# Patient Record
Sex: FEMALE | Race: WHITE | Employment: OTHER | URBAN - METROPOLITAN AREA
[De-identification: names, ages, dates, MRNs, and addresses within clinical notes are randomized per-mention and may not be internally consistent; named-entity substitution may affect disease eponyms.]

---

## 2017-02-07 RX ORDER — GABAPENTIN 300 MG/1
CAPSULE ORAL
Qty: 360 CAPSULE | Refills: 1 | Status: SHIPPED | OUTPATIENT
Start: 2017-02-07 | End: 2017-09-29 | Stop reason: SDUPTHER

## 2017-02-16 ENCOUNTER — TELEPHONE (OUTPATIENT)
Dept: INTERNAL MEDICINE CLINIC | Age: 72
End: 2017-02-16

## 2017-02-16 ENCOUNTER — OFFICE VISIT (OUTPATIENT)
Dept: RHEUMATOLOGY | Age: 72
End: 2017-02-16

## 2017-02-16 VITALS
BODY MASS INDEX: 29.41 KG/M2 | WEIGHT: 193.4 LBS | SYSTOLIC BLOOD PRESSURE: 122 MMHG | DIASTOLIC BLOOD PRESSURE: 74 MMHG | HEART RATE: 80 BPM

## 2017-02-16 DIAGNOSIS — F11.20 CHRONIC NARCOTIC DEPENDENCE (HCC): ICD-10-CM

## 2017-02-16 DIAGNOSIS — M34.9 SCLERODERMA (HCC): Primary | ICD-10-CM

## 2017-02-16 DIAGNOSIS — S33.9XXA SPRAIN AND STRAIN OF LUMBOSACRAL JOINT/LIGAMENT, INITIAL ENCOUNTER: ICD-10-CM

## 2017-02-16 DIAGNOSIS — M15.9 PRIMARY OSTEOARTHRITIS INVOLVING MULTIPLE JOINTS: ICD-10-CM

## 2017-02-16 DIAGNOSIS — M79.7 FIBROMYALGIA: ICD-10-CM

## 2017-02-16 PROCEDURE — G8484 FLU IMMUNIZE NO ADMIN: HCPCS | Performed by: INTERNAL MEDICINE

## 2017-02-16 PROCEDURE — G8427 DOCREV CUR MEDS BY ELIG CLIN: HCPCS | Performed by: INTERNAL MEDICINE

## 2017-02-16 PROCEDURE — 1090F PRES/ABSN URINE INCON ASSESS: CPT | Performed by: INTERNAL MEDICINE

## 2017-02-16 PROCEDURE — 1036F TOBACCO NON-USER: CPT | Performed by: INTERNAL MEDICINE

## 2017-02-16 PROCEDURE — G8420 CALC BMI NORM PARAMETERS: HCPCS | Performed by: INTERNAL MEDICINE

## 2017-02-16 PROCEDURE — 3014F SCREEN MAMMO DOC REV: CPT | Performed by: INTERNAL MEDICINE

## 2017-02-16 PROCEDURE — 4040F PNEUMOC VAC/ADMIN/RCVD: CPT | Performed by: INTERNAL MEDICINE

## 2017-02-16 PROCEDURE — 99213 OFFICE O/P EST LOW 20 MIN: CPT | Performed by: INTERNAL MEDICINE

## 2017-02-16 PROCEDURE — 3017F COLORECTAL CA SCREEN DOC REV: CPT | Performed by: INTERNAL MEDICINE

## 2017-02-16 PROCEDURE — G8399 PT W/DXA RESULTS DOCUMENT: HCPCS | Performed by: INTERNAL MEDICINE

## 2017-02-16 PROCEDURE — 1123F ACP DISCUSS/DSCN MKR DOCD: CPT | Performed by: INTERNAL MEDICINE

## 2017-02-16 RX ORDER — HYDROCODONE BITARTRATE AND ACETAMINOPHEN 7.5; 325 MG/1; MG/1
TABLET ORAL
Qty: 60 TABLET | Refills: 0 | Status: SHIPPED | OUTPATIENT
Start: 2017-03-27 | End: 2017-09-29 | Stop reason: SDUPTHER

## 2017-02-16 RX ORDER — HYDROCODONE BITARTRATE AND ACETAMINOPHEN 7.5; 325 MG/1; MG/1
TABLET ORAL
Qty: 60 TABLET | Refills: 0 | Status: SHIPPED | OUTPATIENT
Start: 2017-02-27 | End: 2017-09-29 | Stop reason: SDUPTHER

## 2017-02-16 RX ORDER — HYDROCODONE BITARTRATE AND ACETAMINOPHEN 7.5; 325 MG/1; MG/1
TABLET ORAL
Qty: 60 TABLET | Refills: 0 | Status: SHIPPED | OUTPATIENT
Start: 2017-04-27 | End: 2017-09-29 | Stop reason: SDUPTHER

## 2017-02-16 RX ORDER — CYCLOBENZAPRINE HCL 10 MG
10 TABLET ORAL 3 TIMES DAILY PRN
Qty: 60 TABLET | Refills: 1 | Status: SHIPPED | OUTPATIENT
Start: 2017-02-16 | End: 2017-02-16 | Stop reason: SDUPTHER

## 2017-02-16 RX ORDER — CYCLOBENZAPRINE HCL 10 MG
10 TABLET ORAL 3 TIMES DAILY PRN
Qty: 60 TABLET | Refills: 1 | Status: SHIPPED | OUTPATIENT
Start: 2017-02-16 | End: 2017-03-14 | Stop reason: SDUPTHER

## 2017-02-22 LAB
6-ACETYLMORPHINE: NOT DETECTED
7-AMINOCLONAZEPAM: NOT DETECTED
ALPHA-OH-ALPRAZOLAM: NOT DETECTED
ALPRAZOLAM: NOT DETECTED
AMPHETAMINE: NOT DETECTED
BARBITURATES: NOT DETECTED
BENZOYLECGONINE: NOT DETECTED
BUPRENORPHINE: NOT DETECTED
CARISOPRODOL: NOT DETECTED
CLONAZEPAM: NOT DETECTED
CODEINE: NOT DETECTED
CREATININE URINE: 124.9 MG/DL (ref 20–400)
DIAZEPAM: NOT DETECTED
DRUGS EXPECTED: NORMAL
EER PAIN MGT DRUG PANEL, HIGH RES/EMIT U: NORMAL
ETHYL GLUCURONIDE: NOT DETECTED
FENTANYL: NOT DETECTED
HYDROCODONE: PRESENT
HYDROMORPHONE: NOT DETECTED
LORAZEPAM: NOT DETECTED
MARIJUANA METABOLITE: NOT DETECTED
MDA: NOT DETECTED
MDEA: NOT DETECTED
MDMA URINE: NOT DETECTED
MEPERIDINE: NOT DETECTED
METHADONE: NOT DETECTED
METHAMPHETAMINE: NOT DETECTED
METHYLPHENIDATE: NOT DETECTED
MIDAZOLAM: NOT DETECTED
MORPHINE: NOT DETECTED
NORBUPRENORPHINE, FREE: NOT DETECTED
NORDIAZEPAM: NOT DETECTED
NORFENTANYL: NOT DETECTED
NORHYDROCODONE, URINE: PRESENT
NOROXYCODONE: NOT DETECTED
NOROXYMORPHONE, URINE: NOT DETECTED
OXAZEPAM: NOT DETECTED
OXYCODONE: NOT DETECTED
OXYMORPHONE: NOT DETECTED
PAIN MANAGEMENT DRUG PANEL: NORMAL
PAIN MANAGEMENT DRUG PANEL: NORMAL
PCP: NOT DETECTED
PHENTERMINE: NOT DETECTED
PROPOXYPHENE: NOT DETECTED
TAPENTADOL, URINE: NOT DETECTED
TAPENTADOL-O-SULFATE, URINE: NOT DETECTED
TEMAZEPAM: NOT DETECTED
TRAMADOL: NOT DETECTED
ZOLPIDEM: NOT DETECTED

## 2017-03-14 DIAGNOSIS — S33.9XXA SPRAIN AND STRAIN OF LUMBOSACRAL JOINT/LIGAMENT, INITIAL ENCOUNTER: ICD-10-CM

## 2017-03-14 RX ORDER — CYCLOBENZAPRINE HCL 10 MG
10 TABLET ORAL 3 TIMES DAILY PRN
Qty: 60 TABLET | Refills: 1 | Status: SHIPPED | OUTPATIENT
Start: 2017-03-14 | End: 2017-03-21 | Stop reason: SDUPTHER

## 2017-03-21 DIAGNOSIS — S33.9XXA SPRAIN AND STRAIN OF LUMBOSACRAL JOINT/LIGAMENT, INITIAL ENCOUNTER: ICD-10-CM

## 2017-03-22 RX ORDER — CYCLOBENZAPRINE HCL 10 MG
10 TABLET ORAL 3 TIMES DAILY PRN
Qty: 270 TABLET | Refills: 0 | Status: SHIPPED | OUTPATIENT
Start: 2017-03-22 | End: 2017-09-29 | Stop reason: SDUPTHER

## 2017-09-29 ENCOUNTER — OFFICE VISIT (OUTPATIENT)
Dept: RHEUMATOLOGY | Age: 72
End: 2017-09-29

## 2017-09-29 VITALS
WEIGHT: 183 LBS | HEART RATE: 93 BPM | SYSTOLIC BLOOD PRESSURE: 127 MMHG | BODY MASS INDEX: 27.83 KG/M2 | DIASTOLIC BLOOD PRESSURE: 54 MMHG

## 2017-09-29 DIAGNOSIS — E55.9 VITAMIN D DEFICIENCY: ICD-10-CM

## 2017-09-29 DIAGNOSIS — Z78.0 POSTMENOPAUSAL STATE: ICD-10-CM

## 2017-09-29 DIAGNOSIS — M34.9 SCLERODERMA (HCC): Primary | ICD-10-CM

## 2017-09-29 DIAGNOSIS — G89.29 ENCOUNTER FOR CHRONIC PAIN MANAGEMENT: ICD-10-CM

## 2017-09-29 DIAGNOSIS — M79.7 FIBROMYALGIA: ICD-10-CM

## 2017-09-29 DIAGNOSIS — S33.9XXA SPRAIN AND STRAIN OF LUMBOSACRAL JOINT/LIGAMENT, INITIAL ENCOUNTER: ICD-10-CM

## 2017-09-29 PROCEDURE — 4040F PNEUMOC VAC/ADMIN/RCVD: CPT | Performed by: INTERNAL MEDICINE

## 2017-09-29 PROCEDURE — G8427 DOCREV CUR MEDS BY ELIG CLIN: HCPCS | Performed by: INTERNAL MEDICINE

## 2017-09-29 PROCEDURE — 3017F COLORECTAL CA SCREEN DOC REV: CPT | Performed by: INTERNAL MEDICINE

## 2017-09-29 PROCEDURE — 99214 OFFICE O/P EST MOD 30 MIN: CPT | Performed by: INTERNAL MEDICINE

## 2017-09-29 PROCEDURE — 1123F ACP DISCUSS/DSCN MKR DOCD: CPT | Performed by: INTERNAL MEDICINE

## 2017-09-29 PROCEDURE — 1090F PRES/ABSN URINE INCON ASSESS: CPT | Performed by: INTERNAL MEDICINE

## 2017-09-29 PROCEDURE — G8399 PT W/DXA RESULTS DOCUMENT: HCPCS | Performed by: INTERNAL MEDICINE

## 2017-09-29 PROCEDURE — 3014F SCREEN MAMMO DOC REV: CPT | Performed by: INTERNAL MEDICINE

## 2017-09-29 PROCEDURE — G8419 CALC BMI OUT NRM PARAM NOF/U: HCPCS | Performed by: INTERNAL MEDICINE

## 2017-09-29 PROCEDURE — 1036F TOBACCO NON-USER: CPT | Performed by: INTERNAL MEDICINE

## 2017-09-29 RX ORDER — CYCLOBENZAPRINE HCL 10 MG
10 TABLET ORAL 3 TIMES DAILY PRN
Qty: 270 TABLET | Refills: 1 | Status: SHIPPED | OUTPATIENT
Start: 2017-09-29 | End: 2018-07-03 | Stop reason: SDUPTHER

## 2017-09-29 RX ORDER — HYDROCODONE BITARTRATE AND ACETAMINOPHEN 7.5; 325 MG/1; MG/1
TABLET ORAL
Qty: 60 TABLET | Refills: 0 | Status: SHIPPED | OUTPATIENT
Start: 2017-09-29 | End: 2017-12-19 | Stop reason: SDUPTHER

## 2017-09-29 RX ORDER — HYDROCODONE BITARTRATE AND ACETAMINOPHEN 7.5; 325 MG/1; MG/1
TABLET ORAL
Qty: 60 TABLET | Refills: 0 | Status: SHIPPED | OUTPATIENT
Start: 2017-10-29 | End: 2017-12-19 | Stop reason: SDUPTHER

## 2017-09-29 RX ORDER — GABAPENTIN 300 MG/1
CAPSULE ORAL
Qty: 360 CAPSULE | Refills: 1 | Status: SHIPPED | OUTPATIENT
Start: 2017-09-29 | End: 2018-07-03 | Stop reason: SDUPTHER

## 2017-09-29 RX ORDER — HYDROCODONE BITARTRATE AND ACETAMINOPHEN 7.5; 325 MG/1; MG/1
TABLET ORAL
Qty: 60 TABLET | Refills: 0 | Status: SHIPPED | OUTPATIENT
Start: 2017-11-29 | End: 2018-07-03 | Stop reason: SDUPTHER

## 2017-10-11 ENCOUNTER — TELEPHONE (OUTPATIENT)
Dept: INTERNAL MEDICINE CLINIC | Age: 72
End: 2017-10-11

## 2017-10-11 NOTE — TELEPHONE ENCOUNTER
Pt called stating she had a message to call Lucho Route from Dr. Uday Alba office. She states it was to go over lab results but I don't see anything in the chart. Please call the pt.

## 2017-10-26 ENCOUNTER — TELEPHONE (OUTPATIENT)
Dept: RHEUMATOLOGY | Age: 72
End: 2017-10-26

## 2017-11-07 ENCOUNTER — TELEPHONE (OUTPATIENT)
Dept: INTERNAL MEDICINE CLINIC | Age: 72
End: 2017-11-07

## 2017-11-07 ENCOUNTER — OFFICE VISIT (OUTPATIENT)
Dept: RHEUMATOLOGY | Age: 72
End: 2017-11-07

## 2017-11-07 VITALS
DIASTOLIC BLOOD PRESSURE: 80 MMHG | BODY MASS INDEX: 28.13 KG/M2 | WEIGHT: 185 LBS | HEART RATE: 82 BPM | SYSTOLIC BLOOD PRESSURE: 120 MMHG

## 2017-11-07 DIAGNOSIS — M79.10 MYALGIA: Primary | ICD-10-CM

## 2017-11-07 PROCEDURE — 1123F ACP DISCUSS/DSCN MKR DOCD: CPT | Performed by: INTERNAL MEDICINE

## 2017-11-07 PROCEDURE — G8427 DOCREV CUR MEDS BY ELIG CLIN: HCPCS | Performed by: INTERNAL MEDICINE

## 2017-11-07 PROCEDURE — 1036F TOBACCO NON-USER: CPT | Performed by: INTERNAL MEDICINE

## 2017-11-07 PROCEDURE — 3014F SCREEN MAMMO DOC REV: CPT | Performed by: INTERNAL MEDICINE

## 2017-11-07 PROCEDURE — 1090F PRES/ABSN URINE INCON ASSESS: CPT | Performed by: INTERNAL MEDICINE

## 2017-11-07 PROCEDURE — 4040F PNEUMOC VAC/ADMIN/RCVD: CPT | Performed by: INTERNAL MEDICINE

## 2017-11-07 PROCEDURE — 99213 OFFICE O/P EST LOW 20 MIN: CPT | Performed by: INTERNAL MEDICINE

## 2017-11-07 PROCEDURE — G8419 CALC BMI OUT NRM PARAM NOF/U: HCPCS | Performed by: INTERNAL MEDICINE

## 2017-11-07 PROCEDURE — 3017F COLORECTAL CA SCREEN DOC REV: CPT | Performed by: INTERNAL MEDICINE

## 2017-11-07 PROCEDURE — G8484 FLU IMMUNIZE NO ADMIN: HCPCS | Performed by: INTERNAL MEDICINE

## 2017-11-07 PROCEDURE — G8399 PT W/DXA RESULTS DOCUMENT: HCPCS | Performed by: INTERNAL MEDICINE

## 2017-11-07 RX ORDER — OMEPRAZOLE 20 MG/1
20 TABLET, DELAYED RELEASE ORAL DAILY
Qty: 30 TABLET | Refills: 3 | Status: SHIPPED | OUTPATIENT
Start: 2017-11-07 | End: 2017-12-19 | Stop reason: ALTCHOICE

## 2017-11-07 RX ORDER — DICLOFENAC SODIUM 75 MG/1
75 TABLET, DELAYED RELEASE ORAL 2 TIMES DAILY
Qty: 60 TABLET | Refills: 4 | Status: SHIPPED | OUTPATIENT
Start: 2017-11-07 | End: 2017-12-19 | Stop reason: ALTCHOICE

## 2017-11-07 RX ORDER — DICLOFENAC SODIUM 75 MG/1
75 TABLET, DELAYED RELEASE ORAL 2 TIMES DAILY
Qty: 60 TABLET | Refills: 1 | Status: SHIPPED | OUTPATIENT
Start: 2017-11-07 | End: 2017-11-07

## 2017-11-07 NOTE — PROGRESS NOTES
Provider, MD     Allergies   Allergen Reactions    Cortisone Other (See Comments)    Pcn [Penicillins]     Scopolamine Sulfate        Past medical/surgical history, medications and allergies are reviewed and updated as appropriate. Subjective:        Review of Systems  CONSTITUTIONAL:    One-time episode of rigors  GI:Denies having any gastrointestinal upset or side effects associated with medications  RESPIRATORY:Stable breathing  CARDIOVASCULAR:  negative for Costochondritis  GI: Stable swallowing. Denies frequent choking episodes. MUSCULOSKELETAL: Diffuse myalgias    severe and much worse  BEHAVIOR/PSYCH:  negative for depressed mood and psychosis. Poor sleep secondary to her joint symptoms  SKIN: Stable Raynaud's      Objective: Wt Readings from Last 3 Encounters:   11/07/17 185 lb (83.9 kg)   09/29/17 183 lb (83 kg)   02/16/17 193 lb 6.4 oz (87.7 kg)       BP Readings from Last 3 Encounters:   11/07/17 120/80   09/29/17 (!) 127/54   02/16/17 122/74     Pulse Readings from Last 3 Encounters:   11/07/17 82   09/29/17 93   02/16/17 80         General:  Awake, alert, NAD. Able to ambulate with assistance of a single prong cane  HEENT: no evidence of any oral ulcers lesions or sores  LUNGS: clear to auscultation bilaterally  CARDIOVASCULAR: regular rate  MUSCULOSKELETAL: No evidence of any synovitis, swelling, warmth, or limitation of motion noted over patient's upper or lower peripheral joints. Able to make a complete fist bilaterally. Intraosseous muscle wasting bilaterally. Full range of motion of bilateral elbows and shoulders with mild tenderness with right greater than left internal rotation. SKIN: no evidence of any morphea, skin hardening over bilateral hands neck or back. Noted scars involving the left lower leg.      Labs:         Chemistry        Component Value Date/Time     02/25/2015 1201    K 4.5 02/25/2015 1201    CL 99 02/25/2015 1201    CO2 30 02/25/2015 1201    BUN 22 02/25/2015 1201    CREATININE 0.90 02/25/2015 1201        Component Value Date/Time    CALCIUM 9.9 02/25/2015 1201    ALKPHOS 66 01/06/2015 1520    AST 40 (H) 01/06/2015 1520    ALT 52 (H) 01/06/2015 1520    BILITOT 0.6 01/06/2015 1520        Lab Results   Component Value Date    WBC 12.9 (H) 01/06/2015    HGB 14.0 01/06/2015    HCT 43.1 01/06/2015    MCV 91.4 01/06/2015     01/06/2015     Results for Edwardo Barger (MRN <[id number]>) as of 5/27/2014 16:08   Ref. Range 2/27/2014 16:03   Angio Convert Enzyme Latest Range: 9-67 U/L 70 (H)   Total CK Latest Range:  U/L 180     Results for Edwardo Barger (MRN <[id number]>) as of 5/27/2014 16:08   Ref. Range 2/27/2014 16:03   CHRISTOFER Latest Range: Negative  POSITIVE (A)   CHRISTOFER TITER No range found 1:160   ANCA IFA Latest Range: <1:20  <1:20   CHRISTOFER Interpretation No range found see below   CHRISTOFER Pattern 1 No range found Centromere         Assessment/Plan:      Assessment/Plan:  Lenny Worrell was seen today for follow-up. Diagnoses and all orders for this visit:    Myalgia- question if this may be a viral illness versus medication-induced side effect from the Bactrim. Patient is allergic to prednisone. Plan will be to temporarily start her on diclofenac for her to use the Prilosec given her significant reflux symptoms. Risks, side effects and warning signs were fully discussed with patient. Reading information was given to patient to review. She was advised to only use the diclofenac for the next 14 days. In addition, she was advised to call her PCP if her symptoms worsened or if she developed signs of an infection.     -     Discontinue: diclofenac (VOLTAREN) 75 MG EC tablet; Take 1 tablet by mouth 2 times daily  -     diclofenac (VOLTAREN) 75 MG EC tablet; Take 1 tablet by mouth 2 times daily  -     omeprazole (PRILOSEC OTC) 20 MG tablet; Take 1 tablet by mouth daily      Return as Scheduled.       The risks and benefits of my recommendations, as well as other

## 2017-12-19 ENCOUNTER — OFFICE VISIT (OUTPATIENT)
Dept: RHEUMATOLOGY | Age: 72
End: 2017-12-19

## 2017-12-19 VITALS
SYSTOLIC BLOOD PRESSURE: 118 MMHG | RESPIRATION RATE: 14 BRPM | BODY MASS INDEX: 28.55 KG/M2 | DIASTOLIC BLOOD PRESSURE: 68 MMHG | HEART RATE: 84 BPM | WEIGHT: 187.8 LBS

## 2017-12-19 DIAGNOSIS — G89.29 ENCOUNTER FOR CHRONIC PAIN MANAGEMENT: ICD-10-CM

## 2017-12-19 DIAGNOSIS — M79.7 FIBROMYALGIA: ICD-10-CM

## 2017-12-19 DIAGNOSIS — M34.9 SCLERODERMA (HCC): Primary | ICD-10-CM

## 2017-12-19 PROCEDURE — G8484 FLU IMMUNIZE NO ADMIN: HCPCS | Performed by: INTERNAL MEDICINE

## 2017-12-19 PROCEDURE — 1036F TOBACCO NON-USER: CPT | Performed by: INTERNAL MEDICINE

## 2017-12-19 PROCEDURE — 4040F PNEUMOC VAC/ADMIN/RCVD: CPT | Performed by: INTERNAL MEDICINE

## 2017-12-19 PROCEDURE — G8419 CALC BMI OUT NRM PARAM NOF/U: HCPCS | Performed by: INTERNAL MEDICINE

## 2017-12-19 PROCEDURE — 99213 OFFICE O/P EST LOW 20 MIN: CPT | Performed by: INTERNAL MEDICINE

## 2017-12-19 PROCEDURE — 3017F COLORECTAL CA SCREEN DOC REV: CPT | Performed by: INTERNAL MEDICINE

## 2017-12-19 PROCEDURE — 1123F ACP DISCUSS/DSCN MKR DOCD: CPT | Performed by: INTERNAL MEDICINE

## 2017-12-19 PROCEDURE — G8399 PT W/DXA RESULTS DOCUMENT: HCPCS | Performed by: INTERNAL MEDICINE

## 2017-12-19 PROCEDURE — G8427 DOCREV CUR MEDS BY ELIG CLIN: HCPCS | Performed by: INTERNAL MEDICINE

## 2017-12-19 PROCEDURE — 3014F SCREEN MAMMO DOC REV: CPT | Performed by: INTERNAL MEDICINE

## 2017-12-19 PROCEDURE — 1090F PRES/ABSN URINE INCON ASSESS: CPT | Performed by: INTERNAL MEDICINE

## 2017-12-19 RX ORDER — HYDROCODONE BITARTRATE AND ACETAMINOPHEN 7.5; 325 MG/1; MG/1
TABLET ORAL
Qty: 60 TABLET | Refills: 0 | Status: SHIPPED | OUTPATIENT
Start: 2018-01-29 | End: 2018-04-03 | Stop reason: SDUPTHER

## 2017-12-19 RX ORDER — HYDROCODONE BITARTRATE AND ACETAMINOPHEN 7.5; 325 MG/1; MG/1
TABLET ORAL
Qty: 60 TABLET | Refills: 0 | Status: SHIPPED | OUTPATIENT
Start: 2017-12-29 | End: 2018-04-03 | Stop reason: SDUPTHER

## 2017-12-19 RX ORDER — HYDROCODONE BITARTRATE AND ACETAMINOPHEN 7.5; 325 MG/1; MG/1
TABLET ORAL
Qty: 60 TABLET | Refills: 0 | Status: SHIPPED | OUTPATIENT
Start: 2018-02-28 | End: 2018-04-03 | Stop reason: SDUPTHER

## 2017-12-19 NOTE — PROGRESS NOTES
[facility]) Physicians  [facility]  Rheumatology Progress Note  Jadiel Miles MD           [ X ] [facility] Rheumatology        [ ] [facility] Rheumatology                                      [address], [address]      [phone]                [phone]      Fax: [phone]                 Fax: [phone]           _______________________________________________    Patient Name:  Alana Cheng is a 74 y.o. patient     Chief Complaint:     Returns today for follow-up of her scleroderma, fibromyalgia and for her chronic pain management. Since last seen in general she has been stable on current medication. She remains off of all medications for her scleroderma. Her breathing symptoms gastrointestinal symptoms have both been stable. She remarks that she has not been able to keep up her appointments with Dr. Arjun Galindo secondary to her sister's illness and her increased responsibilities. But she's not getting short of breath as often as she had. Denies any skin changes or hardening. With the cooler weather she's noticing increase in her Raynauds symptoms. Denies having any ulcerations. She is able to take care of her ADLs without any assistance. Chronically has diffuse myalgias but denies any new swelling of her joints. From where she's had surgery involving her left lower extremity she chronically has some mild swelling involving her left knee leg and ankle. No recent laboratory studies have been obtained. Chronically she remains on Norco taken dailytwice a day to help her with her chronic pain management. Past medical history, surgical history, and medications were reviewed and updated accordingly.        Past Medical History:   Diagnosis Date    Diabetes mellitus ([address]     Fatty liver     Severe    Fibromyalgia     GERD (gastroesophageal reflux disease)     Hypertension     IBS (irritable bowel syndrome)     Osteoarthritis     Peripheral neuropathy (HCC)     Scleroderma ([facility] [address]     crest     Past Surgical History:   Procedure Laterality Date    ARTHROPLASTY  08/10/2012    FIFTH TOE LEFT FOOT    CHOLECYSTECTOMY  1989    FIBULA FRACTURE SURGERY  2017    3 separate surgeries for a fractured left fibula tibial a closed done by Dr. Jayson Khan  2007    THROAT SURGERY  2012    vocal cord polyp           possibly  Prior to Admission medications    Medication Sig Start Date End Date Taking? Authorizing Provider   Biotin (BIOTIN 5000) 5 MG CAPS Take  by mouth daily. Yes Historical Provider, MD   hydroxychloroquine (PLAQUENIL) 200 MG tablet Take 1 tablet by mouth 2 times daily. 7/24/12 7/24/13 Yes Carleen Gomez MD   Naproxen-Esomeprazole (VIMOVO) 500-20 MG TBEC Take 500 mg by mouth 2 times daily (with meals). 5/24/12  Yes Carleen Gomez MD   GLIPIZIDE Take 5 mg by mouth. Yes Historical Provider, MD   losartan-hydrochlorothiazide (HYZAAR) 50-12.5 MG per tablet Take 1 tablet by mouth daily. Yes Historical Provider, MD   gabapentin (NEURONTIN) 100 MG capsule Take 200 mg by mouth 2 times daily. 200mg in am and 300 in pm    Yes Historical Provider, MD   potassium chloride (KLOR-CON 10) 10 MEQ CR tablet Take 10 mEq by mouth daily. Yes Historical Provider, MD   trazodone (DESYREL) 50 MG tablet Take 50 mg by mouth nightly. Yes Historical Provider, MD   paroxetine (PAXIL) 20 MG tablet Take 20 mg by mouth every morning. Yes Historical Provider, MD   esomeprazole (NEXIUM) 40 MG capsule Take 40 mg by mouth every morning (before breakfast). Yes Historical Provider, MD   hydrocodone-acetaminophen (VICODIN) 5-500 MG per tablet Take 1 tablet by mouth every 8 hours as needed. Yes Historical Provider, MD   aspirin 81 MG chewable tablet Take 81 mg by mouth daily.      Yes Historical MG per tablet; Take 1 tab every 12 hrs prn for pain. Earliest Fill Date: 1/29/18  -     HYDROcodone-acetaminophen (NORCO) 7.5-325 MG per tablet; Take 1 tab every 12 hrs prn for pain. Beatriz Diaz Date: 12/29/17  -     HYDROcodone-acetaminophen (Karole Dakins) 7.5-325 MG per tablet; Take 1 tab every 12 hrs prn for pain. Earliest Fill Date: 2/28/18  -     Controlled Substances Monitoring:     Attestation: The Prescription Monitoring Report for this patient was reviewed today. Justin Dickinson MD)  Documentation: No signs of potential drug abuse or diversion identified. Justin Dickinson MD)  Chronic Pain: Treatment objectives documented - patient is progressing appropriately. Justin Dickinson MD)         Return in about 90 days (around 3/19/2018). The risks and benefits of my recommendations, as well as other treatment options, benefits and side effects were discussed with the patient today. Questions were answered. [address] [facility]  [facility] and Rheumatology  [address] [place] [address] Dr Rodriguez S [address]  [facility]:[phone]  [facility]:[phone]    NOTE: This report is transcribed by using voice recognition software dragon. Every effort is made to ensure accuracy; however, inadvertent computerized transcription errors may be present.

## 2018-04-03 ENCOUNTER — OFFICE VISIT (OUTPATIENT)
Dept: RHEUMATOLOGY | Age: 73
End: 2018-04-03

## 2018-04-03 VITALS
SYSTOLIC BLOOD PRESSURE: 138 MMHG | BODY MASS INDEX: 29.22 KG/M2 | DIASTOLIC BLOOD PRESSURE: 82 MMHG | WEIGHT: 192.2 LBS | HEART RATE: 102 BPM

## 2018-04-03 DIAGNOSIS — M79.7 FIBROMYALGIA: ICD-10-CM

## 2018-04-03 DIAGNOSIS — G89.29 ENCOUNTER FOR CHRONIC PAIN MANAGEMENT: ICD-10-CM

## 2018-04-03 DIAGNOSIS — M34.9 SCLERODERMA (HCC): ICD-10-CM

## 2018-04-03 DIAGNOSIS — M34.9 SCLERODERMA (HCC): Primary | ICD-10-CM

## 2018-04-03 LAB
ALBUMIN SERPL-MCNC: 4.6 G/DL (ref 3.4–5)
ALP BLD-CCNC: 78 U/L (ref 40–129)
ALT SERPL-CCNC: 49 U/L (ref 10–40)
AST SERPL-CCNC: 49 U/L (ref 15–37)
BACTERIA: ABNORMAL /HPF
BILIRUB SERPL-MCNC: 0.5 MG/DL (ref 0–1)
BILIRUBIN DIRECT: <0.2 MG/DL (ref 0–0.3)
BILIRUBIN URINE: NEGATIVE
BILIRUBIN, INDIRECT: ABNORMAL MG/DL (ref 0–1)
BLOOD, URINE: NEGATIVE
C-REACTIVE PROTEIN: 4.5 MG/L (ref 0–5.1)
CLARITY: CLEAR
COLOR: YELLOW
CREAT SERPL-MCNC: 0.9 MG/DL (ref 0.6–1.2)
EPITHELIAL CELLS, UA: 2 /HPF (ref 0–5)
GFR AFRICAN AMERICAN: >60
GFR NON-AFRICAN AMERICAN: >60
GLUCOSE URINE: 100 MG/DL
HCT VFR BLD CALC: 41.4 % (ref 36–48)
HEMOGLOBIN: 13.8 G/DL (ref 12–16)
HYALINE CASTS: 0 /LPF (ref 0–8)
KETONES, URINE: NEGATIVE MG/DL
LEUKOCYTE ESTERASE, URINE: ABNORMAL
MCH RBC QN AUTO: 29.8 PG (ref 26–34)
MCHC RBC AUTO-ENTMCNC: 33.5 G/DL (ref 31–36)
MCV RBC AUTO: 89.1 FL (ref 80–100)
MICROSCOPIC EXAMINATION: YES
NITRITE, URINE: NEGATIVE
PDW BLD-RTO: 14.2 % (ref 12.4–15.4)
PH UA: 7
PLATELET # BLD: 193 K/UL (ref 135–450)
PMV BLD AUTO: 9.5 FL (ref 5–10.5)
PROTEIN UA: NEGATIVE MG/DL
RBC # BLD: 4.64 M/UL (ref 4–5.2)
RBC UA: ABNORMAL /HPF (ref 0–2)
RHEUMATOID FACTOR: <10 IU/ML
SPECIFIC GRAVITY UA: 1.02
TOTAL PROTEIN: 7.2 G/DL (ref 6.4–8.2)
URINE TYPE: ABNORMAL
UROBILINOGEN, URINE: 0.2 E.U./DL
WBC # BLD: 10.6 K/UL (ref 4–11)
WBC UA: 9 /HPF (ref 0–5)

## 2018-04-03 PROCEDURE — G8427 DOCREV CUR MEDS BY ELIG CLIN: HCPCS | Performed by: INTERNAL MEDICINE

## 2018-04-03 PROCEDURE — 1036F TOBACCO NON-USER: CPT | Performed by: INTERNAL MEDICINE

## 2018-04-03 PROCEDURE — G8399 PT W/DXA RESULTS DOCUMENT: HCPCS | Performed by: INTERNAL MEDICINE

## 2018-04-03 PROCEDURE — 4040F PNEUMOC VAC/ADMIN/RCVD: CPT | Performed by: INTERNAL MEDICINE

## 2018-04-03 PROCEDURE — 1090F PRES/ABSN URINE INCON ASSESS: CPT | Performed by: INTERNAL MEDICINE

## 2018-04-03 PROCEDURE — 3017F COLORECTAL CA SCREEN DOC REV: CPT | Performed by: INTERNAL MEDICINE

## 2018-04-03 PROCEDURE — 1123F ACP DISCUSS/DSCN MKR DOCD: CPT | Performed by: INTERNAL MEDICINE

## 2018-04-03 PROCEDURE — 99214 OFFICE O/P EST MOD 30 MIN: CPT | Performed by: INTERNAL MEDICINE

## 2018-04-03 PROCEDURE — G8419 CALC BMI OUT NRM PARAM NOF/U: HCPCS | Performed by: INTERNAL MEDICINE

## 2018-04-03 RX ORDER — HYDROCODONE BITARTRATE AND ACETAMINOPHEN 7.5; 325 MG/1; MG/1
TABLET ORAL
Qty: 60 TABLET | Refills: 0 | Status: SHIPPED | OUTPATIENT
Start: 2018-06-02 | End: 2018-07-03 | Stop reason: SDUPTHER

## 2018-04-03 RX ORDER — HYDROCODONE BITARTRATE AND ACETAMINOPHEN 7.5; 325 MG/1; MG/1
TABLET ORAL
Qty: 60 TABLET | Refills: 0 | Status: SHIPPED | OUTPATIENT
Start: 2018-05-03 | End: 2018-07-03 | Stop reason: SDUPTHER

## 2018-04-03 RX ORDER — HYDROCODONE BITARTRATE AND ACETAMINOPHEN 7.5; 325 MG/1; MG/1
TABLET ORAL
Qty: 60 TABLET | Refills: 0 | Status: SHIPPED | OUTPATIENT
Start: 2018-04-03 | End: 2018-05-03

## 2018-04-04 LAB
ENA TO RNP ANTIBODY: NEGATIVE EU
ENA TO SMITH (SM) ANTIBODY: NEGATIVE EU
ENA TO SSA (RO) ANTIBODY: NEGATIVE EU
ENA TO SSB (LA) ANTIBODY: NEGATIVE EU

## 2018-04-05 ENCOUNTER — TELEPHONE (OUTPATIENT)
Dept: CARDIOLOGY CLINIC | Age: 73
End: 2018-04-05

## 2018-04-05 DIAGNOSIS — M34.9 SCLERODERMA (HCC): Chronic | ICD-10-CM

## 2018-04-05 DIAGNOSIS — R06.02 SOB (SHORTNESS OF BREATH): Chronic | ICD-10-CM

## 2018-04-05 DIAGNOSIS — I27.21 PAH (PULMONARY ARTERY HYPERTENSION) (HCC): Primary | Chronic | ICD-10-CM

## 2018-04-05 LAB
CCP IGG ANTIBODIES: 3 UNITS (ref 0–19)
CENTROMERE AB IGG: 122 AU/ML (ref 0–40)
SCLERODERMA (SCL-70) AB: 0 AU/ML (ref 0–40)

## 2018-04-06 LAB
ANA INTERPRETATION: ABNORMAL
ANA PATTERN: ABNORMAL
ANA TITER: ABNORMAL
ANTI-NUCLEAR ANTIBODY (ANA): POSITIVE
DOUBLE STRANDED DNA AB, IGG: NORMAL
PATHOLOGIST: ABNORMAL

## 2018-04-13 ENCOUNTER — HOSPITAL ENCOUNTER (OUTPATIENT)
Dept: PULMONOLOGY | Age: 73
Discharge: HOME | End: 2018-04-13
Attending: INTERNAL MEDICINE | Admitting: INTERNAL MEDICINE

## 2018-04-13 VITALS — HEART RATE: 89 BPM | RESPIRATION RATE: 18 BRPM | OXYGEN SATURATION: 96 %

## 2018-04-13 DIAGNOSIS — M34.9 SYSTEMIC SCLEROSIS (HCC): ICD-10-CM

## 2018-04-13 DIAGNOSIS — M34.9 SCLERODERMA (HCC): ICD-10-CM

## 2018-04-13 RX ORDER — ALBUTEROL SULFATE 90 UG/1
4 AEROSOL, METERED RESPIRATORY (INHALATION) ONCE
Status: COMPLETED | OUTPATIENT
Start: 2018-04-13 | End: 2018-04-13

## 2018-04-13 RX ADMIN — ALBUTEROL SULFATE 4 PUFF: 90 AEROSOL, METERED RESPIRATORY (INHALATION) at 15:38

## 2018-05-31 ENCOUNTER — HOSPITAL ENCOUNTER (OUTPATIENT)
Dept: NON INVASIVE DIAGNOSTICS | Age: 73
Discharge: HOME | End: 2018-05-31
Attending: INTERNAL MEDICINE | Admitting: INTERNAL MEDICINE

## 2018-05-31 ENCOUNTER — OFFICE VISIT (OUTPATIENT)
Dept: CARDIOLOGY CLINIC | Age: 73
End: 2018-05-31

## 2018-05-31 VITALS
SYSTOLIC BLOOD PRESSURE: 118 MMHG | DIASTOLIC BLOOD PRESSURE: 70 MMHG | HEIGHT: 67 IN | HEART RATE: 88 BPM | WEIGHT: 189 LBS | BODY MASS INDEX: 29.66 KG/M2

## 2018-05-31 DIAGNOSIS — R06.02 SOB (SHORTNESS OF BREATH): Primary | Chronic | ICD-10-CM

## 2018-05-31 DIAGNOSIS — M34.9 SYSTEMIC SCLEROSIS (HCC): ICD-10-CM

## 2018-05-31 DIAGNOSIS — M34.9 SCLERODERMA (HCC): Chronic | ICD-10-CM

## 2018-05-31 LAB
LV EF: 55 %
LVEF MODALITY: NORMAL

## 2018-05-31 PROCEDURE — 1036F TOBACCO NON-USER: CPT | Performed by: INTERNAL MEDICINE

## 2018-05-31 PROCEDURE — 3017F COLORECTAL CA SCREEN DOC REV: CPT | Performed by: INTERNAL MEDICINE

## 2018-05-31 PROCEDURE — 1090F PRES/ABSN URINE INCON ASSESS: CPT | Performed by: INTERNAL MEDICINE

## 2018-05-31 PROCEDURE — G8427 DOCREV CUR MEDS BY ELIG CLIN: HCPCS | Performed by: INTERNAL MEDICINE

## 2018-05-31 PROCEDURE — G8419 CALC BMI OUT NRM PARAM NOF/U: HCPCS | Performed by: INTERNAL MEDICINE

## 2018-05-31 PROCEDURE — 99214 OFFICE O/P EST MOD 30 MIN: CPT | Performed by: INTERNAL MEDICINE

## 2018-05-31 PROCEDURE — 1123F ACP DISCUSS/DSCN MKR DOCD: CPT | Performed by: INTERNAL MEDICINE

## 2018-05-31 PROCEDURE — G8399 PT W/DXA RESULTS DOCUMENT: HCPCS | Performed by: INTERNAL MEDICINE

## 2018-05-31 PROCEDURE — 4040F PNEUMOC VAC/ADMIN/RCVD: CPT | Performed by: INTERNAL MEDICINE

## 2018-05-31 RX ORDER — OXYBUTYNIN CHLORIDE 15 MG/1
15 TABLET, EXTENDED RELEASE ORAL DAILY
COMMUNITY
End: 2018-07-03

## 2018-07-03 ENCOUNTER — TELEPHONE (OUTPATIENT)
Dept: INTERNAL MEDICINE CLINIC | Age: 73
End: 2018-07-03

## 2018-07-03 ENCOUNTER — OFFICE VISIT (OUTPATIENT)
Dept: RHEUMATOLOGY | Age: 73
End: 2018-07-03

## 2018-07-03 VITALS
DIASTOLIC BLOOD PRESSURE: 78 MMHG | BODY MASS INDEX: 29.63 KG/M2 | WEIGHT: 189.2 LBS | SYSTOLIC BLOOD PRESSURE: 140 MMHG | HEART RATE: 74 BPM

## 2018-07-03 DIAGNOSIS — S33.9XXA SPRAIN AND STRAIN OF LUMBOSACRAL JOINT/LIGAMENT, INITIAL ENCOUNTER: ICD-10-CM

## 2018-07-03 DIAGNOSIS — M34.9 SCLERODERMA (HCC): Primary | ICD-10-CM

## 2018-07-03 DIAGNOSIS — G89.29 ENCOUNTER FOR CHRONIC PAIN MANAGEMENT: ICD-10-CM

## 2018-07-03 DIAGNOSIS — M34.9 SCLERODERMA (HCC): ICD-10-CM

## 2018-07-03 DIAGNOSIS — M79.7 FIBROMYALGIA: ICD-10-CM

## 2018-07-03 DIAGNOSIS — T81.82XD SUBCUTANEOUS EMPHYSEMA RESULTING FROM A PROCEDURE, SUBSEQUENT ENCOUNTER: ICD-10-CM

## 2018-07-03 PROCEDURE — G8427 DOCREV CUR MEDS BY ELIG CLIN: HCPCS | Performed by: INTERNAL MEDICINE

## 2018-07-03 PROCEDURE — 1036F TOBACCO NON-USER: CPT | Performed by: INTERNAL MEDICINE

## 2018-07-03 PROCEDURE — 1090F PRES/ABSN URINE INCON ASSESS: CPT | Performed by: INTERNAL MEDICINE

## 2018-07-03 PROCEDURE — G8419 CALC BMI OUT NRM PARAM NOF/U: HCPCS | Performed by: INTERNAL MEDICINE

## 2018-07-03 PROCEDURE — 1123F ACP DISCUSS/DSCN MKR DOCD: CPT | Performed by: INTERNAL MEDICINE

## 2018-07-03 PROCEDURE — G8399 PT W/DXA RESULTS DOCUMENT: HCPCS | Performed by: INTERNAL MEDICINE

## 2018-07-03 PROCEDURE — 4040F PNEUMOC VAC/ADMIN/RCVD: CPT | Performed by: INTERNAL MEDICINE

## 2018-07-03 PROCEDURE — 3017F COLORECTAL CA SCREEN DOC REV: CPT | Performed by: INTERNAL MEDICINE

## 2018-07-03 PROCEDURE — 99214 OFFICE O/P EST MOD 30 MIN: CPT | Performed by: INTERNAL MEDICINE

## 2018-07-03 RX ORDER — HYDROXYCHLOROQUINE SULFATE 200 MG/1
200 TABLET, FILM COATED ORAL 2 TIMES DAILY
Qty: 60 TABLET | Refills: 11 | Status: SHIPPED | OUTPATIENT
Start: 2018-07-03 | End: 2018-07-03 | Stop reason: SDUPTHER

## 2018-07-03 RX ORDER — HYDROCODONE BITARTRATE AND ACETAMINOPHEN 7.5; 325 MG/1; MG/1
TABLET ORAL
Qty: 60 TABLET | Refills: 0 | Status: SHIPPED | OUTPATIENT
Start: 2018-09-02 | End: 2018-09-27 | Stop reason: SDUPTHER

## 2018-07-03 RX ORDER — GABAPENTIN 300 MG/1
CAPSULE ORAL
Qty: 360 CAPSULE | Refills: 1 | Status: SHIPPED | OUTPATIENT
Start: 2018-07-03 | End: 2018-07-03 | Stop reason: SDUPTHER

## 2018-07-03 RX ORDER — HYDROXYCHLOROQUINE SULFATE 200 MG/1
200 TABLET, FILM COATED ORAL 2 TIMES DAILY
Qty: 60 TABLET | Refills: 11 | Status: SHIPPED | OUTPATIENT
Start: 2018-07-03 | End: 2018-07-03

## 2018-07-03 RX ORDER — HYDROCODONE BITARTRATE AND ACETAMINOPHEN 7.5; 325 MG/1; MG/1
TABLET ORAL
Qty: 60 TABLET | Refills: 0 | Status: SHIPPED | OUTPATIENT
Start: 2018-08-02 | End: 2018-08-02

## 2018-07-03 RX ORDER — HYDROCODONE BITARTRATE AND ACETAMINOPHEN 7.5; 325 MG/1; MG/1
TABLET ORAL
Qty: 60 TABLET | Refills: 0 | Status: SHIPPED | OUTPATIENT
Start: 2018-07-03 | End: 2018-09-27 | Stop reason: SDUPTHER

## 2018-07-03 RX ORDER — HYDROXYCHLOROQUINE SULFATE 200 MG/1
200 TABLET, FILM COATED ORAL 2 TIMES DAILY
Qty: 60 TABLET | Refills: 11 | Status: SHIPPED | OUTPATIENT
Start: 2018-07-03 | End: 2018-07-05 | Stop reason: SDUPTHER

## 2018-07-03 RX ORDER — CYCLOBENZAPRINE HCL 10 MG
10 TABLET ORAL 3 TIMES DAILY PRN
Qty: 270 TABLET | Refills: 1 | Status: SHIPPED | OUTPATIENT
Start: 2018-07-03 | End: 2019-04-14 | Stop reason: SDUPTHER

## 2018-07-03 RX ORDER — CYCLOBENZAPRINE HCL 10 MG
10 TABLET ORAL 3 TIMES DAILY PRN
Qty: 270 TABLET | Refills: 1 | Status: SHIPPED | OUTPATIENT
Start: 2018-07-03 | End: 2018-07-03 | Stop reason: SDUPTHER

## 2018-07-03 RX ORDER — GABAPENTIN 300 MG/1
CAPSULE ORAL
Qty: 360 CAPSULE | Refills: 1 | Status: SHIPPED | OUTPATIENT
Start: 2018-07-03 | End: 2018-12-07 | Stop reason: SDUPTHER

## 2018-07-03 NOTE — PROGRESS NOTES
Fibromyalgia     GERD (gastroesophageal reflux disease)     Hypertension     IBS (irritable bowel syndrome)     Osteoarthritis     Peripheral neuropathy (HCC)     Scleroderma ([facility] [address]     crest     Past Surgical History:   Procedure Laterality Date    ARTHROPLASTY  08/10/2012    FIFTH TOE LEFT FOOT    CHOLECYSTECTOMY  1989    FIBULA FRACTURE SURGERY  2017    3 separate surgeries for a fractured left fibula tibial a closed done by Dr. Souleymane Cannon  2007    THROAT SURGERY  2012    vocal cord polyp           possibly  Prior to Admission medications    Medication Sig Start Date End Date Taking? Authorizing Provider   Biotin (BIOTIN 5000) 5 MG CAPS Take  by mouth daily. Yes Historical Provider, MD   hydroxychloroquine (PLAQUENIL) 200 MG tablet Take 1 tablet by mouth 2 times daily. 7/24/12 7/24/13 Yes Monte Barthel, MD   Naproxen-Esomeprazole (VIMOVO) 500-20 MG TBEC Take 500 mg by mouth 2 times daily (with meals). 5/24/12  Yes Monte Barthel, MD   GLIPIZIDE Take 5 mg by mouth. Yes Historical Provider, MD   losartan-hydrochlorothiazide (HYZAAR) 50-12.5 MG per tablet Take 1 tablet by mouth daily. Yes Historical Provider, MD   gabapentin (NEURONTIN) 100 MG capsule Take 200 mg by mouth 2 times daily. 200mg in am and 300 in pm    Yes Historical Provider, MD   potassium chloride (KLOR-CON 10) 10 MEQ CR tablet Take 10 mEq by mouth daily. Yes Historical Provider, MD   trazodone (DESYREL) 50 MG tablet Take 50 mg by mouth nightly. Yes Historical Provider, MD   paroxetine (PAXIL) 20 MG tablet Take 20 mg by mouth every morning. Yes Historical Provider, MD   esomeprazole (NEXIUM) 40 MG capsule Take 40 mg by mouth every morning (before breakfast). Yes Historical Provider, MD   hydrocodone-acetaminophen (VICODIN) 5-500 MG per tablet Take 1 tablet by mouth every 8 hours as needed. Yes Historical Provider, MD   aspirin 81 MG chewable tablet Take 81 mg by mouth daily. tablet; Take 1 tab every 12 hrs prn for pain. Earliest Fill Date: 8/2/18  -     HYDROcodone-acetaminophen (NORCO) 7.5-325 MG per tablet; Take 1 tab every 12 hrs prn for pain. Gisselle Mew Date: 9/2/18  -     hydroxychloroquine (PLAQUENIL) 200 MG tablet; Take 1 tablet by mouth 2 times daily  -     hydroxychloroquine (PLAQUENIL) 200 MG tablet; Take 1 tablet by mouth 2 times daily    Fibromyalgia- continue with gabapentin. Encounter for chronic pain management   Controlled Substances Monitoring:     RX Monitoring 7/3/2018   Attestation The Prescription Monitoring Report was requested today but not available. Documentation No signs of potential drug abuse or diversion identified. Chronic Pain Reviewed the patient's functional status and documentation. Medication Contracts Existing medication contract.       -     HYDROcodone-acetaminophen (NORCO) 7.5-325 MG per tablet; Take 1 tab every 12 hrs prn for pain. Take 1 tab every 12 hrs prn for pain. .. Earliest Fill Date: 7/3/18  -     HYDROcodone-acetaminophen (NORCO) 7.5-325 MG per tablet; Take 1 tab every 12 hrs prn for pain. Earliest Fill Date: 8/2/18  -     HYDROcodone-acetaminophen (NORCO) 7.5-325 MG per tablet; Take 1 tab every 12 hrs prn for pain. Gisselle [facility] Date: 9/2/18    Sprain and strain of lumbosacral joint/ligament, initial encounter- plan on continuing Flexeril. If her insurance denies it then we'll consider time tizanidine. Has tried and failed baclofen per patient. -     cyclobenzaprine (FLEXERIL) 10 MG tablet; Take 1 tablet by mouth 3 times daily as needed for Muscle spasms  -     gabapentin (NEURONTIN) 300 MG capsule; TAKE 1 CAPSULE IN THE MORNING & AFTERNOON AND 2 CAPSULES AT BEDTIME. .    Subcutaneous emphysema resulting from a procedure, subsequent encounter- referral given for her to see Dr. Vasquez Patel regarding her emphysematous changes and possible aspiration pneumonia. Guarded prognosis.     -     External Referral To Pulmonology       Return in about 90 days (around 10/1/2018). The risks and benefits of my recommendations, as well as other treatment options, benefits and side effects were discussed with the patient today. Questions were answered. [address] [facility]  [facility] and Rheumatology  [address] [place] [address] [address] , [address] [place] [address]  YYDCD:[phone]  PUL:[phone]    NOTE: This report is transcribed by using voice recognition software dragon. Every effort is made to ensure accuracy; however, inadvertent computerized transcription errors may be present.            Referring physicians:  Dr. Radha Wang

## 2018-07-03 NOTE — PATIENT INSTRUCTIONS
Plaquenil instructions: Take Plaquenil one tablet daily for the first week. If well tolerated, then take one tablet twice a day. Hydroxychloroquine       Pronunciation: hernesto staley   Brand: Plaquenil Sulfate   What is hydroxychloroquine? Hydroxychloroquine is used to treat or prevent malaria, a disease caused by parasites that enter the body through the bite of a mosquito. Malaria is common in areas such as [place], [place], and [place]. Hydroxychloroquine is also used to treat symptoms of rheumatoid arthritis and discoid or systemic lupus erythematosus. Hydroxychloroquine may also be used for purposes not listed in this medication guide. What should I discuss with my health care provider before taking hydroxychloroquine? You should not use this medication if you are allergic to hydroxychloroquine, or if you have a history of vision changes or damage to your retina caused by an anti-malaria medication. Hydroxychloroquine should not be used for long-term treatment in children. To make sure hydroxychloroquine is safe for you, tell your doctor if you have any of these conditions:   psoriasis;   porphyria;   liver disease;   alcoholism; or   glucose-6-phosphate dehydrogenase (G-6-PD) deficiency. It is not known whether hydroxychloroquine will harm an unborn baby. Tell your doctor if you are pregnant or plan to become pregnant while using this medication. Malaria is more likely to cause death in a pregnant woman. If you are pregnant, talk with your doctor about the risks of traveling to areas where malaria is common. It is not known whether hydroxychloroquine passes into breast milk or if it could harm a nursing baby. Do not use this medication without telling your doctor if you are breast-feeding a baby. How should I take hydroxychloroquine? Take exactly as prescribed by your doctor. Do not take in larger or smaller amounts or for longer than recommended.  Follow the directions on your prescription label. Take hydroxychloroquine with a meal or a glass of milk, unless your doctor tells you otherwise. Hydroxychloroquine is sometimes given only once per week. Choose the same day each week to take this medication if you are on a weekly dosing schedule. To prevent malaria: Start taking the medicine 2 weeks before entering an area where malaria is common. Continue taking the medicine regularly during your stay and for at least 8 weeks after you leave the area. To treat malaria: Your doctor may recommend a single dose, or a high starting dose followed by a smaller dose during the last 2 days of treatment. Follow your doctor's instructions. Take this medicine for the full prescribed length of time for malaria. Your symptoms may improve before the infection is completely cleared. In addition to taking hydroxychloroquine, use protective clothing, insect repellents, and mosquito netting around your bed to further prevent mosquito bites that could cause malaria. Contact your doctor as soon as possible if you have been exposed to malaria, or if you have fever or other symptoms of illness during or after a stay in an area where malaria is common. When treating lupus or arthritis, hydroxychloroquine is usually given daily for several weeks or months. For best results, keep using the medication as directed. Talk with your doctor if your symptoms do not improve after 6 months of treatment. While using hydroxychloroquine, you may need frequent blood tests at your doctor's office. No medication is 100% effective in treating or preventing all types of malaria. For best results, keep using the medication as directed. Talk with your doctor if you have fever, vomiting, or diarrhea during your treatment. Store at room temperature away from moisture, heat, and light. What happens if I miss a dose? Take the missed dose as soon as you remember.  Skip the missed dose if it is almost time for your next scheduled dose. Do not take extra medicine to make up the missed dose. What happens if I overdose? Seek emergency medical attention or call the Poison Help line at 1-939.719.1308. An overdose of hydroxychloroquine can be fatal, especially in children. Treatment of a hydroxychloroquine overdose must be started quickly. You may be told to induce vomiting right away (at home, before transport to an emergency room). Ask the poison control center how to induce vomiting in the case of a hydroxychloroquine overdose. Overdose symptoms may include headache, drowsiness, vision changes, slow heart rate, chest pain or heavy feeling, pain spreading to the arm or shoulder, nausea, sweating, seizure (convulsions), shallow breathing, or breathing that stops. What should I avoid while taking hydroxychloroquine? Avoid taking an antacid or Kaopectate (kaolin-pectin) within 4 hours before or after you take hydroxychloroquine. Some antacids can make it harder for your body to absorb hydroxychloroquine. What are the possible side effects of hydroxychloroquine? Some people taking this medication over long periods of time or at high doses have developed irreversible damage to the retina of the eye. Stop taking hydroxychloroquine and call your doctor at once if you have trouble focusing, if you see light streaks or flashes in your vision, or if you notice any swelling or color changes in your eyes. Get emergency medical help if you have any of these signs of an allergic reaction: hives; difficulty breathing; swelling of your face, lips, tongue, or throat. Call your doctor at once if you have a serious side effect such as:   muscle weakness, twitching, or uncontrolled movement;   loss of balance or coordination;   blurred vision, light sensitivity, seeing halos around lights;   pale skin, easy bruising or bleeding;   confusion, unusual thoughts or behavior; or   seizure (convulsions).   Less serious side effects may include:   headache, ringing in your ears, spinning sensation;   nausea, vomiting, stomach pain;   loss of appetite, weight loss;   mood changes, feeling nervous or irritable;   skin rash or itching; or   hair loss. This is not a complete list of side effects and others may occur. Call your doctor for medical advice about side effects. You may report side effects to FDA at 2-610-IHQ-8818. What other drugs will affect hydroxychloroquine? Hydroxychloroquine can harm your liver. This effect is increased when you also use other medicines harmful to the liver. You may need dose adjustments or special tests if you have recently used:   acetaminophen (Tylenol);   an antibiotic, antifungal medicine, sulfa drug, or tuberculosis medicine;   birth control pills or hormone replacement therapy;   blood pressure medication;   cancer medications;   cholesterol-lowering medications such as Crestor, Lipitor, Pravachol, Simcor, Vytorin, Zocor;   gout or arthritis medications (including gold injections); HIV/AIDS medications;   medicines to treat psychiatric disorders;   an NSAID such as Advil, Aleve, Arthrotec, Cataflam, Celebrex, Indocin, Motrin, Naprosyn, Treximet, Voltaren; or   seizure medications. This list is not complete and other drugs may interact with hydroxychloroquine. Tell your doctor about all medications you use. This includes prescription, over-the-counter, vitamin, and herbal products. Do not start a new medication without telling your doctor. Where can I get more information? Your pharmacist can provide more information about hydroxychloroquine. Remember, keep this and all other medicines out of the reach of children, never share your medicines with others, and use this medication only for the indication prescribed.    Every effort has been made to ensure that the information provided by [address]anie [address] is accurate, up-to-date, and complete, but no guarantee is

## 2018-07-03 NOTE — TELEPHONE ENCOUNTER
Phone call from patient. Her refills from today's visit were sent to [facility] in error. She says they need to be sent to [facility].

## 2018-07-05 DIAGNOSIS — M34.9 SCLERODERMA (HCC): ICD-10-CM

## 2018-07-05 RX ORDER — HYDROXYCHLOROQUINE SULFATE 200 MG/1
200 TABLET, FILM COATED ORAL 2 TIMES DAILY
Qty: 180 TABLET | Refills: 3 | Status: SHIPPED | OUTPATIENT
Start: 2018-07-05 | End: 2019-05-06 | Stop reason: SDUPTHER

## 2018-08-01 ENCOUNTER — HOSPITAL ENCOUNTER (OUTPATIENT)
Dept: SURGERY | Age: 73
Discharge: HOME | End: 2018-08-01
Attending: UROLOGY | Admitting: UROLOGY

## 2018-08-01 VITALS
BODY MASS INDEX: 29.32 KG/M2 | SYSTOLIC BLOOD PRESSURE: 124 MMHG | HEART RATE: 60 BPM | OXYGEN SATURATION: 98 % | WEIGHT: 187.2 LBS | RESPIRATION RATE: 14 BRPM | TEMPERATURE: 97.6 F | DIASTOLIC BLOOD PRESSURE: 75 MMHG

## 2018-08-01 LAB
ANION GAP SERPL CALCULATED.3IONS-SCNC: 13 MMOL/L (ref 3–16)
BUN BLDV-MCNC: 21 MG/DL (ref 7–20)
CALCIUM SERPL-MCNC: 9.7 MG/DL (ref 8.3–10.6)
CHLORIDE BLD-SCNC: 100 MMOL/L (ref 99–110)
CO2: 29 MMOL/L (ref 21–32)
CREAT SERPL-MCNC: 1.1 MG/DL (ref 0.6–1.2)
GFR AFRICAN AMERICAN: 59
GFR NON-AFRICAN AMERICAN: 48
GLUCOSE BLD-MCNC: 141 MG/DL (ref 70–99)
GLUCOSE BLD-MCNC: 149 MG/DL (ref 70–99)
GLUCOSE BLD-MCNC: 154 MG/DL (ref 70–99)
HCT VFR BLD CALC: 40 % (ref 36–48)
HEMOGLOBIN: 12.9 G/DL (ref 12–16)
MCH RBC QN AUTO: 29.3 PG (ref 26–34)
MCHC RBC AUTO-ENTMCNC: 32.2 G/DL (ref 31–36)
MCV RBC AUTO: 90.9 FL (ref 80–100)
PDW BLD-RTO: 13.4 % (ref 12.4–15.4)
PERFORMED ON: ABNORMAL
PERFORMED ON: ABNORMAL
PLATELET # BLD: 168 K/UL (ref 135–450)
PMV BLD AUTO: 8.7 FL (ref 5–10.5)
POTASSIUM SERPL-SCNC: 4.6 MMOL/L (ref 3.5–5.1)
RBC # BLD: 4.41 M/UL (ref 4–5.2)
SODIUM BLD-SCNC: 142 MMOL/L (ref 136–145)
WBC # BLD: 8.2 K/UL (ref 4–11)

## 2018-08-01 PROCEDURE — 93010 ELECTROCARDIOGRAM REPORT: CPT | Performed by: INTERNAL MEDICINE

## 2018-08-01 RX ORDER — LABETALOL HYDROCHLORIDE 5 MG/ML
5 INJECTION, SOLUTION INTRAVENOUS EVERY 10 MIN PRN
Status: DISCONTINUED | OUTPATIENT
Start: 2018-08-01 | End: 2018-08-02 | Stop reason: HOSPADM

## 2018-08-01 RX ORDER — CIPROFLOXACIN 2 MG/ML
400 INJECTION, SOLUTION INTRAVENOUS
Status: COMPLETED | OUTPATIENT
Start: 2018-08-01 | End: 2018-08-01

## 2018-08-01 RX ORDER — MEPERIDINE HYDROCHLORIDE 25 MG/ML
12.5 INJECTION INTRAMUSCULAR; INTRAVENOUS; SUBCUTANEOUS EVERY 5 MIN PRN
Status: DISCONTINUED | OUTPATIENT
Start: 2018-08-01 | End: 2018-08-02 | Stop reason: HOSPADM

## 2018-08-01 RX ORDER — OXYCODONE HYDROCHLORIDE AND ACETAMINOPHEN 5; 325 MG/1; MG/1
2 TABLET ORAL PRN
Status: ACTIVE | OUTPATIENT
Start: 2018-08-01 | End: 2018-08-01

## 2018-08-01 RX ORDER — SODIUM CHLORIDE 9 MG/ML
INJECTION, SOLUTION INTRAVENOUS CONTINUOUS
Status: DISCONTINUED | OUTPATIENT
Start: 2018-08-01 | End: 2018-08-02 | Stop reason: HOSPADM

## 2018-08-01 RX ORDER — HYDROMORPHONE HCL 110MG/55ML
0.5 PATIENT CONTROLLED ANALGESIA SYRINGE INTRAVENOUS EVERY 5 MIN PRN
Status: DISCONTINUED | OUTPATIENT
Start: 2018-08-01 | End: 2018-08-02 | Stop reason: HOSPADM

## 2018-08-01 RX ORDER — OXYCODONE HYDROCHLORIDE AND ACETAMINOPHEN 5; 325 MG/1; MG/1
1 TABLET ORAL PRN
Status: ACTIVE | OUTPATIENT
Start: 2018-08-01 | End: 2018-08-01

## 2018-08-01 RX ORDER — DIPHENHYDRAMINE HYDROCHLORIDE 50 MG/ML
12.5 INJECTION INTRAMUSCULAR; INTRAVENOUS
Status: ACTIVE | OUTPATIENT
Start: 2018-08-01 | End: 2018-08-01

## 2018-08-01 RX ORDER — ONDANSETRON 2 MG/ML
4 INJECTION INTRAMUSCULAR; INTRAVENOUS
Status: ACTIVE | OUTPATIENT
Start: 2018-08-01 | End: 2018-08-01

## 2018-08-01 RX ORDER — FENTANYL CITRATE 50 UG/ML
50 INJECTION, SOLUTION INTRAMUSCULAR; INTRAVENOUS EVERY 5 MIN PRN
Status: DISCONTINUED | OUTPATIENT
Start: 2018-08-01 | End: 2018-08-02 | Stop reason: HOSPADM

## 2018-08-01 RX ORDER — LIDOCAINE HYDROCHLORIDE 10 MG/ML
0.5 INJECTION, SOLUTION EPIDURAL; INFILTRATION; INTRACAUDAL; PERINEURAL ONCE
Status: DISCONTINUED | OUTPATIENT
Start: 2018-08-01 | End: 2018-08-02 | Stop reason: HOSPADM

## 2018-08-01 RX ADMIN — CIPROFLOXACIN 400 MG: 2 INJECTION, SOLUTION INTRAVENOUS at 08:58

## 2018-08-01 RX ADMIN — SODIUM CHLORIDE: 9 INJECTION, SOLUTION INTRAVENOUS at 08:57

## 2018-08-01 ASSESSMENT — ENCOUNTER SYMPTOMS: SHORTNESS OF BREATH: 1

## 2018-08-01 ASSESSMENT — PAIN - FUNCTIONAL ASSESSMENT: PAIN_FUNCTIONAL_ASSESSMENT: 0-10

## 2018-08-01 ASSESSMENT — PAIN SCALES - GENERAL: PAINLEVEL_OUTOF10: 0

## 2018-08-01 NOTE — H&P
The history and physical exam was reviewed. The patient was seen and examined in pre-op - heart and lungs normal. She had a chance to ask questions which were answered. There has been no interval changes. Plan to continue to the OR for cystoscopy, botox injection 100 units. Clinic note 7/2018:  73F pt of Dr. Miladys Nguyen. PMH-hysterectomy, DM, scleroderma. Recent normal pelvic exam per Dr. Aaron Mullins. OAB: trial of Myrbetriq, Vesicare, Sx - urgency, UUI (4-5 ppd), frequency, noct 1-2 x. No RIGOBERTO. No other LUTS. Failed 1st/2nd line therapies.    - Botox 100 UN RBA DWP particularly urinary retention   UTIs with atrophic vaginitis: on vaginal estrogen, improvement in UTIs since starting       Remigio Primrose  8/1/2018

## 2018-08-01 NOTE — PROGRESS NOTES
Arrived to phase two care. Report received from [facility], [facility]. Patient denies pain, vss on room air, patient eating ice chips. Will continue to monitor.

## 2018-08-01 NOTE — OP NOTE
Urology Operative Report  [facility]    Provider: Naomy Serrano MD Patient ID:  Admission Date: 2018 Name: Harjeet Quezada Date: 2018  MRN: [id number]   Patient Location: Room/bed info not found : 1945  Attending: Naomy Serrano MD Date of Service: 2018  PCP: Antonio Gonsalves MD     Date of Operation: 2018    Preoperative Diagnosis: Overactive bladder with urgency urinary incontinence     Postoperative Diagnosis: same     Procedure:    1. Cystoscopy  2. Intradestrusor injection of botulinum toxin (100 units)     Surgeon:   Naomy Serrano MD     Anesthesia: General LMA anesthesia     Indications: Aurea Hanna is a 74 y.o. female who presents for the above named surgery. Informed consent was obtained and the risks, benefits, and details of the procedure were explained to the patient who elected to proceed.      Details of Procedure: The patient was brought to the operating room and placed in the supine position on the operating room table. SCDs were placed on the lower extremities. Following induction of anesthesia the patient was positioned in lithotomy and the genitals were prepped and draped in the usual sterile fashion. A routine timeout was performed, confirming the patient, procedure, site, risk of fire, patient allergies and confirming that preoperative antibiotics had been administered prior to beginning.       A 21 Fr rigid cystoscope was advanced through a normal appearing urethra into the bladder. The bladder was inspected systematically using a 30 degree lense. There were no suspicious lesions, stones or diverticula seen. 100 units of botox were then injected in 1 mL instillations into the posterior bladder wall. Hemostasis was confirmed. The bladder was emptied and all cystoscopic equipment was removed.      At the end of the procedure all counts were correct.  The patient tolerated the procedure well and was transported to the PACU in stable condition.     Findings: normal cystoscopy     Estimated Blood Loss: minimal                  Drains: none          Specimens: none     Complications: none apparent           Disposition:  PACU - hemodynamically stable.     Plan: followup 6 weeks            Jeremie Gates MD  8/1/2018

## 2018-08-01 NOTE — ANESTHESIA PRE-OP
Department of Anesthesiology  Preprocedure Note       Name:  Sheila Bella   Age:  74 y.o.  :  1945                                          MRN:  [id number]         Date:  2018      Surgeon:    Procedure:    Medications prior to admission:   Prior to Admission medications    Medication Sig Start Date End Date Taking? Authorizing Provider   hydroxychloroquine (PLAQUENIL) 200 MG tablet Take 1 tablet by mouth 2 times daily 18   Deborah Olmedo MD   HYDROcodone-acetaminophen (NORCO) 7.5-325 MG per tablet Take 1 tab every 12 hrs prn for pain. Take 1 tab every 12 hrs prn for pain. .. Earliest Fill Date: 7/3/18 7/3/18 8/2/18  Deborah Olmedo MD   HYDROcodone-acetaminophen (NORCO) 7.5-325 MG per tablet Take 1 tab every 12 hrs prn for pain. Earliest Fill Date: 18  Deborah Olmedo MD   HYDROcodone-acetaminophen (NORCO) 7.5-325 MG per tablet Take 1 tab every 12 hrs prn for pain. Peymanus Sood Date: 9/2/18 9/2/18 10/2/18  Deborah Olmedo MD   gabapentin (NEURONTIN) 300 MG capsule TAKE 1 CAPSULE IN THE MORNING & AFTERNOON AND 2 CAPSULES AT BEDTIME. Gopal Rotunda 7/3/18 7/3/19  Deborah Olmedo MD   cyclobenzaprine (FLEXERIL) 10 MG tablet Take 1 tablet by mouth 3 times daily as needed for Muscle spasms 7/3/18   Deborah Olmedo MD   Probiotic Product (PROBIOTIC DAILY PO) Take by mouth daily    Historical Provider, MD   Albiglutide (TANZEUM) 30 MG PEN Inject into the skin weekly    Historical Provider, MD   sitaGLIPtin (JANUVIA) 100 MG tablet Take 100 mg by mouth daily    Historical Provider, MD   magnesium oxide (MAG-OX) 400 MG tablet Take 800 mg by mouth daily. Historical Provider, MD   metFORMIN (GLUCOPHAGE) 500 MG tablet Take 1,000 mg by mouth 2 times daily (with meals). Historical Provider, MD   Biotin (BIOTIN 5000) 5 MG CAPS Take  by mouth daily. Historical Provider, MD   losartan-hydrochlorothiazide (HYZAAR) 50-12.5 MG per tablet Take 1 tablet by mouth 2 times daily.     Historical Provider, MD   potassium chloride (KLOR-CON 10) 10 MEQ CR tablet Take 10 mEq by mouth daily. Historical Provider, MD   aspirin 81 MG chewable tablet Take 81 mg by mouth daily. Historical Provider, MD Schroeder Ruby  by Does not apply route. Historical Provider, MD   Vitamin D (CHOLECALCIFEROL) 1000 UNITS CAPS capsule Take 2,000 Units by mouth daily. Historical Provider, MD   Multiple Vitamin (THERA) TABS Take  by mouth. Historical Provider, MD       Current medications:    Current Outpatient Prescriptions   Medication Sig Dispense Refill    hydroxychloroquine (PLAQUENIL) 200 MG tablet Take 1 tablet by mouth 2 times daily 180 tablet 3    HYDROcodone-acetaminophen (NORCO) 7.5-325 MG per tablet Take 1 tab every 12 hrs prn for pain. Take 1 tab every 12 hrs prn for pain. .. Earliest Fill Date: 7/3/18 60 tablet 0    [START ON 8/2/2018] HYDROcodone-acetaminophen (NORCO) 7.5-325 MG per tablet Take 1 tab every 12 hrs prn for pain. Earliest Fill Date: 8/2/18 60 tablet 0    [START ON 9/2/2018] HYDROcodone-acetaminophen (NORCO) 7.5-325 MG per tablet Take 1 tab every 12 hrs prn for pain. Antony Reading Date: 9/2/18 60 tablet 0    gabapentin (NEURONTIN) 300 MG capsule TAKE 1 CAPSULE IN THE MORNING & AFTERNOON AND 2 CAPSULES AT BEDTIME. . 360 capsule 1    cyclobenzaprine (FLEXERIL) 10 MG tablet Take 1 tablet by mouth 3 times daily as needed for Muscle spasms 270 tablet 1    Probiotic Product (PROBIOTIC DAILY PO) Take by mouth daily      Albiglutide (TANZEUM) 30 MG PEN Inject into the skin weekly      sitaGLIPtin (JANUVIA) 100 MG tablet Take 100 mg by mouth daily      magnesium oxide (MAG-OX) 400 MG tablet Take 800 mg by mouth daily.  metFORMIN (GLUCOPHAGE) 500 MG tablet Take 1,000 mg by mouth 2 times daily (with meals).  Biotin (BIOTIN 5000) 5 MG CAPS Take  by mouth daily.  losartan-hydrochlorothiazide (HYZAAR) 50-12.5 MG per tablet Take 1 tablet by mouth 2 times daily.       potassium chloride (KLOR-CON 10) 10 MEQ CR tablet Take 10 mEq by mouth daily.  aspirin 81 MG chewable tablet Take 81 mg by mouth daily.  FISH OIL by Does not apply route.  Vitamin D (CHOLECALCIFEROL) 1000 UNITS CAPS capsule Take 2,000 Units by mouth daily.  Multiple Vitamin (THERA) TABS Take  by mouth. Current Facility-Administered Medications   Medication Dose Route Frequency Provider Last Rate Last Dose    onabotulinumtoxin A (BOTOX) injection 100 Units  100 Units Intramuscular Once Cassius Toussaint MD        0.9 % sodium chloride infusion   Intravenous Continuous Cassius Toussaint MD        ciprofloxacin (CIPRO) IVPB 400 mg  400 mg Intravenous On Call to [address]        lidocaine PF 1 % injection 0.5 mL  0.5 mL Intradermal Once Cassius Toussaint MD           Allergies: Allergies   Allergen Reactions    Bactrim [Sulfamethoxazole-Trimethoprim] Other (See Comments)     Body shaking and chills.  Body pain    Cortisone Other (See Comments)     Rash, confusion, hyperactivity    Pcn [Penicillins]     Scopolamine Sulfate Anxiety       Problem List:    Patient Active Problem List   Diagnosis Code    Toe deformity, acquired M20.60    Diabetes type 2, controlled ([facility] [address] E11.9    Hypertension I10    Peripheral neuropathy ([facility] G62.9    GERD (gastroesophageal reflux disease) K21.9    Fibromyalgia M79.7    Scleroderma (HCC) M34.9    SOB (shortness of breath) R06.02    PAH (pulmonary artery hypertension) I27.21    Chronic narcotic dependence (HCC) F11.20    Encounter for chronic pain management G89.29       Past Medical History:        Diagnosis Date    Diabetes mellitus ([facility] [address]     Fatty liver     Severe    Fibromyalgia     GERD (gastroesophageal reflux disease)     Hypertension     IBS (irritable bowel syndrome)     Osteoarthritis     Peripheral neuropathy (HCC)     Scleroderma (HCC)     crest       Past Surgical History:        Procedure Laterality Date    ARTHROPLASTY  08/10/2012    FIFTH TOE LEFT FOOT    BACK SURGERY      CHOLECYSTECTOMY  1989    FIBULA FRACTURE SURGERY  2017    3 separate surgeries for a fractured left fibula tibial a closed done by Dr. Lyla Monzon  2007    THROAT SURGERY  2012    vocal cord polyp       Social History:    Social History   Substance Use Topics    Smoking status: Former Smoker     Packs/day: 3.00     Years: 49.00     Start date: 1/1/1960     Quit date: 8/17/2011    Smokeless tobacco: Never Used    Alcohol use No                                Counseling given: Not Answered      Vital Signs (Current): There were no vitals filed for this visit. BP Readings from Last 3 Encounters:   07/03/18 (!) 140/78   05/31/18 118/70   04/03/18 138/82       NPO Status:                                                                                 BMI:   Wt Readings from Last 3 Encounters:   07/25/18 190 lb (86.2 kg)   07/03/18 189 lb 3.2 oz (85.8 kg)   05/31/18 189 lb (85.7 kg)     There is no height or weight on file to calculate BMI. Anesthesia Evaluation  Patient summary reviewed and Nursing notes reviewed  Airway: Mallampati: II  TM distance: >3 FB   Neck ROM: limited  Mouth opening: > = 3 FB Dental:      Comment: Missing left upper flipper     Pulmonary:   (+) shortness of breath: chronic,                             Cardiovascular:  Exercise tolerance: good (>4 METS),   (+) hypertension: mild,             Echocardiogram reviewed               ROS comment: PULMONARY HPT   Echo EF 55% see report      Neuro/Psych:      (-) neuromuscular disease           GI/Hepatic/Renal:   (+) GERD: poorly controlled, liver disease:,           Endo/Other:    (+) DiabetesType II DM, , : arthritis:., .                  ROS comment: SCLERODERMA  CHRONIC NARCOTIC DEPENDENCE Abdominal:           Vascular: negative vascular ROS.                                      Anesthesia Plan      MAC     ASA 3     (    ECHO

## 2018-08-01 NOTE — ANESTHESIA POST-OP
Anesthesia Post-op Note    Patient: Terrie Parson  MRN: [id number]  YOB: 1945  Date of evaluation: 8/1/2018  Time:  2:57 PM     Procedure(s) Performed:     Last Vitals: /75   Pulse 60   Temp 97.6 °F (36.4 °C) (Temporal)   Resp 14   Wt 187 lb 3.2 oz (84.9 kg)   SpO2 98%   BMI 29.32 kg/m²     Constantin Phase I: Constantin Score: 10    Constantin Phase II: Constantin Score: 10    Anesthesia Post Evaluation    Final anesthesia type: MAC and TIVA  Patient location during evaluation: PACU  Level of consciousness: awake and alert  Complications: no  Respiratory status: acceptable  Hydration status: stable        Kurt Samson MD  2:57 PM

## 2018-08-01 NOTE — PROGRESS NOTES
Discharge instructions reviewed with patient/responsible adult. All home medications have been reviewed, questions answered and patient verbalized understanding. Discharge instructions signed and copies given. Patient discharged home via wheelchair with belongings. No new prescriptions.

## 2018-08-02 LAB
EKG ATRIAL RATE: 77 BPM
EKG DIAGNOSIS: NORMAL
EKG P AXIS: 96 DEGREES
EKG P-R INTERVAL: 222 MS
EKG Q-T INTERVAL: 436 MS
EKG QRS DURATION: 98 MS
EKG QTC CALCULATION (BAZETT): 493 MS
EKG R AXIS: -40 DEGREES
EKG T AXIS: 56 DEGREES
EKG VENTRICULAR RATE: 77 BPM

## 2018-08-24 ENCOUNTER — TELEPHONE (OUTPATIENT)
Dept: INTERNAL MEDICINE CLINIC | Age: 73
End: 2018-08-24

## 2018-08-28 NOTE — TELEPHONE ENCOUNTER
Called pt she believes they said they needed a new rx from our office According to our records new rx was given in July for 90 day I will call [facility] tomorrow

## 2018-08-29 NOTE — TELEPHONE ENCOUNTER
[facility] to [facility] Spoke with Rey Valdez Pt had a paid claim from [place] on the exact day that she gort her refill they will not be able to refill again until 10/2018 Pt was advised

## 2018-09-27 ENCOUNTER — OFFICE VISIT (OUTPATIENT)
Dept: RHEUMATOLOGY | Age: 73
End: 2018-09-27
Payer: MEDICARE

## 2018-09-27 VITALS
BODY MASS INDEX: 30.42 KG/M2 | WEIGHT: 193.8 LBS | SYSTOLIC BLOOD PRESSURE: 102 MMHG | HEIGHT: 67 IN | HEART RATE: 88 BPM | DIASTOLIC BLOOD PRESSURE: 80 MMHG

## 2018-09-27 DIAGNOSIS — G89.29 ENCOUNTER FOR CHRONIC PAIN MANAGEMENT: ICD-10-CM

## 2018-09-27 DIAGNOSIS — Z79.899 HIGH RISK MEDICATION USE: ICD-10-CM

## 2018-09-27 DIAGNOSIS — I27.21 PAH (PULMONARY ARTERY HYPERTENSION) (HCC): ICD-10-CM

## 2018-09-27 DIAGNOSIS — M48.061 SPINAL STENOSIS OF LUMBAR REGION WITHOUT NEUROGENIC CLAUDICATION: ICD-10-CM

## 2018-09-27 DIAGNOSIS — M34.9 SCLERODERMA (HCC): Primary | ICD-10-CM

## 2018-09-27 DIAGNOSIS — M34.9 SCLERODERMA (HCC): ICD-10-CM

## 2018-09-27 DIAGNOSIS — M79.7 FIBROMYALGIA: ICD-10-CM

## 2018-09-27 LAB
ALBUMIN SERPL-MCNC: 4.6 G/DL (ref 3.4–5)
ALP BLD-CCNC: 80 U/L (ref 40–129)
ALT SERPL-CCNC: 44 U/L (ref 10–40)
AST SERPL-CCNC: 43 U/L (ref 15–37)
BILIRUB SERPL-MCNC: 0.6 MG/DL (ref 0–1)
BILIRUBIN DIRECT: <0.2 MG/DL (ref 0–0.3)
BILIRUBIN, INDIRECT: ABNORMAL MG/DL (ref 0–1)
C-REACTIVE PROTEIN: 4.3 MG/L (ref 0–5.1)
TOTAL PROTEIN: 7.4 G/DL (ref 6.4–8.2)

## 2018-09-27 PROCEDURE — 1123F ACP DISCUSS/DSCN MKR DOCD: CPT | Performed by: INTERNAL MEDICINE

## 2018-09-27 PROCEDURE — 3017F COLORECTAL CA SCREEN DOC REV: CPT | Performed by: INTERNAL MEDICINE

## 2018-09-27 PROCEDURE — G8427 DOCREV CUR MEDS BY ELIG CLIN: HCPCS | Performed by: INTERNAL MEDICINE

## 2018-09-27 PROCEDURE — G8417 CALC BMI ABV UP PARAM F/U: HCPCS | Performed by: INTERNAL MEDICINE

## 2018-09-27 PROCEDURE — G8399 PT W/DXA RESULTS DOCUMENT: HCPCS | Performed by: INTERNAL MEDICINE

## 2018-09-27 PROCEDURE — 1090F PRES/ABSN URINE INCON ASSESS: CPT | Performed by: INTERNAL MEDICINE

## 2018-09-27 PROCEDURE — 99214 OFFICE O/P EST MOD 30 MIN: CPT | Performed by: INTERNAL MEDICINE

## 2018-09-27 PROCEDURE — 1036F TOBACCO NON-USER: CPT | Performed by: INTERNAL MEDICINE

## 2018-09-27 PROCEDURE — 1101F PT FALLS ASSESS-DOCD LE1/YR: CPT | Performed by: INTERNAL MEDICINE

## 2018-09-27 PROCEDURE — 4040F PNEUMOC VAC/ADMIN/RCVD: CPT | Performed by: INTERNAL MEDICINE

## 2018-09-27 RX ORDER — HYDROCODONE BITARTRATE AND ACETAMINOPHEN 7.5; 325 MG/1; MG/1
1 TABLET ORAL EVERY 12 HOURS PRN
Qty: 60 TABLET | Refills: 0 | Status: SHIPPED | OUTPATIENT
Start: 2018-12-04 | End: 2018-12-12 | Stop reason: SDUPTHER

## 2018-09-27 RX ORDER — HYDROCODONE BITARTRATE AND ACETAMINOPHEN 7.5; 325 MG/1; MG/1
1 TABLET ORAL EVERY 12 HOURS PRN
Qty: 60 TABLET | Refills: 0 | Status: SHIPPED | OUTPATIENT
Start: 2018-10-04 | End: 2019-03-07 | Stop reason: SDUPTHER

## 2018-09-27 RX ORDER — HYDROCODONE BITARTRATE AND ACETAMINOPHEN 7.5; 325 MG/1; MG/1
1 TABLET ORAL EVERY 12 HOURS PRN
Qty: 60 TABLET | Refills: 0 | Status: SHIPPED | OUTPATIENT
Start: 2018-11-04 | End: 2018-12-12 | Stop reason: SDUPTHER

## 2018-09-27 RX ORDER — PANTOPRAZOLE SODIUM 40 MG/1
40 TABLET, DELAYED RELEASE ORAL DAILY
COMMUNITY
End: 2022-03-03 | Stop reason: ALTCHOICE

## 2018-09-27 NOTE — PATIENT INSTRUCTIONS
by either parent may cause birth defects. If you are a man, use a condom to keep from causing a pregnancy while you are using methotrexate. Continue using condoms for at least 90 days after your treatment ends. If you are a woman, use an effective form of birth control while you are taking methotrexate, and for at least one cycle of ovulation after your treatment ends. Do not give this medicine to a child without the advice of a doctor. How should I take methotrexate? Follow all directions on your prescription label. Do not take this medicine in larger or smaller amounts or for longer than recommended. You must use the correct dose of methotrexate for your condition. Methotrexate is sometimes taken once or twice per week and not every day. Follow the directions on your prescription label. Some people have  after taking methotrexate every day by accident. Ask your doctor or pharmacist if you have questions about your dose of methotrexate or how often to take it. Use methotrexate regularly to get the most benefit. Get your prescription refilled before you run out of medicine completely. Methotrexate can lower blood cells that help your body fight infections and help your blood to clot. Your blood will need to be tested often, and you may need an occasional liver biopsy. Your cancer treatments may be delayed based on the results of these tests. Store at room temperature away from moisture and heat. What happens if I miss a dose? Call your doctor for instructions if you miss a dose of methotrexate. What happens if I overdose? Seek emergency medical attention or call the Poison Help line at 1-330.454.9718. An overdose of methotrexate can be fatal.   What should I avoid while taking methotrexate? This medicine can pass into body fluids (including urine, feces, vomit, semen, vaginal fluid).  Patients and caregivers should wear rubber gloves while cleaning up body fluids, handling contaminated

## 2018-09-27 NOTE — PROGRESS NOTES
[facility]) Physicians  Internists of [address]  Rheumatology Progress Note  Marva Kang MD           [ X ] [address] Rheumatology        [ ] [facility] Rheumatology                                      [address]                  [address] [id number]                [address], [address] [facility]      [phone]                [phone]      Fax: [phone]                 Fax: [phone]           _______________________________________________    Patient Name:  Elise Riley is a 74 y.o. patient     Chief Complaint:     Returns today for follow-up of her scleroderma, fibromyalgia and for her chronic pain management. Since last seen in general she  continues to do worse. She tells me that she has established care with Dr. Satya Oliveira. She continues to have ongoing shortness of breath and difficulty breathing. She tells me that her arthritis continues to be ongoing. Worse symptoms involve her lumbar spine. She has received injections in the past and tells me that she does not tolerate steroids. She has undergone surgery done by Dr. Al Ozuna. Review of her last MRI that was done in 2016 indicated evidence of moderate canal stenosis at L2-3 and L4-5. Symptoms radiate down into her lower extremities. She is having difficulty standing and walking. She seen today with her sister who is concerned and worried about her current health status. She has had episodes of swelling involving her hands over the last couple of weeks. She's recently undergone surgical removal of a skin cancer involving her left hand. Chronically she is requiring the Norco.  She tries to only take it dailytwice a day on her severe days. She tells me that she could most likely use it more often but is concerned about the possibility of being addicted to it. Chart was reviewed. Laboratory studies that were obtained were reviewed.     Past medical history, surgical history, and medications were reviewed and updated accordingly. Past Medical History:   Diagnosis Date    Diabetes mellitus ([facility] [address]     Fatty liver     Severe    Fibromyalgia     GERD (gastroesophageal reflux disease)     Hypertension     IBS (irritable bowel syndrome)     Osteoarthritis     Peripheral neuropathy     Scleroderma (HCC)     crest     Past Surgical History:   Procedure Laterality Date    ARTHROPLASTY  08/10/2012    FIFTH TOE LEFT FOOT    BACK SURGERY      CHOLECYSTECTOMY  1989    CYSTOSCOPY  08/01/2018    with botox injection     FIBULA FRACTURE SURGERY  2017    3 separate surgeries for a fractured left fibula tibial a closed done by Dr. Marie Head  2007    THROAT SURGERY  2012    vocal cord polyp     Prior to Admission medications    Medication Sig Start Date End Date Taking? Authorizing Provider   pantoprazole (PROTONIX) 40 MG tablet Take 40 mg by mouth daily   Yes Historical Provider, MD   HYDROcodone-acetaminophen (NORCO) 7.5-325 MG per tablet Take 1 tablet by mouth every 12 hours as needed for Pain for up to 30 days. Take 1 tab every 12 hrs prn for pain. Larnell Mons Date: 10/4/18 10/4/18 11/3/18 Yes Dulce Maria Benites MD   HYDROcodone-acetaminophen (NORCO) 7.5-325 MG per tablet Take 1 tablet by mouth every 12 hours as needed for Pain for up to 30 days. Take 1 tab every 12 hrs prn for pain. Take 1 tab every 12 hrs prn for pain. .. Earliest Fill Date: 11/4/18 11/4/18 12/4/18 Yes Dulce Maria Benites MD   HYDROcodone-acetaminophen (NORCO) 7.5-325 MG per tablet Take 1 tablet by mouth every 12 hours as needed for Pain for up to 30 days. Take 1 tab every 12 hrs prn for pain.  Earliest Fill Date: 12/4/18 12/4/18 1/3/19 Yes Dulce Maria Benites MD   hydroxychloroquine (PLAQUENIL) 200 MG tablet Take 1 tablet by mouth 2 times daily 7/5/18  Yes Dulce Maria Benites MD   gabapentin (NEURONTIN) 300 MG capsule TAKE 1 CAPSULE IN THE MORNING & AFTERNOON AND 2 CAPSULES AT BEDTIME. Marla Valdez 7/3/18 7/3/19 Yes Cheri Swan MD   cyclobenzaprine (FLEXERIL) 10 MG tablet Take 1 tablet by mouth 3 times daily as needed for Muscle spasms 7/3/18  Yes Cheri Swan MD   Probiotic Product (PROBIOTIC DAILY PO) Take by mouth daily   Yes Historical Provider, MD   Albiglutide (TANZEUM) 30 MG PEN Inject into the skin weekly   Yes Historical Provider, MD   sitaGLIPtin (JANUVIA) 100 MG tablet Take 100 mg by mouth daily   Yes Historical Provider, MD   magnesium oxide (MAG-OX) 400 MG tablet Take 800 mg by mouth daily. Yes Historical Provider, MD   metFORMIN (GLUCOPHAGE) 500 MG tablet Take 1,000 mg by mouth 2 times daily (with meals). Yes Historical Provider, MD   Biotin (BIOTIN 5000) 5 MG CAPS Take  by mouth daily. Yes Historical Provider, MD   losartan-hydrochlorothiazide (HYZAAR) 50-12.5 MG per tablet Take 1 tablet by mouth 2 times daily. Yes Historical Provider, MD   potassium chloride (KLOR-CON 10) 10 MEQ CR tablet Take 10 mEq by mouth daily. Yes Historical Provider, MD   aspirin 81 MG chewable tablet Take 81 mg by mouth daily. Yes Historical Provider, MD   [address] by Does not apply route. Yes Historical Provider, MD   Vitamin D (CHOLECALCIFEROL) 1000 UNITS CAPS capsule Take 2,000 Units by mouth daily. Yes Historical Provider, MD   Multiple Vitamin (THERA) TABS Take  by mouth. Yes Historical Provider, MD         Allergies   Allergen Reactions    Bactrim [Sulfamethoxazole-Trimethoprim] Other (See Comments)     Body shaking and chills. Body pain    Cortisone Other (See Comments)     Rash, confusion, hyperactivity    Pcn [Penicillins]     Scopolamine Sulfate Anxiety       Past medical/surgical history, medications and allergies are reviewed and updated as appropriate. Subjective:        Review of Systems  CONSTITUTIONAL:      denies severe fatigue or appetite.    Anish Lizet having any gastrointestinal upset or side effects associated with medications  RESPIRATORY: 12.9 08/01/2018    HCT 40.0 08/01/2018    MCV 90.9 08/01/2018     08/01/2018     Results for Melinda Haywood (MRN <[id number]>) as of 5/27/2014 16:08   Ref. Range 2/27/2014 16:03   Angio Convert Enzyme Latest Range: 9-67 U/L 70 (H)   Total CK Latest Range:  U/L 180     Results for Melinda Haywood (MRN <[id number]>) as of 5/27/2014 16:08   Ref. Range 2/27/2014 16:03   CHRISTOFER Latest Range: Negative  POSITIVE (A)   CHRISTOFER TITER No range found 1:160   ANCA IFA Latest Range: <1:20  <1:20   CHRISTOFER Interpretation No range found see below   CHRISTOFER Pattern 1 No range found Centromere           Assessment/Plan:      Assessment/Plan:  Brandon Sherwood was seen today for follow-up. Diagnoses and all orders for this visit:    Scleroderma ([address]- chronically remains on Plaquenil 200 mg 3 times a day now with intermittent inflammatory arthritis. Currently stable. Discussed with her the probability that we may need to consider methotrexate or possibly Imuran or CellCept. Guarded prognosis. -     Hepatic Function Panel; Future  -     HYDROcodone-acetaminophen (NORCO) 7.5-325 MG per tablet; Take 1 tablet by mouth every 12 hours as needed for Pain for up to 30 days. Take 1 tab every 12 hrs prn for pain. Peyman Sood Date: 10/4/18  -     C-Reactive Protein    High risk medication use - Will check labs to evaluate for any evidence of drug toxicity or side effects.    -     Hepatic Function Panel; Future    Fibromyalgia continue gabapentin 300 mg in the morning and midafternoon and 600 mg at bedtime. When necessary Flexeril 10 mg. Encounter for chronic pain management   Controlled Substances Monitoring:     RX Monitoring 9/27/2018   Attestation The Prescription Monitoring Report for this patient was reviewed today. Documentation No signs of potential drug abuse or diversion identified. Acute Pain Prescriptions Severe pain not adequately treated with lower dose. Chronic Pain Dose reduction has been attempted. ;Treatment objectives Kaylee Gee

## 2018-12-07 DIAGNOSIS — S33.9XXA SPRAIN AND STRAIN OF LUMBOSACRAL JOINT/LIGAMENT, INITIAL ENCOUNTER: ICD-10-CM

## 2018-12-07 RX ORDER — GABAPENTIN 300 MG/1
CAPSULE ORAL
Qty: 120 CAPSULE | Refills: 2 | Status: SHIPPED | OUTPATIENT
Start: 2018-12-07 | End: 2019-08-05

## 2018-12-12 ENCOUNTER — OFFICE VISIT (OUTPATIENT)
Dept: RHEUMATOLOGY | Age: 73
End: 2018-12-12
Payer: MEDICARE

## 2018-12-12 VITALS
BODY MASS INDEX: 30.38 KG/M2 | HEART RATE: 100 BPM | WEIGHT: 194 LBS | SYSTOLIC BLOOD PRESSURE: 120 MMHG | DIASTOLIC BLOOD PRESSURE: 80 MMHG

## 2018-12-12 DIAGNOSIS — G89.29 ENCOUNTER FOR CHRONIC PAIN MANAGEMENT: ICD-10-CM

## 2018-12-12 DIAGNOSIS — M34.9 SCLERODERMA (HCC): ICD-10-CM

## 2018-12-12 DIAGNOSIS — M79.7 FIBROMYALGIA: ICD-10-CM

## 2018-12-12 DIAGNOSIS — M34.9 SCLERODERMA (HCC): Primary | ICD-10-CM

## 2018-12-12 DIAGNOSIS — Z79.899 HIGH RISK MEDICATION USE: ICD-10-CM

## 2018-12-12 LAB
ALBUMIN SERPL-MCNC: 4.8 G/DL (ref 3.4–5)
ALP BLD-CCNC: 79 U/L (ref 40–129)
ALT SERPL-CCNC: 49 U/L (ref 10–40)
AST SERPL-CCNC: 53 U/L (ref 15–37)
BILIRUB SERPL-MCNC: 0.5 MG/DL (ref 0–1)
BILIRUBIN DIRECT: <0.2 MG/DL (ref 0–0.3)
BILIRUBIN, INDIRECT: ABNORMAL MG/DL (ref 0–1)
C-REACTIVE PROTEIN: 4.2 MG/L (ref 0–5.1)
CREAT SERPL-MCNC: 1.2 MG/DL (ref 0.6–1.2)
GFR AFRICAN AMERICAN: 53
GFR NON-AFRICAN AMERICAN: 44
HCT VFR BLD CALC: 42.1 % (ref 36–48)
HEMOGLOBIN: 13.6 G/DL (ref 12–16)
MCH RBC QN AUTO: 29.5 PG (ref 26–34)
MCHC RBC AUTO-ENTMCNC: 32.3 G/DL (ref 31–36)
MCV RBC AUTO: 91.4 FL (ref 80–100)
PDW BLD-RTO: 13.9 % (ref 12.4–15.4)
PLATELET # BLD: 202 K/UL (ref 135–450)
PMV BLD AUTO: 9.9 FL (ref 5–10.5)
RBC # BLD: 4.6 M/UL (ref 4–5.2)
TOTAL PROTEIN: 7.4 G/DL (ref 6.4–8.2)
WBC # BLD: 12 K/UL (ref 4–11)

## 2018-12-12 PROCEDURE — 99214 OFFICE O/P EST MOD 30 MIN: CPT | Performed by: INTERNAL MEDICINE

## 2018-12-12 PROCEDURE — G8399 PT W/DXA RESULTS DOCUMENT: HCPCS | Performed by: INTERNAL MEDICINE

## 2018-12-12 PROCEDURE — 1123F ACP DISCUSS/DSCN MKR DOCD: CPT | Performed by: INTERNAL MEDICINE

## 2018-12-12 PROCEDURE — G8417 CALC BMI ABV UP PARAM F/U: HCPCS | Performed by: INTERNAL MEDICINE

## 2018-12-12 PROCEDURE — 4040F PNEUMOC VAC/ADMIN/RCVD: CPT | Performed by: INTERNAL MEDICINE

## 2018-12-12 PROCEDURE — 1036F TOBACCO NON-USER: CPT | Performed by: INTERNAL MEDICINE

## 2018-12-12 PROCEDURE — G8428 CUR MEDS NOT DOCUMENT: HCPCS | Performed by: INTERNAL MEDICINE

## 2018-12-12 PROCEDURE — 1101F PT FALLS ASSESS-DOCD LE1/YR: CPT | Performed by: INTERNAL MEDICINE

## 2018-12-12 PROCEDURE — 1090F PRES/ABSN URINE INCON ASSESS: CPT | Performed by: INTERNAL MEDICINE

## 2018-12-12 PROCEDURE — 3017F COLORECTAL CA SCREEN DOC REV: CPT | Performed by: INTERNAL MEDICINE

## 2018-12-12 PROCEDURE — G8484 FLU IMMUNIZE NO ADMIN: HCPCS | Performed by: INTERNAL MEDICINE

## 2018-12-12 RX ORDER — HYDROCODONE BITARTRATE AND ACETAMINOPHEN 7.5; 325 MG/1; MG/1
1 TABLET ORAL EVERY 12 HOURS PRN
Qty: 60 TABLET | Refills: 0 | Status: SHIPPED | OUTPATIENT
Start: 2019-02-04 | End: 2019-03-07 | Stop reason: SDUPTHER

## 2018-12-12 RX ORDER — HYDROCODONE BITARTRATE AND ACETAMINOPHEN 7.5; 325 MG/1; MG/1
1 TABLET ORAL EVERY 12 HOURS PRN
Qty: 60 TABLET | Refills: 0 | Status: SHIPPED | OUTPATIENT
Start: 2019-03-04 | End: 2019-03-07 | Stop reason: SDUPTHER

## 2018-12-12 RX ORDER — HYDROCODONE BITARTRATE AND ACETAMINOPHEN 7.5; 325 MG/1; MG/1
1 TABLET ORAL EVERY 12 HOURS PRN
Qty: 60 TABLET | Refills: 0 | Status: SHIPPED | OUTPATIENT
Start: 2019-01-04 | End: 2019-02-03

## 2018-12-12 NOTE — PROGRESS NOTES
MD   sitaGLIPtin (JANUVIA) 100 MG tablet Take 100 mg by mouth daily   Yes Historical Provider, MD   magnesium oxide (MAG-OX) 400 MG tablet Take 800 mg by mouth daily. Yes Historical Provider, MD   metFORMIN (GLUCOPHAGE) 500 MG tablet Take 1,000 mg by mouth 2 times daily (with meals). Yes Historical Provider, MD   Biotin (BIOTIN 5000) 5 MG CAPS Take  by mouth daily. Yes Historical Provider, MD   losartan-hydrochlorothiazide (HYZAAR) 50-12.5 MG per tablet Take 1 tablet by mouth 2 times daily. Yes Historical Provider, MD   potassium chloride (KLOR-CON 10) 10 MEQ CR tablet Take 10 mEq by mouth daily. Yes Historical Provider, MD   aspirin 81 MG chewable tablet Take 81 mg by mouth daily. Yes Historical Provider, MD [address] by Does not apply route. Yes Historical Provider, MD   Vitamin D (CHOLECALCIFEROL) 1000 UNITS CAPS capsule Take 2,000 Units by mouth daily. Yes Historical Provider, MD   Multiple Vitamin (THERA) TABS Take  by mouth. Yes Historical Provider, MD         Allergies   Allergen Reactions    Bactrim [Sulfamethoxazole-Trimethoprim] Other (See Comments)     Body shaking and chills. Body pain    Cortisone Other (See Comments)     Rash, confusion, hyperactivity    Pcn [Penicillins]     Scopolamine Sulfate Anxiety       Past medical/surgical history, medications and allergies are reviewed and updated as appropriate. Subjective:        Review of Systems  CONSTITUTIONAL:      denies severe fatigue or appetite. Pam Watson having any gastrointestinal upset or side effects associated with medications  RESPIRATORY:  stable  shortness of breath    CARDIOVASCULAR:  negative for Costochondritis  GI: Difficulty swallowing. Denies frequent choking episodes. MUSCULOSKELETAL: Diffuse myalgias  ; lumbar spine pain    BEHAVIOR/PSYCH:  negative for depressed mood and psychosis. SKIN:    Recent skin biopsy      Objective:      Wt Readings from Last 3 Encounters:   12/12/18 194 lb (88 kg) 09/27/18 193 lb 12.8 oz (87.9 kg)   08/01/18 187 lb 3.2 oz (84.9 kg)       BP Readings from Last 3 Encounters:   12/12/18 120/80   09/27/18 102/80   08/01/18 124/75     Pulse Readings from Last 3 Encounters:   12/12/18 100   09/27/18 88   08/01/18 60         General:  Awake, alert, NAD. Able to ambulate with assistance of a single prong cane  HEENT: no evidence of any oral ulcers lesions or sores  LUNGS: clear to auscultation bilaterally  CARDIOVASCULAR: regular rate  MUSCULOSKELETAL: No evidence of any synovitis, swelling, warmth, or limitation of motion noted over patient's upper or lower peripheral joints except for the right second and third PIP joints with noted mild swelling but no synovitis. Able to make a complete fist bilaterally. Intraosseous muscle wasting bilaterally. Full range of motion of bilateral elbows and shoulders with mild tenderness with right greater than left internal rotation. SKIN: no evidence of any morphea, skin hardening over bilateral hands neck or back. Noted bandaged over left medial aspect of her hand. No evidence of any drainage. Labs:         Chemistry        Component Value Date/Time     08/01/2018 0851    K 4.6 08/01/2018 0851     08/01/2018 0851    CO2 29 08/01/2018 0851    BUN 21 (H) 08/01/2018 0851    CREATININE 1.1 08/01/2018 0851        Component Value Date/Time    CALCIUM 9.7 08/01/2018 0851    ALKPHOS 80 09/27/2018 1438    AST 43 (H) 09/27/2018 1438    ALT 44 (H) 09/27/2018 1438    BILITOT 0.6 09/27/2018 1438        Lab Results   Component Value Date    WBC 8.2 08/01/2018    HGB 12.9 08/01/2018    HCT 40.0 08/01/2018    MCV 90.9 08/01/2018     08/01/2018     Results for Rosalinda Cardona (MRN <[id number]>) as of 5/27/2014 16:08   Ref. Range 2/27/2014 16:03   Angio Convert Enzyme Latest Range: 9-67 U/L 70 (H)   Total CK Latest Range:  U/L 180     Results for Rosalinda Cardona (MRN <[id number]>) as of 5/27/2014 16:08   Ref.  Range 2/27/2014 16:03 CHRISTOFER Latest Range: Negative  POSITIVE (A)   CHRISTOFER TITER No range found 1:160   ANCA IFA Latest Range: <1:20  <1:20   CHRISTOFER Interpretation No range found see below   CHRISTOFER Pattern 1 No range found Centromere           Assessment/Plan:      Assessment/Plan:  Stephen Rust was seen today for follow-up. Diagnoses and all orders for this visit:    Scleroderma ([facility] [address]- chronically remains on Plaquenil 200 mg 3 times a day now with intermittent inflammatory arthritis. Currently stable. May need to consider starting her on methotrexate if she continues to develop skin changes. -     CBC; Future  -     C-Reactive Protein; Future  -     Creatinine, Serum; Future  -     Hepatic Function Panel; Future    High risk medication use - Will check labs to evaluate for any evidence of drug toxicity or side effects.    -     CBC; Future  -     C-Reactive Protein; Future  -     Creatinine, Serum; Future  -     Hepatic Function Panel; Future    Fibromyalgia- continue with Flexeril when necessary    Encounter for chronic pain management   Controlled Substances Monitoring:     RX Monitoring 2018   Attestation The Prescription Monitoring Report for this patient was reviewed today. Documentation No signs of potential drug abuse or diversion identified.;Obtaining appropriate analgesic effect of treatment. Acute Pain Prescriptions -   Chronic Pain Dose reduction has been attempted. Medication Contracts -   \               Return in about 90 days (around 3/12/2019). The risks and benefits of my recommendations, as well as other treatment options, benefits and side effects were discussed with the patient today. Questions were answered. [facility]  [facility]  [address] [place] [address] Dr [address]  [phone]  CBI:[phone]    NOTE: This report is transcribed by using voice recognition software dragon.  Every effort is made to ensure accuracy; however, inadvertent

## 2018-12-13 ENCOUNTER — TELEPHONE (OUTPATIENT)
Dept: INTERNAL MEDICINE CLINIC | Age: 73
End: 2018-12-13

## 2019-03-01 ENCOUNTER — TELEPHONE (OUTPATIENT)
Dept: INTERNAL MEDICINE CLINIC | Age: 74
End: 2019-03-01

## 2019-03-07 ENCOUNTER — TELEPHONE (OUTPATIENT)
Dept: RHEUMATOLOGY | Age: 74
End: 2019-03-07

## 2019-03-07 ENCOUNTER — OFFICE VISIT (OUTPATIENT)
Dept: RHEUMATOLOGY | Age: 74
End: 2019-03-07
Payer: MEDICARE

## 2019-03-07 VITALS
WEIGHT: 191.8 LBS | HEART RATE: 88 BPM | DIASTOLIC BLOOD PRESSURE: 68 MMHG | BODY MASS INDEX: 30.04 KG/M2 | SYSTOLIC BLOOD PRESSURE: 130 MMHG

## 2019-03-07 DIAGNOSIS — M34.9 SCLERODERMA (HCC): Primary | ICD-10-CM

## 2019-03-07 DIAGNOSIS — M48.061 SPINAL STENOSIS OF LUMBAR REGION WITHOUT NEUROGENIC CLAUDICATION: ICD-10-CM

## 2019-03-07 DIAGNOSIS — Z79.899 HIGH RISK MEDICATION USE: ICD-10-CM

## 2019-03-07 DIAGNOSIS — G89.29 ENCOUNTER FOR CHRONIC PAIN MANAGEMENT: ICD-10-CM

## 2019-03-07 DIAGNOSIS — M34.9 SCLERODERMA (HCC): ICD-10-CM

## 2019-03-07 DIAGNOSIS — M79.7 FIBROMYALGIA: ICD-10-CM

## 2019-03-07 LAB
ALBUMIN SERPL-MCNC: 4.7 G/DL (ref 3.4–5)
ALP BLD-CCNC: 85 U/L (ref 40–129)
ALT SERPL-CCNC: 36 U/L (ref 10–40)
AST SERPL-CCNC: 34 U/L (ref 15–37)
BILIRUB SERPL-MCNC: 0.5 MG/DL (ref 0–1)
BILIRUBIN DIRECT: <0.2 MG/DL (ref 0–0.3)
BILIRUBIN, INDIRECT: NORMAL MG/DL (ref 0–1)
C-REACTIVE PROTEIN: 14.1 MG/L (ref 0–5.1)
CREAT SERPL-MCNC: 1.2 MG/DL (ref 0.6–1.2)
GFR AFRICAN AMERICAN: 53
GFR NON-AFRICAN AMERICAN: 44
HCT VFR BLD CALC: 42.4 % (ref 36–48)
HEMOGLOBIN: 13.6 G/DL (ref 12–16)
MCH RBC QN AUTO: 29.7 PG (ref 26–34)
MCHC RBC AUTO-ENTMCNC: 32.1 G/DL (ref 31–36)
MCV RBC AUTO: 92.3 FL (ref 80–100)
PDW BLD-RTO: 13.6 % (ref 12.4–15.4)
PLATELET # BLD: 212 K/UL (ref 135–450)
PMV BLD AUTO: 10.4 FL (ref 5–10.5)
RBC # BLD: 4.59 M/UL (ref 4–5.2)
TOTAL PROTEIN: 7.4 G/DL (ref 6.4–8.2)
WBC # BLD: 14.3 K/UL (ref 4–11)

## 2019-03-07 PROCEDURE — 1090F PRES/ABSN URINE INCON ASSESS: CPT | Performed by: INTERNAL MEDICINE

## 2019-03-07 PROCEDURE — 1101F PT FALLS ASSESS-DOCD LE1/YR: CPT | Performed by: INTERNAL MEDICINE

## 2019-03-07 PROCEDURE — G8484 FLU IMMUNIZE NO ADMIN: HCPCS | Performed by: INTERNAL MEDICINE

## 2019-03-07 PROCEDURE — 1036F TOBACCO NON-USER: CPT | Performed by: INTERNAL MEDICINE

## 2019-03-07 PROCEDURE — 4040F PNEUMOC VAC/ADMIN/RCVD: CPT | Performed by: INTERNAL MEDICINE

## 2019-03-07 PROCEDURE — G8417 CALC BMI ABV UP PARAM F/U: HCPCS | Performed by: INTERNAL MEDICINE

## 2019-03-07 PROCEDURE — G8427 DOCREV CUR MEDS BY ELIG CLIN: HCPCS | Performed by: INTERNAL MEDICINE

## 2019-03-07 PROCEDURE — G8399 PT W/DXA RESULTS DOCUMENT: HCPCS | Performed by: INTERNAL MEDICINE

## 2019-03-07 PROCEDURE — 1123F ACP DISCUSS/DSCN MKR DOCD: CPT | Performed by: INTERNAL MEDICINE

## 2019-03-07 PROCEDURE — 3017F COLORECTAL CA SCREEN DOC REV: CPT | Performed by: INTERNAL MEDICINE

## 2019-03-07 PROCEDURE — 99214 OFFICE O/P EST MOD 30 MIN: CPT | Performed by: INTERNAL MEDICINE

## 2019-03-07 RX ORDER — DULOXETIN HYDROCHLORIDE 30 MG/1
30 CAPSULE, DELAYED RELEASE ORAL DAILY
Qty: 7 CAPSULE | Refills: 0 | Status: SHIPPED | OUTPATIENT
Start: 2019-03-07 | End: 2019-06-11

## 2019-03-07 RX ORDER — HYDROCODONE BITARTRATE AND ACETAMINOPHEN 7.5; 325 MG/1; MG/1
1 TABLET ORAL EVERY 12 HOURS PRN
Qty: 60 TABLET | Refills: 0 | Status: CANCELLED | OUTPATIENT
Start: 2019-06-06 | End: 2019-07-06

## 2019-03-07 RX ORDER — HYDROCODONE BITARTRATE AND ACETAMINOPHEN 7.5; 325 MG/1; MG/1
1 TABLET ORAL EVERY 12 HOURS PRN
Qty: 60 TABLET | Refills: 0 | Status: SHIPPED | OUTPATIENT
Start: 2019-05-07 | End: 2019-03-25 | Stop reason: SDUPTHER

## 2019-03-07 RX ORDER — HYDROCODONE BITARTRATE AND ACETAMINOPHEN 7.5; 325 MG/1; MG/1
1 TABLET ORAL EVERY 12 HOURS PRN
Qty: 60 TABLET | Refills: 0 | Status: SHIPPED | OUTPATIENT
Start: 2019-03-07 | End: 2019-04-06

## 2019-03-07 RX ORDER — HYDROCODONE BITARTRATE AND ACETAMINOPHEN 7.5; 325 MG/1; MG/1
1 TABLET ORAL EVERY 12 HOURS PRN
Qty: 60 TABLET | Refills: 0 | Status: SHIPPED | OUTPATIENT
Start: 2019-04-07 | End: 2019-05-07

## 2019-03-07 RX ORDER — DULOXETIN HYDROCHLORIDE 60 MG/1
60 CAPSULE, DELAYED RELEASE ORAL DAILY
Qty: 30 CAPSULE | Refills: 4 | Status: SHIPPED | OUTPATIENT
Start: 2019-03-07 | End: 2019-04-08 | Stop reason: SDUPTHER

## 2019-03-08 ENCOUNTER — TELEPHONE (OUTPATIENT)
Dept: INTERNAL MEDICINE CLINIC | Age: 74
End: 2019-03-08

## 2019-03-11 LAB
6-ACETYLMORPHINE: NOT DETECTED
7-AMINOCLONAZEPAM: NOT DETECTED
ALPHA-OH-ALPRAZOLAM: NOT DETECTED
ALPRAZOLAM: NOT DETECTED
AMPHETAMINE: NOT DETECTED
BARBITURATES: NOT DETECTED
BENZOYLECGONINE: NOT DETECTED
BUPRENORPHINE: NOT DETECTED
CARISOPRODOL: NOT DETECTED
CLONAZEPAM: NOT DETECTED
CODEINE: NOT DETECTED
CREATININE URINE: 163.3 MG/DL (ref 20–400)
DIAZEPAM: NOT DETECTED
DRUGS EXPECTED: NORMAL
EER PAIN MGT DRUG PANEL, HIGH RES/EMIT U: NORMAL
ETHYL GLUCURONIDE: NORMAL
FENTANYL: NOT DETECTED
HYDROCODONE: PRESENT
HYDROMORPHONE: NOT DETECTED
LORAZEPAM: NOT DETECTED
MARIJUANA METABOLITE: NOT DETECTED
MDA: NOT DETECTED
MDEA: NOT DETECTED
MDMA URINE: NOT DETECTED
MEPERIDINE: NOT DETECTED
METHADONE: NOT DETECTED
METHAMPHETAMINE: NOT DETECTED
METHYLPHENIDATE: NOT DETECTED
MIDAZOLAM: NOT DETECTED
MORPHINE: NOT DETECTED
NORBUPRENORPHINE, FREE: NOT DETECTED
NORDIAZEPAM: NOT DETECTED
NORFENTANYL: NOT DETECTED
NORHYDROCODONE, URINE: PRESENT
NOROXYCODONE: NOT DETECTED
NOROXYMORPHONE, URINE: NOT DETECTED
OXAZEPAM: NOT DETECTED
OXYCODONE: NOT DETECTED
OXYMORPHONE: NOT DETECTED
PAIN MANAGEMENT DRUG PANEL: NORMAL
PAIN MANAGEMENT DRUG PANEL: NORMAL
PCP: NOT DETECTED
PHENTERMINE: NOT DETECTED
PROPOXYPHENE: NOT DETECTED
TAPENTADOL, URINE: NOT DETECTED
TAPENTADOL-O-SULFATE, URINE: NOT DETECTED
TEMAZEPAM: NOT DETECTED
TRAMADOL: NOT DETECTED
ZOLPIDEM: NOT DETECTED

## 2019-03-21 DIAGNOSIS — G89.29 ENCOUNTER FOR CHRONIC PAIN MANAGEMENT: ICD-10-CM

## 2019-03-26 RX ORDER — HYDROCODONE BITARTRATE AND ACETAMINOPHEN 7.5; 325 MG/1; MG/1
1 TABLET ORAL EVERY 12 HOURS PRN
Qty: 60 TABLET | Refills: 0 | Status: SHIPPED | OUTPATIENT
Start: 2019-06-06 | End: 2019-06-11 | Stop reason: SDUPTHER

## 2019-04-08 ENCOUNTER — TELEPHONE (OUTPATIENT)
Dept: INTERNAL MEDICINE CLINIC | Age: 74
End: 2019-04-08

## 2019-04-08 DIAGNOSIS — M48.061 SPINAL STENOSIS OF LUMBAR REGION WITHOUT NEUROGENIC CLAUDICATION: ICD-10-CM

## 2019-04-08 DIAGNOSIS — M79.7 FIBROMYALGIA: ICD-10-CM

## 2019-04-09 RX ORDER — DULOXETIN HYDROCHLORIDE 60 MG/1
60 CAPSULE, DELAYED RELEASE ORAL DAILY
Qty: 90 CAPSULE | Refills: 1 | Status: SHIPPED | OUTPATIENT
Start: 2019-04-09 | End: 2020-10-30

## 2019-04-14 DIAGNOSIS — S33.9XXA SPRAIN AND STRAIN OF LUMBOSACRAL JOINT/LIGAMENT, INITIAL ENCOUNTER: ICD-10-CM

## 2019-04-15 RX ORDER — CYCLOBENZAPRINE HCL 10 MG
TABLET ORAL
Qty: 270 TABLET | Refills: 1 | Status: SHIPPED | OUTPATIENT
Start: 2019-04-15 | End: 2021-02-12

## 2019-05-06 DIAGNOSIS — M34.9 SCLERODERMA (HCC): ICD-10-CM

## 2019-05-06 RX ORDER — HYDROXYCHLOROQUINE SULFATE 200 MG/1
200 TABLET, FILM COATED ORAL 2 TIMES DAILY
Qty: 180 TABLET | Refills: 3 | Status: SHIPPED | OUTPATIENT
Start: 2019-05-06 | End: 2019-08-04

## 2019-06-11 ENCOUNTER — OFFICE VISIT (OUTPATIENT)
Dept: RHEUMATOLOGY | Age: 74
End: 2019-06-11
Payer: MEDICARE

## 2019-06-11 VITALS
DIASTOLIC BLOOD PRESSURE: 74 MMHG | SYSTOLIC BLOOD PRESSURE: 106 MMHG | WEIGHT: 191.8 LBS | OXYGEN SATURATION: 99 % | HEART RATE: 106 BPM | BODY MASS INDEX: 30.04 KG/M2

## 2019-06-11 DIAGNOSIS — M79.7 FIBROMYALGIA: ICD-10-CM

## 2019-06-11 DIAGNOSIS — N28.9 RENAL INSUFFICIENCY: ICD-10-CM

## 2019-06-11 DIAGNOSIS — Z79.899 HIGH RISK MEDICATION USE: ICD-10-CM

## 2019-06-11 DIAGNOSIS — G89.29 ENCOUNTER FOR CHRONIC PAIN MANAGEMENT: ICD-10-CM

## 2019-06-11 DIAGNOSIS — M34.9 SCLERODERMA (HCC): Primary | ICD-10-CM

## 2019-06-11 PROCEDURE — G8399 PT W/DXA RESULTS DOCUMENT: HCPCS | Performed by: INTERNAL MEDICINE

## 2019-06-11 PROCEDURE — 1123F ACP DISCUSS/DSCN MKR DOCD: CPT | Performed by: INTERNAL MEDICINE

## 2019-06-11 PROCEDURE — 1090F PRES/ABSN URINE INCON ASSESS: CPT | Performed by: INTERNAL MEDICINE

## 2019-06-11 PROCEDURE — 99214 OFFICE O/P EST MOD 30 MIN: CPT | Performed by: INTERNAL MEDICINE

## 2019-06-11 PROCEDURE — 4040F PNEUMOC VAC/ADMIN/RCVD: CPT | Performed by: INTERNAL MEDICINE

## 2019-06-11 PROCEDURE — G8427 DOCREV CUR MEDS BY ELIG CLIN: HCPCS | Performed by: INTERNAL MEDICINE

## 2019-06-11 PROCEDURE — 1036F TOBACCO NON-USER: CPT | Performed by: INTERNAL MEDICINE

## 2019-06-11 PROCEDURE — G8417 CALC BMI ABV UP PARAM F/U: HCPCS | Performed by: INTERNAL MEDICINE

## 2019-06-11 RX ORDER — HYDROCODONE BITARTRATE AND ACETAMINOPHEN 7.5; 325 MG/1; MG/1
1 TABLET ORAL EVERY 12 HOURS PRN
Qty: 60 TABLET | Refills: 0 | Status: SHIPPED | OUTPATIENT
Start: 2019-07-07 | End: 2019-06-21 | Stop reason: SDUPTHER

## 2019-06-11 RX ORDER — HYDROCODONE BITARTRATE AND ACETAMINOPHEN 7.5; 325 MG/1; MG/1
1 TABLET ORAL EVERY 12 HOURS PRN
Qty: 60 TABLET | Refills: 0 | Status: SHIPPED | OUTPATIENT
Start: 2019-09-05 | End: 2019-10-05

## 2019-06-11 RX ORDER — HYDROCODONE BITARTRATE AND ACETAMINOPHEN 7.5; 325 MG/1; MG/1
1 TABLET ORAL EVERY 12 HOURS PRN
Qty: 60 TABLET | Refills: 0 | Status: SHIPPED | OUTPATIENT
Start: 2019-08-06 | End: 2019-06-21 | Stop reason: SDUPTHER

## 2019-06-11 NOTE — Clinical Note
Pavithra Sinclair. Please follow-up with Ms. Telles Overall regarding her tachycardia. We have now captured her tachycardia on multiple occasions. I am concerned given that she has passed out and fallen and is symptomatic. I am wondering if she may have an underlying arrhythmia to explain this. I have requested that she call your office for follow-up but hope that you can have your schedule call her for sooner appointment. Other than that, her scleroderma has been stable. Please do not hesitate to contact me should you have any questions or concerns.

## 2019-06-11 NOTE — PROGRESS NOTES
[facility]) Physicians  [facility]  Rheumatology Progress Note  Anna Kumar MD           [ X ] [facility] Rheumatology        [ ] [facility] Rheumatology                                      [address], [address]      [phone]                [phone]      Fax: [phone]                 Fax: [phone]           _______________________________________________    Patient Name:  Bonnie Cotton is a 66 y.o. patient     Chief Complaint:     Returns today for follow-up of her scleroderma, fibromyalgia and for her chronic pain management. Since last seen in general she  has been stable. She   remains on Plaquenil 200 mg twice a day. Continues to require the use of the Norco.  Remarks that she continues to have good efficacy from it. She usually takes it on a when necessary basis but recently has been using it at least daily sometimes twice daily depending on her activities and pain level. She tells me that her primary care physician has referred her to the ER and now to seeing a nephrologist because of her creatinine and her renal insufficiency. She has fallen once since her last office visit here and tells me that at times she is aware of a rapid heart rate. She denies having any breathing difficulties, or swallowing difficulties, or morphea. Chart was reviewed. Laboratory studies that were obtained were reviewed. Past medical history, surgical history, and medications were reviewed and updated accordingly.        Past Medical History:   Diagnosis Date    Diabetes mellitus ([address]     Fatty liver     Severe    Fibromyalgia     GERD (gastroesophageal reflux disease)     Hypertension     IBS (irritable bowel syndrome)     Osteoarthritis     Peripheral neuropathy     Scleroderma (H[facility])     crest     Past Surgical History: Procedure Laterality Date    ARTHROPLASTY  08/10/2012    FIFTH TOE LEFT FOOT    BACK SURGERY      CHOLECYSTECTOMY  1989    CYSTOSCOPY  08/01/2018    with botox injection     FIBULA FRACTURE SURGERY  2017    3 separate surgeries for a fractured left fibula tibial a closed done by Dr. Ernesto Pratt  2007    THROAT SURGERY  2012    vocal cord polyp     Prior to Admission medications    Medication Sig Start Date End Date Taking? Authorizing Provider   aspirin 81 MG tablet Take 81 mg by mouth daily   Yes Historical Provider, MD   hydroxychloroquine (PLAQUENIL) 200 MG tablet Take 1 tablet by mouth 2 times daily 5/6/19 8/4/19 Yes Anastacia Benavides MD   cyclobenzaprine (FLEXERIL) 10 MG tablet TAKE 1 TABLET THREE TIMES A DAY AS NEEDED FOR MUSCLE SPASMS 4/15/19  Yes Anastacia Benavides MD   DULoxetine (CYMBALTA) 60 MG extended release capsule Take 1 capsule by mouth daily 4/9/19  Yes Anastacia Benavides MD   HYDROcodone-acetaminophen (NORCO) 7.5-325 MG per tablet Take 1 tablet by mouth every 12 hours as needed for Pain for up to 30 days. 6/6/19 7/6/19 Yes Anastacia Benavides MD   Dulaglutide (TRULICITY SC) Inject into the skin   Yes Historical Provider, MD   Calcium Polycarbophil (FIBER-CAPS PO) Take by mouth   Yes Historical Provider, MD   gabapentin (NEURONTIN) 300 MG capsule TAKE 1 CAPSULE IN THE MORNING AND AFTERNOON AND 2 CAPSULES AT BEDTIME 12/7/18 6/11/19 Yes Anastacia Benavides MD   pantoprazole (PROTONIX) 40 MG tablet Take 40 mg by mouth daily   Yes Historical Provider, MD   Probiotic Product (PROBIOTIC DAILY PO) Take by mouth daily   Yes Historical Provider, MD   sitaGLIPtin (JANUVIA) 100 MG tablet Take 100 mg by mouth daily   Yes Historical Provider, MD   magnesium oxide (MAG-OX) 400 MG tablet Take 800 mg by mouth daily. Yes Historical Provider, MD   metFORMIN (GLUCOPHAGE) 500 MG tablet Take 1,000 mg by mouth 2 times daily (with meals).    Yes Historical Provider, MD   Biotin (BIOTIN 5000) 5 MG CAPS Take bilaterally  CARDIOVASCULAR: regular rate  MUSCULOSKELETAL: No evidence of any synovitis, swelling, warmth, or limitation of motion noted over patient's upper or lower peripheral joints except for the right second and third PIP joints with noted mild swelling but no synovitis. Able to make a complete fist bilaterally. Intraosseous muscle wasting bilaterally. Full range of motion of bilateral elbows and shoulders with mild tenderness with right greater than left internal rotation. SKIN: no evidence of any morphea, skin hardening over bilateral hands neck or back. Labs:         Chemistry        Component Value Date/Time     08/01/2018 0851    K 4.6 08/01/2018 0851     08/01/2018 0851    CO2 29 08/01/2018 0851    BUN 21 (H) 08/01/2018 0851    CREATININE 1.2 03/07/2019 1503        Component Value Date/Time    CALCIUM 9.7 08/01/2018 0851    ALKPHOS 85 03/07/2019 1503    AST 34 03/07/2019 1503    ALT 36 03/07/2019 1503    BILITOT 0.5 03/07/2019 1503        Lab Results   Component Value Date    WBC 14.3 (H) 03/07/2019    HGB 13.6 03/07/2019    HCT 42.4 03/07/2019    MCV 92.3 03/07/2019     03/07/2019     Results for Karen Vivar (MRN <[id number]>) as of 5/27/2014 16:08   Ref. Range 2/27/2014 16:03   Angio Convert Enzyme Latest Range: 9-67 U/L 70 (H)   Total CK Latest Range:  U/L 180     Results for Karen Vivar (MRN <[id number]>) as of 5/27/2014 16:08   Ref. Range 2/27/2014 16:03   CHRISTOFER Latest Range: Negative  POSITIVE (A)   CHRISTOFER TITER No range found 1:160   ANCA IFA Latest Range: <1:20  <1:20   CHRISTOFER Interpretation No range found see below   CHRISTOFER Pattern 1 No range found Centromere         Assessment/Plan:      Assessment/Plan:  Kristie Rodriguez was seen today for follow-up. Diagnoses and all orders for this visit:    Scleroderma ([address] continue-with Plaquenil 200 mg twice daily.     High risk medication use - Will check labs to evaluate for any evidence of drug toxicity or side effects. Fibromyalgia  - Continue Cymbalta 60 mg daily and   Gabapentin 300 mg 1 capsule in the morning and afternoon and 2 nightly. Encounter for chronic pain management   Controlled Substance Monitoring:    Acute and Chronic Pain Monitoring:   RX Monitoring 3/7/2019   Attestation The Prescription Monitoring Report for this patient was reviewed today. Acute Pain Prescriptions -   Periodic Controlled Substance Monitoring No signs of potential drug abuse or diversion identified: otherwise, see note documentation;Obtaining appropriate analgesic effect of treatment. Chronic Pain > 80 MEDD -         -     HYDROcodone-acetaminophen (NORCO) 7.5-325 MG per tablet; Take 1 tablet by mouth every 12 hours as needed for Pain for up to 30 days.  -     HYDROcodone-acetaminophen (NORCO) 7.5-325 MG per tablet; Take 1 tablet by mouth every 12 hours as needed for Pain for up to 30 days.  -     HYDROcodone-acetaminophen (NORCO) 7.5-325 MG per tablet; Take 1 tablet by mouth every 12 hours as needed for Pain for up to 30 days. Renal insufficiency-referral given. -     Amb External Referral To Nephrology    Tachycardia-I expressed the patient that I was more concerned about her tachycardia and her renal insufficiency and I wanted her to follow-up with Dr. Fidencio Black regarding this. Return in about 3 months (around 9/9/2019). The risks and benefits of my recommendations, as well as other treatment options, benefits and side effects were discussed with the patient today. Questions were answered. [address] [facility]  [facility]  [address] [place] [address] [address]  LGLES:[phone]  QEV:[phone]    NOTE: This report is transcribed by using voice recognition software dragon. Every effort is made to ensure accuracy; however, inadvertent computerized transcription errors may be present.                    Referring physicians:  Dr. Maribell Valadez Dr. Henderson Dr. Lynann Jolly Dr. Claudette Spinner Dr. Corlis Mandes

## 2019-06-13 ENCOUNTER — TELEPHONE (OUTPATIENT)
Dept: CARDIOLOGY CLINIC | Age: 74
End: 2019-06-13

## 2019-06-13 DIAGNOSIS — R06.02 SHORTNESS OF BREATH: Primary | ICD-10-CM

## 2019-06-13 NOTE — TELEPHONE ENCOUNTER
Pt scheduled for 6/21/19 for echo and needs updated order placed in epic. Central scheduled states dx M34.9 is not valid.  Please only use R06.02.

## 2019-06-17 ENCOUNTER — TELEPHONE (OUTPATIENT)
Dept: CARDIOLOGY CLINIC | Age: 74
End: 2019-06-17

## 2019-06-17 DIAGNOSIS — R06.02 SOB (SHORTNESS OF BREATH): Chronic | ICD-10-CM

## 2019-06-17 DIAGNOSIS — R42 DIZZINESS: Primary | ICD-10-CM

## 2019-06-17 NOTE — TELEPHONE ENCOUNTER
Mark Roland, you will be seeing this patient 6/21. KAREN had sent me a message that she needs an MCOT in addition to OV and echo. Do you want this ordered and started before your OV?

## 2019-06-20 ENCOUNTER — TELEPHONE (OUTPATIENT)
Dept: CARDIOLOGY CLINIC | Age: 74
End: 2019-06-20

## 2019-06-20 DIAGNOSIS — R00.0 TACHYCARDIA: Primary | ICD-10-CM

## 2019-06-20 NOTE — TELEPHONE ENCOUNTER
Spoke with pt.   She is to get MCOT after OV with [facility]

## 2019-06-21 ENCOUNTER — NURSE ONLY (OUTPATIENT)
Dept: CARDIOLOGY CLINIC | Age: 74
End: 2019-06-21
Payer: MEDICARE

## 2019-06-21 ENCOUNTER — OFFICE VISIT (OUTPATIENT)
Dept: CARDIOLOGY CLINIC | Age: 74
End: 2019-06-21
Payer: MEDICARE

## 2019-06-21 ENCOUNTER — HOSPITAL ENCOUNTER (OUTPATIENT)
Dept: NON INVASIVE DIAGNOSTICS | Age: 74
Discharge: HOME | End: 2019-06-21
Payer: MEDICARE

## 2019-06-21 ENCOUNTER — HOSPITAL ENCOUNTER (OUTPATIENT)
Age: 74
Discharge: HOME | End: 2019-06-21
Payer: MEDICARE

## 2019-06-21 VITALS
OXYGEN SATURATION: 96 % | HEART RATE: 72 BPM | DIASTOLIC BLOOD PRESSURE: 82 MMHG | BODY MASS INDEX: 28.64 KG/M2 | SYSTOLIC BLOOD PRESSURE: 134 MMHG | WEIGHT: 189 LBS | HEIGHT: 68 IN

## 2019-06-21 DIAGNOSIS — I10 ESSENTIAL HYPERTENSION: ICD-10-CM

## 2019-06-21 DIAGNOSIS — R00.0 TACHYCARDIA: ICD-10-CM

## 2019-06-21 DIAGNOSIS — R00.0 TACHYCARDIA: Primary | ICD-10-CM

## 2019-06-21 DIAGNOSIS — M34.9 SCLERODERMA (HCC): ICD-10-CM

## 2019-06-21 DIAGNOSIS — R06.02 SHORTNESS OF BREATH: ICD-10-CM

## 2019-06-21 LAB
ANION GAP SERPL CALCULATED.3IONS-SCNC: 13 MMOL/L (ref 3–16)
BUN BLDV-MCNC: 19 MG/DL (ref 7–20)
CALCIUM SERPL-MCNC: 10 MG/DL (ref 8.3–10.6)
CHLORIDE BLD-SCNC: 100 MMOL/L (ref 99–110)
CO2: 29 MMOL/L (ref 21–32)
CREAT SERPL-MCNC: 1.2 MG/DL (ref 0.6–1.2)
GFR AFRICAN AMERICAN: 53
GFR NON-AFRICAN AMERICAN: 44
GLUCOSE BLD-MCNC: 114 MG/DL (ref 70–99)
HCT VFR BLD CALC: 40 % (ref 36–48)
HEMOGLOBIN: 13.4 G/DL (ref 12–16)
LV EF: 58 %
LVEF MODALITY: NORMAL
MCH RBC QN AUTO: 30.6 PG (ref 26–34)
MCHC RBC AUTO-ENTMCNC: 33.6 G/DL (ref 31–36)
MCV RBC AUTO: 91.1 FL (ref 80–100)
PDW BLD-RTO: 13.5 % (ref 12.4–15.4)
PLATELET # BLD: 207 K/UL (ref 135–450)
PMV BLD AUTO: 9.7 FL (ref 5–10.5)
POTASSIUM SERPL-SCNC: 4.8 MMOL/L (ref 3.5–5.1)
RBC # BLD: 4.39 M/UL (ref 4–5.2)
SODIUM BLD-SCNC: 142 MMOL/L (ref 136–145)
TSH REFLEX: 1.44 UIU/ML (ref 0.27–4.2)
WBC # BLD: 10.2 K/UL (ref 4–11)

## 2019-06-21 PROCEDURE — 93228 REMOTE 30 DAY ECG REV/REPORT: CPT | Performed by: INTERNAL MEDICINE

## 2019-06-21 PROCEDURE — 36415 COLL VENOUS BLD VENIPUNCTURE: CPT

## 2019-06-21 PROCEDURE — G8417 CALC BMI ABV UP PARAM F/U: HCPCS | Performed by: NURSE PRACTITIONER

## 2019-06-21 PROCEDURE — 99214 OFFICE O/P EST MOD 30 MIN: CPT | Performed by: NURSE PRACTITIONER

## 2019-06-21 PROCEDURE — 85027 COMPLETE CBC AUTOMATED: CPT

## 2019-06-21 PROCEDURE — 93000 ELECTROCARDIOGRAM COMPLETE: CPT | Performed by: NURSE PRACTITIONER

## 2019-06-21 PROCEDURE — 4040F PNEUMOC VAC/ADMIN/RCVD: CPT | Performed by: NURSE PRACTITIONER

## 2019-06-21 PROCEDURE — 1123F ACP DISCUSS/DSCN MKR DOCD: CPT | Performed by: NURSE PRACTITIONER

## 2019-06-21 PROCEDURE — 80048 BASIC METABOLIC PNL TOTAL CA: CPT

## 2019-06-21 PROCEDURE — 1036F TOBACCO NON-USER: CPT | Performed by: NURSE PRACTITIONER

## 2019-06-21 PROCEDURE — 93306 TTE W/DOPPLER COMPLETE: CPT

## 2019-06-21 PROCEDURE — 1090F PRES/ABSN URINE INCON ASSESS: CPT | Performed by: NURSE PRACTITIONER

## 2019-06-21 PROCEDURE — G8427 DOCREV CUR MEDS BY ELIG CLIN: HCPCS | Performed by: NURSE PRACTITIONER

## 2019-06-21 PROCEDURE — G8399 PT W/DXA RESULTS DOCUMENT: HCPCS | Performed by: NURSE PRACTITIONER

## 2019-06-21 PROCEDURE — 84443 ASSAY THYROID STIM HORMONE: CPT

## 2019-06-21 NOTE — PATIENT INSTRUCTIONS
1. Continue current medications. 2. Check labs (BMP/CBC/TSH). 3. Cardiac event monitor to further assess heart rate. 4. We will call with echo results. 5. Follow up in 6 weeks, earlier for worsening.

## 2019-06-21 NOTE — PROGRESS NOTES
[address] - Cardiology      Chief Complaint: fast HR    Chief Complaint   Patient presents with    1 Year Follow Up     Had Echo done today       History of present illness: Eulalia Villafana is a 66 y.o. female with past medical history significant for HTN, DM, scleroderma followed by Dr Alexis Garcia. Patient is monitored yearly with echo to monitor for [address]. Last echo 2018 demonstrated RVSP 27 mmHg. Patient is here today for yearly monitoring for PAH. She presents to office ambulatory with cane. Patient reports she was recently in to see Dr Alexis Garcia () and was noted to have , she was advised to follow up up cardiology. Denies sensation of rapid HR, palpitations, lightheadedness, dizziness. Notes stable exertional shortness of breath, denies chest pain, has diffuse myalgias. Echo performed prior to office visit, results not yet available.          Past Medical History:   Diagnosis Date    Diabetes mellitus ([address]     Fatty liver     Severe    Fibromyalgia     GERD (gastroesophageal reflux disease)     Hypertension     IBS (irritable bowel syndrome)     Osteoarthritis     Peripheral neuropathy     Scleroderma (HCC)     [facility]st     Past Surgical History:   Procedure Laterality Date    ARTHROPLASTY  08/10/2012    FIFTH TOE LEFT FOOT    BACK SURGERY      CHOLECYSTECTOMY  1989    CYSTOSCOPY  2018    with botox injection     FIBULA FRACTURE SURGERY  2017    3 separate surgeries for a fractured left fibula tibial a closed done by Dr. Jonathan Woo      THROAT SURGERY  2012    vocal cord polyp     Social History     Tobacco Use    Smoking status: Former Smoker     Packs/day: 3.00     Years: 49.00     Pack years: 147.00     Start date: 1960     Last attempt to quit: 2011     Years since quittin.8    Smokeless tobacco: Never Used   Substance Use Topics    Alcohol use: No       Review of Systems: Constitutional: No significant change in weight, + fatigue, no weakness. HEENT: No change in vision or ringing in the ears. Respiratory: + GANDHI, no PND, orthopnea or cough. Cardiovascular: No chest pain, palpitations, edema. GI: No n/v, abdominal pain or changes in bowel habits. No melena, no hematochezia  : No dysuria or hematuria. Skin: No rash or new skin lesions. Musculoskeletal: + diffuse myalgia. Neurological: No lightheadedness, dizziness, syncope or TIA-like symptoms. Psychiatric: No anxiety, insomnia or depression    Physical Exam:  /82 (Site: Left Upper Arm, Position: Sitting, Cuff Size: Medium Adult)   Pulse 72   Ht 5' 7.5\" (1.715 m)   Wt 189 lb (85.7 kg)   SpO2 96%   BMI 29.16 kg/m²     General:  Awake, alert, oriented in NAD  Skin:  Warm and dry. No unusual bruising or rash  HEENT: Normocephalic and atraumatic. Oral mucosa moist, no cyanosis. Neck:  Supple. No JVP appreciated  Chest:  Normal effort. Clear to auscultation  Cardiovascular:  RRR, mildly tachycardic, S1/S2, no murmur/gallop/rub  Abdomen:  Soft, nontender, +bowel sounds  Extremities:  No edema  Neurological: No focal deficits  Psychological: Normal mood and affect     Home Medications:  Current Outpatient Medications   Medication Sig Dispense Refill    aspirin 81 MG tablet Take 81 mg by mouth daily      [START ON 9/5/2019] HYDROcodone-acetaminophen (NORCO) 7.5-325 MG per tablet Take 1 tablet by mouth every 12 hours as needed for Pain for up to 30 days.  60 tablet 0    hydroxychloroquine (PLAQUENIL) 200 MG tablet Take 1 tablet by mouth 2 times daily 180 tablet 3    cyclobenzaprine (FLEXERIL) 10 MG tablet TAKE 1 TABLET THREE TIMES A DAY AS NEEDED FOR MUSCLE SPASMS 270 tablet 1    DULoxetine (CYMBALTA) 60 MG extended release capsule Take 1 capsule by mouth daily 90 capsule 1    Dulaglutide (TRULICITY SC) Inject into the skin      Calcium Polycarbophil (FIBER-CAPS PO) Take by mouth      pantoprazole (PROTONIX) 40 MG tablet Take 40 mg by mouth daily      Probiotic Product (PROBIOTIC DAILY PO) Take by mouth daily      sitaGLIPtin (JANUVIA) 100 MG tablet Take 100 mg by mouth daily      magnesium oxide (MAG-OX) 400 MG tablet Take 800 mg by mouth daily.  metFORMIN (GLUCOPHAGE) 500 MG tablet Take 1,000 mg by mouth 2 times daily (with meals).  Biotin (BIOTIN 5000) 5 MG CAPS Take  by mouth daily.  losartan-hydrochlorothiazide (HYZAAR) 50-12.5 MG per tablet Take 1 tablet by mouth 2 times daily.  potassium chloride (KLOR-CON 10) 10 MEQ CR tablet Take 10 mEq by mouth daily.  FISH OIL by Does not apply route.  Vitamin D (CHOLECALCIFEROL) 1000 UNITS CAPS capsule Take 2,000 Units by mouth daily.  Multiple Vitamin (THERA) TABS Take  by mouth.  gabapentin (NEURONTIN) 300 MG capsule TAKE 1 CAPSULE IN THE MORNING AND AFTERNOON AND 2 CAPSULES AT BEDTIME 120 capsule 2     No current facility-administered medications for this visit. Labs:   Lab Results   Component Value Date    WBC 14.3 (H) 03/07/2019    HGB 13.6 03/07/2019    HCT 42.4 03/07/2019    MCV 92.3 03/07/2019     03/07/2019     Lab Results   Component Value Date     08/01/2018    K 4.6 08/01/2018     08/01/2018    CO2 29 08/01/2018    BUN 21 08/01/2018    CREATININE 1.2 03/07/2019    GLUCOSE 149 08/01/2018    CALCIUM 9.7 08/01/2018      No results found for: TRIG, HDL, LDLCALC, LDLDIRECT, LABVLDL      Diagnostics:    EKG 6/21/2019: Sinus tachycardia vs atrial tach    Echo 5/31/2018:  Summary  Normal left ventricle size, wall thickness, and systolic function with an EF of 55%. Mitral annular calcification is present. Mild mitral regurgitation. Mild tricuspid regurgitation with RVSP of 27 mmHg. Trivial pulmonic regurgitation present. Echo 9/6/2018:  Summary   Normal left ventricle size, wall thickness and systolic function with an estimated ejection fraction of 55%.    Calcification of the mitral

## 2019-06-25 ENCOUNTER — TELEPHONE (OUTPATIENT)
Dept: CARDIOLOGY CLINIC | Age: 74
End: 2019-06-25

## 2019-06-26 NOTE — TELEPHONE ENCOUNTER
Patient informed of results and comments. In agreement with starting beta blocker - send script to [facility] in [address] Samples Lot [id number], exp Jun 2021, 4 boxes of 14 tabs placed at  for the patient    She stated she would come in on Friday morning to speak with [facility]. Call transferred to scheduling to set up EP appointment.

## 2019-06-26 NOTE — TELEPHONE ENCOUNTER
Notify patient echo shows normal pumping function of the heart and normal wall motion. There is evidence that relaxation of the heart is impaired (diastolic dysfunction), this is treated by keeping BP under control and heart rate controlled so heart has more time to fill. They did not give pressure measurement from right side to assess for pulmonary hypertension, perhaps because just trivial regurgitation, but right side of heart is normal sized and function. In regards to the fast heart rate, EP did confirm that you have episodes of atrial fibrillation. She needs to continue taking the Eliquis to protect her from stroke. I would also like to start a beta blocker,likely lopressor to control heart rate. I would like for her to stop by office this week to  additional Eliquis samples. When her, I will meet with her to answer questions and discuss treatment plan. She will not be charged for office visit. She can stop by pretty much anytime except when I have patients scheduled. She also needs appointment with EP. Thanks.

## 2019-06-28 ENCOUNTER — TELEPHONE (OUTPATIENT)
Dept: CARDIOLOGY CLINIC | Age: 74
End: 2019-06-28

## 2019-06-28 DIAGNOSIS — I48.91 NEW ONSET ATRIAL FIBRILLATION (HCC): Primary | ICD-10-CM

## 2019-06-28 DIAGNOSIS — R06.09 DYSPNEA ON EXERTION: ICD-10-CM

## 2019-07-15 ENCOUNTER — TELEPHONE (OUTPATIENT)
Dept: CARDIOLOGY CLINIC | Age: 74
End: 2019-07-15

## 2019-07-17 NOTE — TELEPHONE ENCOUNTER
Reviewed weekly MCOT (7/7-7/13) which shows sinus rhythm and HR 60s-90s. Have sent refills of Eliquis and metoprolol as requested.

## 2019-07-17 NOTE — TELEPHONE ENCOUNTER
Signed prescription for Eliquis and Free 30-day trial card given to [facility]. I still need to review monitor results to determine metoprolol dose.

## 2019-07-23 ENCOUNTER — TELEPHONE (OUTPATIENT)
Dept: CARDIOLOGY CLINIC | Age: 74
End: 2019-07-23

## 2019-08-01 ENCOUNTER — OFFICE VISIT (OUTPATIENT)
Dept: CARDIOLOGY CLINIC | Age: 74
End: 2019-08-01
Payer: MEDICARE

## 2019-08-01 VITALS
SYSTOLIC BLOOD PRESSURE: 118 MMHG | HEIGHT: 68 IN | BODY MASS INDEX: 29.22 KG/M2 | WEIGHT: 192.8 LBS | DIASTOLIC BLOOD PRESSURE: 78 MMHG | HEART RATE: 76 BPM

## 2019-08-01 DIAGNOSIS — I10 BENIGN ESSENTIAL HTN: ICD-10-CM

## 2019-08-01 DIAGNOSIS — M34.9 SCLERODERMA (HCC): ICD-10-CM

## 2019-08-01 DIAGNOSIS — R00.0 TACHYCARDIA: ICD-10-CM

## 2019-08-01 DIAGNOSIS — I47.1 SVT (SUPRAVENTRICULAR TACHYCARDIA) (HCC): Primary | ICD-10-CM

## 2019-08-01 PROCEDURE — G8427 DOCREV CUR MEDS BY ELIG CLIN: HCPCS | Performed by: INTERNAL MEDICINE

## 2019-08-01 PROCEDURE — 1123F ACP DISCUSS/DSCN MKR DOCD: CPT | Performed by: INTERNAL MEDICINE

## 2019-08-01 PROCEDURE — 93000 ELECTROCARDIOGRAM COMPLETE: CPT | Performed by: INTERNAL MEDICINE

## 2019-08-01 PROCEDURE — 1090F PRES/ABSN URINE INCON ASSESS: CPT | Performed by: INTERNAL MEDICINE

## 2019-08-01 PROCEDURE — 99204 OFFICE O/P NEW MOD 45 MIN: CPT | Performed by: INTERNAL MEDICINE

## 2019-08-01 PROCEDURE — 1036F TOBACCO NON-USER: CPT | Performed by: INTERNAL MEDICINE

## 2019-08-01 PROCEDURE — G8399 PT W/DXA RESULTS DOCUMENT: HCPCS | Performed by: INTERNAL MEDICINE

## 2019-08-01 PROCEDURE — 4040F PNEUMOC VAC/ADMIN/RCVD: CPT | Performed by: INTERNAL MEDICINE

## 2019-08-01 PROCEDURE — G8417 CALC BMI ABV UP PARAM F/U: HCPCS | Performed by: INTERNAL MEDICINE

## 2019-08-01 NOTE — LETTER
[facility]  [facility]    Alma Rosa Early  1945    EP Procedures     Pacemaker implant (single/dual)  EP Study    ICD implant (single/dual)  Atrial flutter ablation (ANNETTE Y/N)    Biv implant ICD  Cryoablation    Biv implant PPM  Atrial fibrillation ablation (ANNETTE Yes)    Generator Change (PPM/ICD/BiV)  SVT ablation    Lead revision (RV/LA/RA) (<1 month)  VT ablation      Lead extraction +/- upgrade (BiV/PPM/ICD)  VT Ischemic/ non-ischemic   xxx Loop implant  VT RVOT    Cardioversion  VT Left sided    ANNETTE  AVN ablation     Equipment     Medtronic   JURGEN Mapping System    [facility]  [address] [address]  CryoAblation    Biotronik  Laser Lead Extraction    Special Equipment       EP Procedures Scheduling Request    # hours Requested     Specific Day    Anesthesia    Overnight stay    CT surgery backup    Location mff  MXA     Pre-Procedure Labs / Imaging     PT/INR  Type & cross    CBC  Units PRBC    BMP/Mg  Units FFP    Venogram  CXR    Echo  Cardiac CTA for Pulmonary vein mapping     RN INITIALS: LS      Patient Instructions    Dx: AF

## 2019-08-02 NOTE — PROGRESS NOTES
[facility]   Advanced Heart Failure/Pulmonary Hypertension  Cardiac Evaluation      Ariana Conner  YOB: 1945    Date of Visit:  8/8/19    Chief Complaint   Patient presents with    Hypertension      History of Present Illness:  Saleem Bronw has a history of pulmonary hypertension, scleroderma, and Crest syndrome with Raynaud's / telangiosis including arthralgia and myalgia. She also has Fibromyalgia which is stable, and severe fatty liver infiltration-borderline elevated LFTs. She had a PFT 6/5/2014--and it shows mild obstructive defect with no response to bronchodilators. Air trapping was present on lung volumes and moderate decrease in diffusion capacity. Clinical correlation is recommended since the severity of her decrease in diffusion capacity might indicate pulmonary hypertension in a patient with scleroderma. She had some hair loss that she thinks was from taking Plaquenil so it was stopped, and the hair loss has lessened. She had an echo, and it did not show PHTN. She is getting echos for surveying scleroderma. Today, she is here for regular follow up. Overall, she is feeling fairly well. Mild SOB is unchanged. She denies exertional chest pain, PND, palpitations, light-headedness, edema. MCOT showed one episode of AF, f/u with Dr. Hernandez Rojas who scheduled an ILR placement. She will remain on Eliquis for now. She is wearing a hard soled flat foot shoe right foot after falling and breaking one of the bones across the top. Allergies   Allergen Reactions    Bactrim [Sulfamethoxazole-Trimethoprim] Other (See Comments)     Body shaking and chills.  Body pain    Cortisone Other (See Comments)     Rash, confusion, hyperactivity    Pcn [Penicillins]     Scopolamine Sulfate Anxiety       Current Outpatient Medications:     potassium chloride (MICRO-K) 10 MEQ extended release capsule, Take 10 mEq by mouth daily, Disp: , Rfl:     gabapentin (NEURONTIN) 300 MG capsule, Take 300 mg  Hypertension     IBS (irritable bowel syndrome)     Osteoarthritis     Peripheral neuropathy     Scleroderma (HCC)     crest     Past Surgical History:   Procedure Laterality Date    ARTHROPLASTY  08/10/2012    FIFTH TOE LEFT FOOT    BACK SURGERY      CHOLECYSTECTOMY  1989    CYSTOSCOPY  2018    with botox injection     FIBULA FRACTURE SURGERY  2017    3 separate surgeries for a fractured left fibula tibial a closed done by Dr. Dominique Muhammad [address]  2007    THROAT SURGERY  2012    vocal cord polyp     Family History   Problem Relation Age of Onset    Heart Disease Mother     Cancer Mother     Hypertension Mother     Diabetes Mother     Stroke Father     Hypertension Father     Cancer Other         leukemia    Diabetes Sister      Social History     Socioeconomic History    Marital status:       Spouse name: Not on file    Number of children: Not on file    Years of education: Not on file    Highest education level: Not on file   Occupational History    Not on file   Social Needs    Financial resource strain: Not on file    Food insecurity:     Worry: Not on file     Inability: Not on file    Transportation needs:     Medical: Not on file     Non-medical: Not on file   Tobacco Use    Smoking status: Former Smoker     Packs/day: 3.00     Years: 49.00     Pack years: 147.00     Start date: 1960     Last attempt to quit: 2011     Years since quittin.9    Smokeless tobacco: Never Used   Substance and Sexual Activity    Alcohol use: No    Drug use: No    Sexual activity: Not on file   Lifestyle    Physical activity:     Days per week: Not on file     Minutes per session: Not on file    Stress: Not on file   Relationships    Social connections:     Talks on phone: Not on file     Gets together: Not on file     Attends Sabianism service: Not on file     Active member of club or organization: Not on file     Attends meetings of clubs or organizations: Not on file     Relationship status: Not on file    Intimate partner violence:     Fear of current or ex partner: Not on file     Emotionally abused: Not on file     Physically abused: Not on file     Forced sexual activity: Not on file   Other Topics Concern    Not on file   Social History Narrative    Not on file       Review of Systems:   · Constitutional: there has been no unanticipated weight loss. There's been no change in energy level, sleep pattern, or activity level. Wearing hard sol flat shoe brace right foot   · Eyes: No visual changes or diplopia. No scleral icterus. · ENT: No Headaches, hearing loss or vertigo. No mouth sores or sore throat. · Cardiovascular: Reviewed in HPI  · Respiratory: No cough or wheezing, no sputum production. No hematemesis. · Gastrointestinal: No abdominal pain, appetite loss, blood in stools. No change in bowel or bladder habits. · Genitourinary: No dysuria, trouble voiding, or hematuria. · Musculoskeletal:  No gait disturbance, weakness or joint complaints. · Integumentary: No rash or pruritis. · Neurological: No headache, diplopia, change in muscle strength, numbness or tingling. No change in gait, balance, coordination, mood, affect, memory, mentation, behavior. · Psychiatric: No anxiety, no depression. · Endocrine: No malaise, fatigue or temperature intolerance. No excessive thirst, fluid intake, or urination. No tremor. · Hematologic/Lymphatic: No abnormal bruising or bleeding, blood clots or swollen lymph nodes. · Allergic/Immunologic: No nasal congestion or hives. Physical Examination:    Body mass index is 29.63 kg/m².      Wt Readings from Last 3 Encounters:   08/08/19 192 lb (87.1 kg)   08/05/19 191 lb (86.6 kg)   08/01/19 192 lb 12.8 oz (87.5 kg)     BP Readings from Last 3 Encounters:   08/08/19 134/72   08/05/19 122/78   08/01/19 118/78     Constitutional and General Appearance:   WD/WN in NAD  HEENT:

## 2019-08-08 ENCOUNTER — OFFICE VISIT (OUTPATIENT)
Dept: CARDIOLOGY CLINIC | Age: 74
End: 2019-08-08
Payer: MEDICARE

## 2019-08-08 VITALS
OXYGEN SATURATION: 97 % | BODY MASS INDEX: 29.1 KG/M2 | DIASTOLIC BLOOD PRESSURE: 72 MMHG | SYSTOLIC BLOOD PRESSURE: 134 MMHG | WEIGHT: 192 LBS | HEIGHT: 68 IN | HEART RATE: 74 BPM

## 2019-08-08 DIAGNOSIS — M34.9 SCLERODERMA (HCC): ICD-10-CM

## 2019-08-08 DIAGNOSIS — I47.1 SVT (SUPRAVENTRICULAR TACHYCARDIA) (HCC): Primary | ICD-10-CM

## 2019-08-08 DIAGNOSIS — I10 BENIGN ESSENTIAL HTN: ICD-10-CM

## 2019-08-08 DIAGNOSIS — R06.09 DYSPNEA ON EXERTION: ICD-10-CM

## 2019-08-08 PROCEDURE — G8427 DOCREV CUR MEDS BY ELIG CLIN: HCPCS | Performed by: INTERNAL MEDICINE

## 2019-08-08 PROCEDURE — G8399 PT W/DXA RESULTS DOCUMENT: HCPCS | Performed by: INTERNAL MEDICINE

## 2019-08-08 PROCEDURE — 1123F ACP DISCUSS/DSCN MKR DOCD: CPT | Performed by: INTERNAL MEDICINE

## 2019-08-08 PROCEDURE — 1036F TOBACCO NON-USER: CPT | Performed by: INTERNAL MEDICINE

## 2019-08-08 PROCEDURE — 1090F PRES/ABSN URINE INCON ASSESS: CPT | Performed by: INTERNAL MEDICINE

## 2019-08-08 PROCEDURE — G8417 CALC BMI ABV UP PARAM F/U: HCPCS | Performed by: INTERNAL MEDICINE

## 2019-08-08 PROCEDURE — 4040F PNEUMOC VAC/ADMIN/RCVD: CPT | Performed by: INTERNAL MEDICINE

## 2019-08-08 PROCEDURE — 99214 OFFICE O/P EST MOD 30 MIN: CPT | Performed by: INTERNAL MEDICINE

## 2019-08-08 RX ORDER — POTASSIUM CHLORIDE 750 MG/1
10 CAPSULE, EXTENDED RELEASE ORAL DAILY
COMMUNITY
End: 2020-09-10

## 2019-08-20 ENCOUNTER — HOSPITAL ENCOUNTER (OUTPATIENT)
Dept: CARDIAC CATH/INVASIVE PROCEDURES | Age: 74
Discharge: HOME | End: 2019-08-20
Attending: INTERNAL MEDICINE | Admitting: INTERNAL MEDICINE
Payer: MEDICARE

## 2019-08-20 VITALS
RESPIRATION RATE: 16 BRPM | DIASTOLIC BLOOD PRESSURE: 77 MMHG | SYSTOLIC BLOOD PRESSURE: 163 MMHG | TEMPERATURE: 98 F | BODY MASS INDEX: 29.82 KG/M2 | HEART RATE: 75 BPM | OXYGEN SATURATION: 97 % | HEIGHT: 67 IN | WEIGHT: 190 LBS

## 2019-08-20 PROCEDURE — 33285 INSJ SUBQ CAR RHYTHM MNTR: CPT | Performed by: INTERNAL MEDICINE

## 2019-08-20 PROCEDURE — 33285 INSJ SUBQ CAR RHYTHM MNTR: CPT

## 2019-08-20 PROCEDURE — C1764 EVENT RECORDER, CARDIAC: HCPCS

## 2019-08-21 ENCOUNTER — TELEPHONE (OUTPATIENT)
Dept: CARDIOLOGY CLINIC | Age: 74
End: 2019-08-21

## 2019-08-26 PROBLEM — Z45.09 ENCOUNTER FOR ELECTRONIC ANALYSIS OF REVEAL EVENT RECORDER: Status: ACTIVE | Noted: 2017-09-29

## 2019-08-27 ENCOUNTER — NURSE ONLY (OUTPATIENT)
Dept: CARDIOLOGY CLINIC | Age: 74
End: 2019-08-27
Payer: MEDICARE

## 2019-08-27 DIAGNOSIS — I48.0 PAROXYSMAL ATRIAL FIBRILLATION (HCC): ICD-10-CM

## 2019-08-27 DIAGNOSIS — Z45.09 ENCOUNTER FOR ELECTRONIC ANALYSIS OF REVEAL EVENT RECORDER: Primary | ICD-10-CM

## 2019-08-27 PROCEDURE — 93291 INTERROG DEV EVAL SCRMS IP: CPT | Performed by: INTERNAL MEDICINE

## 2019-09-19 ENCOUNTER — TELEPHONE (OUTPATIENT)
Dept: CARDIOLOGY CLINIC | Age: 74
End: 2019-09-19

## 2019-09-20 NOTE — TELEPHONE ENCOUNTER
Covering for Ramon Wisdom, message sent to [facility] in error. Will defer to EP as previously routed.

## 2019-10-01 ENCOUNTER — OFFICE VISIT (OUTPATIENT)
Dept: CARDIOLOGY CLINIC | Age: 74
End: 2019-10-01
Payer: MEDICARE

## 2019-10-01 VITALS
DIASTOLIC BLOOD PRESSURE: 56 MMHG | HEIGHT: 68 IN | OXYGEN SATURATION: 90 % | HEART RATE: 70 BPM | WEIGHT: 188 LBS | SYSTOLIC BLOOD PRESSURE: 110 MMHG | BODY MASS INDEX: 28.49 KG/M2

## 2019-10-01 DIAGNOSIS — R06.02 SOB (SHORTNESS OF BREATH): Chronic | ICD-10-CM

## 2019-10-01 DIAGNOSIS — I27.21 PAH (PULMONARY ARTERY HYPERTENSION) (HCC): Primary | Chronic | ICD-10-CM

## 2019-10-01 DIAGNOSIS — I10 ESSENTIAL HYPERTENSION: Chronic | ICD-10-CM

## 2019-10-01 PROCEDURE — 1090F PRES/ABSN URINE INCON ASSESS: CPT | Performed by: NURSE PRACTITIONER

## 2019-10-01 PROCEDURE — 1123F ACP DISCUSS/DSCN MKR DOCD: CPT | Performed by: NURSE PRACTITIONER

## 2019-10-01 PROCEDURE — G8427 DOCREV CUR MEDS BY ELIG CLIN: HCPCS | Performed by: NURSE PRACTITIONER

## 2019-10-01 PROCEDURE — G8399 PT W/DXA RESULTS DOCUMENT: HCPCS | Performed by: NURSE PRACTITIONER

## 2019-10-01 PROCEDURE — 1036F TOBACCO NON-USER: CPT | Performed by: NURSE PRACTITIONER

## 2019-10-01 PROCEDURE — G8484 FLU IMMUNIZE NO ADMIN: HCPCS | Performed by: NURSE PRACTITIONER

## 2019-10-01 PROCEDURE — G8417 CALC BMI ABV UP PARAM F/U: HCPCS | Performed by: NURSE PRACTITIONER

## 2019-10-01 PROCEDURE — 4040F PNEUMOC VAC/ADMIN/RCVD: CPT | Performed by: NURSE PRACTITIONER

## 2019-10-01 PROCEDURE — 99214 OFFICE O/P EST MOD 30 MIN: CPT | Performed by: NURSE PRACTITIONER

## 2019-10-01 RX ORDER — GENTAMICIN SULFATE 40 MG/ML
80 INJECTION, SOLUTION INTRAMUSCULAR; INTRAVENOUS EVERY 12 HOURS
COMMUNITY
End: 2019-10-07

## 2019-10-01 RX ORDER — LANOLIN ALCOHOL/MO/W.PET/CERES
9 CREAM (GRAM) TOPICAL NIGHTLY PRN
COMMUNITY
End: 2020-02-12

## 2019-10-01 RX ORDER — ACETAMINOPHEN 500 MG
500 TABLET ORAL EVERY 6 HOURS PRN
COMMUNITY

## 2019-10-01 RX ORDER — PHOSPHORATED CARBOHYDRATE 1.87; 21.5; 1.87 G/5ML; MG/5ML; G/5ML
30 SOLUTION ORAL
COMMUNITY
End: 2020-09-10

## 2019-10-01 RX ORDER — MECLIZINE HCL 12.5 MG/1
12.5 TABLET ORAL 3 TIMES DAILY PRN
COMMUNITY
End: 2020-02-12

## 2019-10-01 ASSESSMENT — ENCOUNTER SYMPTOMS
GASTROINTESTINAL NEGATIVE: 1
SHORTNESS OF BREATH: 1

## 2019-10-07 PROBLEM — M47.817 LUMBOSACRAL SPONDYLOSIS WITHOUT MYELOPATHY: Status: ACTIVE | Noted: 2017-10-26

## 2019-10-07 PROBLEM — R93.89 ABNORMAL CT OF THE CHEST: Status: ACTIVE | Noted: 2018-07-30

## 2019-10-07 PROBLEM — M21.70 LEG LENGTH DISCREPANCY: Status: ACTIVE | Noted: 2017-10-26

## 2019-10-07 PROBLEM — S72.011A SUBCAPITAL FRACTURE OF FEMUR, RIGHT, CLOSED, INITIAL ENCOUNTER (HCC): Status: ACTIVE | Noted: 2019-09-04

## 2019-10-07 PROBLEM — R94.2 DIFFUSION CAPACITY OF LUNG (DL), DECREASED: Status: ACTIVE | Noted: 2018-07-30

## 2019-10-07 PROBLEM — R94.2 RESTRICTIVE VENTILATORY DEFECT: Status: ACTIVE | Noted: 2018-07-30

## 2019-10-07 PROBLEM — S82.409A CLOSED FRACTURE OF TIBIA AND FIBULA, SHAFT: Status: ACTIVE | Noted: 2017-04-04

## 2019-10-07 PROBLEM — S82.209A CLOSED FRACTURE OF TIBIA AND FIBULA, SHAFT: Status: ACTIVE | Noted: 2017-04-04

## 2019-10-10 RX ORDER — METOPROLOL SUCCINATE 100 MG/1
150 TABLET, EXTENDED RELEASE ORAL DAILY
Qty: 135 TABLET | Refills: 3 | Status: SHIPPED | OUTPATIENT
Start: 2019-10-10 | End: 2020-09-17

## 2019-10-24 ENCOUNTER — NURSE ONLY (OUTPATIENT)
Dept: CARDIOLOGY CLINIC | Age: 74
End: 2019-10-24
Payer: MEDICARE

## 2019-10-24 ENCOUNTER — OFFICE VISIT (OUTPATIENT)
Dept: CARDIOLOGY CLINIC | Age: 74
End: 2019-10-24
Payer: MEDICARE

## 2019-10-24 VITALS
SYSTOLIC BLOOD PRESSURE: 96 MMHG | HEIGHT: 68 IN | BODY MASS INDEX: 27.74 KG/M2 | WEIGHT: 183 LBS | HEART RATE: 97 BPM | DIASTOLIC BLOOD PRESSURE: 61 MMHG

## 2019-10-24 DIAGNOSIS — I48.0 PAROXYSMAL ATRIAL FIBRILLATION (HCC): ICD-10-CM

## 2019-10-24 DIAGNOSIS — I47.1 SVT (SUPRAVENTRICULAR TACHYCARDIA) (HCC): ICD-10-CM

## 2019-10-24 DIAGNOSIS — I10 BENIGN ESSENTIAL HTN: ICD-10-CM

## 2019-10-24 DIAGNOSIS — R55 NEAR SYNCOPE: ICD-10-CM

## 2019-10-24 DIAGNOSIS — I48.0 PAROXYSMAL ATRIAL FIBRILLATION (HCC): Primary | ICD-10-CM

## 2019-10-24 DIAGNOSIS — M34.9 SCLERODERMA (HCC): ICD-10-CM

## 2019-10-24 DIAGNOSIS — Z45.09 ENCOUNTER FOR ELECTRONIC ANALYSIS OF REVEAL EVENT RECORDER: ICD-10-CM

## 2019-10-24 PROCEDURE — G8417 CALC BMI ABV UP PARAM F/U: HCPCS | Performed by: INTERNAL MEDICINE

## 2019-10-24 PROCEDURE — 1123F ACP DISCUSS/DSCN MKR DOCD: CPT | Performed by: INTERNAL MEDICINE

## 2019-10-24 PROCEDURE — G8484 FLU IMMUNIZE NO ADMIN: HCPCS | Performed by: INTERNAL MEDICINE

## 2019-10-24 PROCEDURE — 1090F PRES/ABSN URINE INCON ASSESS: CPT | Performed by: INTERNAL MEDICINE

## 2019-10-24 PROCEDURE — 93291 INTERROG DEV EVAL SCRMS IP: CPT | Performed by: INTERNAL MEDICINE

## 2019-10-24 PROCEDURE — 99214 OFFICE O/P EST MOD 30 MIN: CPT | Performed by: INTERNAL MEDICINE

## 2019-10-24 PROCEDURE — G8399 PT W/DXA RESULTS DOCUMENT: HCPCS | Performed by: INTERNAL MEDICINE

## 2019-10-24 PROCEDURE — 4040F PNEUMOC VAC/ADMIN/RCVD: CPT | Performed by: INTERNAL MEDICINE

## 2019-10-24 PROCEDURE — 1036F TOBACCO NON-USER: CPT | Performed by: INTERNAL MEDICINE

## 2019-10-24 PROCEDURE — G8427 DOCREV CUR MEDS BY ELIG CLIN: HCPCS | Performed by: INTERNAL MEDICINE

## 2019-10-30 ENCOUNTER — TELEPHONE (OUTPATIENT)
Dept: CARDIOLOGY CLINIC | Age: 74
End: 2019-10-30

## 2019-12-03 ENCOUNTER — NURSE ONLY (OUTPATIENT)
Dept: CARDIOLOGY CLINIC | Age: 74
End: 2019-12-03
Payer: MEDICARE

## 2019-12-03 DIAGNOSIS — Z45.09 ENCOUNTER FOR ELECTRONIC ANALYSIS OF REVEAL EVENT RECORDER: ICD-10-CM

## 2019-12-03 DIAGNOSIS — I48.0 PAROXYSMAL ATRIAL FIBRILLATION (HCC): ICD-10-CM

## 2019-12-03 PROCEDURE — 93299 PR REM INTERROG ICPMS/SCRMS <30 D TECH REVIEW: CPT | Performed by: INTERNAL MEDICINE

## 2019-12-03 PROCEDURE — 93298 REM INTERROG DEV EVAL SCRMS: CPT | Performed by: INTERNAL MEDICINE

## 2019-12-27 ENCOUNTER — TELEPHONE (OUTPATIENT)
Dept: CARDIOLOGY CLINIC | Age: 74
End: 2019-12-27

## 2020-01-02 ENCOUNTER — NURSE ONLY (OUTPATIENT)
Dept: CARDIOLOGY CLINIC | Age: 75
End: 2020-01-02
Payer: MEDICARE

## 2020-01-02 NOTE — LETTER
[address] [phone]  [address] [facility] [phone]    Pacemaker/Defibrillator Clinic          01/02/20        Jim Flynnalbania Conner  [address] [facility]        Dear Janeth Lara    This letter is to inform you that we received the transmission from your monitor at home that checks your implanted heart monitor. Your report shows no abnormal rhythms. The next date your monitor will automatically transmit will be 2-4-20. Your device and monitor are wireless and most transmit cellularly, but please periodically check your monitor is still plugged in to the electrical outlet. If you still use the telephone land line to send please ensure the connection to the phone yenni is secure. This will help to ensure successful automatic transmissions in the future. Also, the monitor needs to be close to you while sleeping at night. Please be aware that the remote device transmission sites are periodically monitored only during regular business hours during which simultaneous in-office device clinics are being run. If your transmission requires attention, we will contact you as soon as possible. Thank you.             Brian 81

## 2020-01-02 NOTE — PROGRESS NOTES
[facility] remote Linq report shows no arrhythmias. Tachy recording is oversensing and AF recording is NSR w ectopy. We will continue to follow remotely.

## 2020-01-03 PROCEDURE — 93298 REM INTERROG DEV EVAL SCRMS: CPT | Performed by: INTERNAL MEDICINE

## 2020-02-04 ENCOUNTER — NURSE ONLY (OUTPATIENT)
Dept: CARDIOLOGY CLINIC | Age: 75
End: 2020-02-04

## 2020-02-04 NOTE — LETTER
[address] [facility] [phone]  Luige [address] [place] [address]  [address] [phone]    Pacemaker/Defibrillator Clinic          02/03/20        Lizett Conner  [address] [facility]        Dear Mata Orta    This letter is to inform you that we received the transmission from your monitor at home that checks your implanted heart device. The next date your monitor will automatically transmit will be 3-9-20. If your report needs attention we will notify you. Your device and monitor are wireless and most transmit cellularly, but please periodically check your monitor is still plugged in to the electrical outlet. If you still use the telephone land line to send please ensure the connection to the phone yenni is secure. This will help to ensure successful automatic transmissions in the future. Also, the monitor needs to be close to you while sleeping at night. Please be aware that the remote device transmission sites are periodically monitored only during regular business hours during which simultaneous in-office device clinics are being run. If your transmission requires attention, we will contact you as soon as possible. Thank you.             Kika

## 2020-02-12 ENCOUNTER — OFFICE VISIT (OUTPATIENT)
Dept: CARDIOLOGY CLINIC | Age: 75
End: 2020-02-12
Payer: MEDICARE

## 2020-02-12 VITALS
SYSTOLIC BLOOD PRESSURE: 118 MMHG | WEIGHT: 189 LBS | OXYGEN SATURATION: 92 % | DIASTOLIC BLOOD PRESSURE: 66 MMHG | HEART RATE: 57 BPM | BODY MASS INDEX: 29.66 KG/M2 | HEIGHT: 67 IN

## 2020-02-12 PROBLEM — I10 BENIGN ESSENTIAL HTN: Status: ACTIVE | Noted: 2020-02-12

## 2020-02-12 PROCEDURE — 1090F PRES/ABSN URINE INCON ASSESS: CPT | Performed by: INTERNAL MEDICINE

## 2020-02-12 PROCEDURE — 4040F PNEUMOC VAC/ADMIN/RCVD: CPT | Performed by: INTERNAL MEDICINE

## 2020-02-12 PROCEDURE — G8427 DOCREV CUR MEDS BY ELIG CLIN: HCPCS | Performed by: INTERNAL MEDICINE

## 2020-02-12 PROCEDURE — 1123F ACP DISCUSS/DSCN MKR DOCD: CPT | Performed by: INTERNAL MEDICINE

## 2020-02-12 PROCEDURE — G8399 PT W/DXA RESULTS DOCUMENT: HCPCS | Performed by: INTERNAL MEDICINE

## 2020-02-12 PROCEDURE — G8484 FLU IMMUNIZE NO ADMIN: HCPCS | Performed by: INTERNAL MEDICINE

## 2020-02-12 PROCEDURE — 99214 OFFICE O/P EST MOD 30 MIN: CPT | Performed by: INTERNAL MEDICINE

## 2020-02-12 PROCEDURE — G8417 CALC BMI ABV UP PARAM F/U: HCPCS | Performed by: INTERNAL MEDICINE

## 2020-02-12 PROCEDURE — 1036F TOBACCO NON-USER: CPT | Performed by: INTERNAL MEDICINE

## 2020-02-12 NOTE — PROGRESS NOTES
tablet, Take 1 tablet by mouth 2 times daily, Disp: 180 tablet, Rfl: 3    metoprolol succinate (TOPROL XL) 100 MG extended release tablet, Take 1.5 tablets by mouth daily, Disp: 135 tablet, Rfl: 3    HYDROcodone-acetaminophen (NORCO) 7.5-325 MG per tablet, Take 1 tablet by mouth 2 times daily as needed. , Disp: , Rfl:     acetaminophen (TYLENOL) 500 MG tablet, Take 500 mg by mouth every 6 hours as needed for Pain, Disp: , Rfl:     ACIDOPHILUS LACTOBACILLUS PO, Take by mouth, Disp: , Rfl:     anti-nausea (EMETROL) 1.87-1.87-21.5 solution, Take 30 mLs by mouth every 15 minutes as needed for Nausea, Disp: , Rfl:     potassium chloride (MICRO-K) 10 MEQ extended release capsule, Take 10 mEq by mouth daily, Disp: , Rfl:     gabapentin (NEURONTIN) 600 MG tablet, Take by mouth. 1 tablet morning, 1 tablet evening, 2 tablets bedtime, Disp: , Rfl:     hydroxychloroquine (PLAQUENIL) 200 MG tablet, Take 200 mg by mouth 2 times daily, Disp: , Rfl:     cyclobenzaprine (FLEXERIL) 10 MG tablet, TAKE 1 TABLET THREE TIMES A DAY AS NEEDED FOR MUSCLE SPASMS, Disp: 270 tablet, Rfl: 1    DULoxetine (CYMBALTA) 60 MG extended release capsule, Take 1 capsule by mouth daily (Patient taking differently: Take 30 mg by mouth daily ), Disp: 90 capsule, Rfl: 1    Calcium Polycarbophil (FIBER-CAPS PO), Take by mouth, Disp: , Rfl:     pantoprazole (PROTONIX) 40 MG tablet, Take 40 mg by mouth daily, Disp: , Rfl:     Probiotic Product (PROBIOTIC DAILY PO), Take by mouth daily, Disp: , Rfl:     magnesium oxide (MAG-OX) 400 MG tablet, Take 400 mg by mouth daily , Disp: , Rfl:     Biotin (BIOTIN 5000) 5 MG CAPS, Take 1,000 mcg by mouth daily , Disp: , Rfl:     losartan-hydrochlorothiazide (HYZAAR) 50-12.5 MG per tablet, Take 1 tablet by mouth daily , Disp: , Rfl:     FISH OIL, 1,000 mg by Does not apply route daily , Disp: , Rfl:     Vitamin D (CHOLECALCIFEROL) 1000 UNITS CAPS capsule, Take 2,000 Units by mouth daily. , Disp: , Rfl:    Multiple Vitamin (THERA) TABS, Take  by mouth.  , Disp: , Rfl:       Past Medical History:   Diagnosis Date    Diabetes mellitus ([facility] [address]     Fatty liver     Severe    Fibromyalgia     Fracture, hip (HCC)     GERD (gastroesophageal reflux disease)     Hypertension     IBS (irritable bowel syndrome)     Osteoarthritis     Peripheral neuropathy     Scleroderma ([facility] [address]     crest     Past Surgical History:   Procedure Laterality Date    ARTHROPLASTY  08/10/2012    FIFTH TOE LEFT FOOT    BACK SURGERY      CHOLECYSTECTOMY  1989    CYSTOSCOPY  2018    with botox injection     FIBULA FRACTURE SURGERY  2017    3 separate surgeries for a fractured left fibula tibial a closed done by Dr. Beny Lynch      PARTIAL HIP ARTHROPLASTY Right 2019    [facility] THROAT SURGERY  2012    vocal cord polyp     Family History   Problem Relation Age of Onset    Heart Disease Mother     Cancer Mother     Hypertension Mother     Diabetes Mother    [facility] Stroke Father     Hypertension Father     Cancer Other         leukemia    Diabetes Sister      Social History     Socioeconomic History    Marital status:       Spouse name: Not on file    Number of children: Not on file    Years of education: Not on file    Highest education level: Not on file   Occupational History    Not on file   Social Needs    Financial resource strain: Not on file    Food insecurity:     Worry: Not on file     Inability: Not on file    Transportation needs:     Medical: Not on file     Non-medical: Not on file   Tobacco Use    Smoking status: Former Smoker     Packs/day: 3.00     Years: 49.00     Pack years: 147.00     Start date: 1960     Last attempt to quit: 2011     Years since quittin.4    Smokeless tobacco: Never Used   Substance and Sexual Activity    Alcohol use: No    Drug use: No    Sexual activity: Not on file   Lifestyle    Physical activity: Days per week: Not on file     Minutes per session: Not on file    Stress: Not on file   Relationships    Social connections:     Talks on phone: Not on file     Gets together: Not on file     Attends Shinto service: Not on file     Active member of club or organization: Not on file     Attends meetings of clubs or organizations: Not on file     Relationship status: Not on file    Intimate partner violence:     Fear of current or ex partner: Not on file     Emotionally abused: Not on file     Physically abused: Not on file     Forced sexual activity: Not on file   Other Topics Concern    Not on file   Social History Narrative    Not on file       Review of Systems:   · Constitutional: there has been no unanticipated weight loss. There's been no change in energy level, sleep pattern, or activity level. Wearing hard sol flat shoe brace right foot   · Eyes: No visual changes or diplopia. No scleral icterus. · ENT: No Headaches, hearing loss or vertigo. No mouth sores or sore throat. · Cardiovascular: Reviewed in HPI  · Respiratory: No cough or wheezing, no sputum production. No hematemesis. · Gastrointestinal: No abdominal pain, appetite loss, blood in stools. No change in bowel or bladder habits. · Genitourinary: No dysuria, trouble voiding, or hematuria. · Musculoskeletal:  No gait disturbance, weakness or joint complaints. · Integumentary: No rash or pruritis. · Neurological: No headache, diplopia, change in muscle strength, numbness or tingling. No change in gait, balance, coordination, mood, affect, memory, mentation, behavior. · Psychiatric: No anxiety, no depression. · Endocrine: No malaise, fatigue or temperature intolerance. No excessive thirst, fluid intake, or urination. No tremor. · Hematologic/Lymphatic: No abnormal bruising or bleeding, blood clots or swollen lymph nodes. · Allergic/Immunologic: No nasal congestion or hives. Physical Examination:    Body mass index is 29.6 kg/m².

## 2020-03-06 ENCOUNTER — HOSPITAL ENCOUNTER (OUTPATIENT)
Dept: NON INVASIVE DIAGNOSTICS | Age: 75
Discharge: HOME | End: 2020-03-06
Payer: MEDICARE

## 2020-03-06 LAB
LV EF: 60 %
LVEF MODALITY: NORMAL

## 2020-03-06 PROCEDURE — 93306 TTE W/DOPPLER COMPLETE: CPT

## 2020-03-08 PROCEDURE — 93298 REM INTERROG DEV EVAL SCRMS: CPT | Performed by: INTERNAL MEDICINE

## 2020-03-08 PROCEDURE — G2066 INTER DEVC REMOTE 30D: HCPCS | Performed by: INTERNAL MEDICINE

## 2020-03-08 NOTE — PROGRESS NOTES
[facility] remote Linq report shows tachy recording to be noise and oversensing. Pt did have 1 pause recording. AF recording does not appear to be real. We will continue to monitor remotely.

## 2020-03-09 ENCOUNTER — NURSE ONLY (OUTPATIENT)
Dept: CARDIOLOGY CLINIC | Age: 75
End: 2020-03-09
Payer: MEDICARE

## 2020-03-09 NOTE — LETTER
[address] [facility] [phone]  Luige [address] [place] [address]  [address] [phone]    Pacemaker/Defibrillator Clinic          03/08/20        Dinora Conner  [address] [facility]        Dear Luciano Garg    This letter is to inform you that we received the transmission from your monitor at home that checks your implanted heart device. The next date your monitor will automatically transmit will be 6-1-20. If your report needs attention we will notify you. Your device and monitor are wireless and most transmit cellularly, but please periodically check your monitor is still plugged in to the electrical outlet. If you still use the telephone land line to send please ensure the connection to the phone yenni is secure. This will help to ensure successful automatic transmissions in the future. Also, the monitor needs to be close to you while sleeping at night. Please be aware that the remote device transmission sites are periodically monitored only during regular business hours during which simultaneous in-office device clinics are being run. If your transmission requires attention, we will contact you as soon as possible. Thank you.             Akenisha [address]

## 2020-03-30 PROBLEM — R93.89 ABNORMAL CT OF THE CHEST: Status: RESOLVED | Noted: 2018-07-30 | Resolved: 2020-03-30

## 2020-03-30 PROBLEM — Z79.899 HIGH RISK MEDICATION USE: Status: RESOLVED | Noted: 2018-09-27 | Resolved: 2020-03-30

## 2020-03-30 NOTE — PROGRESS NOTES
[address]   Electrophysiology  TOM Car-EDGAR  Attending EP: Dr. Giorgio Orozco    Date: 4/29/2020  I had the privilege of visiting Sumeet Erwin in the office. Chief Complaint:   Chief Complaint   Patient presents with    Follow-up     device check    Atrial Fibrillation     History of Present Illness: History obtained from patient and medical record. Sumeet Erwin is 66 y.o. female with a past medical history of pulmonary hypertension, scleroderma, tachycardia, atrial fibrillation, HTN, fibromyalgia, and Crest syndrome with Raynaud's/telangiosis. She wore an event monitor that showed PAF and she was started on eliquis. S/p ILR implant (8/20/19). -Interval history: Today, Sumeet Erwin is being seen for routine follow-up. She is doing well, no cardiac complaints. Her EKG today shows NSR with 1st degree block and left anterior fascicular block. Her blood pressure is stable, 118/72. She does not monitor her BP at home. Her loop interrogation today shows episodes of AF conversion pauses up to 10 seconds in length. She also has episodes of atrial fib/flutter with RVR. Her tachycardia episodes are generally brief in duration, only last 5-30 minutes. Pt is on eliquis for anti-coagulation. The patient reports she has fairly frequent episodes of dizziness/lightheadedness at rest and activity, not daily but several times per week. She does not use her loop symptom button as she is unsure if she has it and does not check her vitals during the episodes. No pre-syncope or syncope. Denies having chest pain, palpitations, shortness of breath, orthopnea/PND, cough, or dizziness at the time of this visit. With regard to medication therapy the patient has been compliant with prescribed regimen. They have tolerated therapy to date. Allergies: Allergies   Allergen Reactions    Bactrim [Sulfamethoxazole-Trimethoprim] Other (See Comments)     Body shaking and chills.  Body pain    Cortisone Other (See Comments)     Rash, confusion, hyperactivity    Pcn [Penicillins]     Scopolamine Anxiety    Scopolamine Sulfate Anxiety     Home Medications:  Prior to Visit Medications    Medication Sig Taking? Authorizing Provider   apixaban (ELIQUIS) 5 MG TABS tablet Take 1 tablet by mouth 2 times daily  TOM Thomas - CNP   metoprolol succinate (TOPROL XL) 100 MG extended release tablet Take 1.5 tablets by mouth daily  TOM Samuels CNP   HYDROcodone-acetaminophen (NORCO) 7.5-325 MG per tablet Take 1 tablet by mouth 2 times daily as needed. Historical Provider, MD   acetaminophen (TYLENOL) 500 MG tablet Take 500 mg by mouth every 6 hours as needed for Pain  Historical Provider, MD   ACIDOPHILUS LACTOBACILLUS PO Take by mouth  Historical Provider, MD   anti-nausea (EMETROL) 1.87-1.87-21.5 solution Take 30 mLs by mouth every 15 minutes as needed for Nausea  Historical Provider, MD   potassium chloride (MICRO-K) 10 MEQ extended release capsule Take 10 mEq by mouth daily  Historical Provider, MD   gabapentin (NEURONTIN) 600 MG tablet Take by mouth.  1 tablet morning, 1 tablet evening, 2 tablets bedtime  Historical Provider, MD   hydroxychloroquine (PLAQUENIL) 200 MG tablet Take 200 mg by mouth 2 times daily  Historical Provider, MD   cyclobenzaprine (FLEXERIL) 10 MG tablet TAKE 1 TABLET THREE TIMES A DAY AS NEEDED FOR MUSCLE SPASMS  Avel Guadalupe MD   DULoxetine (CYMBALTA) 60 MG extended release capsule Take 1 capsule by mouth daily  Patient taking differently: Take 30 mg by mouth daily   Avel Guadalupe MD   Calcium Polycarbophil (FIBER-CAPS PO) Take by mouth  Historical Provider, MD   pantoprazole (PROTONIX) 40 MG tablet Take 40 mg by mouth daily  Historical Provider, MD   Probiotic Product (PROBIOTIC DAILY PO) Take by mouth daily  Historical Provider, MD   magnesium oxide (MAG-OX) 400 MG tablet Take 400 mg by mouth daily   Historical Provider, MD   Biotin (BIOTIN 5000) 5 MG CAPS Take 1,000 mcg by anticoagulation and possible ablation were discussed with patient. Risks, benefits and alternative of each treatment options were explained. All questions answered    2. Implantable Loop Recorder  - S/p ILR insertion on 8/20/19  -The CIED was interrogated and programmed and I supervised and reviewed all the data. All findings and changes are in device interrogation sheat and reflect my personal interpretation and changes and is scanned to Epic  - Device shows: NSR. Episodes of AF/AFL with RVR. Pauses up 5-10 seconds  - Follow up with device clinic as scheduled    3. Pauses, SSS   - Noted on loop interrogation   - High risk for high grade AV block due to her LAFB     - The risks, benefits and alternatives of the procedure were discussed with the patient. The risks including, but not limited to, the risks of bleeding, infection, pain, device malfunction, lead dislodgement, radiation exposure, injury to cardiac and surrounding structures (including pneumothorax), stroke, cardiac perforation, tamponade, need for emergent heart surgery, myocardial infarction and death were discussed in detail. Dual vs single chamber device, including risks and benefits were discussed with patient.     ----> The patient opted to proceed with the device implantation. 4. Dizziness  - Etiology ?   ~ Unclear whether related to AF with RVR vs pauses  - Will monitor for improvement post PPM   ~ Will add antiarrythmic to prevent RVR episodes once she is protected from bradyarrhythmias post op    5. HTN  - Controlled: Goal <130/80  - Continue current medications  - Encouraged to monitor and log BP readings at home, then bring log to next visit  - Discussed importance of low sodium diet, weight control and exercise    6. PAH   - Stable   - Continue losartan-HCTZ   - Followed by Dr. Maggy Pimentel:  1. Our  will be contacting you from [phone] to schedule your procedure. 2. No medication changes  3.  Hold eliquis one day prior to

## 2020-03-31 PROBLEM — R55 NEAR SYNCOPE: Status: ACTIVE | Noted: 2020-03-31

## 2020-04-29 ENCOUNTER — NURSE ONLY (OUTPATIENT)
Dept: CARDIOLOGY CLINIC | Age: 75
End: 2020-04-29
Payer: MEDICARE

## 2020-04-29 ENCOUNTER — OFFICE VISIT (OUTPATIENT)
Dept: CARDIOLOGY CLINIC | Age: 75
End: 2020-04-29
Payer: MEDICARE

## 2020-04-29 VITALS
WEIGHT: 191 LBS | BODY MASS INDEX: 29.98 KG/M2 | DIASTOLIC BLOOD PRESSURE: 72 MMHG | SYSTOLIC BLOOD PRESSURE: 118 MMHG | HEART RATE: 66 BPM | HEIGHT: 67 IN

## 2020-04-29 PROBLEM — I49.5 SSS (SICK SINUS SYNDROME) (HCC): Status: ACTIVE | Noted: 2020-04-29

## 2020-04-29 PROBLEM — I45.5 SINUS PAUSE: Status: ACTIVE | Noted: 2020-04-29

## 2020-04-29 PROBLEM — S82.209A CLOSED FRACTURE OF TIBIA AND FIBULA, SHAFT: Status: RESOLVED | Noted: 2017-04-04 | Resolved: 2020-04-29

## 2020-04-29 PROBLEM — S82.409A CLOSED FRACTURE OF TIBIA AND FIBULA, SHAFT: Status: RESOLVED | Noted: 2017-04-04 | Resolved: 2020-04-29

## 2020-04-29 PROBLEM — M21.70 LEG LENGTH DISCREPANCY: Status: RESOLVED | Noted: 2017-10-26 | Resolved: 2020-04-29

## 2020-04-29 PROBLEM — S72.011A SUBCAPITAL FRACTURE OF FEMUR, RIGHT, CLOSED, INITIAL ENCOUNTER (HCC): Status: RESOLVED | Noted: 2019-09-04 | Resolved: 2020-04-29

## 2020-04-29 PROCEDURE — 93000 ELECTROCARDIOGRAM COMPLETE: CPT | Performed by: NURSE PRACTITIONER

## 2020-04-29 PROCEDURE — 1090F PRES/ABSN URINE INCON ASSESS: CPT | Performed by: NURSE PRACTITIONER

## 2020-04-29 PROCEDURE — G8417 CALC BMI ABV UP PARAM F/U: HCPCS | Performed by: NURSE PRACTITIONER

## 2020-04-29 PROCEDURE — 4040F PNEUMOC VAC/ADMIN/RCVD: CPT | Performed by: NURSE PRACTITIONER

## 2020-04-29 PROCEDURE — 99214 OFFICE O/P EST MOD 30 MIN: CPT | Performed by: NURSE PRACTITIONER

## 2020-04-29 PROCEDURE — 1036F TOBACCO NON-USER: CPT | Performed by: NURSE PRACTITIONER

## 2020-04-29 PROCEDURE — G8399 PT W/DXA RESULTS DOCUMENT: HCPCS | Performed by: NURSE PRACTITIONER

## 2020-04-29 PROCEDURE — 93291 INTERROG DEV EVAL SCRMS IP: CPT | Performed by: INTERNAL MEDICINE

## 2020-04-29 PROCEDURE — 1123F ACP DISCUSS/DSCN MKR DOCD: CPT | Performed by: NURSE PRACTITIONER

## 2020-04-29 PROCEDURE — G8427 DOCREV CUR MEDS BY ELIG CLIN: HCPCS | Performed by: NURSE PRACTITIONER

## 2020-05-15 ENCOUNTER — OFFICE VISIT (OUTPATIENT)
Dept: PRIMARY CARE CLINIC | Age: 75
End: 2020-05-15

## 2020-05-16 LAB — SARS-COV-2, PCR: NOT DETECTED

## 2020-05-19 ENCOUNTER — HOSPITAL ENCOUNTER (OUTPATIENT)
Dept: CARDIAC CATH/INVASIVE PROCEDURES | Age: 75
Discharge: HOME | End: 2020-05-20
Attending: INTERNAL MEDICINE | Admitting: INTERNAL MEDICINE
Payer: MEDICARE

## 2020-05-19 ENCOUNTER — APPOINTMENT (OUTPATIENT)
Dept: GENERAL RADIOLOGY | Age: 75
End: 2020-05-19
Attending: INTERNAL MEDICINE
Payer: MEDICARE

## 2020-05-19 PROBLEM — Z95.0 PACEMAKER: Status: ACTIVE | Noted: 2020-05-19

## 2020-05-19 LAB
ANION GAP SERPL CALCULATED.3IONS-SCNC: 8 MMOL/L (ref 3–16)
BUN BLDV-MCNC: 16 MG/DL (ref 7–20)
CALCIUM SERPL-MCNC: 10 MG/DL (ref 8.3–10.6)
CHLORIDE BLD-SCNC: 99 MMOL/L (ref 99–110)
CO2: 33 MMOL/L (ref 21–32)
CREAT SERPL-MCNC: 1 MG/DL (ref 0.6–1.2)
EKG ATRIAL RATE: 76 BPM
EKG DIAGNOSIS: NORMAL
EKG P AXIS: 98 DEGREES
EKG P-R INTERVAL: 228 MS
EKG Q-T INTERVAL: 412 MS
EKG QRS DURATION: 90 MS
EKG QTC CALCULATION (BAZETT): 463 MS
EKG R AXIS: -42 DEGREES
EKG T AXIS: 30 DEGREES
EKG VENTRICULAR RATE: 76 BPM
GFR AFRICAN AMERICAN: >60
GFR NON-AFRICAN AMERICAN: 54
GLUCOSE BLD-MCNC: 161 MG/DL (ref 70–99)
HCT VFR BLD CALC: 41.6 % (ref 36–48)
HEMOGLOBIN: 13.9 G/DL (ref 12–16)
MCH RBC QN AUTO: 30.3 PG (ref 26–34)
MCHC RBC AUTO-ENTMCNC: 33.3 G/DL (ref 31–36)
MCV RBC AUTO: 91.1 FL (ref 80–100)
PDW BLD-RTO: 14.6 % (ref 12.4–15.4)
PLATELET # BLD: 170 K/UL (ref 135–450)
PMV BLD AUTO: 9.8 FL (ref 5–10.5)
POTASSIUM SERPL-SCNC: 4.1 MMOL/L (ref 3.5–5.1)
RBC # BLD: 4.57 M/UL (ref 4–5.2)
SODIUM BLD-SCNC: 140 MMOL/L (ref 136–145)
WBC # BLD: 7.5 K/UL (ref 4–11)

## 2020-05-19 PROCEDURE — 99152 MOD SED SAME PHYS/QHP 5/>YRS: CPT | Performed by: INTERNAL MEDICINE

## 2020-05-19 PROCEDURE — 93005 ELECTROCARDIOGRAM TRACING: CPT | Performed by: INTERNAL MEDICINE

## 2020-05-19 PROCEDURE — 6360000002 HC RX W HCPCS

## 2020-05-19 PROCEDURE — 2580000003 HC RX 258: Performed by: INTERNAL MEDICINE

## 2020-05-19 PROCEDURE — 71045 X-RAY EXAM CHEST 1 VIEW: CPT

## 2020-05-19 PROCEDURE — C1898 LEAD, PMKR, OTHER THAN TRANS: HCPCS

## 2020-05-19 PROCEDURE — 99152 MOD SED SAME PHYS/QHP 5/>YRS: CPT

## 2020-05-19 PROCEDURE — 99153 MOD SED SAME PHYS/QHP EA: CPT

## 2020-05-19 PROCEDURE — 33208 INSRT HEART PM ATRIAL & VENT: CPT

## 2020-05-19 PROCEDURE — 80048 BASIC METABOLIC PNL TOTAL CA: CPT

## 2020-05-19 PROCEDURE — 85027 COMPLETE CBC AUTOMATED: CPT

## 2020-05-19 PROCEDURE — 36415 COLL VENOUS BLD VENIPUNCTURE: CPT

## 2020-05-19 PROCEDURE — 6370000000 HC RX 637 (ALT 250 FOR IP): Performed by: INTERNAL MEDICINE

## 2020-05-19 PROCEDURE — C1894 INTRO/SHEATH, NON-LASER: HCPCS

## 2020-05-19 PROCEDURE — 2580000003 HC RX 258

## 2020-05-19 PROCEDURE — C1785 PMKR, DUAL, RATE-RESP: HCPCS

## 2020-05-19 PROCEDURE — 33208 INSRT HEART PM ATRIAL & VENT: CPT | Performed by: INTERNAL MEDICINE

## 2020-05-19 PROCEDURE — 93010 ELECTROCARDIOGRAM REPORT: CPT | Performed by: INTERNAL MEDICINE

## 2020-05-19 PROCEDURE — 2500000003 HC RX 250 WO HCPCS

## 2020-05-19 PROCEDURE — C1887 CATHETER, GUIDING: HCPCS

## 2020-05-19 RX ORDER — GABAPENTIN 600 MG/1
100 TABLET ORAL 2 TIMES DAILY
Status: DISCONTINUED | OUTPATIENT
Start: 2020-05-19 | End: 2020-05-19 | Stop reason: SDUPTHER

## 2020-05-19 RX ORDER — HYDROXYCHLOROQUINE SULFATE 200 MG/1
200 TABLET, FILM COATED ORAL DAILY
Status: DISCONTINUED | OUTPATIENT
Start: 2020-05-19 | End: 2020-05-19

## 2020-05-19 RX ORDER — DULOXETIN HYDROCHLORIDE 30 MG/1
30 CAPSULE, DELAYED RELEASE ORAL DAILY
Status: DISCONTINUED | OUTPATIENT
Start: 2020-05-20 | End: 2020-05-20 | Stop reason: HOSPADM

## 2020-05-19 RX ORDER — ACETAMINOPHEN 500 MG
500 TABLET ORAL EVERY 6 HOURS PRN
Status: DISCONTINUED | OUTPATIENT
Start: 2020-05-19 | End: 2020-05-19 | Stop reason: SDUPTHER

## 2020-05-19 RX ORDER — LANOLIN ALCOHOL/MO/W.PET/CERES
400 CREAM (GRAM) TOPICAL DAILY
Status: DISCONTINUED | OUTPATIENT
Start: 2020-05-20 | End: 2020-05-20 | Stop reason: HOSPADM

## 2020-05-19 RX ORDER — HYDROCHLOROTHIAZIDE 25 MG/1
12.5 TABLET ORAL DAILY
Status: DISCONTINUED | OUTPATIENT
Start: 2020-05-20 | End: 2020-05-20 | Stop reason: HOSPADM

## 2020-05-19 RX ORDER — ACETAMINOPHEN 325 MG/1
650 TABLET ORAL EVERY 4 HOURS PRN
Status: DISCONTINUED | OUTPATIENT
Start: 2020-05-19 | End: 2020-05-20 | Stop reason: HOSPADM

## 2020-05-19 RX ORDER — PANTOPRAZOLE SODIUM 40 MG/1
40 TABLET, DELAYED RELEASE ORAL DAILY
Status: DISCONTINUED | OUTPATIENT
Start: 2020-05-20 | End: 2020-05-20 | Stop reason: HOSPADM

## 2020-05-19 RX ORDER — PHENAZOPYRIDINE HYDROCHLORIDE 100 MG/1
100 TABLET, FILM COATED ORAL 3 TIMES DAILY PRN
Status: DISCONTINUED | OUTPATIENT
Start: 2020-05-19 | End: 2020-05-20 | Stop reason: HOSPADM

## 2020-05-19 RX ORDER — HYDROCODONE BITARTRATE AND ACETAMINOPHEN 7.5; 325 MG/1; MG/1
1 TABLET ORAL 2 TIMES DAILY PRN
Status: DISCONTINUED | OUTPATIENT
Start: 2020-05-19 | End: 2020-05-20 | Stop reason: HOSPADM

## 2020-05-19 RX ORDER — LOSARTAN POTASSIUM 100 MG/1
50 TABLET ORAL DAILY
Status: DISCONTINUED | OUTPATIENT
Start: 2020-05-20 | End: 2020-05-20 | Stop reason: HOSPADM

## 2020-05-19 RX ORDER — POTASSIUM CHLORIDE 750 MG/1
10 TABLET, FILM COATED, EXTENDED RELEASE ORAL DAILY
Status: DISCONTINUED | OUTPATIENT
Start: 2020-05-20 | End: 2020-05-20 | Stop reason: HOSPADM

## 2020-05-19 RX ORDER — GABAPENTIN 300 MG/1
600 CAPSULE ORAL NIGHTLY
Status: DISCONTINUED | OUTPATIENT
Start: 2020-05-19 | End: 2020-05-20 | Stop reason: HOSPADM

## 2020-05-19 RX ORDER — VITAMIN B COMPLEX
2000 TABLET ORAL DAILY
Status: DISCONTINUED | OUTPATIENT
Start: 2020-05-20 | End: 2020-05-20 | Stop reason: HOSPADM

## 2020-05-19 RX ORDER — CYCLOBENZAPRINE HCL 10 MG
10 TABLET ORAL NIGHTLY PRN
Status: DISCONTINUED | OUTPATIENT
Start: 2020-05-19 | End: 2020-05-20 | Stop reason: HOSPADM

## 2020-05-19 RX ORDER — METOPROLOL SUCCINATE 50 MG/1
150 TABLET, EXTENDED RELEASE ORAL DAILY
Status: DISCONTINUED | OUTPATIENT
Start: 2020-05-20 | End: 2020-05-20 | Stop reason: HOSPADM

## 2020-05-19 RX ORDER — LOSARTAN POTASSIUM AND HYDROCHLOROTHIAZIDE 12.5; 5 MG/1; MG/1
1 TABLET ORAL DAILY
Status: DISCONTINUED | OUTPATIENT
Start: 2020-05-19 | End: 2020-05-19 | Stop reason: CLARIF

## 2020-05-19 RX ORDER — DIPHENHYDRAMINE HYDROCHLORIDE 25 MG/1
1000 TABLET ORAL DAILY
Status: DISCONTINUED | OUTPATIENT
Start: 2020-05-19 | End: 2020-05-19 | Stop reason: RX

## 2020-05-19 RX ORDER — GABAPENTIN 300 MG/1
300 CAPSULE ORAL 2 TIMES DAILY
Status: DISCONTINUED | OUTPATIENT
Start: 2020-05-20 | End: 2020-05-20 | Stop reason: HOSPADM

## 2020-05-19 RX ORDER — SODIUM CHLORIDE 0.9 % (FLUSH) 0.9 %
10 SYRINGE (ML) INJECTION PRN
Status: DISCONTINUED | OUTPATIENT
Start: 2020-05-19 | End: 2020-05-20 | Stop reason: HOSPADM

## 2020-05-19 RX ORDER — PHENAZOPYRIDINE HYDROCHLORIDE 100 MG/1
100 TABLET, FILM COATED ORAL 3 TIMES DAILY PRN
COMMUNITY
End: 2020-09-10

## 2020-05-19 RX ORDER — PHOSPHORATED CARBOHYDRATE 1.87; 21.5; 1.87 G/5ML; MG/5ML; G/5ML
30 SOLUTION ORAL
Status: DISCONTINUED | OUTPATIENT
Start: 2020-05-19 | End: 2020-05-19

## 2020-05-19 RX ORDER — SODIUM CHLORIDE 0.9 % (FLUSH) 0.9 %
10 SYRINGE (ML) INJECTION EVERY 12 HOURS SCHEDULED
Status: DISCONTINUED | OUTPATIENT
Start: 2020-05-19 | End: 2020-05-20 | Stop reason: HOSPADM

## 2020-05-19 RX ADMIN — GABAPENTIN 600 MG: 300 CAPSULE ORAL at 20:14

## 2020-05-19 RX ADMIN — HYDROCODONE BITARTRATE AND ACETAMINOPHEN 1 TABLET: 7.5; 325 TABLET ORAL at 23:20

## 2020-05-19 RX ADMIN — Medication 10 ML: at 23:20

## 2020-05-19 ASSESSMENT — PAIN DESCRIPTION - PAIN TYPE: TYPE: ACUTE PAIN

## 2020-05-19 ASSESSMENT — PAIN SCALES - GENERAL
PAINLEVEL_OUTOF10: 5
PAINLEVEL_OUTOF10: 0

## 2020-05-19 ASSESSMENT — PAIN DESCRIPTION - LOCATION: LOCATION: CHEST

## 2020-05-19 NOTE — PROGRESS NOTES
Pt admitted to room *** from ED. VSS. Pt ***. POC updated with pt, all questions answered. Oriented pt to room and call light. Call light and bedside table within reach. Instructed to call out with any needs, v/u. Will monitor.

## 2020-05-19 NOTE — DISCHARGE SUMMARY
EP Discharge Summary  [place]    Patient :Slime Sawyer  Room/Bed: [facility]2967/5135-95  Medical Record: [id number]  YOB: 1945    Admit date: 5/19/2020  Discharge date: 05/20/20     Admitting Physician: Addison Sharma MD   Discharge NP: Brooke Moody CNP    Admission Diagnoses: Sick sinus syndrome [I49.5]  Syncope and collapse [R55]  SSS (sick sinus syndrome) [facility]) [I49.5]     Discharge Diagnoses: Sick sinus syndrome    Discharged Condition: good    Hospital Course: The patient is a 66 y.o. female with a past medical history of pulmonary hypertension, scleroderma, tachycardia, atrial fibrillation, HTN, fibromyalgia, and Crest syndrome with Raynaud's/telangiosis. She wore an event monitor that showed PAF and she was started on eliquis. S/p ILR implant (8/20/19). On a transmission, she was noted to have conversion pauses up to 10 seconds with near syncope. Interval HX: Patient presented for elective pacemaker placement. S/p dual chamber [employer] PPM with Dr. Rodolfo Ruiz yesterday. Uneventful post operative course. Left chest PPM site soft, no hematoma/ooozing/swelling noted. Up in chair, ambulating in room without issue. Patient seen and examined. Notes, labs, and recent testing reviewed. Telemetry reviewed, pt in NSR vs Apaced  No new complaints today and no major events overnight. Denies having chest pain, palpitations, shortness of breath, edema or dizziness at the time of this visit. Consults: none    Objective:   BP (!) 164/63   Pulse 76   Temp 97.7 °F (36.5 °C) (Oral)   Resp 18   Ht 5' 7\" (1.702 m)   Wt 192 lb 11.2 oz (87.4 kg)   SpO2 92%   BMI 30.18 kg/m²    No intake or output data in the 24 hours ending 05/20/20 1059    Physical Exam:  Telemetry: NSR, A-paced  General: Alert & Oriented x4 in no distress  Skin: Warm and dry, no cyanosis or bruising. + PVD to lower legs. Left chest PPM site soft, no hematoma/swelling/oozing noted.    Neck: JVD <8, no diet, weight control and exercise     5. PAH              - Stable              - Continue losartan-HCTZ              - Followed by Dr. Jyotsna Yates    Overall the patient is stable for discharge from a CV standpoint    Diet & Exercise:   The patient is counseled to follow a low salt diet to assure blood pressure remains controlled for cardiovascular risk factor modification   The patient is counseled to avoid excess caffeine, and energy drinks as this may exacerbated ectopy and arrhythmia   The patient is counseled to lose weight to control cardiovascular risk factors   Exercise program discussed: To improve overall cardiovascular health, the patient is instructed to increase cardiovascular related activities with a goal of 150 min/week of moderate level activity or 10,000 steps per day.  Encouraged to perform as much activity as tolerated    EF of 24%  ACEi for systolic HF: N/A  ASA and Statin for CAD: N/A  Anticoagulation for AF and heart failure: Yes (Eliquis)  Tobacco use was discussed with the patient and education provided: N/A  Documentation sent to PCP: Note sent to PCP office    Activity: See discharge instructions  Diet: cardiac diet  Wound Care: See discharge instructions    F/U:   1. Follow-up with Dr. Karsten Trejo in 3 months and device clinic as scheduled  -[facility] [phone] with any questions    Signed:  Pearl Stockton CNP  5/20/2020  10:59 AM    Total time spent on day of discharge including face-to-face visit, examination, documentation, counseling, preparation of discharge plans and follow-up, and discharge medicine reconciliation and prescriptions is >31 minutes

## 2020-05-19 NOTE — H&P
 Probiotic Product (PROBIOTIC DAILY PO) Take by mouth daily      sitaGLIPtin (JANUVIA) 100 MG tablet Take 100 mg by mouth daily      magnesium oxide (MAG-OX) 400 MG tablet Take 400 mg by mouth daily       metFORMIN (GLUCOPHAGE) 500 MG tablet Take 1,000 mg by mouth 2 times daily (with meals).  Biotin (BIOTIN 5000) 5 MG CAPS Take 1,000 mcg by mouth daily       losartan-hydrochlorothiazide (HYZAAR) 50-12.5 MG per tablet Take 1 tablet by mouth daily       FISH OIL 1,000 mg by Does not apply route daily       Vitamin D (CHOLECALCIFEROL) 1000 UNITS CAPS capsule Take 2,000 Units by mouth daily.  Multiple Vitamin (THERA) TABS Take  by mouth.  oxybutynin (DITROPAN-XL) 10 MG extended release tablet Take 5 mg by mouth nightly      sennosides-docusate sodium (SENOKOT-S) 8.6-50 MG tablet Take 2 tablets by mouth as needed      ACIDOPHILUS LACTOBACILLUS PO Take by mouth      Cranberry 250 MG TABS Take by mouth      anti-nausea (EMETROL) 1.87-1.87-21.5 solution Take 30 mLs by mouth every 15 minutes as needed for Nausea      meclizine (ANTIVERT) 12.5 MG tablet Take 12.5 mg by mouth 3 times daily as needed      melatonin 3 MG TABS tablet Take 9 mg by mouth nightly as needed      linagliptin (TRADJENTA) 5 MG tablet Take 5 mg by mouth daily      hydroxychloroquine (PLAQUENIL) 200 MG tablet Take 200 mg by mouth 2 times daily       No current facility-administered medications for this visit. Assessment and plan:       Sick sinus syndrome     Post conversion pause of up to 10 sec    The risks, benefits and alternatives of the procedure were discussed with the patient.  The risks including, but not limited to, the risks of bleeding, infection, pain, device malfunction, lead dislodgement, radiation exposure, injury to cardiac and surrounding structures (including pneumothorax), stroke, cardiac perforation, tamponade, need for emergent heart surgery, myocardial infarction and death were discussed in detail. Dual vs single chamber device, including risks and benefits were discussed with patient. Printed information about ablation including risks were given. Patient was encouraged to review information given, and call back with any questions about risks, benefits and alternative of procedure. The patient opted to proceed with the device implantation. near syncope   Likely due to low BP   BP meds already lowered   Will monitor and adjust if needed      SVT vs Atrial fibrillation   The fast rhythms are more likely atrial flutter than Atrial fibrillation vs atrial tachycardia . I would continue anticoagulation due to high [id number] score of 5 until clear picture of rhythm is establiched. S/p ILR 8/20/19   Toprol  mg daily   All episodes and ECG today show sinus with frequent PAC  Will monitor for Atrial fibrillation     ILR   The CIED was interrogated and programmed and I supervised and reviewed all the data. All findings and changes are in device interrogation sheat and reflect my personal interpretation and changes and is scanned to Epic. Conversion pause up to 10 sec    HTN     Vitals:    10/24/19 1405   BP: 96/61   Pulse: 97    PCP decreased losartan d/t hypotension    Home BP monitoring encourage with a BP goal <130/80   BP is well controlled. Continue current meds. Scleroderma   stable      Plan:     Permanent pacemaker     NOTE: This report was transcribed using voice recognition software. Every effort was made to ensure accuracy, however, inadvertent computerized transcription errors may be present.        Jacki Frank MD, Stefanie Barrera [facility]   Office: [phone]

## 2020-05-20 VITALS
SYSTOLIC BLOOD PRESSURE: 164 MMHG | WEIGHT: 192.7 LBS | BODY MASS INDEX: 30.25 KG/M2 | TEMPERATURE: 97.7 F | HEIGHT: 67 IN | HEART RATE: 76 BPM | DIASTOLIC BLOOD PRESSURE: 63 MMHG | RESPIRATION RATE: 18 BRPM | OXYGEN SATURATION: 92 %

## 2020-05-20 PROBLEM — Z45.09 ENCOUNTER FOR ELECTRONIC ANALYSIS OF REVEAL EVENT RECORDER: Status: RESOLVED | Noted: 2017-09-29 | Resolved: 2020-05-20

## 2020-05-20 PROCEDURE — 6360000002 HC RX W HCPCS: Performed by: INTERNAL MEDICINE

## 2020-05-20 PROCEDURE — 2580000003 HC RX 258: Performed by: INTERNAL MEDICINE

## 2020-05-20 PROCEDURE — 93280 PM DEVICE PROGR EVAL DUAL: CPT | Performed by: INTERNAL MEDICINE

## 2020-05-20 PROCEDURE — 6370000000 HC RX 637 (ALT 250 FOR IP): Performed by: INTERNAL MEDICINE

## 2020-05-20 PROCEDURE — 99217 PR OBSERVATION CARE DISCHARGE MANAGEMENT: CPT | Performed by: NURSE PRACTITIONER

## 2020-05-20 RX ORDER — SODIUM CHLORIDE 9 MG/ML
INJECTION, SOLUTION INTRAVENOUS
Status: DISPENSED
Start: 2020-05-20 | End: 2020-05-20

## 2020-05-20 RX ADMIN — METOPROLOL SUCCINATE 150 MG: 50 TABLET, EXTENDED RELEASE ORAL at 09:19

## 2020-05-20 RX ADMIN — APIXABAN 5 MG: 5 TABLET, FILM COATED ORAL at 09:18

## 2020-05-20 RX ADMIN — LOSARTAN POTASSIUM 50 MG: 100 TABLET, FILM COATED ORAL at 09:18

## 2020-05-20 RX ADMIN — Medication 400 MG: at 09:17

## 2020-05-20 RX ADMIN — GABAPENTIN 300 MG: 300 CAPSULE ORAL at 09:27

## 2020-05-20 RX ADMIN — HYDROCODONE BITARTRATE AND ACETAMINOPHEN 1 TABLET: 7.5; 325 TABLET ORAL at 09:19

## 2020-05-20 RX ADMIN — DULOXETINE HYDROCHLORIDE 30 MG: 30 CAPSULE, DELAYED RELEASE ORAL at 09:17

## 2020-05-20 RX ADMIN — VANCOMYCIN HYDROCHLORIDE 1500 MG: 1 INJECTION, POWDER, LYOPHILIZED, FOR SOLUTION INTRAVENOUS at 00:37

## 2020-05-20 RX ADMIN — POTASSIUM CHLORIDE 10 MEQ: 750 TABLET, FILM COATED, EXTENDED RELEASE ORAL at 09:17

## 2020-05-20 RX ADMIN — HYDROCHLOROTHIAZIDE 12.5 MG: 25 TABLET ORAL at 09:18

## 2020-05-20 RX ADMIN — Medication 10 ML: at 09:20

## 2020-05-20 RX ADMIN — PANTOPRAZOLE SODIUM 40 MG: 40 TABLET, DELAYED RELEASE ORAL at 09:19

## 2020-05-20 RX ADMIN — VITAMIN D, TAB 1000IU (100/BT) 2000 UNITS: 25 TAB at 09:19

## 2020-05-20 ASSESSMENT — PAIN SCALES - GENERAL
PAINLEVEL_OUTOF10: 0
PAINLEVEL_OUTOF10: 6
PAINLEVEL_OUTOF10: 4
PAINLEVEL_OUTOF10: 0
PAINLEVEL_OUTOF10: 0
PAINLEVEL_OUTOF10: 4

## 2020-05-20 ASSESSMENT — PAIN DESCRIPTION - PAIN TYPE: TYPE: CHRONIC PAIN

## 2020-05-20 ASSESSMENT — PAIN DESCRIPTION - LOCATION: LOCATION: BACK

## 2020-05-20 NOTE — PLAN OF CARE
Problem: Falls - Risk of:  Goal: Will remain free from falls  Description: Will remain free from falls  Outcome: Ongoing  Note: Fall precautions in place, bed alarm on, nonskid foot wear applied, bed in lowest position, and call light within reach. Will continue to monitor. Problem: Pain:  Goal: Pain level will decrease  Description: Pain level will decrease  Outcome: Ongoing  Note: Pain medication given per [facility] for pain at surgical site. Ice pack applied. Pain reassessed within 1 hour. Will continue to monitor. Problem: Cardiovascular  Goal: Hemodynamic stability  Outcome: Ongoing  Note: Pacemaker insertion site dressing clean, dry, intact, no swelling or hematoma noted. Pressure dressing in place. VSS. Pt on tele monitoring rate and rhythm, pacer fired on demand. [employer] following. Will continue to monitor.

## 2020-05-27 ENCOUNTER — NURSE ONLY (OUTPATIENT)
Dept: CARDIOLOGY CLINIC | Age: 75
End: 2020-05-27
Payer: MEDICARE

## 2020-05-27 PROCEDURE — 93280 PM DEVICE PROGR EVAL DUAL: CPT | Performed by: INTERNAL MEDICINE

## 2020-08-05 NOTE — PROGRESS NOTES
[facility]   Electrophysiology Follow Up  Date: 8/6/2020    Chief Complaint   Patient presents with    3 Month Follow-Up     No complaints     Atrial Fibrillation      HPI: Sangita Power is a 66 y.o. who was evaluated for tachycardia, sinus tachycardia vs. Atrial fib. Her 30 day monitor reported PAF. She was started on Eliquis. An ILR was implanted on 8/20/19 to better determine whether she has atrial fibrillation. Her loop recorder showed 1st degree AVB with episodes of atrial fib/flutter with RVR and conversion pauses up to 10 seconds. 5/19/20 Insertion of dual chamber pacemaker for symptomatic SSS and removal of ILR     Interval history:  She is doing well. No palpitations or chest pain . She had episodes of Atrial fibrillation or flutter in mid June lasting as long as 48 hours      Past Medical History:   Diagnosis Date    Diabetes mellitus ([address]     Fatty liver     Severe    Fibromyalgia     Fracture, hip (HCC)     GERD (gastroesophageal reflux disease)     Hypertension     IBS (irritable bowel syndrome)     Osteoarthritis     Peripheral neuropathy     [address]cleroderma ([facility] [address]     crest        Past Surgical History:   Procedure Laterality Date    ARTHROPLASTY  08/10/2012    FIFTH TOE LEFT FOOT    BACK SURGERY      CHOLECYSTECTOMY  1989    CYSTOSCOPY  08/01/2018    with botox injection     FIBULA FRACTURE SURGERY  2017    3 separate surgeries for a fractured left fibula tibial a closed done by Dr. Carolee Loar  2007    [facility]    THROAT SURGERY  2012    vocal cord polyp       Allergies: Allergies   Allergen Reactions    Bactrim [Sulfamethoxazole-Trimethoprim] Other (See Comments)     Body shaking and chills.  Body pain    Cortisone Other (See Comments)     Rash, confusion, hyperactivity    Pcn [Penicillins]     Scopolamine Anxiety    Scopolamine Sulfate Anxiety       Social History:   reports that she quit smoking about 8 years ago. She started smoking about 60 years ago. She has a 147.00 pack-year smoking history. She has never used smokeless tobacco. She reports that she does not drink alcohol or use drugs. Family History:  family history includes Cancer in her mother and another family member; Diabetes in her mother and sister; Heart Disease in her mother; Hypertension in her father and mother; Stroke in her father. Reviewed. Denies family history of sudden cardiac death, arrhythmia, premature CAD    Review of System:  All other systems reviewed and are negative except for that noted above. Pertinent negatives are:   General: negative for fever, chills   Ophthalmic ROS: negative for - eye pain or loss of vision  ENT ROS: negative for - headaches, sore throat   Respiratory: negative for - cough, sputum  Cardiovascular: Reviewed in HPI  Gastrointestinal: negative for - abdominal pain, diarrhea, N/V  Hematology: negative for - bleeding, blood clots, bruising or jaundice  Genito-Urinary:  negative for - Dysuria or incontinence  Musculoskeletal: negative for - Joint swelling, muscle pain  Neurological: negative for - confusion, dizziness, headaches   Psychiatric: No anxiety, no depression. Dermatological: negative for - rash    Physical Examination:  Vitals:    08/06/20 1349   BP: 109/72   Pulse: 66   Resp: 18      Wt Readings from Last 3 Encounters:   08/06/20 197 lb (89.4 kg)   05/20/20 192 lb 11.2 oz (87.4 kg)   04/29/20 191 lb (86.6 kg)       · Constitutional: Oriented. No distress. · Head: Normocephalic and atraumatic. · Mouth/Throat: Oropharynx is clear and moist.   · Eyes: Conjunctivae normal. EOM are normal.   · Neck: Neck supple. No rigidity. No JVD present. · Cardiovascular: Normal rate, regular rhythm, S1&S2. · Pulmonary/Chest: Bilateral respiratory sounds. No wheezes, No rhonchi. · Abdominal: Soft. Bowel sounds present. No distension, No tenderness. · Musculoskeletal: No tenderness. No edema    · Lymphadenopathy: Has no cervical adenopathy. · Neurological: Alert and oriented. Cranial nerve appears intact, No Gross deficit   · Skin: Skin is warm and dry. No rash noted. · Psychiatric: Has a normal behavior       Labs, diagnostic and imaging results reviewed. Lab Results   Component Value Date    TSHREFLEX 1.44 2019    TSH 1.13 2013    TSH 2.47 05/10/2012    CREATININE 1.0 2020    CREATININE 1.2 2019    AST 34 2019    ALT 36 2019     Reviewed. EC20  Atrial paced      Event monitor: 19  AF burden 3%  Longest AF 3 hours 50 minutes  AF highest 124  AF lowest 70  AF average 96     Echo:  3/6/20  Summary   -Overall left ventricular systolic function is normal.   -Ejection fraction is visually estimated to be 60 %. E/e'= 13.1 cm.   -No regional wall motion abnormalities are noted. -Normal diastolic function.   -Moderate calcification of the posterior leaflet of the mitral valve. -Mitral annular calcification is present. -Mild thickening of anterior leaflet of mitral valve. -Mild mitral regurgitation is present.   -Aortic valve appears sclerotic but opens adequately. -IVC size is normal (<2.1cm) and collapses > 50% with respiration consistent   with normal RA pressure (3mmHg). Echo: 19  Summary   -Normal left ventricle size, wall thickness, and systolic function with an   estimated ejection fraction of 55-60%.  -No regional wall motion abnormalities are seen.   -Aortic valve appears sclerotic but opens adequately.   -Mitral annular calcification is present.   -Trivial mitral regurgitation.   -Trivial tricuspid regurgitation.   -Diastolic filling parameters suggest grade III diastolic dysfunction.  Avg.   E/e'=24.2      Medication:  Current Outpatient Medications   Medication Sig Dispense Refill    phenazopyridine (PYRIDIUM) 100 MG tablet Take 100 mg by mouth 3 times daily as needed for Pain on the pacemaker. Although the previous monitoring modalities including an event monitor and loop recorder has suggested presence of atrial fibrillation or flutter now that she has a pacemaker we are able to more accurately identify the actual rhythm. I would continue anticoagulation due to high [id number] score of 5 and possibility of some to be atrial flutter. S/p Permanent pacemaker due to post conversion pauses   Toprol  mg daily       MARGARET was turned on. Will see if her burden increases in which case ablation can be considered       near syncope   Likely due to low BP   BP meds already lowered   Will monitor and adjust if needed   No recurrence     Permanent pacemaker    The CIED was interrogated and programmed and I supervised and reviewed all the data. All findings and changes are in device interrogation sheat and reflect my personal interpretation and changes and is scanned to Epic. MINRVA  AP 21.6 and  <0.1%    HTN   Vitals:    08/06/20 1349   BP: 109/72   Pulse: 66   Resp: 18       Home BP monitoring encourage with a BP goal <130/80   BP is well controlled. Continue current meds. Scleroderma   stable      Plan:   Continue current medications  Monitor the burden of Supraventricular tachycardia   F/u EP NP 6 months with SR NP      I independently reviewed Device interrogation    Thank you for allowing me to participate in the care of [address] Further evaluation will be based upon the patient's clinical course and testing results. All questions and concerns were addressed to the patient/family. Alternatives to my treatment were discussed. I have discussed the above stated plan and the patient verbalized understanding and agreed with the plan. NOTE: This report was transcribed using voice recognition software. Every effort was made to ensure accuracy, however, inadvertent computerized transcription errors may be present.        Elvis Salguero MD, [facility]   Office: [phone]    Scribe attestation:  This note was scribed in the presence of Jayna Thomas MD by Katrina Carter RN    I, Dr. Jayna Thomas personally performed the services described in this documentation as scribed by RN in my presence, and it is both accurate and complete.

## 2020-08-06 ENCOUNTER — OFFICE VISIT (OUTPATIENT)
Dept: CARDIOLOGY CLINIC | Age: 75
End: 2020-08-06

## 2020-08-06 ENCOUNTER — NURSE ONLY (OUTPATIENT)
Dept: CARDIOLOGY CLINIC | Age: 75
End: 2020-08-06
Payer: MEDICARE

## 2020-08-06 VITALS
SYSTOLIC BLOOD PRESSURE: 109 MMHG | DIASTOLIC BLOOD PRESSURE: 72 MMHG | WEIGHT: 197 LBS | HEART RATE: 66 BPM | RESPIRATION RATE: 18 BRPM | HEIGHT: 68 IN | BODY MASS INDEX: 29.86 KG/M2

## 2020-08-06 PROCEDURE — 99024 POSTOP FOLLOW-UP VISIT: CPT | Performed by: INTERNAL MEDICINE

## 2020-08-06 PROCEDURE — 93280 PM DEVICE PROGR EVAL DUAL: CPT | Performed by: INTERNAL MEDICINE

## 2020-08-06 NOTE — PROGRESS NOTES
Patient comes in for programming evaluation for her pacemaker. All sensing and pacing parameters are within normal range. Battery life 15.2 years. AP 21.6%.  <0.1%. 39 AT/AF episodes noted with a 8.2% burden. Last on 6/28/2020, longest 58 hours. Patient remains on Eliquis and metoprolol. Today we lowered the outputs in both chambers to preserve battery life and Turned on [employer] to the [address] settings. Please see interrogation for more detail. Patient will see Dr. Radha Saucedo today and follow up in 3 months in office or remotely.

## 2020-08-13 ENCOUNTER — TELEPHONE (OUTPATIENT)
Dept: CARDIOLOGY CLINIC | Age: 75
End: 2020-08-13

## 2020-08-13 NOTE — TELEPHONE ENCOUNTER
KEHINDE loco requesting pt's 8/6/20 pacemaker interrogation fax [phone]    When should pt hold eliquis prior to cystoscopy bilateral retrograde pyelogram and bladder biopsy on 8/21/20.  Fax [phone]

## 2020-08-14 ENCOUNTER — TELEPHONE (OUTPATIENT)
Dept: CARDIOLOGY CLINIC | Age: 75
End: 2020-08-14

## 2020-08-14 NOTE — LETTER
[address]  [address] [address] [place], [address]  Phone Number: [phone]  Dr. Zahira Maurer Asa, APRN-[facility]    [address] [address]  Phone: [phone]  Fax: [phone]     Date: 20  Patient:Isela Conner  : 1945    To Whom It May Concern:    Discontinuation of any anticoagulant carries significant risks of morbidity, sometimes with a fatal outcome (e.g. stroke), from thromboembolic complications. Guidelines do not recommend discontinuation of anticoagulation for low risk surgical procedure, such as cataract surgery, dental procedures, and dermatologic surgeries. The risks of hemorrhage or thromboembolism versus the benefit from the operation need to be addressed by attending surgeon. Eliquis: Based on current manufacturing guidelines for the discontinuation of apixaban prior to any surgical procedure, \"ELIQUIS should be discontinued at least 48 hours prior to elective surgery or invasive procedures with a moderate or high risk of unacceptable or clinically significant bleeding. ELIQUIS should be discontinued at least 24 hours prior to elective surgery or invasive procedures with a low risk of bleeding or where the bleeding would be noncritical in location and easily controlled. Bridging anticoagulation during the 24 to 48 hours after stopping ELIQUIS and prior to the intervention is not generally required. ELIQUIS should be restarted after the surgical or other procedures as soon as adequate hemostasis has been established. \"    Lucianne Mohs may hold Eliquis for 2 days prior to her scheduled procedure. If more days are desired, it is up to the requesting physician to appropriately bridge the patient with an alternative medication. Please limit time off anticoagulation and resume medication as soon as possible. Please contact my office with any further questions or concerns.      Sincerely, Efren Burroughs, APRN-CNP

## 2020-08-14 NOTE — TELEPHONE ENCOUNTER
When should pt hold eliquis prior to cystoscopy bilateral retrograde pyelogram and bladder biopsy on 8/21/20.  Fax [phone]

## 2020-09-08 NOTE — PROGRESS NOTES
[facility]   Advanced Heart Failure/Pulmonary Hypertension  Cardiac Evaluation      Kathya Conner  YOB: 1945    Date of Visit:  9/10/20    Chief Complaint   Patient presents with    Shortness of Breath     PAH    Extremity Weakness      History of Present Illness:  Richie Mejia has a history of pulmonary hypertension, scleroderma, and Crest syndrome with Raynaud's / telangiosis including arthralgia and myalgia. She also has Fibromyalgia which is stable, and severe fatty liver infiltration-borderline elevated LFTs. She had a PFT 6/5/2014--and it shows mild obstructive defect with no response to bronchodilators. Air trapping was present on lung volumes and moderate decrease in diffusion capacity. Clinical correlation is recommended since the severity of her decrease in diffusion capacity might indicate pulmonary hypertension in a patient with scleroderma. She had some hair loss that she thinks was from taking Plaquenil so it was stopped, and the hair loss has lessened. She had an echo, and it did not show PHTN. She is getting echos for surveying scleroderma. She had a dual chamber pacemaker placed per Dr. Staci Martinez on 5-19-20 for SSS with a post conversion pause of up to 10 seconds. At the time, she had an IRL implanted 8-20-19 which was reviewed s/p fall and hip fracture. Today she is here for follow up. She states she had blood work drawn today at [facility] in [facility]. She was admitted on 8-24-20 for gastroenteritis at [facility].  States her potassium went low and her Atrial Fib went crazy. Her diarrhea resolved and came back but not as bad. She states her gut is just screwed up. She denies being swollen in the hospital.  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 She had a Cytoscopy. She does not have a PCP. She continues to follow with Dr. Radha Rios. She has Tai Antonio coming to her home. Her friend is with her today.   Reviewed echo from outside hospital today. Will plan to repeat echo in about 6 months when illness is resolved. She is still weak. EKG today read by me shows Atrial Fib rate of 67. WHO Group 1    Allergies   Allergen Reactions    Bactrim [Sulfamethoxazole-Trimethoprim] Other (See Comments)     Body shaking and chills. Body pain    Cortisone Other (See Comments)     Rash, confusion, hyperactivity    Pcn [Penicillins]     Scopolamine Anxiety    Scopolamine Sulfate Anxiety       Current Outpatient Medications:     dilTIAZem (DILTIAZEM CD) 240 MG extended release capsule, Take 1 capsule by mouth daily, Disp: , Rfl:     hydroxychloroquine (PLAQUENIL) 200 MG tablet, Take 200 mg by mouth daily, Disp: , Rfl:     Dulaglutide (TRULICITY) 1.5 NB/5.8XB SOPN, Inject 1.5 mg into the skin once a week, Disp: , Rfl:     apixaban (ELIQUIS) 5 MG TABS tablet, Take 1 tablet by mouth 2 times daily, Disp: 180 tablet, Rfl: 3    metoprolol succinate (TOPROL XL) 100 MG extended release tablet, Take 1.5 tablets by mouth daily, Disp: 135 tablet, Rfl: 3    HYDROcodone-acetaminophen (NORCO) 7.5-325 MG per tablet, Take 1 tablet by mouth 2 times daily as needed. , Disp: , Rfl:     acetaminophen (TYLENOL) 500 MG tablet, Take 500 mg by mouth every 6 hours as needed for Pain, Disp: , Rfl:     ACIDOPHILUS LACTOBACILLUS PO, Take by mouth, Disp: , Rfl:     gabapentin (NEURONTIN) 600 MG tablet, Take by mouth.  1 tablet morning, 1 tablet evening, 2 tablets bedtime, Disp: , Rfl:     cyclobenzaprine (FLEXERIL) 10 MG tablet, TAKE 1 TABLET THREE TIMES A DAY AS NEEDED FOR MUSCLE SPASMS, Disp: 270 tablet, Rfl: 1    DULoxetine (CYMBALTA) 60 MG extended release capsule, Take 1 capsule by mouth daily (Patient taking differently: Take 30 mg by mouth daily ), Disp: 90 capsule, Rfl: 1    Calcium Polycarbophil (FIBER-CAPS PO), Take by mouth, Disp: , Rfl:     pantoprazole (PROTONIX) 40 MG tablet, Take 40 mg by mouth daily, Disp: , Rfl:     Probiotic Product (PROBIOTIC Non-medical: Not on file   Tobacco Use    Smoking status: Former Smoker     Packs/day: 3.00     Years: 49.00     Pack years: 147.00     Start date: 1960     Last attempt to quit: 2011     Years since quittin.0    Smokeless tobacco: Never Used   Substance and Sexual Activity    Alcohol use: No    Drug use: No    Sexual activity: Not on file   Lifestyle    Physical activity     Days per week: Not on file     Minutes per session: Not on file    Stress: Not on file   Relationships    Social connections     Talks on phone: Not on file     Gets together: Not on file     Attends Confucianism service: Not on file     Active member of club or organization: Not on file     Attends meetings of clubs or organizations: Not on file     Relationship status: Not on file    Intimate partner violence     Fear of current or ex partner: Not on file     Emotionally abused: Not on file     Physically abused: Not on file     Forced sexual activity: Not on file   Other Topics Concern    Not on file   Social History Narrative    Not on file       Review of Systems:   · Constitutional: there has been no unanticipated weight loss. There's been no change in energy level, sleep pattern, or activity level. Wearing hard sol flat shoe brace right foot   · Eyes: No visual changes or diplopia. No scleral icterus. · ENT: No Headaches, hearing loss or vertigo. No mouth sores or sore throat. · Cardiovascular: Reviewed in HPI  · Respiratory: No cough or wheezing, no sputum production. No hematemesis. · Gastrointestinal: No abdominal pain, appetite loss, blood in stools. No change in bowel or bladder habits. · Genitourinary: No dysuria, trouble voiding, or hematuria. · Musculoskeletal:  No gait disturbance, weakness or joint complaints. · Integumentary: No rash or pruritis. · Neurological: No headache, diplopia, change in muscle strength, numbness or tingling.  No change in gait, balance, coordination, mood, affect, memory, mentation, behavior. · Psychiatric: No anxiety, no depression. · Endocrine: No malaise, fatigue or temperature intolerance. No excessive thirst, fluid intake, or urination. No tremor. · Hematologic/Lymphatic: No abnormal bruising or bleeding, blood clots or swollen lymph nodes. · Allergic/Immunologic: No nasal congestion or hives. Physical Examination:    Body mass index is 28.13 kg/m². Wt Readings from Last 3 Encounters:   09/10/20 185 lb (83.9 kg)   08/06/20 197 lb (89.4 kg)   05/20/20 192 lb 11.2 oz (87.4 kg)     BP Readings from Last 3 Encounters:   09/10/20 126/88   08/06/20 109/72   05/20/20 (!) 164/63     Constitutional and General Appearance: In wheel chair, pale  WD/WN in NAD  HEENT:  NC/AT  Skin:  Warm, dry  Hearing:  No problems  Respiratory:  · Normal excursion and expansion without use of accessory muscles  · Resp Auscultation: Normal breath sounds without dullness  Cardiovascular:  · The apical impulses not displaced  · Heart tones are crisp and normal  · Cervical veins are not engorged  · The carotid upstroke is normal in amplitude and contour without delay or bruit  · JVP less than 8 cm H2O  RRR with nl S1 and S2 without m,r,g  · Peripheral pulses are symmetrical and full  · There is no clubbing, cyanosis of the extremities. · No edema. Dependant cyanosis to feet. · Femoral Arteries: 2+ and equal  · Pedal Pulses: 2+ and equal   Abdomen:  · No masses or tenderness  · Liver/Spleen: No Abnormalities Noted  Neurological/Psychiatric:  · Alert and oriented in all spheres  · Moves all extremities well  · Exhibits normal gait balance and coordination  · No abnormalities of mood, affect, memory, mentation, or behavior are noted    Echo 8-  [facility]. SUMMARY:  1. Left ventricle: The cavity size was normal. Wall thickness was     normal. Systolic function was normal. The estimated ejection     fraction was in the range of 55% to 60%. 2. Mitral valve:  The annulus was mildly to moderately calcified.     The leaflets were normal thickness. 3. Right ventricle: The cavity size was normal.  4. Pulmonary arteries: Systolic pressure was moderately increased.     Estimated PA peak pressure is 46mm Hg (S). 5. Pericardium, extracardiac: There was no pericardial effusion. Procedures performed: 5-  Indication of the procedure: Pedro Luis Feliciano is a 66 y.o. female with    Symptomatic Sick sinus syndrome; Sinus dysfunction      · Insertion of MRI compatible right ventricular  lead under fluoroscopy. · Insertion of MRI compatible right atrial lead under fluoroscopy  · Insertion of a MRI compatible  dual  chamber Pacemaker/  · IV sedation. · Programming and analysis of the device  · Removal of Implantable Loop recorder      Echo 3-6-2020  -Overall left ventricular systolic function is normal.   -Ejection fraction is visually estimated to be 60 %. E/e'= 13.1 cm.   -No regional wall motion abnormalities are noted. -Normal diastolic function.   -Moderate calcification of the posterior leaflet of the mitral valve. -Mitral annular calcification is present. -Mild thickening of anterior leaflet of mitral valve. -Mild mitral regurgitation is present.   -Aortic valve appears sclerotic but opens adequately. -IVC size is normal (<2.1cm) and collapses > 50% with respiration consistent   with normal RA pressure (3mmHg). ECHO 6/21/19  -Normal left ventricle size, wall thickness, and systolic function with an estimated ejection fraction of 55-60%.  -No regional wall motion abnormalities are seen.   -Aortic valve appears sclerotic but opens adequately.   -Mitral annular calcification is present.   -Trivial mitral regurgitation.   -Trivial tricuspid regurgitation.   -Diastolic filling parameters suggest grade III diastolic dysfunction. Avg. E/e'=24.2    ECHO 8/6/2014  Normal left ventricle size wall thickness and systolic function with an  estimated ejection fraction of 55-60%.   No regional wall motion abnormalities are seen. Normal diastolic filling  pattern for age. Mitral annular calcification is present. Aortic valve appears sclerotic but opens adequately. No valvular abnormalities noted. Labs were reviewed including labs from other hospital systems through [facility]. Cardiac testing was reviewed including echos, nuclear scans, cardiac catheterization, including from other hospital systems through [facility]. Assessment:  1. PAH (pulmonary artery hypertension) ([facility]    2. Tachycardia    3. Benign essential HTN    4. Paroxysmal atrial fibrillation (HCC)    5. Pacemaker    6. Scleroderma ([facility]    7. SOB (shortness of breath)        1. PAH (pulmonary artery hypertension) ([facility] [address]):  Stable.  -Echo 8- PAP 46mmHg      2. Tachycardia: Intermittent. Cardizem was added at 240mg.  -Will decrease to 180mg. HR today is stable. -Continue metoprolol. 3. Benign essential HTN: /88   Pulse 69   Ht 5' 8\" (1.727 m)   Wt 185 lb (83.9 kg)   SpO2 97%   BMI 28.13 kg/m²   Stable BP. 4. Paroxysmal atrial fibrillation ([facility]): HR controlled and remains on Eliquis. 5. Pacemaker:  [facility]. 6. Scleroderma ([facility]):  CREST Syndrome with Raynaud's and telangiectasias including arthralgias and myalgias.     -No open sores to fingers today. 7. SOB (shortness of breath):  SOB with activity.     -Weakness related to recent viral illness and hospitalization. Plan:  1. Change Cardizem to 180mg one tablet daily. May finish 240mg strength first.   2.  Labs CBC, CMP, BNP in 3 months. 3.  Continue all other medications. 4.  See Dr. Noel Beasley in 6 months. Scribe's attestation: This note was scribed in the presence of Christiano Daly M.D. by Princess Carrero RN     The scribe's documentation has been prepared under my direction and personally reviewed by me in its entirety.   I confirm that the note above accurately reflects all work, treatment, procedures, and medical decision making performed by me. QUALITY MEASURES  1. Tobacco Cessation Counseling: NA  2. Retake of BP if >140/90: NA  3. CAD patient on anti-platelet: NA  4. CAD patient on STATIN therapy: NA  5. Patient with CHF and aFib on anticoagulation:  NA      I appreciate the opportunity of cooperating in the care of this patient.     Rosario Blanco M.D., [facility] [place]

## 2020-09-09 ENCOUNTER — TELEPHONE (OUTPATIENT)
Dept: CARDIOLOGY CLINIC | Age: 75
End: 2020-09-09

## 2020-09-09 NOTE — TELEPHONE ENCOUNTER
Home Health nurse is visiting today and said that heart rate is 124 and is irregular. Pt is taking metoprolol 150. Pt was advise when she should go to the ER by the home health nurse. Pt is sched to see KAREN tomorrow. Is there anything else they should do? Please call to advise.

## 2020-09-10 ENCOUNTER — OFFICE VISIT (OUTPATIENT)
Dept: CARDIOLOGY CLINIC | Age: 75
End: 2020-09-10
Payer: MEDICARE

## 2020-09-10 VITALS
WEIGHT: 185 LBS | HEART RATE: 69 BPM | BODY MASS INDEX: 28.04 KG/M2 | DIASTOLIC BLOOD PRESSURE: 88 MMHG | OXYGEN SATURATION: 97 % | HEIGHT: 68 IN | SYSTOLIC BLOOD PRESSURE: 126 MMHG

## 2020-09-10 PROCEDURE — G8427 DOCREV CUR MEDS BY ELIG CLIN: HCPCS | Performed by: INTERNAL MEDICINE

## 2020-09-10 PROCEDURE — 1123F ACP DISCUSS/DSCN MKR DOCD: CPT | Performed by: INTERNAL MEDICINE

## 2020-09-10 PROCEDURE — 1036F TOBACCO NON-USER: CPT | Performed by: INTERNAL MEDICINE

## 2020-09-10 PROCEDURE — 1090F PRES/ABSN URINE INCON ASSESS: CPT | Performed by: INTERNAL MEDICINE

## 2020-09-10 PROCEDURE — 93000 ELECTROCARDIOGRAM COMPLETE: CPT | Performed by: INTERNAL MEDICINE

## 2020-09-10 PROCEDURE — G8399 PT W/DXA RESULTS DOCUMENT: HCPCS | Performed by: INTERNAL MEDICINE

## 2020-09-10 PROCEDURE — 99214 OFFICE O/P EST MOD 30 MIN: CPT | Performed by: INTERNAL MEDICINE

## 2020-09-10 PROCEDURE — G8417 CALC BMI ABV UP PARAM F/U: HCPCS | Performed by: INTERNAL MEDICINE

## 2020-09-10 PROCEDURE — 4040F PNEUMOC VAC/ADMIN/RCVD: CPT | Performed by: INTERNAL MEDICINE

## 2020-09-10 RX ORDER — DULAGLUTIDE 1.5 MG/.5ML
1.5 INJECTION, SOLUTION SUBCUTANEOUS WEEKLY
COMMUNITY
Start: 2020-06-10 | End: 2021-04-14 | Stop reason: ALTCHOICE

## 2020-09-10 RX ORDER — HYDROXYCHLOROQUINE SULFATE 200 MG/1
200 TABLET, FILM COATED ORAL DAILY
COMMUNITY

## 2020-09-10 RX ORDER — DILTIAZEM HYDROCHLORIDE 180 MG/1
180 CAPSULE, COATED, EXTENDED RELEASE ORAL DAILY
Qty: 90 CAPSULE | Refills: 3 | Status: SHIPPED | OUTPATIENT
Start: 2020-09-10 | End: 2020-09-14 | Stop reason: DRUGHIGH

## 2020-09-10 RX ORDER — DILTIAZEM HYDROCHLORIDE 240 MG/1
1 CAPSULE, COATED, EXTENDED RELEASE ORAL DAILY
COMMUNITY
Start: 2020-08-27 | End: 2020-09-10

## 2020-09-10 NOTE — PATIENT INSTRUCTIONS
Plan:  1. Change Cardizem to 180mg one tablet daily. May finish 240mg strength first.   2.  Labs CBC, CMP, BNP in 3 months. 3.  Continue all other medications. 4.  See Dr. Juan Toussaint in 6 months.

## 2020-09-14 ENCOUNTER — TELEPHONE (OUTPATIENT)
Dept: CARDIOLOGY CLINIC | Age: 75
End: 2020-09-14

## 2020-09-14 ENCOUNTER — TELEPHONE (OUTPATIENT)
Dept: OTHER | Age: 75
End: 2020-09-14

## 2020-09-14 RX ORDER — DILTIAZEM HYDROCHLORIDE 240 MG/1
240 CAPSULE, COATED, EXTENDED RELEASE ORAL DAILY
Qty: 30 CAPSULE | Refills: 5 | Status: SHIPPED | OUTPATIENT
Start: 2020-09-14 | End: 2020-09-17 | Stop reason: SDUPTHER

## 2020-09-14 NOTE — TELEPHONE ENCOUNTER
Patient told to make an appt with EP for irregular HR. Where can she be added ? There is no open appt with MXA or NP for weeks .  Patient states she was told to be seen ASAP

## 2020-09-14 NOTE — TELEPHONE ENCOUNTER
Call placed to Neel Boucher regarding her dosage of cardizem.  She is on the 240 mg but will decrease to 180 mg as ordered by KAREN she will let us know how her HR is next week

## 2020-09-14 NOTE — TELEPHONE ENCOUNTER
Spoke to the [facility] instructions per Silva Weiss CNP.   Updated the medication list.

## 2020-09-14 NOTE — TELEPHONE ENCOUNTER
Did she decrease cardizem already? If so, would go back to higher dose of 240. She probably needs EP f/u as well.  Burke Alanis

## 2020-09-14 NOTE — TELEPHONE ENCOUNTER
Lauren from [facility] called the resource line and stated the patient has an elevated blood pressure of 134/101 and heart rate of 105-138. Her only complaint is a headache. She took all her medications this am. When Lauren left her blood pressure was down to 152/95 and heart rate was 140. Please advise. Lauren's phone number is [phone].

## 2020-09-17 RX ORDER — DILTIAZEM HYDROCHLORIDE 240 MG/1
240 CAPSULE, COATED, EXTENDED RELEASE ORAL DAILY
Qty: 90 CAPSULE | Refills: 3 | Status: SHIPPED | OUTPATIENT
Start: 2020-09-17 | End: 2021-02-03

## 2020-09-17 RX ORDER — METOPROLOL SUCCINATE 100 MG
TABLET, EXTENDED RELEASE 24 HR ORAL
Qty: 135 TABLET | Refills: 3 | Status: SHIPPED | OUTPATIENT
Start: 2020-09-17 | End: 2020-10-30 | Stop reason: SDUPTHER

## 2020-09-17 NOTE — TELEPHONE ENCOUNTER
RX APPROVAL:      Refill:   Requested Prescriptions     Pending Prescriptions Disp Refills    TOPROL  MG extended release tablet [Pharmacy Med Name: TOPROL XL TABS 100MG] 135 tablet 3     Sig: TAKE ONE AND ONE-HALF TABLETS DAILY      Last OV: 9/10/2020   Last EKG:   Last Labs:   Lab Results   Component Value Date    GLUCOSE 128 09/16/2020    BUN 23 09/16/2020    CREATININE 1.0 05/19/2020    LABGLOM 48 09/16/2020    LABGLOM 64 02/25/2015    EGFRAA 75 88/87/5180    BCR NOT APPLICABLE 79/07/6531     09/16/2020    K 4.5 09/16/2020     09/16/2020    CO2 29 09/16/2020    CALCIUM 9.2 09/16/2020     Lab Results   Component Value Date     09/16/2020     09/16/2020    CO2 29 09/16/2020    ANIONGAP 8 09/16/2020    GLUCOSE 128 09/16/2020    BUN 23 09/16/2020    CREATININE 1.0 05/19/2020    LABGLOM 48 09/16/2020    LABGLOM 64 02/25/2015    GFRAA 58 09/16/2020    GFRAA >60 02/26/2013    CALCIUM 9.2 09/16/2020    PROT 7.4 03/07/2019    PROT 6.8 02/26/2013    LABALBU 3.8 09/16/2020    LABALBU 4.7 03/07/2019    BILITOT 0.5 03/07/2019    ALKPHOS 85 03/07/2019    AST 34 03/07/2019    ALT 36 03/07/2019    GLOB 2.2 01/06/2015     Lab Results   Component Value Date    ALT 36 03/07/2019    AST 34 03/07/2019     Lab Results   Component Value Date    K 4.5 09/16/2020       Plan and labs reviewed

## 2020-09-17 NOTE — TELEPHONE ENCOUNTER
[facility]ent in a [facility] because NPKV increased and had sent to [facility] and she cant get to [facility] to get this.  Advised her to take the 180 mg till her 240 mg come in

## 2020-09-21 PROBLEM — E55.9 VITAMIN D DEFICIENCY: Status: ACTIVE | Noted: 2020-09-11

## 2020-09-21 PROBLEM — Z23 FLU VACCINE NEED: Status: ACTIVE | Noted: 2020-09-11

## 2020-09-21 PROBLEM — J44.9 CHRONIC OBSTRUCTIVE PULMONARY DISEASE (HCC): Status: ACTIVE | Noted: 2020-09-16

## 2020-09-21 PROBLEM — L98.9 SKIN LESION: Status: ACTIVE | Noted: 2020-09-11

## 2020-09-21 PROBLEM — Z23 NEED FOR STREPTOCOCCUS PNEUMONIAE VACCINATION: Status: ACTIVE | Noted: 2020-09-11

## 2020-10-13 ENCOUNTER — TELEPHONE (OUTPATIENT)
Dept: CARDIOLOGY CLINIC | Age: 75
End: 2020-10-13

## 2020-10-13 NOTE — TELEPHONE ENCOUNTER
Please add her to my schedule on 10/30 any time.  This will allow her time to be out of the covid precautions so she may come in for a device check    TOM Cramer-CNP

## 2020-10-13 NOTE — TELEPHONE ENCOUNTER
If she does not have access to remote interrogation equipment we will need to see her once she is cleared from covid.  Please contact the nursing home and seed if they can obtain her equipment from home to plug in Terbinafine Pregnancy And Lactation Text: This medication is Pregnancy Category B and is considered safe during pregnancy. It is also excreted in breast milk and breast feeding isn't recommended.

## 2020-10-13 NOTE — TELEPHONE ENCOUNTER
Patient had an vv appointment with Georgia Andrews today. Dane Salinas canceled appointment because patient did not do a remote device check. Patient is at [facility] for rehab. Was supposed to be discharged the end of this week however patient had a covid test, rapid result was negative. Patient does not have the equipment at [facility], equipment is at home. There are no appointments with Gricelda Luis the rest of the year. Please call patient and advise.   eugene

## 2020-10-26 PROBLEM — Z23 FLU VACCINE NEED: Status: RESOLVED | Noted: 2020-09-11 | Resolved: 2020-10-26

## 2020-10-26 PROBLEM — L98.9 SKIN LESION: Status: RESOLVED | Noted: 2020-09-11 | Resolved: 2020-10-26

## 2020-10-26 NOTE — PROGRESS NOTES
[facility]   Electrophysiology  [place] TOM Fischer-CNP  Attending EP: Dr. Manny Litten    Date: 10/30/2020  I had the privilege of visiting [facility] in the office. Chief Complaint:   Chief Complaint   Patient presents with    Atrial Fibrillation     History of Present Illness: History obtained from patient and medical record. Charlotte Agee is 66 y.o. female with a past medical history of pulmonary hypertension, scleroderma, tachycardia, atrial fibrillation, HTN, fibromyalgia, and Crest syndrome with Raynaud's/telangiosis. She wore an event monitor that showed PAF and she was started on eliquis. S/p ILR implant (8/20/19). She was found to have prolonged pauses with near syncope s/p PPM implant (5/19/20). -Interval history: Today, Charlotte Agee is being seen for follow up. She is doing fairly well, no complaints. She was recently at [facility] for congestive heart failure and is currently at [facility] for rehab. She remains very weak and on oxygen. Her device interrogation shows AFL burden of 48%. She denies overt symptoms. We discussed the correlation between AFL and CHF exacerbations. Long discussion about possible need for an ablation to reduce recurrence and to alleviate her AFL. She admits she still feels SOB at times and her legs will swell, but mildly. Pt reports she takes a water pill, but is unsure of the dosage. Denies having chest pain, palpitations, shortness of breath, orthopnea/PND, cough, or dizziness at the time of this visit. With regard to medication therapy the patient has been compliant with prescribed regimen. They have tolerated therapy to date. Allergies: Allergies   Allergen Reactions    Adhesive Tape     Collagen     Other     Bactrim [Sulfamethoxazole-Trimethoprim] Other (See Comments)     Body shaking and chills.  Body pain    Cortisone Other (See Comments)     Rash, confusion, hyperactivity    Pcn [Penicillins]     Scopolamine Anxiety MD   Vitamin D (CHOLECALCIFEROL) 1000 UNITS CAPS capsule Take 2,000 Units by mouth daily. Yes Historical Provider, MD   Multiple Vitamin (THERA) TABS Take  by mouth. Yes Historical Provider, MD   ACIDOPHILUS LACTOBACILLUS PO Take by mouth  Historical Provider, MD      Past Medical History:  Past Medical History:   Diagnosis Date    Diabetes mellitus ([facility]     Fatty liver     Severe    Fibromyalgia     Fracture, hip (HCC)     GERD (gastroesophageal reflux disease)     Hypertension     IBS (irritable bowel syndrome)     Osteoarthritis     Peripheral neuropathy     Scleroderma (HCC)     crest     Past Surgical History:    has a past surgical history that includes Hysterectomy (1987); Cholecystectomy (1989); Lumbar disc surgery (2007); Throat surgery (2012); arthroplasty (08/10/2012); Fibula Fracture Surgery (2017); back surgery; Cystocopy (08/01/2018); and Partial hip arthroplasty (Right, 09/2019). Social History:  Reviewed. reports that she quit smoking about 9 years ago. She started smoking about 60 years ago. She has a 147.00 pack-year smoking history. She has never used smokeless tobacco. She reports that she does not drink alcohol or use drugs. Family History:  Reviewed. family history includes Cancer in her mother and another family member; Diabetes in her mother and sister; Heart Disease in her mother; Hypertension in her father and mother; Stroke in her father.      Review of System:  · Constitutional: Negative for fever, night sweats, chills, weight changes, or weakness  · Skin: Negative for rash, dry skin, pruritus, bruising, bleeding, blood clots, or changes in skin pigment  · HEENT: Negative for vision changes, ringing in the ears, sore throat, dysphagia, or swollen lymph nodes  · Respiratory: Reviewed in HPI  · Cardiovascular: Reviewed in HPI  · Gastrointestinal: Negative for abdominal pain, N/V/D, constipation, or black/tarry stools  · Genito-Urinary: Negative for dysuria, incontinence, urgency, or hematuria  · Musculoskeletal: Negative for joint swelling, muscle pain, or injuries  · Neurological/Psych: Negative for confusion, seizures, headaches, balance issues or TIA-like symptoms. No anxiety, depression, or insomnia    Physical Examination: Limited due to virtual visit  Vitals:    10/30/20 1121   BP: 124/68   Pulse: 65   SpO2: 99%      Wt Readings from Last 3 Encounters:   10/30/20 194 lb 4.8 oz (88.1 kg)   09/21/20 182 lb 12.8 oz (82.9 kg)   09/10/20 185 lb (83.9 kg)     Constitutional: Cooperative and in no apparent distress, and appears stated age  Skin: Warm and pink; no pallor, cyanosis, bruising, or clubbing  HEENT: Symmetric and normocephalic. PERRL, EOM intact. Conjunctiva pink with clear sclera. Mucus membranes pink and moist. Teeth intact. Thyroid smooth without nodules or goiter  Respiratory: Respirations symmetric and unlabored. Lungs with mild crackles to auscultation bilateral lower lobes, no wheezing, rhonchi. On 2L of oxygen  Cardiovascular:  Regular rate and rhythm. S1/S2 present without murmurs, rubs, or gallops. Peripheral pulses 2+, capillary refill < 3 seconds. No elevation of JVP. Trace BLE edema  Gastrointestinal: Abdomen soft and round. Bowel sounds normoactive in all quadrants without tenderness or masses. Musculoskeletal: Bilateral upper and lower extremity strength 5/5 with full ROM. + In wheel chair  Neurological/Psych: Awake and orientated to person, place and time. Calm affect, appropriate mood.      Pertinent labs, diagnostic, device, and imaging results reviewed as a part of this visit    LABS    CBC:   Lab Results   Component Value Date    WBC 10.7 (A) 09/16/2020    HGB 13.8 09/16/2020    HCT 41.4 09/16/2020    MCV 92.1 09/16/2020     09/16/2020     BMP:   Lab Results   Component Value Date    CREATININE 1.0 05/19/2020    BUN 23 (A) 09/16/2020     09/16/2020    K 4.5 09/16/2020     09/16/2020    CO2 29 09/16/2020     CrCl cannot be calculated (Patient's most recent lab result is older than the maximum 120 days allowed. ). Thyroid:   Lab Results   Component Value Date    TSH 1.13 11/13/2013     Lipid Panel: No results found for: CHOL, HDL, TRIG  LFTs:  Lab Results   Component Value Date    ALT 36 03/07/2019    AST 34 03/07/2019    ALKPHOS 85 03/07/2019    BILITOT 0.5 03/07/2019     Coags: No results found for: PROTIME, INR, APTT    ECG: 10/30/2020  - Atrial paced    Echo: 3/6/20   -Overall left ventricular systolic function is normal.   -Ejection fraction is visually estimated to be 60 %. E/e'= 13.1 cm.   -No regional wall motion abnormalities are noted. -Normal diastolic function.   -Moderate calcification of the posterior leaflet of the mitral valve. -Mitral annular calcification is present. -Mild thickening of anterior leaflet of mitral valve. -Mild mitral regurgitation is present.   -Aortic valve appears sclerotic but opens adequately. -IVC size is normal (<2.1cm) and collapses > 50% with respiration consistent   with normal RA pressure (3mmHg). Echo: 8/20 (KHN)  1. Left ventricle: The cavity size was normal. Wall thickness was     normal. Systolic function was normal. The estimated ejection     fraction was in the range of 55% to 60%. 2. Mitral valve: The annulus was mildly to moderately calcified.     The leaflets were normal thickness. 3. Right ventricle: The cavity size was normal.  4. Pulmonary arteries: Systolic pressure was moderately increased.     Estimated PA peak pressure is 46mm Hg (S). 5. Pericardium, extracardiac: There was no pericardial effusion. GXT: None    Event monitor: 6/21/19  AF burden 3% (Longest AF 3 hours 50 minutes)  AF Average HR 96, low 70, high 124    Assessment:    1.  Paroxysmal Atrial Flutter vs SVT  - Currently in A-paced    - Continue cardizem 240 mg QD  - Increase Toprol to 100 mg BID to reduce burden    - [id number] score: 5 (Age x2, Gender, HTN) ; [id number] Vasc score and anticoagulation discussed. High risk for stroke and thromboembolism. Anticoagulation is recommended. Risk of bleeding was discussed.  ~ On eliquis 5 mg BID; tolerating well    - Afib risk factors including age, HTN, obesity, inactivity and JEAN CARLOS were discussed with patient. Risk factor modification recommended   ~ TSH 1.44 (6/19)      - Treatment options including cardioversion, rate control strategy, antiarrhythmics, anticoagulation and possible ablation were discussed with patient. Risks, benefits and alternative of each treatment options were explained. All questions answered    ~ The risks, benefits and alternatives of the ablation procedure were discussed with the patient. The risks including, but not limited to, the risks of bleeding, infection, radiation exposure, injury to vascular, cardiac and surrounding structures (including pneumothorax), stroke, cardiac perforation, tamponade, need for emergent open heart surgery, need for pacemaker implantation, Injury to the phrenic nerve, injury to the esophagus, myocardial infarction and death were discussed in detail. I explained the success rate considering possible need for a second procedure is about 60-70% and with addition of anti arrhythmics is about 80%.     -------- > The patient wants to recover and consider further before deciding, although she will likely need ablation long term for her CHF     2. Pauses,Sick Sinus Syndrome  - S/p Dual chamber [employer] PPM (5/19/20, )  - I reviewed device interrogation today. Device functioning normally.   ~ A-paced 28.4% V-paced 4.1%. AFL burden 48%  ~ 13.6 years left on battery life expectancy  - Discussed with patient  - Follow up with device clinic as scheduled    4.  Chronic diastolic heart failure (NYHA Class III)  - Appears slightly decompensated   ~ EF 55-60% per echo (8/20)  - Continue with Toprol  mg BID, lasix 20 mg QD ? (MAR from [facility] has two dosages listed)  - Aggressive medical therapy with risk factor modification  - Discussed with patient importance of monitoring weight, low sodium diet and fluid restriction  - Followed by CHF team    - Check labs in 7-10 days prior to visit; bring results    5. HTN  - Controlled: Goal <130/80  - Continue current medications  - Encouraged to monitor and log BP readings at home, then bring log to next visit  - Discussed importance of low sodium diet, weight control and exercise    6. PAH   - Stable   - Continue losartan-HCTZ   - Followed by Dr. Odom Core:  1. Increase toprol XL to 100 mg BID  2. Check labs in 7-10 days prior to next visit and bring to visit for CHF nurse to review  3. Call office if leg swelling or SOB worsen    F/U: Follow-up with CHF NP in 7-10 days and Dr. Rena Tapia on 12/30 to discuss ablation further  -Call [facility] at [phone] with any questions    Diet & Exercise:   The patient is counseled to follow a low salt diet to assure blood pressure remains controlled for cardiovascular risk factor modification   The patient is counseled to avoid excess caffeine, and energy drinks as this may exacerbated ectopy and arrhythmia   The patient is counseled to lose weight to control cardiovascular risk factors   Exercise program discussed: To improve overall cardiovascular health, the patient is instructed to increase cardiovascular related activities with a goal of 150 min/week of moderate level activity or 10,000 steps per day. Encouraged to perform as much activity as tolerated    Quality Metrics  1. Tobacco Cessation Counseling: N/A   2. Retake of BP if >140/90: N/A  3. Documentation to PCP: Note sent to PCP office visit  4. CAD patient on anti-platelet: N/A   5.   CAD patient on STATIN therapy: N/A   6. Patient with history of CHF and atrial fibrillation on anticoagulation: Yes (Eliquis)      I have addressed the patient's cardiac risk factors and adjusted pharmacologic treatment as needed.  In addition, I have reinforced the need for patient directed risk factor modification. I independently reviewed the EKG and device interrogation    All questions and concerns were addressed with the patient. Alternatives to treatment were discussed. Thank you for allowing to us to participate in the care of [address] TOM Browne-CNP  [facility]   Office: [phone]

## 2020-10-28 PROCEDURE — 93294 REM INTERROG EVL PM/LDLS PM: CPT | Performed by: INTERNAL MEDICINE

## 2020-10-28 PROCEDURE — 93296 REM INTERROG EVL PM/IDS: CPT | Performed by: INTERNAL MEDICINE

## 2020-10-29 NOTE — PROGRESS NOTES
We received remote transmission from patient's monitor at home. Transmission shows normal sensing and pacing function. Current ECG AS/VS 75 bpm  Battery life 13.6 years  AP 28.4%.  4.1%. AF with a 48.7% burden. Last episode on 10/14/2020  AT/AF >= 6 hr for 42 days. Avg. Ventricular Rate >= 100 bpm during AT/AF (>= 6 hr) for 21 days. Patient remains on Eliquis, Toprol XL and Cardizem. EP physician will review. See interrogation under cardiology tab in the [address] [place] [address] [address] field for more details.

## 2020-10-30 ENCOUNTER — NURSE ONLY (OUTPATIENT)
Dept: CARDIOLOGY CLINIC | Age: 75
End: 2020-10-30
Payer: COMMERCIAL

## 2020-10-30 ENCOUNTER — OFFICE VISIT (OUTPATIENT)
Dept: CARDIOLOGY CLINIC | Age: 75
End: 2020-10-30
Payer: MEDICARE

## 2020-10-30 VITALS
HEART RATE: 65 BPM | WEIGHT: 194.3 LBS | OXYGEN SATURATION: 99 % | HEIGHT: 68 IN | BODY MASS INDEX: 29.45 KG/M2 | SYSTOLIC BLOOD PRESSURE: 124 MMHG | DIASTOLIC BLOOD PRESSURE: 68 MMHG

## 2020-10-30 PROCEDURE — G8399 PT W/DXA RESULTS DOCUMENT: HCPCS | Performed by: NURSE PRACTITIONER

## 2020-10-30 PROCEDURE — 99214 OFFICE O/P EST MOD 30 MIN: CPT | Performed by: NURSE PRACTITIONER

## 2020-10-30 PROCEDURE — 1123F ACP DISCUSS/DSCN MKR DOCD: CPT | Performed by: NURSE PRACTITIONER

## 2020-10-30 PROCEDURE — 1036F TOBACCO NON-USER: CPT | Performed by: NURSE PRACTITIONER

## 2020-10-30 PROCEDURE — 1090F PRES/ABSN URINE INCON ASSESS: CPT | Performed by: NURSE PRACTITIONER

## 2020-10-30 PROCEDURE — G8484 FLU IMMUNIZE NO ADMIN: HCPCS | Performed by: NURSE PRACTITIONER

## 2020-10-30 PROCEDURE — G8427 DOCREV CUR MEDS BY ELIG CLIN: HCPCS | Performed by: NURSE PRACTITIONER

## 2020-10-30 PROCEDURE — 93000 ELECTROCARDIOGRAM COMPLETE: CPT | Performed by: NURSE PRACTITIONER

## 2020-10-30 PROCEDURE — G8417 CALC BMI ABV UP PARAM F/U: HCPCS | Performed by: NURSE PRACTITIONER

## 2020-10-30 PROCEDURE — 4040F PNEUMOC VAC/ADMIN/RCVD: CPT | Performed by: NURSE PRACTITIONER

## 2020-10-30 RX ORDER — DULOXETIN HYDROCHLORIDE 60 MG/1
30 CAPSULE, DELAYED RELEASE ORAL DAILY
Qty: 30 CAPSULE | Refills: 0
Start: 2020-10-30

## 2020-10-30 RX ORDER — METOPROLOL SUCCINATE 100 MG/1
100 TABLET, EXTENDED RELEASE ORAL 2 TIMES DAILY
Qty: 135 TABLET | Refills: 3
Start: 2020-10-30 | End: 2021-02-01

## 2020-10-30 RX ORDER — FUROSEMIDE 20 MG/1
20 TABLET ORAL DAILY
Qty: 90 TABLET | Refills: 1
Start: 2020-10-30 | End: 2020-11-13 | Stop reason: DRUGHIGH

## 2020-10-30 RX ORDER — ALPRAZOLAM 0.25 MG/1
0.25 TABLET ORAL NIGHTLY PRN
COMMUNITY
End: 2020-11-13

## 2020-10-30 NOTE — PATIENT INSTRUCTIONS
1. Increase toprol XL to 100 mg BID  2. Check labs in 7-10 days prior to next visit and bring to visit for CHF nurse to review  3.  Call office if leg swelling or SOB worsen

## 2020-11-13 ENCOUNTER — HOSPITAL ENCOUNTER (OUTPATIENT)
Age: 75
Discharge: HOME | End: 2020-11-13
Payer: MEDICARE

## 2020-11-13 ENCOUNTER — OFFICE VISIT (OUTPATIENT)
Dept: CARDIOLOGY CLINIC | Age: 75
End: 2020-11-13
Payer: MEDICARE

## 2020-11-13 VITALS
HEIGHT: 68 IN | WEIGHT: 190 LBS | BODY MASS INDEX: 28.79 KG/M2 | OXYGEN SATURATION: 98 % | DIASTOLIC BLOOD PRESSURE: 68 MMHG | SYSTOLIC BLOOD PRESSURE: 136 MMHG | HEART RATE: 68 BPM

## 2020-11-13 LAB — PRO-BNP: 1246 PG/ML (ref 0–449)

## 2020-11-13 PROCEDURE — G8427 DOCREV CUR MEDS BY ELIG CLIN: HCPCS | Performed by: CLINICAL NURSE SPECIALIST

## 2020-11-13 PROCEDURE — 4040F PNEUMOC VAC/ADMIN/RCVD: CPT | Performed by: CLINICAL NURSE SPECIALIST

## 2020-11-13 PROCEDURE — G8417 CALC BMI ABV UP PARAM F/U: HCPCS | Performed by: CLINICAL NURSE SPECIALIST

## 2020-11-13 PROCEDURE — 83880 ASSAY OF NATRIURETIC PEPTIDE: CPT

## 2020-11-13 PROCEDURE — 1123F ACP DISCUSS/DSCN MKR DOCD: CPT | Performed by: CLINICAL NURSE SPECIALIST

## 2020-11-13 PROCEDURE — 99214 OFFICE O/P EST MOD 30 MIN: CPT | Performed by: CLINICAL NURSE SPECIALIST

## 2020-11-13 PROCEDURE — 36415 COLL VENOUS BLD VENIPUNCTURE: CPT

## 2020-11-13 PROCEDURE — G8399 PT W/DXA RESULTS DOCUMENT: HCPCS | Performed by: CLINICAL NURSE SPECIALIST

## 2020-11-13 PROCEDURE — 1090F PRES/ABSN URINE INCON ASSESS: CPT | Performed by: CLINICAL NURSE SPECIALIST

## 2020-11-13 PROCEDURE — G8484 FLU IMMUNIZE NO ADMIN: HCPCS | Performed by: CLINICAL NURSE SPECIALIST

## 2020-11-13 PROCEDURE — 1036F TOBACCO NON-USER: CPT | Performed by: CLINICAL NURSE SPECIALIST

## 2020-11-13 RX ORDER — ZINC SULFATE 50(220)MG
220 CAPSULE ORAL DAILY
COMMUNITY

## 2020-11-13 RX ORDER — ASCORBIC ACID 500 MG
500 TABLET ORAL DAILY
COMMUNITY

## 2020-11-13 RX ORDER — DILTIAZEM HYDROCHLORIDE 180 MG/1
180 CAPSULE, COATED, EXTENDED RELEASE ORAL DAILY
Qty: 30 CAPSULE | Refills: 0
Start: 2020-11-13 | End: 2020-11-16 | Stop reason: SDUPTHER

## 2020-11-13 RX ORDER — FUROSEMIDE 20 MG/1
40 TABLET ORAL DAILY
Qty: 90 TABLET | Refills: 0 | Status: SHIPPED
Start: 2020-11-13 | End: 2020-11-16 | Stop reason: SDUPTHER

## 2020-11-13 NOTE — PROGRESS NOTES
[address]  Progress Note    Primary Care Doctor:  Dagmar Hansen MD    Chief Complaint   Patient presents with    Follow-up     1 week f/u    Other     PAH    Shortness of Breath        History of Present Illness:  66 y.o. female with history of PHTN, scleroderma and crest syndrome with Raynaud;s/telangiosis including arthralgia and myalgia, fibromyalgia, severe fatty liver infiltration, mild obstructive lung disease on oxygen. She had been on plaquenil (stopped due to hair loss). Pacemaker Dr Manny Litten 5/19/2020, IRL implanted 8/20/2019. AF    I had the pleasure of seeing Charlotte Agee in follow up for [address]. She is in a wheel chair with her friend, Bethel Hope. She was admitted at [facility] 9/22/2020  For SOB with minimal exertion and swelling in her legs, treated with lasix and sent to [facility]. She was sent home on 2 L of oxygen and remains on this. She saw Dr Don Sevilla 11/11 and he is concerned that her need for oxygen may be heart failure vs progressive interstitial process vs scleroderma. Labs done 11/11 creat 1.1 no recent probnp since discharge. She has not been taking her lasix 20 mg bid and no cardizem    Past Medical History:   has a past medical history of Diabetes mellitus ([address] Fatty liver, Fibromyalgia, Fracture, hip ([address] GERD (gastroesophageal reflux disease), Hypertension, IBS (irritable bowel syndrome), Osteoarthritis, Peripheral neuropathy, and Scleroderma ([address] Surgical History:   has a past surgical history that includes Hysterectomy (1987); Cholecystectomy (1989); Lumbar disc surgery (2007); Throat surgery (2012); arthroplasty (08/10/2012); Fibula Fracture Surgery (2017); back surgery; Cystocopy (08/01/2018); and Partial hip arthroplasty (Right, 09/2019). Social History:   reports that she quit smoking about 9 years ago. She started smoking about 60 years ago. She has a 147.00 pack-year smoking history.  She has never used smokeless tobacco. She reports that she does not drink alcohol or use drugs. Family History:   Family History   Problem Relation Age of Onset    Heart Disease Mother     Cancer Mother     Hypertension Mother     Diabetes Mother    Kasey Guillen Stroke Father     Hypertension Father     Cancer Other         leukemia    Diabetes Sister        Home Medications:  Prior to Admission medications    Medication Sig Start Date End Date Taking? Authorizing Provider   OXYGEN Inhale 2 L into the lungs   Yes Historical Provider, MD   furosemide (LASIX) 20 MG tablet Take 2 tablets by mouth daily 11/13/20  Yes Miah Granados APRN - CNS   dilTIAZem (CARDIZEM CD) 180 MG extended release capsule Take 1 capsule by mouth daily 11/13/20  Yes Millicent Velarde APRN - CNS   DULoxetine (CYMBALTA) 60 MG extended release capsule Take 1 capsule by mouth daily 10/30/20  Yes Trevon Dapfadumo APRN - CNP   metoprolol succinate (TOPROL XL) 100 MG extended release tablet Take 1 tablet by mouth 2 times daily 10/30/20  Yes Trevon Dapfadumo APRN - CNP   hydroxychloroquine (PLAQUENIL) 200 MG tablet Take 200 mg by mouth daily   Yes Historical Provider, MD   Dulaglutide (TRULICITY) 1.5 EW/1.0SG SOPN Inject 1.5 mg into the skin once a week 6/10/20  Yes Historical Provider, MD   apixaban (ELIQUIS) 5 MG TABS tablet Take 1 tablet by mouth 2 times daily 1/6/20  Yes Trevon Hodges APRN - CNP   acetaminophen (TYLENOL) 500 MG tablet Take 500 mg by mouth every 6 hours as needed for Pain   Yes Historical Provider, MD   gabapentin (NEURONTIN) 600 MG tablet Take by mouth.  1 tablet morning, 1 tablet evening, 2 tablets bedtime   Yes Historical Provider, MD   cyclobenzaprine (FLEXERIL) 10 MG tablet TAKE 1 TABLET THREE TIMES A DAY AS NEEDED FOR MUSCLE SPASMS 4/15/19  Yes Lyudmila Mix MD   Calcium Polycarbophil (FIBER-CAPS PO) Take by mouth   Yes Historical Provider, MD   pantoprazole (PROTONIX) 40 MG tablet Take 40 mg by mouth daily   Yes Historical Provider, MD   Probiotic Product (PROBIOTIC DAILY PO) Take by mouth daily   Yes Historical Provider, MD   magnesium oxide (MAG-OX) 400 MG tablet Take 400 mg by mouth daily    Yes Historical Provider, MD   Biotin (BIOTIN 5000) 5 MG CAPS Take 1,000 mcg by mouth daily    Yes Historical Provider, MD   FISH OIL 1,000 mg by Does not apply route daily    Yes Historical Provider, MD   Vitamin D (CHOLECALCIFEROL) 1000 UNITS CAPS capsule Take 2,000 Units by mouth daily. Yes Historical Provider, MD   Multiple Vitamin (THERA) TABS Take  by mouth. Yes Historical Provider, MD   vitamin C (ASCORBIC ACID) 500 MG tablet Take by mouth    Historical Provider, MD   zinc sulfate (ZINCATE) 220 (50 Zn) MG capsule Take 220 mg by mouth daily    Historical Provider, MD   dilTIAZem (CARDIZEM CD) 240 MG extended release capsule Take 1 capsule by mouth daily  Patient not taking: Reported on 11/13/2020 9/17/20   TOM Chowdhury CNP        Allergies:  Adhesive tape; Collagen; Other; Bactrim [sulfamethoxazole-trimethoprim]; Cortisone; Pcn [penicillins]; Scopolamine; and Scopolamine sulfate     Review of Systems:   · Constitutional: there has been no unanticipated weight loss. There's been no change in energy level, sleep pattern, or activity level. · Eyes: No visual changes or diplopia. No scleral icterus. · ENT: No Headaches, hearing loss or vertigo. No mouth sores or sore throat. · Cardiovascular: Reviewed in HPI  · Respiratory: No cough or wheezing, no sputum production. No hematemesis. · Gastrointestinal: No abdominal pain, appetite loss, blood in stools. No change in bowel or bladder habits. · Genitourinary: No dysuria, trouble voiding, or hematuria. · Musculoskeletal:  No gait disturbance, weakness or joint complaints. · Integumentary: No rash or pruritis. · Neurological: No headache, diplopia, change in muscle strength, numbness or tingling. No change in gait, balance, coordination, mood, affect, memory, mentation, behavior. · Psychiatric: No anxiety, no depression.   · Endocrine: 05/19/2020     08/30/2019    K 4.5 09/16/2020    K 4.1 05/19/2020    K 3.6 08/30/2019     09/16/2020    CL 99 05/19/2020     08/30/2019    CO2 29 09/16/2020    CO2 33 05/19/2020    CO2 33 08/30/2019    PHOS 3.6 09/16/2020    PHOS 2.6 08/30/2019    BUN 23 09/16/2020    BUN 16 05/19/2020    BUN 20 08/30/2019    CREATININE 1.0 05/19/2020    CREATININE 1.2 06/21/2019    CREATININE 1.2 03/07/2019     BNP:   Lab Results   Component Value Date    PROBNP 161 08/06/2014     Cardiac Imaging:  Echo 8-  [facility]. SUMMARY:  1. Left ventricle: The cavity size was normal. Wall thickness was     normal. Systolic function was normal. The estimated ejection     fraction was in the range of 55% to 60%. 2. Mitral valve: The annulus was mildly to moderately calcified. The leaflets were normal thickness. 3. Right ventricle: The cavity size was normal.  4. Pulmonary arteries: Systolic pressure was moderately increased. Estimated PA peak pressure is 46mm Hg (S). 5. Pericardium, extracardiac: There was no pericardial effusion. Procedures performed: 5-  Indication of the procedure: Denise Chris is a 66 y.o. female with    Symptomatic Sick sinus syndrome; Sinus dysfunction      · Insertion of MRI compatible right ventricular  lead under fluoroscopy. · Insertion of MRI compatible right atrial lead under fluoroscopy  · Insertion of a MRI compatible  dual  chamber Pacemaker/  · IV sedation. · Programming and analysis of the device  · Removal of Implantable Loop recorder      Echo 3-6-2020  -Overall left ventricular systolic function is normal.   -Ejection fraction is visually estimated to be 60 %. E/e'= 13.1 cm.   -No regional wall motion abnormalities are noted. -Normal diastolic function.   -Moderate calcification of the posterior leaflet of the mitral valve. -Mitral annular calcification is present. -Mild thickening of anterior leaflet of mitral valve.    -Mild mitral regurgitation is present.   -Aortic valve appears sclerotic but opens adequately. -IVC size is normal (<2.1cm) and collapses > 50% with respiration consistent   with normal RA pressure (3mmHg). ECHO 6/21/19  -Normal left ventricle size, wall thickness, and systolic function with an estimated ejection fraction of 55-60%. -No regional wall motion abnormalities are seen.   -Aortic valve appears sclerotic but opens adequately. -Mitral annular calcification is present.   -Trivial mitral regurgitation.   -Trivial tricuspid regurgitation.   -Diastolic filling parameters suggest grade III diastolic dysfunction. Avg. E/e'=24.2     ECHO 8/6/2014  Normal left ventricle size wall thickness and systolic function with an  estimated ejection fraction of 55-60%. No regional wall motion abnormalities are seen. Normal diastolic filling  pattern for age. Mitral annular calcification is present. Aortic valve appears sclerotic but opens adequately. No valvular abnormalities noted. Assessment:    1. PAH (pulmonary artery hypertension) ([address] follows with Dr Leandro Benitez   2. Benign essential HTN    3. Paroxysmal atrial fibrillation (HCC)    4. Pacemaker    5. Scleroderma ([address]        Plan:   Patient Instructions   1. Check bnp today in lab  2. Resume cardizem 180 mg once a day  3. Take lasix 40 mg daily  4. Continue all other medications  5. I will speak with Dr Leandro Benitez about you next week  6.   Will schedule follow up after I talk with Dr Leandro Benitez       I appreciate the opportunity of cooperating in the care of this individual.    Sean Mcdowell, CNS, 11/13/2020, 4:34 PM

## 2020-11-13 NOTE — PATIENT INSTRUCTIONS
1. Check bnp today in lab  2. Resume cardizem 180 mg once a day  3. Take lasix 40 mg daily  4. Continue all other medications  5. I will speak with Dr Fazal Coyle about you next week  6.   Will schedule follow up after I talk with Dr Fazal Coyle

## 2020-11-16 ENCOUNTER — TELEPHONE (OUTPATIENT)
Dept: CARDIOLOGY CLINIC | Age: 75
End: 2020-11-16

## 2020-11-16 RX ORDER — FUROSEMIDE 40 MG/1
40 TABLET ORAL DAILY
Qty: 90 TABLET | Refills: 1 | Status: SHIPPED | OUTPATIENT
Start: 2020-11-16 | End: 2021-07-06 | Stop reason: SDUPTHER

## 2020-11-16 RX ORDER — DILTIAZEM HYDROCHLORIDE 180 MG/1
180 CAPSULE, COATED, EXTENDED RELEASE ORAL DAILY
Qty: 90 CAPSULE | Refills: 1 | Status: SHIPPED | OUTPATIENT
Start: 2020-11-16 | End: 2021-07-06

## 2020-11-16 NOTE — TELEPHONE ENCOUNTER
Discussed with Dr Severino Rai with increased lasix at 40 mg daily   Will recheck labs in 2 weeks  Then plan for RHC  Will send scripts for lasix and cardizem to mail order  I spoke with patient and she understands     FYI information to Dr Elmo Houser

## 2020-11-30 NOTE — TELEPHONE ENCOUNTER
Printed office note and faxed to [facility] [address] [phone] Fax [phone] receipt confirmed

## 2020-12-17 LAB
ALBUMIN SERPL-MCNC: 3.8 G/DL (ref 3.5–5.7)
ALP BLD-CCNC: 79 IU/L (ref 35–135)
ALT SERPL-CCNC: 12 IU/L (ref 10–60)
ANION GAP SERPL CALCULATED.3IONS-SCNC: 5 MMOL/L (ref 6–18)
AST SERPL-CCNC: 17 IU/L (ref 10–40)
BASOPHILS ABSOLUTE: 0.1 THOU/MCL (ref 0–0.2)
BASOPHILS ABSOLUTE: 1 %
BILIRUB SERPL-MCNC: 0.6 MG/DL (ref 0–1.2)
BUN BLDV-MCNC: 17 MG/DL (ref 8–26)
CALCIUM SERPL-MCNC: 9.3 MG/DL (ref 8.5–10.4)
CHLORIDE BLD-SCNC: 99 MEQ/L (ref 98–111)
CO2: 38 MMOL/L (ref 21–31)
CREAT SERPL-MCNC: 0.98 MG/DL (ref 0.6–1.2)
EOSINOPHILS ABSOLUTE: 0.2 THOU/MCL (ref 0.03–0.45)
EOSINOPHILS RELATIVE PERCENT: 2 %
GFR AFRICAN AMERICAN: 63 ML/MIN/1.73 M2
GFR NON-AFRICAN AMERICAN: 54 ML/MIN/1.73 M2
GLUCOSE BLD-MCNC: 142 MG/DL (ref 70–99)
HCT VFR BLD CALC: 38.9 % (ref 36–46)
HEMOGLOBIN: 12.5 G/DL (ref 12–15.2)
LYMPHOCYTES ABSOLUTE: 2.5 THOU/MCL (ref 1–4)
LYMPHOCYTES RELATIVE PERCENT: 28 %
MCH RBC QN AUTO: 28.2 PG (ref 27–33)
MCHC RBC AUTO-ENTMCNC: 32.2 G/DL (ref 32–36)
MCV RBC AUTO: 87.5 FL (ref 82–97)
MONOCYTES # BLD: 8 %
MONOCYTES ABSOLUTE: 0.7 THOU/MCL (ref 0.2–0.9)
NEUTROPHILS ABSOLUTE: 5.5 THOU/MCL (ref 1.8–7.7)
PDW BLD-RTO: 14.6 % (ref 12.3–17)
PLATELET # BLD: 215 THOU/MCL (ref 140–375)
PMV BLD AUTO: 9.9 FL (ref 7.4–11.5)
POTASSIUM SERPL-SCNC: 3.5 MEQ/L (ref 3.6–5.1)
RBC # BLD: 4.45 MIL/MCL (ref 3.8–5.2)
SEG NEUTROPHILS: 61 %
SODIUM BLD-SCNC: 142 MEQ/L (ref 135–145)
TOTAL PROTEIN: 6.1 G/DL (ref 6–8)
WBC # BLD: 8.9 THOU/MCL (ref 3.6–10.5)

## 2020-12-18 LAB — B-TYPE NATRIURETIC PEPTIDE: 168 PG/ML (ref 0–95)

## 2020-12-22 ENCOUNTER — TELEPHONE (OUTPATIENT)
Dept: CARDIOLOGY CLINIC | Age: 75
End: 2020-12-22

## 2020-12-22 DIAGNOSIS — I27.21 PAH (PULMONARY ARTERY HYPERTENSION) (HCC): Primary | Chronic | ICD-10-CM

## 2020-12-22 DIAGNOSIS — M34.9 SCLERODERMA (HCC): Chronic | ICD-10-CM

## 2020-12-23 PROBLEM — I48.92 ATRIAL FLUTTER (HCC): Status: ACTIVE | Noted: 2020-12-23

## 2020-12-31 RX ORDER — APIXABAN 5 MG/1
TABLET, FILM COATED ORAL
Qty: 180 TABLET | Refills: 3 | Status: SHIPPED | OUTPATIENT
Start: 2020-12-31 | End: 2022-01-12 | Stop reason: SDUPTHER

## 2021-01-05 ENCOUNTER — TELEPHONE (OUTPATIENT)
Dept: CARDIOLOGY CLINIC | Age: 76
End: 2021-01-05

## 2021-01-05 NOTE — TELEPHONE ENCOUNTER
Pt calling she is monserrat for a RHC on 01/11/2021 with KAREN and she has had chest pain for over a week now, she thinks it's Pleurisy has been to the ER and they can't find out what's wrong with her. Pt states she feels really bad. She needs to know if KAREN still wants to do the cath on her?  Pls call to advise Thank you

## 2021-01-05 NOTE — TELEPHONE ENCOUNTER
CXR from [facility] 12/30/20: Probable bibasilar atelectasis with trace left effusion. I spoke to KIMBERLY. She said her chest hurts with moving and breathing, really limiting her mobility. However, she did state that she is [place] little better every day. \" She can't take NSAIDS but is taking her Norco prn which helps a little bit. I told her the office would call her back with your recommendation. Feel free to send back to me as I am here until 5. Thanks.

## 2021-01-08 NOTE — TELEPHONE ENCOUNTER
Yes, we should cancel/postpone. Does she want to be seen in office before we reschedule? I am happy to see her in the next couple of weeks if I have an opening.   KAREN

## 2021-01-08 NOTE — TELEPHONE ENCOUNTER
Pt calling again, she is not any better and she doesn't think/or want to have the Cath on Monday. Needs to know what KAREN wants to do? Can she cancel the cath?  Pls call to advise Thank you

## 2021-01-11 ENCOUNTER — HOSPITAL ENCOUNTER (OUTPATIENT)
Dept: CARDIAC CATH/INVASIVE PROCEDURES | Age: 76
Discharge: HOME | End: 2021-01-11
Payer: MEDICARE

## 2021-01-27 NOTE — PROGRESS NOTES
[facility], [place]   Advanced Heart Failure/Pulmonary Hypertension  Cardiac Evaluation      Anastasia Conner  YOB: 1945    Date of Visit:  1/27/21    No chief complaint on file. History of Present Illness:  Hien Sanchez has a history of pulmonary hypertension, scleroderma, and Crest syndrome with Raynaud's / telangiosis including arthralgia and myalgia. She also has Fibromyalgia which is stable, and severe fatty liver infiltration-borderline elevated LFTs. She had a PFT 6/5/2014--and it shows mild obstructive defect with no response to bronchodilators. Air trapping was present on lung volumes and moderate decrease in diffusion capacity. Clinical correlation is recommended since the severity of her decrease in diffusion capacity might indicate pulmonary hypertension in a patient with scleroderma. She had some hair loss that she thinks was from taking Plaquenil so it was stopped, and the hair loss has lessened. She had an echo, and it did not show PHTN. She is getting echos for surveying scleroderma. She had a dual chamber pacemaker placed per Dr. David Sanchez on 5-19-20 for SSS with a post conversion pause of up to 10 seconds. At the time, she had an IRL implanted 8-20-19 which was reviewed s/p fall and hip fracture. Today, she    WHO Group 1    Allergies   Allergen Reactions    Adhesive Tape     Collagen     Other     Bactrim [Sulfamethoxazole-Trimethoprim] Other (See Comments)     Body shaking and chills.  Body pain    Cortisone Other (See Comments)     Rash, confusion, hyperactivity    Pcn [Penicillins]     Scopolamine Anxiety    Scopolamine Sulfate Anxiety       Current Outpatient Medications:     ELIQUIS 5 MG TABS tablet, TAKE 1 TABLET TWICE A DAY, Disp: 180 tablet, Rfl: 3    furosemide (LASIX) 40 MG tablet, Take 1 tablet by mouth daily, Disp: 90 tablet, Rfl: 1    dilTIAZem (CARDIZEM CD) 180 MG extended release capsule, Take 1 capsule by mouth daily, Disp: 90 capsule, Rfl: 1   OXYGEN, Inhale 2 L into the lungs, Disp: , Rfl:     vitamin C (ASCORBIC ACID) 500 MG tablet, Take by mouth, Disp: , Rfl:     zinc sulfate (ZINCATE) 220 (50 Zn) MG capsule, Take 220 mg by mouth daily, Disp: , Rfl:     DULoxetine (CYMBALTA) 60 MG extended release capsule, Take 1 capsule by mouth daily, Disp: 30 capsule, Rfl: 0    metoprolol succinate (TOPROL XL) 100 MG extended release tablet, Take 1 tablet by mouth 2 times daily, Disp: 135 tablet, Rfl: 3    dilTIAZem (CARDIZEM CD) 240 MG extended release capsule, Take 1 capsule by mouth daily (Patient not taking: Reported on 11/13/2020), Disp: 90 capsule, Rfl: 3    hydroxychloroquine (PLAQUENIL) 200 MG tablet, Take 200 mg by mouth daily, Disp: , Rfl:     Dulaglutide (TRULICITY) 1.5 SK/6.5GK SOPN, Inject 1.5 mg into the skin once a week, Disp: , Rfl:     acetaminophen (TYLENOL) 500 MG tablet, Take 500 mg by mouth every 6 hours as needed for Pain, Disp: , Rfl:     gabapentin (NEURONTIN) 600 MG tablet, Take by mouth. 1 tablet morning, 1 tablet evening, 2 tablets bedtime, Disp: , Rfl:     cyclobenzaprine (FLEXERIL) 10 MG tablet, TAKE 1 TABLET THREE TIMES A DAY AS NEEDED FOR MUSCLE SPASMS, Disp: 270 tablet, Rfl: 1    Calcium Polycarbophil (FIBER-CAPS PO), Take by mouth, Disp: , Rfl:     pantoprazole (PROTONIX) 40 MG tablet, Take 40 mg by mouth daily, Disp: , Rfl:     Probiotic Product (PROBIOTIC DAILY PO), Take by mouth daily, Disp: , Rfl:     magnesium oxide (MAG-OX) 400 MG tablet, Take 400 mg by mouth daily , Disp: , Rfl:     Biotin (BIOTIN 5000) 5 MG CAPS, Take 1,000 mcg by mouth daily , Disp: , Rfl:     FISH OIL, 1,000 mg by Does not apply route daily , Disp: , Rfl:     Vitamin D (CHOLECALCIFEROL) 1000 UNITS CAPS capsule, Take 2,000 Units by mouth daily. , Disp: , Rfl:     Multiple Vitamin (THERA) TABS, Take  by mouth.  , Disp: , Rfl:       Past Medical History:   Diagnosis Date    Diabetes mellitus ([address]     Fatty liver     Severe Gets together: Not on file     Attends Islam service: Not on file     Active member of club or organization: Not on file     Attends meetings of clubs or organizations: Not on file     Relationship status: Not on file    Intimate partner violence     Fear of current or ex partner: Not on file     Emotionally abused: Not on file     Physically abused: Not on file     Forced sexual activity: Not on file   Other Topics Concern    Not on file   Social History Narrative    Not on file       Review of Systems:   · Constitutional: there has been no unanticipated weight loss. There's been no change in energy level, sleep pattern, or activity level. Wearing hard sol flat shoe brace right foot   · Eyes: No visual changes or diplopia. No scleral icterus. · ENT: No Headaches, hearing loss or vertigo. No mouth sores or sore throat. · Cardiovascular: Reviewed in HPI  · Respiratory: No cough or wheezing, no sputum production. No hematemesis. · Gastrointestinal: No abdominal pain, appetite loss, blood in stools. No change in bowel or bladder habits. · Genitourinary: No dysuria, trouble voiding, or hematuria. · Musculoskeletal:  No gait disturbance, weakness or joint complaints. · Integumentary: No rash or pruritis. · Neurological: No headache, diplopia, change in muscle strength, numbness or tingling. No change in gait, balance, coordination, mood, affect, memory, mentation, behavior. · Psychiatric: No anxiety, no depression. · Endocrine: No malaise, fatigue or temperature intolerance. No excessive thirst, fluid intake, or urination. No tremor. · Hematologic/Lymphatic: No abnormal bruising or bleeding, blood clots or swollen lymph nodes. · Allergic/Immunologic: No nasal congestion or hives. Physical Examination:    There is no height or weight on file to calculate BMI.      Wt Readings from Last 3 Encounters:   11/13/20 190 lb (86.2 kg)   10/30/20 194 lb 4.8 oz (88.1 kg) 09/21/20 182 lb 12.8 oz (82.9 kg)     BP Readings from Last 3 Encounters:   11/13/20 136/68   10/30/20 124/68   09/21/20 (!) 143/81     Constitutional and General Appearance: In wheel chair, pale  WD/WN in NAD  HEENT:  NC/AT  Skin:  Warm, dry  Hearing:  No problems  Respiratory:  · Normal excursion and expansion without use of accessory muscles  · Resp Auscultation: Normal breath sounds without dullness  Cardiovascular:  · The apical impulses not displaced  · Heart tones are crisp and normal  · Cervical veins are not engorged  · The carotid upstroke is normal in amplitude and contour without delay or bruit  · JVP less than 8 cm H2O  RRR with nl S1 and S2 without m,r,g  · Peripheral pulses are symmetrical and full  · There is no clubbing, cyanosis of the extremities. · No edema. Dependant cyanosis to feet. · Femoral Arteries: 2+ and equal  · Pedal Pulses: 2+ and equal   Abdomen:  · No masses or tenderness  · Liver/Spleen: No Abnormalities Noted  Neurological/Psychiatric:  · Alert and oriented in all spheres  · Moves all extremities well  · Exhibits normal gait balance and coordination  · No abnormalities of mood, affect, memory, mentation, or behavior are noted    Echo 8-  [facility]. SUMMARY:  1. Left ventricle: The cavity size was normal. Wall thickness was     normal. Systolic function was normal. The estimated ejection     fraction was in the range of 55% to 60%. 2. Mitral valve: The annulus was mildly to moderately calcified.     The leaflets were normal thickness. 3. Right ventricle: The cavity size was normal.  4. Pulmonary arteries: Systolic pressure was moderately increased.     Estimated PA peak pressure is 46mm Hg (S). 5. Pericardium, extracardiac: There was no pericardial effusion.     Procedures performed: 5-  Indication of the procedure: Portia Torres is a 66 y.o. female with    Symptomatic Sick sinus syndrome; Sinus dysfunction · Insertion of MRI compatible right ventricular  lead under fluoroscopy. · Insertion of MRI compatible right atrial lead under fluoroscopy  · Insertion of a MRI compatible  dual  chamber Pacemaker/  · IV sedation. · Programming and analysis of the device  · Removal of Implantable Loop recorder      Echo 3-6-2020  -Overall left ventricular systolic function is normal.   -Ejection fraction is visually estimated to be 60 %. E/e'= 13.1 cm.   -No regional wall motion abnormalities are noted. -Normal diastolic function.   -Moderate calcification of the posterior leaflet of the mitral valve. -Mitral annular calcification is present. -Mild thickening of anterior leaflet of mitral valve. -Mild mitral regurgitation is present.   -Aortic valve appears sclerotic but opens adequately. -IVC size is normal (<2.1cm) and collapses > 50% with respiration consistent   with normal RA pressure (3mmHg). ECHO 6/21/19  -Normal left ventricle size, wall thickness, and systolic function with an estimated ejection fraction of 55-60%.  -No regional wall motion abnormalities are seen.   -Aortic valve appears sclerotic but opens adequately.   -Mitral annular calcification is present.   -Trivial mitral regurgitation.   -Trivial tricuspid regurgitation.   -Diastolic filling parameters suggest grade III diastolic dysfunction. Avg. E/e'=24.2    ECHO 8/6/2014  Normal left ventricle size wall thickness and systolic function with an  estimated ejection fraction of 55-60%. No regional wall motion abnormalities are seen. Normal diastolic filling  pattern for age. Mitral annular calcification is present. Aortic valve appears sclerotic but opens adequately. No valvular abnormalities noted. Labs were reviewed including labs from other hospital systems through [facility]. Cardiac testing was reviewed including echos, nuclear scans, cardiac catheterization, including from other hospital systems through [facility]. Assessment:  No diagnosis found. 1. Pulmonary artery hypertension:  Stable.  -Echo 8/25/2020> PASP 46mmHg      2. Tachycardia: Intermittent. HR today  -Takes Cardizem 180mg (reduced Sept 2020)  -Continue metoprolol. 3. Benign essential HTN: Stable. There were no vitals taken for this visit. 4. Paroxysmal atrial fibrillation: HR controlled and remains on Toprol & Eliquis. 5. Pacemaker:  Implanted on 5/19/2020 per Dr. Sabiha Miguel for SSS. 6. Scleroderma:  CREST Syndrome with Raynaud's and telangiectasias including arthralgias and myalgias.   -No open sores to fingers today. Plan:  1. Time Based Itemization  A total of 30 minutes was spent on today's patient encounter. If applicable, non-patient-facing activities:  ( x)Preparing to see the patient and reviewing records  ( ) Individual interpretation of results  ( ) Discussion or coordination of care with other health care professionals  ( x) Ordering of unique tests, medications, or procedures  ( x) Documentation within the EHR      I appreciate the opportunity of cooperating in the care of this patient.     Natalia Louis M.D., [facility]

## 2021-02-01 ENCOUNTER — TELEPHONE (OUTPATIENT)
Dept: CARDIOLOGY CLINIC | Age: 76
End: 2021-02-01

## 2021-02-01 RX ORDER — METOPROLOL SUCCINATE 100 MG/1
100 TABLET, EXTENDED RELEASE ORAL DAILY
Qty: 135 TABLET | Refills: 3
Start: 2021-02-01 | End: 2021-09-13

## 2021-02-01 NOTE — TELEPHONE ENCOUNTER
Spoke to the pt-stated her BP's are stable, HR 60's-70's. Taking Diltiazem 180 mg BID  Also, medication list has Metoprolol Succinate 100 mg, BID-she is taking it once daily.   Next OV 2/12/21 KAREN

## 2021-02-01 NOTE — TELEPHONE ENCOUNTER
Back in sept she had been off of the cardizem, went to [facility] and then we resumed it at 180 mg once a day    What is her BP and heart rate?  What dose of cardizem has she been taking

## 2021-02-01 NOTE — TELEPHONE ENCOUNTER
Pt received her rx for dilTIAZem (CARDIZEM CD) 180 MG extended release capsule  The bottle stated take one daily she said dr Ayah Garces had her taking it 2 daily     [address], [address] [place] [address]   Phone:  [phone]  Fax:  [phone]    pls advise thank you

## 2021-02-05 ENCOUNTER — TELEPHONE (OUTPATIENT)
Dept: CARDIOLOGY CLINIC | Age: 76
End: 2021-02-05

## 2021-02-05 NOTE — TELEPHONE ENCOUNTER
Pt calling asking if she can cut her lasix in half?  because all she does is pee.  pls call to advise thank you

## 2021-02-08 NOTE — PROGRESS NOTES
[address]   Advanced Heart Failure/Pulmonary Hypertension  Cardiac Evaluation      Sourav Conner  YOB: 1945    Date of Visit:  2/12/21    Chief Complaint   Patient presents with    Follow-up     sob      History of Present Illness:  Sara Da Silva has a history of pulmonary hypertension, scleroderma, and Crest syndrome with Raynaud's / telangiosis including arthralgia and myalgia. She also has Fibromyalgia which is stable, and severe fatty liver infiltration-borderline elevated LFTs. She had a PFT 6/5/2014--and it shows mild obstructive defect with no response to bronchodilators. Air trapping was present on lung volumes and moderate decrease in diffusion capacity. Clinical correlation is recommended since the severity of her decrease in diffusion capacity might indicate pulmonary hypertension in a patient with scleroderma. She had some hair loss that she thinks was from taking Plaquenil so it was stopped, and the hair loss has lessened. She had an echo, and it did not show PHTN. She is getting echos for surveying scleroderma. She had a dual chamber pacemaker placed per Dr. Genna Mojica on 5-19-20 for SSS with a post conversion pause of up to 10 seconds. At the time, she had an IRL implanted 8-20-19 which was reviewed s/p fall and hip fracture. crest     Past Surgical History:   Procedure Laterality Date    ARTHROPLASTY  08/10/2012    FIFTH TOE LEFT FOOT    BACK SURGERY      CHOLECYSTECTOMY  1989    CYSTOSCOPY  2018    with botox injection     FIBULA FRACTURE SURGERY  2017    3 separate surgeries for a fractured left fibula tibial a closed done by Dr. Konrad Correa      PARTIAL HIP ARTHROPLASTY Right 2019    [address] THROAT SURGERY  2012    vocal cord polyp     Family History   Problem Relation Age of Onset    Heart Disease Mother     Cancer Mother     Hypertension Mother     Diabetes Mother    [facility] Stroke Father     Hypertension Father     Cancer Other         leukemia    Diabetes Sister      Social History     Socioeconomic History    Marital status:       Spouse name: Not on file    Number of children: Not on file    Years of education: Not on file    Highest education level: Not on file   Occupational History    Not on file   Social Needs    Financial resource strain: Not on file    Food insecurity     Worry: Not on file     Inability: Not on file    Transportation needs     Medical: Not on file     Non-medical: Not on file   Tobacco Use    Smoking status: Former Smoker     Packs/day: 3.00     Years: 49.00     Pack years: 147.00     Start date: 1960     Quit date: 2011     Years since quittin.4    Smokeless tobacco: Never Used   Substance and Sexual Activity    Alcohol use: No    Drug use: No    Sexual activity: Not on file   Lifestyle    Physical activity     Days per week: Not on file     Minutes per session: Not on file    Stress: Not on file   Relationships    Social connections     Talks on phone: Not on file     Gets together: Not on file     Attends Orthodoxy service: Not on file     Active member of club or organization: Not on file     Attends meetings of clubs or organizations: Not on file     Relationship status: Not on file  Intimate partner violence     Fear of current or ex partner: Not on file     Emotionally abused: Not on file     Physically abused: Not on file     Forced sexual activity: Not on file   Other Topics Concern    Not on file   Social History Narrative    Not on file       Review of Systems:   · Constitutional: there has been no unanticipated weight loss. There's been no change in energy level, sleep pattern, or activity level. Wearing hard sol flat shoe brace right foot   · Eyes: No visual changes or diplopia. No scleral icterus. · ENT: No Headaches, hearing loss or vertigo. No mouth sores or sore throat. · Cardiovascular: Reviewed in HPI  · Respiratory: No cough or wheezing, no sputum production. No hematemesis. · Gastrointestinal: No abdominal pain, appetite loss, blood in stools. No change in bowel or bladder habits. · Genitourinary: No dysuria, trouble voiding, or hematuria. · Musculoskeletal:  No gait disturbance, weakness or joint complaints. · Integumentary: No rash or pruritis. · Neurological: No headache, diplopia, change in muscle strength, numbness or tingling. No change in gait, balance, coordination, mood, affect, memory, mentation, behavior. · Psychiatric: No anxiety, no depression. · Endocrine: No malaise, fatigue or temperature intolerance. No excessive thirst, fluid intake, or urination. No tremor. · Hematologic/Lymphatic: No abnormal bruising or bleeding, blood clots or swollen lymph nodes. · Allergic/Immunologic: No nasal congestion or hives. Physical Examination:    Body mass index is 25.7 kg/m². Wt Readings from Last 3 Encounters:   02/12/21 169 lb (76.7 kg)   11/13/20 190 lb (86.2 kg)   10/30/20 194 lb 4.8 oz (88.1 kg)     BP Readings from Last 3 Encounters:   02/12/21 130/72   11/13/20 136/68   10/30/20 124/68     Constitutional and General Appearance:  In wheel chair, pale  WD/WN in NAD  HEENT:  NC/AT  Skin:  Warm, dry  Hearing:  No problems  Respiratory: · Normal excursion and expansion without use of accessory muscles  · Resp Auscultation: Normal breath sounds without dullness  Cardiovascular:  · The apical impulses not displaced  · Heart tones are crisp and normal  · Cervical veins are not engorged  · The carotid upstroke is normal in amplitude and contour without delay or bruit  · JVP less than 8 cm H2O  RRR with nl S1 and S2 without m,r,g  · Peripheral pulses are symmetrical and full  · There is no clubbing, cyanosis of the extremities. · No edema. Dependant cyanosis to feet. · Femoral Arteries: 2+ and equal  · Pedal Pulses: 2+ and equal   Abdomen:  · No masses or tenderness  · Liver/Spleen: No Abnormalities Noted  Neurological/Psychiatric:  · Alert and oriented in all spheres  · Moves all extremities well  · Exhibits normal gait balance and coordination  · No abnormalities of mood, affect, memory, mentation, or behavior are noted    Echo 8-  [facility]. SUMMARY:  1. Left ventricle: The cavity size was normal. Wall thickness was     normal. Systolic function was normal. The estimated ejection     fraction was in the range of 55% to 60%. 2. Mitral valve: The annulus was mildly to moderately calcified.     The leaflets were normal thickness. 3. Right ventricle: The cavity size was normal.  4. Pulmonary arteries: Systolic pressure was moderately increased.     Estimated PA peak pressure is 46mm Hg (S). 5. Pericardium, extracardiac: There was no pericardial effusion. Procedures performed: 5-  Indication of the procedure: Vin France is a 66 y.o. female with    Symptomatic Sick sinus syndrome; Sinus dysfunction      · Insertion of MRI compatible right ventricular  lead under fluoroscopy. · Insertion of MRI compatible right atrial lead under fluoroscopy  · Insertion of a MRI compatible  dual  chamber Pacemaker/  · IV sedation.   · Programming and analysis of the device  · Removal of Implantable Loop recorder     ~ Plan RHC on March 4th. Stop Eliquis for 3 days. 2. Tachycardia: Intermittent.   -Takes Cardizem 180mg (reduced Sept 2020)  -Continue metoprolol.   ~ Heart rate controlled today at 88.      3. Benign essential HTN: Stable. /72   Pulse 88   Ht 5' 8\" (1.727 m)   Wt 169 lb (76.7 kg)   SpO2 98%   BMI 25.70 kg/m²     ~ Controlled. 4. Paroxysmal atrial fibrillation: HR controlled and remains on Toprol & Eliquis. 5. Pacemaker:  Implanted on 5/19/2020 per Dr. Eufemia Monaco for SSS. 6. Scleroderma:  CREST Syndrome with Raynaud's and telangiectasias including arthralgias and myalgias.   -No open sores to fingers today. Plan:  1. Decrease Lasix to 20mg (1/2 tablet) on Mon, Wed, Fri, Sun and KEEP 40mg on Tues, Thurs, Saturday. 2.  Continue all other medications. 3.  Last dose of Eliquis on 2-28-21. Hold Lasix the morning of March 4th. 4.  Nothing to eat on March 4th. Can take morning meds with a sip of water. Do not Take vitamins or Lasix on March 4th. 5.  You must have transportation for your Right heart cath procedure. Arrive at 7:30am.  Procedure at [address] attestation: This note was scribed in the presence of Ahs Dorsey M.D. by Martin Ellis RN     The scribe's documentation has been prepared under my direction and personally reviewed by me in its entirety. I confirm that the note above accurately reflects all work, treatment, procedures, and medical decision making performed by me. Time Based Itemization  A total of 40 minutes was spent on today's patient encounter.   If applicable, non-patient-facing activities:  ( x)Preparing to see the patient and reviewing records  (x ) Individual interpretation of results  ( ) Discussion or coordination of care with other health care professionals  ( x) Ordering of unique tests, medications, or procedures  ( x) Documentation within the EHR I appreciate the opportunity of cooperating in the care of this patient.     Jonah Posadas M.D., [facility]

## 2021-02-11 ENCOUNTER — TELEPHONE (OUTPATIENT)
Dept: CARDIOLOGY CLINIC | Age: 76
End: 2021-02-11

## 2021-02-12 ENCOUNTER — OFFICE VISIT (OUTPATIENT)
Dept: CARDIOLOGY CLINIC | Age: 76
End: 2021-02-12
Payer: MEDICARE

## 2021-02-12 VITALS
BODY MASS INDEX: 25.61 KG/M2 | OXYGEN SATURATION: 98 % | WEIGHT: 169 LBS | DIASTOLIC BLOOD PRESSURE: 72 MMHG | HEIGHT: 68 IN | HEART RATE: 88 BPM | SYSTOLIC BLOOD PRESSURE: 130 MMHG

## 2021-02-12 DIAGNOSIS — I10 BENIGN ESSENTIAL HTN: ICD-10-CM

## 2021-02-12 DIAGNOSIS — M34.9 SCLERODERMA (HCC): ICD-10-CM

## 2021-02-12 DIAGNOSIS — R06.02 SOB (SHORTNESS OF BREATH): ICD-10-CM

## 2021-02-12 DIAGNOSIS — I27.21 PAH (PULMONARY ARTERY HYPERTENSION) (HCC): Primary | ICD-10-CM

## 2021-02-12 DIAGNOSIS — I48.0 PAROXYSMAL ATRIAL FIBRILLATION (HCC): ICD-10-CM

## 2021-02-12 PROCEDURE — 1090F PRES/ABSN URINE INCON ASSESS: CPT | Performed by: INTERNAL MEDICINE

## 2021-02-12 PROCEDURE — G8484 FLU IMMUNIZE NO ADMIN: HCPCS | Performed by: INTERNAL MEDICINE

## 2021-02-12 PROCEDURE — 1123F ACP DISCUSS/DSCN MKR DOCD: CPT | Performed by: INTERNAL MEDICINE

## 2021-02-12 PROCEDURE — G8427 DOCREV CUR MEDS BY ELIG CLIN: HCPCS | Performed by: INTERNAL MEDICINE

## 2021-02-12 PROCEDURE — 1036F TOBACCO NON-USER: CPT | Performed by: INTERNAL MEDICINE

## 2021-02-12 PROCEDURE — 99215 OFFICE O/P EST HI 40 MIN: CPT | Performed by: INTERNAL MEDICINE

## 2021-02-12 PROCEDURE — 4040F PNEUMOC VAC/ADMIN/RCVD: CPT | Performed by: INTERNAL MEDICINE

## 2021-02-12 PROCEDURE — G8399 PT W/DXA RESULTS DOCUMENT: HCPCS | Performed by: INTERNAL MEDICINE

## 2021-02-12 PROCEDURE — G8417 CALC BMI ABV UP PARAM F/U: HCPCS | Performed by: INTERNAL MEDICINE

## 2021-02-12 RX ORDER — ASPIRIN 81 MG/1
81 TABLET ORAL DAILY
COMMUNITY

## 2021-02-12 NOTE — PATIENT INSTRUCTIONS
Plan:  1. Decrease Lasix to 20mg (1/2 tablet) on Mon, Wed, Fri, Sun and KEEP 40mg on Tues, Thurs, Saturday. 2.  Continue all other medications. 3.  Last dose of Eliquis on 2-28-21. Hold Lasix the morning of March 4th. 4.  Nothing to eat on March 4th. Can take morning meds with a sip of water. Do not Take vitamins or Lasix on March 4th. 5.  You must have transportation for your Right heart cath procedure.   Arrive at 7:30am.  Procedure at 9am.

## 2021-02-26 ENCOUNTER — TELEPHONE (OUTPATIENT)
Dept: CARDIOLOGY CLINIC | Age: 76
End: 2021-02-26

## 2021-02-26 NOTE — TELEPHONE ENCOUNTER
She fell and broke her left femur and is now in [facility] . She cxld her RHC that was scheduled for next week . She will call to  when she gets home.  If KAREN wants to speak to her or change any med or anything the phone number to her room #[id number] is [phone]

## 2021-03-10 ENCOUNTER — TELEPHONE (OUTPATIENT)
Dept: CARDIOLOGY CLINIC | Age: 76
End: 2021-03-10

## 2021-03-31 ENCOUNTER — NURSE ONLY (OUTPATIENT)
Dept: CARDIOLOGY CLINIC | Age: 76
End: 2021-03-31
Payer: MEDICARE

## 2021-03-31 DIAGNOSIS — I49.5 SSS (SICK SINUS SYNDROME) (HCC): ICD-10-CM

## 2021-03-31 DIAGNOSIS — R55 NEAR SYNCOPE: ICD-10-CM

## 2021-03-31 DIAGNOSIS — I48.0 PAROXYSMAL ATRIAL FIBRILLATION (HCC): ICD-10-CM

## 2021-03-31 DIAGNOSIS — Z95.0 PACEMAKER: ICD-10-CM

## 2021-03-31 PROCEDURE — 93296 REM INTERROG EVL PM/IDS: CPT | Performed by: INTERNAL MEDICINE

## 2021-03-31 PROCEDURE — 93294 REM INTERROG EVL PM/LDLS PM: CPT | Performed by: INTERNAL MEDICINE

## 2021-03-31 NOTE — PROGRESS NOTES
We received remote transmission from patient's monitor at home. Transmission shows normal sensing and pacing function. Noted AF. Pt is on Eliquis. EP physician will review. See interrogation under cardiology tab in the [address] [place] [address] [address] field for more details.

## 2021-04-01 NOTE — TELEPHONE ENCOUNTER
Letter completed and faxed to number requested.      Jeremie Larios, APRN-CNP RN returned call to Renetta PT. Renetta stated that pt;s incision is still not closed and pt's daughter took a picture of incision and sent it to Dr Crews's phone. Renetta wanted to make office aware. RN informed Renetta to have daughter also email RN picture of incision site so one of the PA's can look at it and see if pt needs to be seen in the office, RN also provided her email address. Renetta verbalized understanding.

## 2021-04-13 NOTE — PROGRESS NOTES
[facility]   Electrophysiology Follow Up  Date: 4/14/2021    Chief Complaint   Patient presents with    6 Month Follow-Up    Atrial Fibrillation      HPI: Jose Martin Jay is a 64 y.o. who was evaluated for tachycardia, sinus tachycardia vs. Atrial fib. Her 30 day monitor reported PAF. She was started on Eliquis. An ILR was implanted on 8/20/19 to better determine whether she has atrial fibrillation. Her loop recorder showed 1st degree AVB with episodes of atrial fib/flutter with RVR and conversion pauses up to 10 seconds. 5/19/20 Insertion of dual chamber pacemaker for symptomatic SSS and removal of ILR. Interval history:  Kait Monroe presents today in follow up. She states things are \" okay\" . She does not feel her atrial fibrillation. Past Medical History:   Diagnosis Date    Diabetes mellitus ([address]     Fatty liver     Severe    Fibromyalgia     Fracture, hip (HCC)     GERD (gastroesophageal reflux disease)     Hypertension     IBS (irritable bowel syndrome)     Osteoarthritis     Peripheral neuropathy     Scleroderma (HCC)     crest        Past Surgical History:   Procedure Laterality Date    ARTHROPLASTY  08/10/2012    FIFTH TOE LEFT FOOT    BACK SURGERY      CHOLECYSTECTOMY  1989    CYSTOSCOPY  08/01/2018    with botox injection     FIBULA FRACTURE SURGERY  2017    3 separate surgeries for a fractured left fibula tibial a closed done by Dr. Maki Mcelroy  2007    PARTIAL HIP ARTHROPLASTY Right 09/2019    [facility] [facility]    THROAT SURGERY  2012    vocal cord polyp       Allergies: Allergies   Allergen Reactions    Adhesive Tape     Collagen     Other     Bactrim [Sulfamethoxazole-Trimethoprim] Other (See Comments)     Body shaking and chills.  Body pain    Cortisone Other (See Comments)     Rash, confusion, hyperactivity    Pcn [Penicillins]     Scopolamine Anxiety    Scopolamine Sulfate Anxiety       Social History: reports that she quit smoking about 9 years ago. She started smoking about 61 years ago. She has a 147.00 pack-year smoking history. She has never used smokeless tobacco. She reports that she does not drink alcohol or use drugs. Family History:  family history includes Cancer in her mother and another family member; Diabetes in her mother and sister; Heart Disease in her mother; Hypertension in her father and mother; Stroke in her father. Reviewed. Denies family history of sudden cardiac death, arrhythmia, premature CAD    Review of System:  All other systems reviewed and are negative except for that noted above. Pertinent negatives are:   General: negative for fever, chills   Ophthalmic ROS: negative for - eye pain or loss of vision  ENT ROS: negative for - headaches, sore throat   Respiratory: negative for - cough, sputum  Cardiovascular: Reviewed in HPI  Gastrointestinal: negative for - abdominal pain, diarrhea, N/V  Hematology: negative for - bleeding, blood clots, bruising or jaundice  Genito-Urinary:  negative for - Dysuria or incontinence  Musculoskeletal: negative for - Joint swelling, muscle pain  Neurological: negative for - confusion, dizziness, headaches   Psychiatric: No anxiety, no depression. Dermatological: negative for - rash    Physical Examination:  Vitals:    04/14/21 1518   BP: 138/71   Pulse: 62   SpO2: 98%      Wt Readings from Last 3 Encounters:   04/14/21 169 lb (76.7 kg)   02/12/21 169 lb (76.7 kg)   11/13/20 190 lb (86.2 kg)       · Constitutional: Oriented. No distress. · Head: Normocephalic and atraumatic. · Mouth/Throat: Oropharynx is clear and moist.   · Eyes: Conjunctivae normal. EOM are normal.   · Neck: Neck supple. No rigidity. No JVD present. · Cardiovascular: Normal rate, regular rhythm, S1&S2. · Pulmonary/Chest: Bilateral respiratory sounds. No wheezes, No rhonchi. · Abdominal: Soft. Bowel sounds present. No distension, No tenderness.    · Musculoskeletal: No tenderness. No edema    · Lymphadenopathy: Has no cervical adenopathy. · Neurological: Alert and oriented. Cranial nerve appears intact, No Gross deficit   · Skin: Skin is warm and dry. No rash noted. · Psychiatric: Has a normal behavior       Labs, diagnostic and imaging results reviewed. Lab Results   Component Value Date    TSHREFLEX 1.44 06/21/2019    TSH 1.13 11/13/2013    TSH 2.47 05/10/2012    CREATININE 0.98 12/17/2020    CREATININE 1.0 05/19/2020    AST 17 12/17/2020    ALT 12 12/17/2020     Reviewed. ECG:  Sinus Rhythm with a first degree block       Event monitor: 6/21/19  AF burden 3%  Longest AF 3 hours 50 minutes  AF highest 124  AF lowest 70  AF average 96     Echo:  3/6/20  Summary   -Overall left ventricular systolic function is normal.   -Ejection fraction is visually estimated to be 60 %. E/e'= 13.1 cm.   -No regional wall motion abnormalities are noted. -Normal diastolic function.   -Moderate calcification of the posterior leaflet of the mitral valve. -Mitral annular calcification is present. -Mild thickening of anterior leaflet of mitral valve. -Mild mitral regurgitation is present.   -Aortic valve appears sclerotic but opens adequately. -IVC size is normal (<2.1cm) and collapses > 50% with respiration consistent   with normal RA pressure (3mmHg). Echo: 6/21/19  Summary   -Normal left ventricle size, wall thickness, and systolic function with an   estimated ejection fraction of 55-60%.  -No regional wall motion abnormalities are seen.   -Aortic valve appears sclerotic but opens adequately.   -Mitral annular calcification is present.   -Trivial mitral regurgitation.   -Trivial tricuspid regurgitation.   -Diastolic filling parameters suggest grade III diastolic dysfunction.  Avg.   E/e'=24.2      Medication:  Current Outpatient Medications   Medication Sig Dispense Refill    albuterol sulfate HFA (VENTOLIN HFA) 108 (90 Base) MCG/ACT inhaler Inhale 2 puffs into the lungs every 4 hours as needed for Wheezing      bisacodyl (DULCOLAX) 5 MG EC tablet Take 5 mg by mouth daily as needed for Constipation      Cranberry 400 MG TABS Take 400 mg by mouth daily      venlafaxine (EFFEXOR) 37.5 MG tablet Take 37.5 mg by mouth 3 times daily      SODIUM PHOSPHATES RE Place 1 enema rectally as needed Indications: Chronic Constipation      polyethylene glycol (GLYCOLAX) 17 g packet Take 17 g by mouth daily as needed for Constipation      insulin lispro (HUMALOG) 100 UNIT/ML injection vial Inject into the skin 3 times daily (before meals) ON SLIDING SCALE      nitrofurantoin, macrocrystal-monohydrate, (MACROBID) 100 MG capsule Take 100 mg by mouth 2 times daily      Liraglutide (VICTOZA) 18 MG/3ML SOPN SC injection Inject 1.2 mg into the skin daily      aspirin (SB LOW DOSE ASA EC) 81 MG EC tablet Take 81 mg by mouth daily      HYDROcodone-acetaminophen (NORCO) 7.5-325 MG per tablet Take 1 tablet by mouth every 6 hours as needed.       metoprolol succinate (TOPROL XL) 100 MG extended release tablet Take 1 tablet by mouth daily 135 tablet 3    ELIQUIS 5 MG TABS tablet TAKE 1 TABLET TWICE A  tablet 3    furosemide (LASIX) 40 MG tablet Take 1 tablet by mouth daily 90 tablet 1    dilTIAZem (CARDIZEM CD) 180 MG extended release capsule Take 1 capsule by mouth daily (Patient taking differently: Take 240 mg by mouth daily ) 90 capsule 1    OXYGEN Inhale 2 L into the lungs      vitamin C (ASCORBIC ACID) 500 MG tablet Take 500 mg by mouth daily       zinc sulfate (ZINCATE) 220 (50 Zn) MG capsule Take 220 mg by mouth daily      DULoxetine (CYMBALTA) 60 MG extended release capsule Take 1 capsule by mouth daily (Patient taking differently: Take 90 mg by mouth daily ) 30 capsule 0    hydroxychloroquine (PLAQUENIL) 200 MG tablet Take 200 mg by mouth daily      acetaminophen (TYLENOL) 500 MG tablet Take 500 mg by mouth every 6 hours as needed for Pain      gabapentin (NEURONTIN) 600 MG tablet Take by mouth. 1 tablet morning, 1 tablet evening, 2 tablets bedtime      Calcium Polycarbophil (FIBER-CAPS PO) Take by mouth      pantoprazole (PROTONIX) 40 MG tablet Take 40 mg by mouth daily      Probiotic Product (PROBIOTIC DAILY PO) Take by mouth daily      magnesium oxide (MAG-OX) 400 MG tablet Take 400 mg by mouth daily       Vitamin D (CHOLECALCIFEROL) 1000 UNITS CAPS capsule Take 2,000 Units by mouth daily.  Multiple Vitamin (THERA) TABS Take  by mouth.  promethazine (PHENERGAN) 25 MG tablet Take 25 mg by mouth every 6 hours as needed      Biotin (BIOTIN 5000) 5 MG CAPS Take 1,000 mcg by mouth daily       FISH OIL 1,200 mg by Does not apply route daily        No current facility-administered medications for this visit. Assessment and plan:     Paroxysmal atrial fibrillation       Eliquis 5 mg BID  12/17/2020 Creatinine 0.98    I would continue anticoagulation due to high [id number] score of 5 and possibility of some to be atrial flutter. S/p  Dual chamber Permanent pacemaker due to post conversion pauses 05/19/2020    Toprol  mg daily     Cardizem 180 mg daily   MARGARET was turned on. Will see if her burden increases in which case ablation can be considered  Atrial fibrillation was 19.3 percent on interrogation today. She does not feel it. At this point unless she has worsening symptoms or if her ejection fraction decreases there is no need for any intervention for A. fib and she is not interested. I will repeat the echocardiogram to see what her EF is and if at any point in time her LV function deteriorates then consideration to ablation of A. fib can be given. near syncope   Likely due to low BP   BP meds already lowered    Will monitor and adjust if needed   No recurrence      Dual chamber Permanent pacemaker 05/19/2020   The CIED was interrogated and programmed and I supervised and reviewed all the data.  All findings and changes are in device interrogation sheet and reflect my personal interpretation and changes and is scanned to Epic.   13.1 years remaining, AP 46.3% ,  1.5% 3 episodes of NSVT and longest was 2-second, 19.3% atrial fribrillation on interrogation today      HTN  -Controlled  -BP goal <130/80  -Home BP monitoring encouraged, printed information provided on how to accurately measure BP at home.  -Counseled to follow a low salt diet to assure blood pressure remains controlled for cardiovascular risk factor modification.   -The patient is counseled to get regular exercise 3-5 times per week and maintain a healthy weight reduce cardiovascular risk factors. Scleroderma  On hydroxychloroquine  Stable    Plan:   Echo to evaluate effect of atrial fibrillation of LVEF  Follow up in 6 months with EPNP     I independently reviewed Device interrogation    Thank you for allowing me to participate in the care of [address] Further evaluation will be based upon the patient's clinical course and testing results. All questions and concerns were addressed to the patient/family. Alternatives to my treatment were discussed. I have discussed the above stated plan and the patient verbalized understanding and agreed with the plan. NOTE: This report was transcribed using voice recognition software. Every effort was made to ensure accuracy, however, inadvertent computerized transcription errors may be present. Dre Bravo MD, [address]   Office: [phone]    Scribe attestation:  This note was scribed in the presence of Dre Bravo MD by Jamila Petit RN    I, Dr. Dre Bravo personally performed the services described in this documentation as scribed by RN in my presence, and it is both accurate and complete.

## 2021-04-14 ENCOUNTER — OFFICE VISIT (OUTPATIENT)
Dept: CARDIOLOGY CLINIC | Age: 76
End: 2021-04-14
Payer: MEDICARE

## 2021-04-14 ENCOUNTER — NURSE ONLY (OUTPATIENT)
Dept: CARDIOLOGY CLINIC | Age: 76
End: 2021-04-14
Payer: COMMERCIAL

## 2021-04-14 VITALS
WEIGHT: 169 LBS | BODY MASS INDEX: 25.61 KG/M2 | HEART RATE: 62 BPM | SYSTOLIC BLOOD PRESSURE: 138 MMHG | HEIGHT: 68 IN | DIASTOLIC BLOOD PRESSURE: 71 MMHG | OXYGEN SATURATION: 98 %

## 2021-04-14 DIAGNOSIS — I49.5 SSS (SICK SINUS SYNDROME) (HCC): ICD-10-CM

## 2021-04-14 DIAGNOSIS — I48.0 PAROXYSMAL ATRIAL FIBRILLATION (HCC): Primary | ICD-10-CM

## 2021-04-14 DIAGNOSIS — I10 BENIGN ESSENTIAL HTN: ICD-10-CM

## 2021-04-14 DIAGNOSIS — R55 NEAR SYNCOPE: ICD-10-CM

## 2021-04-14 DIAGNOSIS — I48.92 ATRIAL FLUTTER, UNSPECIFIED TYPE (HCC): ICD-10-CM

## 2021-04-14 DIAGNOSIS — Z95.0 PACEMAKER: Primary | ICD-10-CM

## 2021-04-14 DIAGNOSIS — M34.9 SCLERODERMA (HCC): Chronic | ICD-10-CM

## 2021-04-14 DIAGNOSIS — I48.0 PAROXYSMAL ATRIAL FIBRILLATION (HCC): ICD-10-CM

## 2021-04-14 PROCEDURE — 4040F PNEUMOC VAC/ADMIN/RCVD: CPT | Performed by: INTERNAL MEDICINE

## 2021-04-14 PROCEDURE — 1123F ACP DISCUSS/DSCN MKR DOCD: CPT | Performed by: INTERNAL MEDICINE

## 2021-04-14 PROCEDURE — G8399 PT W/DXA RESULTS DOCUMENT: HCPCS | Performed by: INTERNAL MEDICINE

## 2021-04-14 PROCEDURE — G8427 DOCREV CUR MEDS BY ELIG CLIN: HCPCS | Performed by: INTERNAL MEDICINE

## 2021-04-14 PROCEDURE — 1036F TOBACCO NON-USER: CPT | Performed by: INTERNAL MEDICINE

## 2021-04-14 PROCEDURE — G8417 CALC BMI ABV UP PARAM F/U: HCPCS | Performed by: INTERNAL MEDICINE

## 2021-04-14 PROCEDURE — 99214 OFFICE O/P EST MOD 30 MIN: CPT | Performed by: INTERNAL MEDICINE

## 2021-04-14 PROCEDURE — 1090F PRES/ABSN URINE INCON ASSESS: CPT | Performed by: INTERNAL MEDICINE

## 2021-04-14 RX ORDER — LIRAGLUTIDE 6 MG/ML
1.2 INJECTION SUBCUTANEOUS DAILY
COMMUNITY
End: 2021-10-13 | Stop reason: ALTCHOICE

## 2021-04-14 RX ORDER — POLYETHYLENE GLYCOL 3350 17 G/17G
17 POWDER, FOR SOLUTION ORAL DAILY PRN
COMMUNITY
End: 2022-09-08

## 2021-04-14 RX ORDER — NITROFURANTOIN 25; 75 MG/1; MG/1
100 CAPSULE ORAL 2 TIMES DAILY
COMMUNITY
Start: 2021-04-13 | End: 2021-04-23

## 2021-04-14 RX ORDER — VENLAFAXINE 37.5 MG/1
37.5 TABLET ORAL 3 TIMES DAILY
COMMUNITY
End: 2021-10-13 | Stop reason: ALTCHOICE

## 2021-04-14 RX ORDER — GLUCOSAMINE HCL 500 MG
400 TABLET ORAL DAILY
COMMUNITY

## 2021-04-14 RX ORDER — ALBUTEROL SULFATE 90 UG/1
2 AEROSOL, METERED RESPIRATORY (INHALATION) EVERY 4 HOURS PRN
COMMUNITY
End: 2021-10-13 | Stop reason: ALTCHOICE

## 2021-04-29 ENCOUNTER — TELEPHONE (OUTPATIENT)
Dept: ENDOCRINOLOGY | Age: 76
End: 2021-04-29

## 2021-04-29 NOTE — TELEPHONE ENCOUNTER
New patient referral. Please send a Health History Form to return to my attention and let them know I will look for appt day and time after receiving it. Thanks.     Referred by Dr. John Benz

## 2021-04-30 PROCEDURE — 93280 PM DEVICE PROGR EVAL DUAL: CPT | Performed by: INTERNAL MEDICINE

## 2021-05-18 ENCOUNTER — TELEPHONE (OUTPATIENT)
Dept: ENDOCRINOLOGY | Age: 76
End: 2021-05-18

## 2021-06-17 ENCOUNTER — PROCEDURE VISIT (OUTPATIENT)
Dept: ENDOCRINOLOGY | Age: 76
End: 2021-06-17

## 2021-06-17 ENCOUNTER — OFFICE VISIT (OUTPATIENT)
Dept: ENDOCRINOLOGY | Age: 76
End: 2021-06-17
Payer: MEDICARE

## 2021-06-17 ENCOUNTER — HOSPITAL ENCOUNTER (OUTPATIENT)
Dept: GENERAL RADIOLOGY | Age: 76
Discharge: HOME | End: 2021-06-17
Payer: MEDICARE

## 2021-06-17 VITALS — SYSTOLIC BLOOD PRESSURE: 138 MMHG | DIASTOLIC BLOOD PRESSURE: 68 MMHG

## 2021-06-17 DIAGNOSIS — S72.322D CLOSED DISPLACED TRANSVERSE FRACTURE OF SHAFT OF LEFT FEMUR WITH ROUTINE HEALING, SUBSEQUENT ENCOUNTER: ICD-10-CM

## 2021-06-17 DIAGNOSIS — M80.051D OSTEOPOROTIC HIP FRACTURE, RIGHT, WITH ROUTINE HEALING, SUBSEQUENT ENCOUNTER: ICD-10-CM

## 2021-06-17 DIAGNOSIS — M81.0 OSTEOPOROSIS, POSTMENOPAUSAL: ICD-10-CM

## 2021-06-17 DIAGNOSIS — M81.0 OSTEOPOROSIS, POSTMENOPAUSAL: Chronic | ICD-10-CM

## 2021-06-17 DIAGNOSIS — M81.0 OSTEOPOROSIS, POSTMENOPAUSAL: Primary | ICD-10-CM

## 2021-06-17 PROBLEM — M80.051A OSTEOPOROTIC FRACTURE OF RIGHT HIP (HCC): Status: ACTIVE | Noted: 2021-06-17

## 2021-06-17 PROBLEM — S72.323D: Chronic | Status: ACTIVE | Noted: 2021-06-17

## 2021-06-17 PROBLEM — S72.323D: Status: ACTIVE | Noted: 2021-06-17

## 2021-06-17 PROBLEM — M80.051A OSTEOPOROTIC FRACTURE OF RIGHT HIP (HCC): Chronic | Status: ACTIVE | Noted: 2021-06-17

## 2021-06-17 PROBLEM — E55.9 VITAMIN D DEFICIENCY: Status: RESOLVED | Noted: 2020-09-11 | Resolved: 2021-06-17

## 2021-06-17 PROCEDURE — 77080 DXA BONE DENSITY AXIAL: CPT | Performed by: INTERNAL MEDICINE

## 2021-06-17 PROCEDURE — 1036F TOBACCO NON-USER: CPT | Performed by: INTERNAL MEDICINE

## 2021-06-17 PROCEDURE — G8427 DOCREV CUR MEDS BY ELIG CLIN: HCPCS | Performed by: INTERNAL MEDICINE

## 2021-06-17 PROCEDURE — G8417 CALC BMI ABV UP PARAM F/U: HCPCS | Performed by: INTERNAL MEDICINE

## 2021-06-17 PROCEDURE — 77080 DXA BONE DENSITY AXIAL: CPT

## 2021-06-17 PROCEDURE — 99205 OFFICE O/P NEW HI 60 MIN: CPT | Performed by: INTERNAL MEDICINE

## 2021-06-17 PROCEDURE — 4040F PNEUMOC VAC/ADMIN/RCVD: CPT | Performed by: INTERNAL MEDICINE

## 2021-06-17 PROCEDURE — 1123F ACP DISCUSS/DSCN MKR DOCD: CPT | Performed by: INTERNAL MEDICINE

## 2021-06-17 PROCEDURE — 1090F PRES/ABSN URINE INCON ASSESS: CPT | Performed by: INTERNAL MEDICINE

## 2021-06-17 PROCEDURE — G8399 PT W/DXA RESULTS DOCUMENT: HCPCS | Performed by: INTERNAL MEDICINE

## 2021-06-17 RX ORDER — DENOSUMAB 60 MG/ML
60 INJECTION SUBCUTANEOUS ONCE
Qty: 1 SYRINGE | Refills: 2 | Status: SHIPPED | OUTPATIENT
Start: 2021-06-17 | End: 2022-08-10

## 2021-06-17 NOTE — LETTER
[facility] and Osteoporosis  [facility], [address] [place] [address],[place] [address]  Phone [phone]  Fax [phone]         2021         Mary Jo Jacinto MD                            Re:  Jaja Bobo,  1945    Dear Dr. Jessie Rubio: Thank you for asking me to see Jaja Broussard in consultation. As you know, Ms. Conner is a 64 y. o.woman found to have low bone density in . BMD decreased 8540-0232-7407-2020, 0507-9465, most recent T-score -2.1 in the left femoral neck. She fractured her right hip in , making the correct clinical diagnosis \"osteoporosis. \"  2021 she twisted and had a fracture of her left distal femur, just below an enchondroma and just above hardware from repair of a tibial plateau fracture in ; I think this was at least in part a pathological fracture. We reviewed life-style issues (calcium, vitamin D and physical activity). I have ordered some diagnostic tests and will be back in touch when I have the results. Without effective treatment, fracture risk is high. We discussed long-term treatment options (alendronate, zoledronate, Prolia). She selected denosumab SQ twice yearly (Prolia). We will make the necessary arrangements. Enclosed is a copy of my consultation note. Please let me know if you have any questions. Sincerely,    Tucker Mitchell. [place]     Encl.  Copy of consult note    CC: Padmini Marshall MD

## 2021-06-17 NOTE — RESULT ENCOUNTER NOTE
[facility] ([facility]) Osteoporosis and [address] [facility] [address] [facility], [address] [place] [address],[place] [address]  Phone [phone]  Fax [phone]    NAME: Silvia Albrecht   : 1951   STUDY DATE: 2021     REFERRING PROVIDER: Lyla Ornelas MD    INDICATION(S) FOR PERFORMING THE STUDY:  osteoporosis, age related (M81.0)    CLINICAL INFORMATION PROVIDED BY THE PATIENT: 68-year-old woman. Surgical menopause age 38. Tibial plateau fx , R femoral neck 2019, L distal femur fx 2021. No long-term corticosteroid use. EQUIPMENT: [employer]. POSITIONING: Good. REGIONS OF INTEREST: Correct. ARTIFACTS: None. STUDY VALID? Spine BMD is invalid because of generalized degenerative change. T-scores   Initial study: 2012* L1-L4 Not valid left total hip -1.1   Current study: 2021 L1-L4 Not valid left fem. neck -2.1     The table below shows bone mineral density (grams/cm2), the appropriate measure for comparing serial scans. A significant increase or decrease is based on precision studies done at our center according to the ISCD protocol with a least significant change of 0.030 g/cm2. Proximal Femur (left)   Date Fem. neck Trochanter Total hip   2012* 0.740 --- 0.813   2015* 0.740 --- 0.854   10/10/2017* 0.680 --- 0.813   2020* 0.706 --- 0.813   2021 0.616 0.412 0.716   *Done with [employer] equipment. BMD converted to Hologic units. IMPRESSION:  BONE DENSITY IS LOW. BMD DECREASED IN THE TOTAL HIP 6047-2384 AND 2815-1238 AND DECREASED IN THE FEMORAL NECK 1047-8307 AND 9962-9523. WITH HER HISTORY OF HIP FRACTURE, THE CORRECT CLINICAL DIAGNOSIS IS OSTEOPOROSIS.      Consider repeating this study in 1-2 years to assess the patient's progress. _________________________________________________   Santiago Brink MD, Director, [facility] ([facility]) Osteoporosis and [address]

## 2021-06-18 ENCOUNTER — HOSPITAL ENCOUNTER (OUTPATIENT)
Age: 76
Discharge: HOME | End: 2021-06-18
Payer: MEDICARE

## 2021-06-18 ENCOUNTER — TELEPHONE (OUTPATIENT)
Dept: ENDOCRINOLOGY | Age: 76
End: 2021-06-18

## 2021-06-18 ENCOUNTER — HOSPITAL ENCOUNTER (OUTPATIENT)
Dept: NON INVASIVE DIAGNOSTICS | Age: 76
Discharge: HOME | End: 2021-06-18
Payer: MEDICARE

## 2021-06-18 DIAGNOSIS — I48.0 PAROXYSMAL ATRIAL FIBRILLATION (HCC): ICD-10-CM

## 2021-06-18 DIAGNOSIS — M81.0 OSTEOPOROSIS, POSTMENOPAUSAL: ICD-10-CM

## 2021-06-18 LAB
LV EF: 63 %
LVEF MODALITY: NORMAL
VITAMIN D 25-HYDROXY: 83.1 NG/ML

## 2021-06-18 PROCEDURE — 93306 TTE W/DOPPLER COMPLETE: CPT

## 2021-06-18 PROCEDURE — 36415 COLL VENOUS BLD VENIPUNCTURE: CPT

## 2021-06-18 PROCEDURE — 82306 VITAMIN D 25 HYDROXY: CPT

## 2021-06-18 NOTE — TELEPHONE ENCOUNTER
Patient left off one of her doctors she would like her report sent to.       Dr. Diego Wallace [address]

## 2021-06-21 ENCOUNTER — TELEPHONE (OUTPATIENT)
Dept: ENDOCRINOLOGY | Age: 76
End: 2021-06-21

## 2021-07-06 RX ORDER — FUROSEMIDE 40 MG/1
TABLET ORAL
Qty: 90 TABLET | Refills: 3 | OUTPATIENT
Start: 2021-07-06

## 2021-07-06 RX ORDER — DILTIAZEM HYDROCHLORIDE 180 MG/1
CAPSULE, COATED, EXTENDED RELEASE ORAL
Qty: 90 CAPSULE | Refills: 3 | Status: SHIPPED | OUTPATIENT
Start: 2021-07-06 | End: 2022-06-13

## 2021-07-06 RX ORDER — FUROSEMIDE 40 MG/1
TABLET ORAL
Qty: 90 TABLET | Refills: 3 | Status: SHIPPED | OUTPATIENT
Start: 2021-07-06 | End: 2021-10-13 | Stop reason: DRUGHIGH

## 2021-07-06 NOTE — TELEPHONE ENCOUNTER
Lasix needs to be filled by heart failure, will re-pend and route back to heart failure as they are following patient.  Refill sent for diltiazem

## 2021-07-07 ENCOUNTER — NURSE ONLY (OUTPATIENT)
Dept: CARDIOLOGY CLINIC | Age: 76
End: 2021-07-07
Payer: MEDICARE

## 2021-07-07 DIAGNOSIS — I48.0 PAROXYSMAL ATRIAL FIBRILLATION (HCC): ICD-10-CM

## 2021-07-07 DIAGNOSIS — I49.5 SSS (SICK SINUS SYNDROME) (HCC): ICD-10-CM

## 2021-07-07 DIAGNOSIS — R55 NEAR SYNCOPE: ICD-10-CM

## 2021-07-07 DIAGNOSIS — Z95.0 PACEMAKER: ICD-10-CM

## 2021-07-07 PROCEDURE — 93296 REM INTERROG EVL PM/IDS: CPT | Performed by: INTERNAL MEDICINE

## 2021-07-07 PROCEDURE — 93294 REM INTERROG EVL PM/LDLS PM: CPT | Performed by: INTERNAL MEDICINE

## 2021-07-07 NOTE — PROGRESS NOTES
We received remote transmission from patient's monitor at home. Transmission shows normal sensing and pacing function. Noted AF and NSVT. Pt is on Eliquis and Toprol. EP physician will review. See interrogation under cardiology tab in the [address] [place] [address] [address] field for more details.

## 2021-07-07 NOTE — LETTER
[address] [phone]  Luige Samuel [address]  [address] [phone]    Pacemaker/Defibrillator Clinic    07/07/21      Yovani Conner  [address] [facility]      Dear Aaliyah Montoya    This letter is to inform you that we received the transmission from your monitor at home that checks your implanted heart device. The next date your monitor will automatically transmit will be 10-6-21. If your report needs attention we will notify you. Your device and monitor are wireless and most transmit cellularly, but please periodically check your monitor is still plugged in to the electrical outlet. If you still use the telephone land line to send please ensure the connection to the phone yenni is secure. This will help to ensure successful automatic transmissions in the future. Also, the monitor needs to be close to you while sleeping at night. Please be aware that the remote device transmission sites are periodically monitored only during regular business hours during which simultaneous in-office device clinics are being run. If your transmission requires attention, we will contact you as soon as possible. **PLEASE NOTE** that our [facility] policy and processes are changing to ensure a more seamless approach for all parties involved, allowing more time for our nurses to address patient issues and concerns. We will no longer be sending letters for NORMAL remote transmissions. You will be contacted by phone if your transmission requires attention (as previously done), and letters will only be sent regarding monitor disconnections or missed transmissions if you are unable to be reached by phone. Please do not be alarmed by this new process, as we will continue to contact you if your transmission report requires attention. This will be your final \"remote received\" letter.   From this point forward, the [facility] will be utilizing the no news is good news approach. As always, please feel free to contact your nurse with any questions or concerns. Thank you.     [facility]

## 2021-07-26 ENCOUNTER — TELEPHONE (OUTPATIENT)
Dept: CARDIOLOGY CLINIC | Age: 76
End: 2021-07-26

## 2021-07-26 NOTE — TELEPHONE ENCOUNTER
Pt states she has moved ( new address and phone has been placed) but new phone number has not been connected  yet. Her next transmission is scheduled for October 6. Having pt call [employer] to discuss with them. Any other questions please call pt.

## 2021-08-09 ENCOUNTER — TELEPHONE (OUTPATIENT)
Dept: CARDIOLOGY CLINIC | Age: 76
End: 2021-08-09

## 2021-08-09 NOTE — TELEPHONE ENCOUNTER
Spoke with the patient and and advised her of the message below . Pt voiced understanding .  Call complete

## 2021-08-09 NOTE — TELEPHONE ENCOUNTER
Pt states she has started having cramping in legs over the last week or so. Pt states her potassium was stopped a couple months ago. Asking what she should do. Please call to advise.

## 2021-08-09 NOTE — TELEPHONE ENCOUNTER
Blood work in June was normal.  Does not appear to be on any new medications. No EP recommendations. Discussed with HF and she can take OTC magnesium supplement to see if that helps.       TOM Álvarez-CNP

## 2021-08-20 ENCOUNTER — TELEPHONE (OUTPATIENT)
Dept: CARDIOLOGY CLINIC | Age: 76
End: 2021-08-20

## 2021-08-20 NOTE — TELEPHONE ENCOUNTER
Patient had a low afib burden on her last device interrogation. Per current guidelines she can hold her eliquis for 2 days or per her surgeons recommendations.  2 days are generally sufficient

## 2021-08-20 NOTE — TELEPHONE ENCOUNTER
Pt called stating she has to have needle biopsy done to her breast she needs to know how long she cam be off her blood thinners?     Biopsy is pending how long she can be off her blood thinners, biopsy will be done at [facility]  [phone]

## 2021-09-14 PROBLEM — Z23 NEED FOR STREPTOCOCCUS PNEUMONIAE VACCINATION: Status: RESOLVED | Noted: 2020-09-11 | Resolved: 2021-09-14

## 2021-09-14 NOTE — PROGRESS NOTES
[facility]   Electrophysiology  Gaby Santiago, APRN-CNP  Attending EP: Dr. David Sood    Date: 9/14/2021  I had the privilege of visiting [facility] in the office. Chief Complaint:   No chief complaint on file. History of Present Illness: History obtained from patient and medical record. Wilmar Espinoza is 64 y.o. female with a past medical history of pulmonary hypertension, scleroderma, tachycardia, atrial fibrillation, HTN, fibromyalgia, and Crest syndrome with Raynaud's/telangiosis. She wore an event monitor that showed PAF and she was started on eliquis. S/p ILR implant (8/20/19). She was found to have prolonged pauses with near syncope s/p PPM implant (5/19/20). -Interval history: Today, Wilmar Espinoza is being seen for routine follow up. Her son is present for the visit. She is doing well. She remains in sinus rhythm on EKG. Her device interrogation shows 4.8% AF burden. Her weight is stable, no s/s of CHF. She wants to go down on her lasix as she must pee frequently and sometimes doesn't make it to the bathroom. Pt is going to the dentist tomorrow and needs to know if she must hold her blood thinners. Denies having chest pain, palpitations, shortness of breath, orthopnea/PND, cough, or dizziness at the time of this visit. With regard to medication therapy the patient has been compliant with prescribed regimen. They have tolerated therapy to date. Allergies: Allergies   Allergen Reactions    Adhesive Tape     Collagen     Other     Bactrim [Sulfamethoxazole-Trimethoprim] Other (See Comments)     Body shaking and chills. Body pain    Cortisone Other (See Comments)     Rash, confusion, hyperactivity    Pcn [Penicillins]     Scopolamine Anxiety    Scopolamine Sulfate Anxiety     Home Medications:  Prior to Visit Medications    Medication Sig Taking?  Authorizing Provider   metoprolol succinate (TOPROL XL) 100 MG extended release tablet Take 1 tablet by mouth daily Yasmeen Sor, APRN - CNP   dilTIAZem (CARDIZEM CD) 180 MG extended release capsule TAKE 1 CAPSULE DAILY  Javi Watkins MD   furosemide (LASIX) 40 MG tablet Alternating with 40 on Tuesday Thursday and Saturday 20 mg on all other days  Fern Welch MD   PROLIA 60 MG/ML SOSY SC injection Inject 1 mL into the skin once for 1 dose  Carlos Zapien MD   Dulaglutide (TRULICITY) 1.5 GN/4.0BZ SOPN 1.5 mg once a week  Historical Provider, MD   methocarbamol (ROBAXIN) 500 MG tablet Take 0.75 tablets by mouth 3 times daily as needed  Historical Provider, MD   polycarbophil (FIBERCON) 625 MG tablet Take 625 mg by mouth daily  Historical Provider, MD   albuterol sulfate HFA (VENTOLIN HFA) 108 (90 Base) MCG/ACT inhaler Inhale 2 puffs into the lungs every 4 hours as needed for Wheezing  Patient not taking: Reported on 6/16/2021  Historical Provider, MD   bisacodyl (DULCOLAX) 5 MG EC tablet Take 5 mg by mouth daily as needed for Constipation   Historical Provider, MD   Cranberry 400 MG TABS Take 400 mg by mouth daily  Historical Provider, MD   venlafaxine (EFFEXOR) 37.5 MG tablet Take 37.5 mg by mouth 3 times daily  Patient not taking: Reported on 6/16/2021  Historical Provider, MD   SODIUM PHOSPHATES RE Place 1 enema rectally as needed Indications: Chronic Constipation  Patient not taking: Reported on 6/16/2021  Historical Provider, MD   polyethylene glycol (GLYCOLAX) 17 g packet Take 17 g by mouth daily as needed for Constipation  Patient not taking: Reported on 6/16/2021  Historical Provider, MD   insulin lispro (HUMALOG) 100 UNIT/ML injection vial Inject into the skin 3 times daily (before meals) ON SLIDING SCALE  Patient not taking: Reported on 6/16/2021  Historical Provider, MD   Liraglutide (VICTOZA) 18 MG/3ML SOPN SC injection Inject 1.2 mg into the skin daily  Patient not taking: Reported on 6/17/2021  Historical Provider, MD   aspirin (SB LOW DOSE ASA EC) 81 MG EC tablet Take 81 mg by mouth daily  Historical Provider, MD HYDROcodone-acetaminophen (NORCO) 7.5-325 MG per tablet Take 1 tablet by mouth every 6 hours as needed. Historical Provider, MD   promethazine (PHENERGAN) 25 MG tablet Take 25 mg by mouth every 6 hours as needed  Patient not taking: Reported on 6/16/2021  Historical Provider, MD   ELIQUIS 5 MG TABS tablet TAKE 1 TABLET TWICE A DAY  Manan Elkins MD   OXYGEN Inhale 2 L into the lungs  Historical Provider, MD   vitamin C (ASCORBIC ACID) 500 MG tablet Take 500 mg by mouth daily   Historical Provider, MD   zinc sulfate (ZINCATE) 220 (50 Zn) MG capsule Take 220 mg by mouth daily  Historical Provider, MD   DULoxetine (CYMBALTA) 60 MG extended release capsule Take 1 capsule by mouth daily  Patient taking differently: Take 60 mg by mouth daily   TOM Charles CNP   hydroxychloroquine (PLAQUENIL) 200 MG tablet Take 200 mg by mouth daily  Historical Provider, MD   acetaminophen (TYLENOL) 500 MG tablet Take 500 mg by mouth every 6 hours as needed for Pain  Historical Provider, MD   gabapentin (NEURONTIN) 300 MG capsule Take 300 mg by mouth. 1 tablet morning, 1 tablet evening, 2 tablets bedtime  Historical Provider, MD   Calcium Polycarbophil (FIBER-CAPS PO) Take by mouth  Historical Provider, MD   pantoprazole (PROTONIX) 40 MG tablet Take 40 mg by mouth daily  Historical Provider, MD   Probiotic Product (PROBIOTIC DAILY PO) Take by mouth daily  Historical Provider, MD   MAGNESIUM OXIDE PO Take 500 mg by mouth daily   Historical Provider, MD   Biotin (BIOTIN 5000) 5 MG CAPS Take 1,000 mcg by mouth daily   Historical Provider, MD   FISH OIL 1,200 mg by Does not apply route daily   Historical Provider, MD   Vitamin D (CHOLECALCIFEROL) 1000 UNITS CAPS capsule Take 2,000 Units by mouth daily. Historical Provider, MD   Multiple Vitamin (THERA) TABS Take  by mouth.     Historical Provider, MD      Past Medical History:  Past Medical History:   Diagnosis Date    Diabetes mellitus ([facility] [address]     Fatty liver     Severe    Femur fracture, left ([facility] [address] 2021    spontaneous Left femur fracture    Fibromyalgia     Fracture, hip (HCC)     GERD (gastroesophageal reflux disease)     Hypertension     IBS (irritable bowel syndrome)     Osteoarthritis     Peripheral neuropathy     Postmenopausal     Scleroderma ([facility] [address]     crest     Past Surgical History:    has a past surgical history that includes Hysterectomy (1987); Cholecystectomy (1989); Lumbar disc surgery (2007); Throat surgery (2012); arthroplasty (08/10/2012); Fibula Fracture Surgery (2017); back surgery; Cystocopy (08/01/2018); Partial hip arthroplasty (Right, 09/2019); and Femur fracture surgery. Social History:  Reviewed. reports that she quit smoking about 10 years ago. She started smoking about 61 years ago. She has a 147.00 pack-year smoking history. She has never used smokeless tobacco. She reports that she does not drink alcohol and does not use drugs. Family History:  Reviewed. family history includes Cancer in her mother and another family member; Diabetes in her mother and sister; Heart Disease in her mother; Hypertension in her father and mother; Osteoporosis in her mother and sister; Stroke in her father. Review of System:  · Constitutional: Negative for fever, night sweats, chills, weight changes, or weakness  · Skin: Negative for rash, dry skin, pruritus, bruising, bleeding, blood clots, or changes in skin pigment  · HEENT: Negative for vision changes, ringing in the ears, sore throat, dysphagia, or swollen lymph nodes  · Respiratory: Reviewed in HPI  · Cardiovascular: Reviewed in HPI  · Gastrointestinal: Negative for abdominal pain, N/V/D, constipation, or black/tarry stools  · Genito-Urinary: Negative for dysuria, incontinence, urgency, or hematuria  · Musculoskeletal: Negative for joint swelling, muscle pain, or injuries  · Neurological/Psych: Negative for confusion, seizures, headaches, balance issues or TIA-like symptoms.  No anxiety, depression, or insomnia    Physical Examination: Limited due to virtual visit  There were no vitals filed for this visit. Wt Readings from Last 3 Encounters:   06/16/21 168 lb (76.2 kg)   04/14/21 169 lb (76.7 kg)   02/12/21 169 lb (76.7 kg)     Constitutional: Cooperative and in no apparent distress, and appears stated age  Skin: Warm and pink; no pallor, cyanosis, bruising, or clubbing  HEENT: Symmetric and normocephalic. PERRL, EOM intact. Conjunctiva pink with clear sclera. Mucus membranes pink and moist. Teeth intact. Thyroid smooth without nodules or goiter  Respiratory: Respirations symmetric and unlabored. Lungs with mild crackles to auscultation bilateral lower lobes, no wheezing, rhonchi. On 2L of oxygen (baseline)  Cardiovascular:  Regular rate and rhythm. S1/S2 present without rubs, or gallops. + murmur. Peripheral pulses 2+, capillary refill < 3 seconds. No elevation of JVP. Trace BLE edema  Gastrointestinal: Abdomen soft and round. Bowel sounds normoactive in all quadrants without tenderness or masses. Musculoskeletal: Bilateral upper and lower extremity strength 5/5 with full ROM. + In wheel chair  Neurological/Psych: Awake and orientated to person, place and time. Calm affect, appropriate mood. Pertinent labs, diagnostic, device, and imaging results reviewed as a part of this visit    LABS    CBC:   Lab Results   Component Value Date    WBC 8.9 12/17/2020    HGB 12.5 12/17/2020    HCT 38.9 12/17/2020    MCV 86.0 06/10/2021     12/17/2020     BMP:   Lab Results   Component Value Date    CREATININE 0.98 12/17/2020    BUN 14 06/15/2021     06/15/2021    K 4.1 06/15/2021    CL 99 06/15/2021    CO2 33 (A) 06/15/2021     CrCl cannot be calculated (Patient's most recent lab result is older than the maximum 120 days allowed. ).      Thyroid:   Lab Results   Component Value Date    TSH 1.13 11/13/2013     Lipid Panel: No results found for: CHOL, HDL, TRIG  LFTs:  Lab Results   Component Value Date ALT 12 2020    AST 17 2020    ALKPHOS 79 2020    BILITOT 0.6 2020     Coags: No results found for: PROTIME, INR, APTT    EC2021  - NSR with 1st degree    Echo:    -Normal left ventricle size, wall thickness and systolic function with an   estimated ejection fraction of 60-65%. -No regional wall motion abnormalities are seen. -Grade I-II diastolic dysfunction with normal LV filling pressures. E/e' = 23.1.   -Aortic valve appears sclerotic but opens adequately. -There is mild mitral stenosis with mean gradient of 3 mmHG. P1/2t 135 m/s. MAVA (P1/2t) 1.63 cm2. Trivial mitral regurgitation.   -Trivial tricuspid regurgitation. RVSP = 33 mmHG    Echo: 3/6/20   -Overall left ventricular systolic function is normal.   -Ejection fraction is visually estimated to be 60 %. E/e'= 13.1 cm.   -No regional wall motion abnormalities are noted. -Normal diastolic function.   -Moderate calcification of the posterior leaflet of the mitral valve. -Mitral annular calcification is present. -Mild thickening of anterior leaflet of mitral valve. -Mild mitral regurgitation is present.   -Aortic valve appears sclerotic but opens adequately. -IVC size is normal (<2.1cm) and collapses > 50% with respiration consistent   with normal RA pressure (3mmHg). Echo:  ([facility])  1. Left ventricle: The cavity size was normal. Wall thickness was     normal. Systolic function was normal. The estimated ejection     fraction was in the range of 55% to 60%. 2. Mitral valve: The annulus was mildly to moderately calcified.     The leaflets were normal thickness. 3. Right ventricle: The cavity size was normal.  4. Pulmonary arteries: Systolic pressure was moderately increased.     Estimated PA peak pressure is 46mm Hg (S). 5. Pericardium, extracardiac: There was no pericardial effusion.     GXT: None    Event monitor: 19  AF burden 3% (Longest AF 3 hours 50 minutes)  AF Average HR 96, low 70, high 124    Assessment:    1. Paroxysmal Atrial Flutter vs SVT  - Currently in NSR    - Continue cardizem 240 mg QD, Toprol  mg QD  - [id number] score: 5 (Age x2, Gender, HTN) ; [id number] Vasc score and anticoagulation discussed. High risk for stroke and thromboembolism. Anticoagulation is recommended. Risk of bleeding was discussed.  ~ On eliquis 5 mg BID; tolerating well    - Afib risk factors including age, HTN, obesity, inactivity and JEAN CARLOS were discussed with patient. Risk factor modification recommended   ~ TSH 1.44 (6/19)      - Treatment options including cardioversion, rate control strategy, antiarrhythmics, anticoagulation and possible ablation were discussed with patient. Risks, benefits and alternative of each treatment options were explained. All questions answered    ~ Will consider AAD vs ablation if burden worsens and/or her EF begins to decrease due to the AF    2. Pauses,Sick Sinus Syndrome  - S/p Dual chamber [employer]tronic PPM (5/19/20, )  - I reviewed device interrogation today. Device functioning normally.   ~ A-paced 49.5% V-paced 0.7%. AFL burden 4.8%  ~ 12.5 years left on battery life expectancy  - Discussed with patient  - Follow up with device clinic as scheduled    4. Chronic diastolic heart failure (NYHA Class III)  - Appears compensated   ~ EF 60-65% per echo (6/21)  - Continue with BB,ARB  - Reduce to 20 mg daily   - Aggressive medical therapy with risk factor modification  - Discussed with patient importance of monitoring weight, low sodium diet and fluid restriction  - Followed by CHF team    - Instructed to call if worsening leg swelling/SOB with reduced lasix dose and will need to resume previous dosing regimen    5. HTN  - Controlled: Goal <130/80  - Continue current medications  - Encouraged to monitor and log BP readings at home, then bring log to next visit  - Discussed importance of low sodium diet, weight control and exercise    6.  PAH   - Stable, on 2L of oxygen   - On medical therapy   - Followed by Dr. Paco Maier:  1. Ok to hold eliquis for 2 days for dental procedure  2. Reduce lasix to 20 mg daily  3. Call office if any worsening leg swelling or shortness of breath    F/U: Follow-up with EP in 6 months (Will discuss when she needs to see HF team with Dr. Noam Siegel nurse)  -Call [facility] at [phone] with any questions    Diet & Exercise:   The patient is counseled to follow a low salt diet to assure blood pressure remains controlled for cardiovascular risk factor modification   The patient is counseled to avoid excess caffeine, and energy drinks as this may exacerbated ectopy and arrhythmia   The patient is counseled to lose weight to control cardiovascular risk factors   Exercise program discussed: To improve overall cardiovascular health, the patient is instructed to increase cardiovascular related activities with a goal of 150 min/week of moderate level activity or 10,000 steps per day. Encouraged to perform as much activity as tolerated     I have addressed the patient's cardiac risk factors and adjusted pharmacologic treatment as needed. In addition, I have reinforced the need for patient directed risk factor modification. I independently reviewed the EKG and device interrogation    All questions and concerns were addressed with the patient. Alternatives to treatment were discussed. Thank you for allowing to us to participate in the care of [address]     33 minutes spent preparing to see patient including reviewing patient history/prior tests/prior consults, performing a medical exam, counseling and educating patient/family/caregiver, ordering medications/tests/procedures, referring and communicating with PCPs and other pertinent consultants, documenting information in the EMR, independently interpreting results and communicating to family and coordination of patient care.      TOM Clark-EDGAR  [facility]   Office: [phone]

## 2021-10-06 ENCOUNTER — NURSE ONLY (OUTPATIENT)
Dept: CARDIOLOGY CLINIC | Age: 76
End: 2021-10-06
Payer: MEDICARE

## 2021-10-06 DIAGNOSIS — I49.5 SSS (SICK SINUS SYNDROME) (HCC): ICD-10-CM

## 2021-10-06 DIAGNOSIS — Z95.0 PACEMAKER: Primary | ICD-10-CM

## 2021-10-06 PROCEDURE — 93296 REM INTERROG EVL PM/IDS: CPT | Performed by: INTERNAL MEDICINE

## 2021-10-06 PROCEDURE — 93294 REM INTERROG EVL PM/LDLS PM: CPT | Performed by: INTERNAL MEDICINE

## 2021-10-06 NOTE — PROGRESS NOTES
We received remote transmission from patient's monitor at home. Transmission shows normal sensing and pacing function. Noted AF and NSVT. Pt is on Eliquis and Toprol. EP physician will review. See interrogation under cardiology tab in the [address] [place] [address] [address] field for more details.

## 2021-10-13 ENCOUNTER — OFFICE VISIT (OUTPATIENT)
Dept: CARDIOLOGY CLINIC | Age: 76
End: 2021-10-13
Payer: MEDICARE

## 2021-10-13 ENCOUNTER — NURSE ONLY (OUTPATIENT)
Dept: CARDIOLOGY CLINIC | Age: 76
End: 2021-10-13
Payer: MEDICARE

## 2021-10-13 ENCOUNTER — TELEPHONE (OUTPATIENT)
Dept: CARDIOLOGY CLINIC | Age: 76
End: 2021-10-13

## 2021-10-13 VITALS
BODY MASS INDEX: 26.83 KG/M2 | OXYGEN SATURATION: 100 % | HEIGHT: 68 IN | SYSTOLIC BLOOD PRESSURE: 116 MMHG | DIASTOLIC BLOOD PRESSURE: 75 MMHG | HEART RATE: 84 BPM | WEIGHT: 177 LBS

## 2021-10-13 DIAGNOSIS — I49.5 SSS (SICK SINUS SYNDROME) (HCC): ICD-10-CM

## 2021-10-13 DIAGNOSIS — I48.92 ATRIAL FLUTTER, UNSPECIFIED TYPE (HCC): ICD-10-CM

## 2021-10-13 DIAGNOSIS — I48.0 PAROXYSMAL ATRIAL FIBRILLATION (HCC): Primary | ICD-10-CM

## 2021-10-13 DIAGNOSIS — I48.0 PAROXYSMAL ATRIAL FIBRILLATION (HCC): ICD-10-CM

## 2021-10-13 DIAGNOSIS — Z95.0 PACEMAKER: ICD-10-CM

## 2021-10-13 DIAGNOSIS — I10 BENIGN ESSENTIAL HTN: ICD-10-CM

## 2021-10-13 PROCEDURE — G8427 DOCREV CUR MEDS BY ELIG CLIN: HCPCS | Performed by: NURSE PRACTITIONER

## 2021-10-13 PROCEDURE — G8399 PT W/DXA RESULTS DOCUMENT: HCPCS | Performed by: NURSE PRACTITIONER

## 2021-10-13 PROCEDURE — 1036F TOBACCO NON-USER: CPT | Performed by: NURSE PRACTITIONER

## 2021-10-13 PROCEDURE — G8417 CALC BMI ABV UP PARAM F/U: HCPCS | Performed by: NURSE PRACTITIONER

## 2021-10-13 PROCEDURE — 1090F PRES/ABSN URINE INCON ASSESS: CPT | Performed by: NURSE PRACTITIONER

## 2021-10-13 PROCEDURE — 1123F ACP DISCUSS/DSCN MKR DOCD: CPT | Performed by: NURSE PRACTITIONER

## 2021-10-13 PROCEDURE — G8484 FLU IMMUNIZE NO ADMIN: HCPCS | Performed by: NURSE PRACTITIONER

## 2021-10-13 PROCEDURE — 99214 OFFICE O/P EST MOD 30 MIN: CPT | Performed by: NURSE PRACTITIONER

## 2021-10-13 PROCEDURE — 4040F PNEUMOC VAC/ADMIN/RCVD: CPT | Performed by: NURSE PRACTITIONER

## 2021-10-13 RX ORDER — FUROSEMIDE 40 MG/1
TABLET ORAL
Qty: 90 TABLET | Refills: 3 | Status: SHIPPED
Start: 2021-10-13 | End: 2022-08-29 | Stop reason: SDUPTHER

## 2021-10-13 RX ORDER — OXYBUTYNIN CHLORIDE 5 MG/1
TABLET ORAL
COMMUNITY
Start: 2021-10-09 | End: 2022-09-08

## 2021-10-13 RX ORDER — BOSENTAN 62.5 MG/1
125 TABLET, FILM COATED ORAL 2 TIMES DAILY
COMMUNITY
Start: 2021-09-01

## 2021-10-13 NOTE — PATIENT INSTRUCTIONS
1. Ok to hold eliquis for 2 days for dental procedure  2. Reduce lasix to 20 mg daily  3.  Call office if any worsening leg swelling or shortness of breath

## 2021-10-13 NOTE — TELEPHONE ENCOUNTER
----- Message from TOM Saleh CNP sent at 10/13/2021  3:07 PM EDT -----  This is the pt that needs to see Dr. Latasha Martines

## 2021-10-14 PROCEDURE — 93280 PM DEVICE PROGR EVAL DUAL: CPT | Performed by: INTERNAL MEDICINE

## 2021-10-14 NOTE — PROGRESS NOTES
Patient comes in for programming evaluation for her pacemaker. All sensing and pacing parameters are within normal range. Battery life 12.5 years  AP 49.5%.  0.7%. 4 NSVT episodes noted. Last on 8/172021, longest 3 seconds. AF with a 4.8% burden. 86 AT/AF episode noted. 28 or 56 successfully pace terminated. Last on 10/8/2021, longest >99 hours-RVR. Noted. OBSERVATIONS (3)  · AT/AF >= 6 hr for 11 days. · Avg. Ventricular Rate >= 100 bpm during AT/AF (>= 6 hr) for 6 days. · Patient Activity less than 1 hr/day for 4 weeks. Patient remains on Elqiuis. Metoprolol and Cardizem. No parameters have been changed during the current session. Please see interrogation for more detail. Patient will see NPSR today and follow up in 3 months in office or remotely.

## 2021-12-14 RX ORDER — METOPROLOL SUCCINATE 100 MG/1
TABLET, EXTENDED RELEASE ORAL
Qty: 135 TABLET | Refills: 3 | Status: SHIPPED | OUTPATIENT
Start: 2021-12-14

## 2022-01-04 ENCOUNTER — TELEPHONE (OUTPATIENT)
Dept: CARDIOLOGY CLINIC | Age: 77
End: 2022-01-04

## 2022-01-05 ENCOUNTER — NURSE ONLY (OUTPATIENT)
Dept: CARDIOLOGY CLINIC | Age: 77
End: 2022-01-05
Payer: MEDICARE

## 2022-01-05 DIAGNOSIS — Z95.0 PACEMAKER: ICD-10-CM

## 2022-01-05 DIAGNOSIS — I49.5 SSS (SICK SINUS SYNDROME) (HCC): ICD-10-CM

## 2022-01-05 PROCEDURE — 93296 REM INTERROG EVL PM/IDS: CPT | Performed by: INTERNAL MEDICINE

## 2022-01-05 PROCEDURE — 93294 REM INTERROG EVL PM/LDLS PM: CPT | Performed by: INTERNAL MEDICINE

## 2022-01-05 NOTE — PROGRESS NOTES
We received remote transmission from patient's monitor at home. Transmission shows normal sensing and pacing function. Noted AF. Pt is on Eliquis. EP physician will review. See interrogation under cardiology tab in the [address] [place] [address] [address] field for more details.

## 2022-01-12 ENCOUNTER — TELEPHONE (OUTPATIENT)
Dept: CARDIOLOGY CLINIC | Age: 77
End: 2022-01-12

## 2022-01-12 NOTE — TELEPHONE ENCOUNTER
Pt called stating she received a letter, stating they are not going fill RX for ELIQUIS 5 MG TABS tablet because they have not heard from the office.     ELIQUIS 5 MG TABS tablet   As Directed  180 tablet  TAKE 1 TABLET TWICE A DAY    [facility] - [address] [place] [address], [address] [place] [address] [phone] - F [phone]   [place] [address]   Phone:  [phone]  Fax:  [phone]

## 2022-01-12 NOTE — TELEPHONE ENCOUNTER
Received refill request for Eliquis     Last OV: 10/13/2021 w/ NPSR    Last Labs: 06/10/2021 Renal & CBC    Last Refill: 12/31/2020 #180 w/ 3 refills     Next Appointment: on recall list for appt on 04/11/2022 w/ NPSR

## 2022-01-31 ENCOUNTER — HOSPITAL ENCOUNTER (OUTPATIENT)
Dept: NON INVASIVE DIAGNOSTICS | Age: 77
Discharge: HOME | End: 2022-01-31
Payer: MEDICARE

## 2022-01-31 DIAGNOSIS — I27.20 PULMONARY HYPERTENSION (HCC): ICD-10-CM

## 2022-01-31 LAB
LV EF: 63 %
LVEF MODALITY: NORMAL

## 2022-01-31 PROCEDURE — 93306 TTE W/DOPPLER COMPLETE: CPT

## 2022-03-02 NOTE — PROGRESS NOTES
[facility]   Advanced Heart Failure/Pulmonary Hypertension  Cardiac Evaluation      Pamella Conner  YOB: 1945    Date of Visit:  3/3/22    Chief Complaint   Patient presents with    Shortness of Breath     PAH      History of Present Illness:  Hussein Jose has a history of pulmonary hypertension, Scleroderma, and Crest syndrome with Raynaud's / telangiosis including arthralgia and myalgia. She also has Fibromyalgia which is stable, and severe fatty liver infiltration-borderline elevated LFTs. She had a PFT 6/5/2014--and it shows mild obstructive defect with no response to bronchodilators. Air trapping was present on lung volumes and moderate decrease in diffusion capacity. Clinical correlation is recommended since the severity of her decrease in diffusion capacity might indicate pulmonary hypertension in a patient with scleroderma. She had some hair loss that she thinks was from taking Plaquenil so it was stopped, and the hair loss has lessened. She had an echo, and it did not show PHTN. She is getting echos for surveying scleroderma. She had a dual chamber pacemaker placed per Dr. Denney [facility] on 5-19-20 for SSS with a post conversion pause of up to 10 seconds. At the time, she had an IRL implanted 8-20-19 which was reviewed s/p fall and hip fracture. She could not come in for her appointment for [address] did not realize she had Covid. She uses oxygen at 2 liter. She states she saw Dr. Jayson Avina 1/29 and he is trying to get her on Tracleer. Discussed that it requires monthly labs. Labs reviewed. She is here today for follow up. States she had Covid and was very sick but she did not need hospitalized. She feels her breathing is about the same. Her Oxygen is new since Covid and she is wearing it today. She is seeing Dr. Jayson Avina who put her on Tracleer in October 2021. Her last echo was reviewed with patient.          WHO Group 1, NYHA III    Allergies   Allergen Reactions    Adhesive Tape     Collagen     Other     Bactrim [Sulfamethoxazole-Trimethoprim] Other (See Comments)     Body shaking and chills. Body pain    Cortisone Other (See Comments)     Rash, confusion, hyperactivity    Pcn [Penicillins]     Scopolamine Anxiety    Scopolamine Sulfate Anxiety       Current Outpatient Medications:     Omega-3 1000 MG CAPS, Take by mouth daily, Disp: , Rfl:     apixaban (ELIQUIS) 5 MG TABS tablet, TAKE 1 TABLET TWICE A DAY, Disp: 180 tablet, Rfl: 3    metoprolol succinate (TOPROL XL) 100 MG extended release tablet, TAKE 1 TABLET DAILY, Disp: 135 tablet, Rfl: 3    bosentan (TRACLEER) 62.5 MG tablet, Take 125 mg by mouth 2 times daily , Disp: , Rfl:     oxybutynin (DITROPAN) 5 MG tablet, TAKE 1 TABLET BY MOUTH THREE TIMES A DAY AS NEEDED, Disp: , Rfl:     furosemide (LASIX) 40 MG tablet, Take 20 mg (1/2 tablet) daily (Patient taking differently: Take 20 mg by mouth daily ), Disp: 90 tablet, Rfl: 3    dilTIAZem (CARDIZEM CD) 180 MG extended release capsule, TAKE 1 CAPSULE DAILY, Disp: 90 capsule, Rfl: 3    PROLIA 60 MG/ML SOSY SC injection, Inject 1 mL into the skin once for 1 dose, Disp: 1 Syringe, Rfl: 2    polycarbophil (FIBERCON) 625 MG tablet, Take 625 mg by mouth daily, Disp: , Rfl:     Cranberry 400 MG TABS, Take 400 mg by mouth daily, Disp: , Rfl:     polyethylene glycol (GLYCOLAX) 17 g packet, Take 17 g by mouth daily as needed for Constipation , Disp: , Rfl:     aspirin (SB LOW DOSE ASA EC) 81 MG EC tablet, Take 81 mg by mouth daily, Disp: , Rfl:     HYDROcodone-acetaminophen (NORCO) 7.5-325 MG per tablet, Take 1 tablet by mouth every 6 hours as needed.  , Disp: , Rfl:     OXYGEN, Inhale 2 L into the lungs, Disp: , Rfl:     vitamin C (ASCORBIC ACID) 500 MG tablet, Take 500 mg by mouth daily , Disp: , Rfl:     zinc sulfate (ZINCATE) 220 (50 Zn) MG capsule, Take 220 mg by mouth daily, Disp: , Rfl:     DULoxetine (CYMBALTA) 60 MG extended release capsule, Take 1 capsule by mouth daily (Patient taking differently: Take 60 mg by mouth daily ), Disp: 30 capsule, Rfl: 0    hydroxychloroquine (PLAQUENIL) 200 MG tablet, Take 200 mg by mouth daily, Disp: , Rfl:     acetaminophen (TYLENOL) 500 MG tablet, Take 500 mg by mouth every 6 hours as needed for Pain, Disp: , Rfl:     gabapentin (NEURONTIN) 600 MG tablet, Take by mouth.  600mg morning, 600 mg evening, 600mg bedtime, Disp: , Rfl:     Calcium Polycarbophil (FIBER-CAPS PO), Take by mouth, Disp: , Rfl:     Probiotic Product (PROBIOTIC DAILY PO), Take by mouth daily, Disp: , Rfl:     MAGNESIUM OXIDE PO, Take 500 mg by mouth daily , Disp: , Rfl:     Biotin (BIOTIN 5000) 5 MG CAPS, Take 1,000 mcg by mouth daily , Disp: , Rfl:     Multiple Vitamin (THERA) TABS, Take  by mouth.  , Disp: , Rfl:     Dulaglutide (TRULICITY) 1.5 BK/7.6DJ SOPN, 1.5 mg once a week, Disp: , Rfl:     methocarbamol (ROBAXIN) 500 MG tablet, Take 0.75 tablets by mouth 3 times daily as needed (Patient not taking: Reported on 10/13/2021), Disp: , Rfl:     FISH OIL, 1,200 mg by Does not apply route daily , Disp: , Rfl:       Past Medical History:   Diagnosis Date    Diabetes mellitus ([address]     Fatty liver     Severe    Femur fracture, left ([address] 2021    spontaneous Left femur fracture    Fibromyalgia     Fracture, hip ([address]     GERD (gastroesophageal reflux disease)     Hypertension     IBS (irritable bowel syndrome)     Osteoarthritis     Peripheral neuropathy     Postmenopausal     Scleroderma ([address]     crest     Past Surgical History:   Procedure Laterality Date    ARTHROPLASTY  08/10/2012    FIFTH TOE LEFT FOOT    BACK SURGERY      CHOLECYSTECTOMY  1989    CYSTOSCOPY  08/01/2018    with botox injection     FEMUR FRACTURE SURGERY      FIBULA FRACTURE SURGERY  2017    3 separate surgeries for a fractured left fibula tibial a closed done by Dr. Rica Rosario  2007   [address] [place] [address] Right 09/2019    [address] 49 THROAT SURGERY  2012    vocal cord polyp     Family History   Problem Relation Age of Onset    Heart Disease Mother     Cancer Mother     Hypertension Mother     Diabetes Mother     Osteoporosis Mother     Stroke Father     Hypertension Father     Osteoporosis Sister     Diabetes Sister     Cancer Other         leukemia     Social History     Socioeconomic History    Marital status:      Spouse name: Not on file    Number of children: Not on file    Years of education: Not on file    Highest education level: Not on file   Occupational History    Not on file   Tobacco Use    Smoking status: Former Smoker     Packs/day: 3.00     Years: 49.00     Pack years: 147.00     Start date: 1/1/1960     Quit date: 8/17/2011     Years since quitting: 10.5    Smokeless tobacco: Never Used   Vaping Use    Vaping Use: Never used   Substance and Sexual Activity    Alcohol use: No    Drug use: No    Sexual activity: Not on file   Other Topics Concern    Not on file   Social History Narrative    Not on file     Social Determinants of Health     Financial Resource Strain:     Difficulty of Paying Living Expenses: Not on file   Food Insecurity:     Worried About [address] [employer] [employer] in the Last Year: Not on file    Nikolai of Food in the Last Year: Not on file   Transportation Needs:     Lack of Transportation (Medical): Not on file    Lack of Transportation (Non-Medical):  Not on file   Physical Activity:     Days of Exercise per Week: Not on file    Minutes of Exercise per Session: Not on file   Stress:     Feeling of Stress : Not on file   Social Connections:     Frequency of Communication with Friends and Family: Not on file    Frequency of Social Gatherings with Friends and Family: Not on file    Attends Mu-ism Services: Not on file    Active Member of Clubs or Organizations: Not on file    Attends Club or Organization Meetings: Not on file   Vianey Romero Marital Status: Not on file   Intimate Partner Violence:     Fear of Current or Ex-Partner: Not on file    Emotionally Abused: Not on file    Physically Abused: Not on file    Sexually Abused: Not on file   Housing Stability:     Unable to Pay for Housing in the Last Year: Not on file    Number of [place] in the Last Year: Not on file    Unstable Housing in the Last Year: Not on file       Review of Systems:   · Constitutional: there has been no unanticipated weight loss. There's been no change in energy level, sleep pattern, or activity level. Wearing hard sol flat shoe brace right foot   · Eyes: No visual changes or diplopia. No scleral icterus. · ENT: No Headaches, hearing loss or vertigo. No mouth sores or sore throat. · Cardiovascular: Reviewed in HPI  · Respiratory: No cough or wheezing, no sputum production. No hematemesis. · Gastrointestinal: No abdominal pain, appetite loss, blood in stools. No change in bowel or bladder habits. · Genitourinary: No dysuria, trouble voiding, or hematuria. · Musculoskeletal:  No gait disturbance, weakness or joint complaints. · Integumentary: No rash or pruritis. · Neurological: No headache, diplopia, change in muscle strength, numbness or tingling. No change in gait, balance, coordination, mood, affect, memory, mentation, behavior. · Psychiatric: No anxiety, no depression. · Endocrine: No malaise, fatigue or temperature intolerance. No excessive thirst, fluid intake, or urination. No tremor. · Hematologic/Lymphatic: No abnormal bruising or bleeding, blood clots or swollen lymph nodes. · Allergic/Immunologic: No nasal congestion or hives. Physical Examination:    Body mass index is 26.76 kg/m².      Wt Readings from Last 3 Encounters:   03/03/22 176 lb (79.8 kg)   02/16/22 173 lb (78.5 kg)   10/27/21 172 lb (78 kg)     BP Readings from Last 3 Encounters:   03/03/22 (!) 118/56   02/16/22 139/69   10/27/21 (!) 150/57     Constitutional and General Appearance: In wheel chair, pale  WD/WN in NAD  HEENT:  NC/AT  Skin:  Warm, dry  Hearing:  No problems  Respiratory:  · Normal excursion and expansion without use of accessory muscles  · Resp Auscultation: Normal breath sounds without dullness  Cardiovascular:  · The apical impulses not displaced  · Heart tones are crisp and normal  · Cervical veins are not engorged  · The carotid upstroke is normal in amplitude and contour without delay or bruit  · JVP less than 8 cm H2O  RRR with nl S1 and S2 without m,r,g  · Peripheral pulses are symmetrical and full  · There is no clubbing, cyanosis of the extremities. · No edema. Dependant cyanosis to feet. · Femoral Arteries: 2+ and equal  · Pedal Pulses: 2+ and equal   Abdomen:  · No masses or tenderness  · Liver/Spleen: No Abnormalities Noted  Neurological/Psychiatric:  · Alert and oriented in all spheres  · Moves all extremities well  · Exhibits normal gait balance and coordination  · No abnormalities of mood, affect, memory, mentation, or behavior are noted      Echo 1/31/22   -Normal left ventricle size, wall thickness, and systolic function with an   estimated ejection fraction of 60-65%. -No regional wall motion abnormalities are seen. -Grade III diastolic dysfunction with elevated LV filling pressures. -Mild mitral regurgitation is present. -Mitral annular calcification is present.   -The aortic valve is thickened/calcified but opens well with normal   gradients. Mild tricuspid regurgitation.   -Trivial pulmonic regurgitation present. Echo 8-  [facility]. SUMMARY:  1. Left ventricle: The cavity size was normal. Wall thickness was     normal. Systolic function was normal. The estimated ejection     fraction was in the range of 55% to 60%. 2. Mitral valve: The annulus was mildly to moderately calcified.     The leaflets were normal thickness. 3. Right ventricle:  The cavity size was normal.  4. Pulmonary arteries: Systolic abnormalities are seen. Normal diastolic filling  pattern for age. Mitral annular calcification is present. Aortic valve appears sclerotic but opens adequately. No valvular abnormalities noted. Labs were reviewed including labs from other hospital systems through [facility]. Cardiac testing was reviewed including echos, nuclear scans, cardiac catheterization, including from other hospital systems through [facility]. Assessment:  1. PAH (pulmonary artery hypertension) ([address]    2. Benign essential HTN    3. Scleroderma ([address]    4. SOB (shortness of breath)        1. Pulmonary artery hypertension:  Stable.  -Echo 8/25/2020> PASP 46mmHg   ~ Started Tracleer 3 months ago per Dr. Enrique Whyte (October 2021)  ~ Echo 1/2022 >  The right ventricle is normal in size and function   2.   -Takes Cardizem 180mg (reduced Sept 2020)  -Continue metoprolol.   ~ Heart rate controlled today at 64     3. Benign essential HTN: Stable. BP (!) 118/56   Pulse 64   Ht 5' 8\" (1.727 m)   Wt 176 lb (79.8 kg)   SpO2 98%   BMI 26.76 kg/m²     ~ Controlled. Normal kidney function. 4. Paroxysmal atrial fibrillation: HR controlled and remains on Toprol & Eliquis. 5. Pacemaker:  Implanted on 5/19/2020 per Dr. Adelfo Rey for [facility]. 6. Scleroderma:  CREST Syndrome with Raynaud's and telangiectasias including arthralgias and myalgias. Follows with Dr. Vance Sarabia.   -No open sores to fingers today. Plan:    1. Continue same medications. On Tracleer per Dr. Enrique Whyte since   2. Look into next Booster for Covid in May/June. 3.  See Dr. Dio Chaudhary in 6 months      Scribe's attestation: This note was scribed in the presence of Mic Virk M.D. by mAanda Rodriguez RN     The scribe's documentation has been prepared under my direction and personally reviewed by me in its entirety.   I confirm that the note above accurately reflects all work, treatment, procedures, and medical decision making performed by me.          Time Based Itemization  A total of 40 minutes was spent on today's patient encounter. If applicable, non-patient-facing activities:  ( x)Preparing to see the patient and reviewing records  (x ) Individual interpretation of results  ( ) Discussion or coordination of care with other health care professionals  ( x) Ordering of unique tests, medications, or procedures  ( x) Documentation within the EHR      I appreciate the opportunity of cooperating in the care of this patient.     Edouard Barreto M.D., [facility] [place]

## 2022-03-03 ENCOUNTER — OFFICE VISIT (OUTPATIENT)
Dept: CARDIOLOGY CLINIC | Age: 77
End: 2022-03-03
Payer: MEDICARE

## 2022-03-03 VITALS
OXYGEN SATURATION: 98 % | DIASTOLIC BLOOD PRESSURE: 56 MMHG | WEIGHT: 176 LBS | HEART RATE: 64 BPM | SYSTOLIC BLOOD PRESSURE: 118 MMHG | BODY MASS INDEX: 26.67 KG/M2 | HEIGHT: 68 IN

## 2022-03-03 DIAGNOSIS — R06.02 SOB (SHORTNESS OF BREATH): ICD-10-CM

## 2022-03-03 DIAGNOSIS — M34.9 SCLERODERMA (HCC): ICD-10-CM

## 2022-03-03 DIAGNOSIS — I10 BENIGN ESSENTIAL HTN: ICD-10-CM

## 2022-03-03 DIAGNOSIS — I27.21 PAH (PULMONARY ARTERY HYPERTENSION) (HCC): Primary | ICD-10-CM

## 2022-03-03 PROCEDURE — 1123F ACP DISCUSS/DSCN MKR DOCD: CPT | Performed by: INTERNAL MEDICINE

## 2022-03-03 PROCEDURE — 1090F PRES/ABSN URINE INCON ASSESS: CPT | Performed by: INTERNAL MEDICINE

## 2022-03-03 PROCEDURE — 99215 OFFICE O/P EST HI 40 MIN: CPT | Performed by: INTERNAL MEDICINE

## 2022-03-03 PROCEDURE — 4040F PNEUMOC VAC/ADMIN/RCVD: CPT | Performed by: INTERNAL MEDICINE

## 2022-03-03 PROCEDURE — G8484 FLU IMMUNIZE NO ADMIN: HCPCS | Performed by: INTERNAL MEDICINE

## 2022-03-03 PROCEDURE — G8399 PT W/DXA RESULTS DOCUMENT: HCPCS | Performed by: INTERNAL MEDICINE

## 2022-03-03 PROCEDURE — 1036F TOBACCO NON-USER: CPT | Performed by: INTERNAL MEDICINE

## 2022-03-03 PROCEDURE — G8427 DOCREV CUR MEDS BY ELIG CLIN: HCPCS | Performed by: INTERNAL MEDICINE

## 2022-03-03 PROCEDURE — G8417 CALC BMI ABV UP PARAM F/U: HCPCS | Performed by: INTERNAL MEDICINE

## 2022-03-03 RX ORDER — CHLORAL HYDRATE 500 MG
CAPSULE ORAL DAILY
COMMUNITY

## 2022-03-03 NOTE — PATIENT INSTRUCTIONS
Plan:    1. Continue same medications. On Tracleer per Dr. Cammy Lira since   2. Look into next Booster for Covid in May/June.    3.  See Dr. Rose Tsagn in 6 months

## 2022-04-05 PROCEDURE — 93294 REM INTERROG EVL PM/LDLS PM: CPT | Performed by: INTERNAL MEDICINE

## 2022-04-05 PROCEDURE — 93296 REM INTERROG EVL PM/IDS: CPT | Performed by: INTERNAL MEDICINE

## 2022-04-06 ENCOUNTER — NURSE ONLY (OUTPATIENT)
Dept: CARDIOLOGY CLINIC | Age: 77
End: 2022-04-06
Payer: MEDICARE

## 2022-04-06 DIAGNOSIS — I49.5 SSS (SICK SINUS SYNDROME) (HCC): ICD-10-CM

## 2022-04-06 DIAGNOSIS — Z95.0 PACEMAKER: ICD-10-CM

## 2022-04-06 NOTE — PROGRESS NOTES
We received remote transmission from patient's monitor at home. Transmission shows normal sensing and pacing function. Noted AF. Pt is on Eliquis. EP physician will review. See interrogation under cardiology tab in the [address] [place] [address] [address] field for more details.

## 2022-04-11 ENCOUNTER — TELEPHONE (OUTPATIENT)
Dept: CARDIOLOGY CLINIC | Age: 77
End: 2022-04-11

## 2022-04-11 NOTE — TELEPHONE ENCOUNTER
----- Message from Renan Lopez MD sent at 4/10/2022  1:26 PM EDT -----  Needs f/u with ep in next 1-2 months

## 2022-05-19 ENCOUNTER — OFFICE VISIT (OUTPATIENT)
Dept: CARDIOLOGY CLINIC | Age: 77
End: 2022-05-19
Payer: MEDICARE

## 2022-05-19 ENCOUNTER — NURSE ONLY (OUTPATIENT)
Dept: CARDIOLOGY CLINIC | Age: 77
End: 2022-05-19
Payer: MEDICARE

## 2022-05-19 VITALS
DIASTOLIC BLOOD PRESSURE: 86 MMHG | SYSTOLIC BLOOD PRESSURE: 129 MMHG | OXYGEN SATURATION: 97 % | HEIGHT: 67 IN | BODY MASS INDEX: 26.46 KG/M2 | HEART RATE: 67 BPM | WEIGHT: 168.6 LBS

## 2022-05-19 DIAGNOSIS — I27.21 PAH (PULMONARY ARTERY HYPERTENSION) (HCC): ICD-10-CM

## 2022-05-19 DIAGNOSIS — I49.5 SSS (SICK SINUS SYNDROME) (HCC): ICD-10-CM

## 2022-05-19 DIAGNOSIS — Z95.0 PACEMAKER: ICD-10-CM

## 2022-05-19 DIAGNOSIS — I48.0 PAROXYSMAL ATRIAL FIBRILLATION (HCC): ICD-10-CM

## 2022-05-19 DIAGNOSIS — I48.0 PAROXYSMAL ATRIAL FIBRILLATION (HCC): Primary | ICD-10-CM

## 2022-05-19 DIAGNOSIS — I48.92 ATRIAL FLUTTER, UNSPECIFIED TYPE (HCC): ICD-10-CM

## 2022-05-19 DIAGNOSIS — I10 BENIGN ESSENTIAL HTN: ICD-10-CM

## 2022-05-19 PROCEDURE — G8417 CALC BMI ABV UP PARAM F/U: HCPCS | Performed by: INTERNAL MEDICINE

## 2022-05-19 PROCEDURE — 1036F TOBACCO NON-USER: CPT | Performed by: INTERNAL MEDICINE

## 2022-05-19 PROCEDURE — 93280 PM DEVICE PROGR EVAL DUAL: CPT | Performed by: INTERNAL MEDICINE

## 2022-05-19 PROCEDURE — 4040F PNEUMOC VAC/ADMIN/RCVD: CPT | Performed by: INTERNAL MEDICINE

## 2022-05-19 PROCEDURE — 99214 OFFICE O/P EST MOD 30 MIN: CPT | Performed by: INTERNAL MEDICINE

## 2022-05-19 PROCEDURE — 1090F PRES/ABSN URINE INCON ASSESS: CPT | Performed by: INTERNAL MEDICINE

## 2022-05-19 PROCEDURE — G8399 PT W/DXA RESULTS DOCUMENT: HCPCS | Performed by: INTERNAL MEDICINE

## 2022-05-19 PROCEDURE — G8428 CUR MEDS NOT DOCUMENT: HCPCS | Performed by: INTERNAL MEDICINE

## 2022-05-19 PROCEDURE — 1123F ACP DISCUSS/DSCN MKR DOCD: CPT | Performed by: INTERNAL MEDICINE

## 2022-05-19 RX ORDER — FUROSEMIDE 20 MG/1
20 TABLET ORAL DAILY
Qty: 90 TABLET | Refills: 1 | Status: CANCELLED | OUTPATIENT
Start: 2022-05-19

## 2022-05-19 NOTE — PROGRESS NOTES
[facility]   Electrophysiology Follow up   Date: 5/19/2022  I had the privilege of visiting Jcarlos Felton in the office. CC: PAF   HPI: Jcarlos Felton is a 76 y.o. female with a PMH of atrial fibrillation, pre-syncope, CREST syndrome, HTN and fibromyalgia. Her 30 day monitor reported PAF. She was started on Eliquis. An ILR was implanted on 8/20/19 to better determine whether she has atrial fibrillation.     Her loop recorder showed 1st degree AVB with episodes of atrial fib/flutter with RVR and conversion pauses up to 10 seconds.     5/19/20 Insertion of dual chamber pacemaker for symptomatic SSS and removal of ILR. Interval History:  Rosalia Reis presents to the office in follow up. She is doing ok from a cardiac standpoint and denies any cardiac complaints. Continues using O2 therapy. She is accompanied by her family. Continues treatment for Crest syndrome. Patient denies lightheadedness, dizziness, chest pain, palpitations,  edema, presyncope or syncope. Assessment and plan:   PAF   -ECG today shows Sinus Rhythm   -Denies symptoms with these episodes    -0.2% AT/AF burden per device interrogation   -Patient has a [id number] Score of 5 (age1, HTN, Female, DM), Remains on Eliquis 5 mg BID   -Continue Cardizem 180 mg daily and toprol  mg daily     Pauses/SSS   -s/p Dual chamber PPM 5/19/2020   -The CIED was interrogated and programmed and I supervised and reviewed all the data.  All findings and changes are in device interrogation sheet and reflect my personal interpretation and changes and is scanned to Epic.    -12 years remaining, AP 60% ,  <0.1%   -Apaced, dependent today     Pulmonary HTN   -Followed by the HF team     HTN  -Controlled: 129/86  -BP goal <130/80  -Home BP monitoring encouraged, printed information provided on how to accurately measure BP at home.  -Counseled to follow a low salt diet to assure blood pressure remains controlled for cardiovascular risk factor modification.   -The patient is counseled to get regular exercise 3-5 times per week and maintain a healthy weight reduce cardiovascular risk factors. Plan:   Follow up 1 year EPNP    Patient Active Problem List    Diagnosis Date Noted    Scleroderma ([facility] 07/08/2014    Fibromyalgia 08/10/2012    Osteoporosis, postmenopausal 06/17/2021    Osteoporotic fracture of right hip ([address] 06/17/2021    Closed displaced transverse fracture of shaft of femur with routine healing 06/17/2021    Atrial flutter ([address] 12/23/2020    Chronic obstructive pulmonary disease ([address] 09/16/2020    Pacemaker 05/19/2020    SSS (sick sinus syndrome) ([facility] [address] 04/29/2020    Near syncope 03/31/2020    Benign essential HTN 02/12/2020    Paroxysmal atrial fibrillation ([address] 08/27/2019    Spinal stenosis of lumbar region without neurogenic claudication 03/07/2019    Diffusion capacity of lung (dl), decreased 07/30/2018    Restrictive ventilatory defect 07/30/2018    Lumbosacral spondylosis without myelopathy 10/26/2017    Vocal cord polyp 10/27/2016    Chronic narcotic dependence ([address] 08/15/2016    PAH (pulmonary artery hypertension) ([facility] 08/06/2014    Diabetes type 2, controlled ([address] 08/10/2012    Peripheral neuropathy 08/10/2012    GERD (gastroesophageal reflux disease) 08/10/2012    Herniation of lumbar intervertebral disc with radiculopathy 12/20/2006    Degeneration of intervertebral disc of lumbosacral region 11/06/2006     Diagnostic studies:   ECG 5/19/22  Sr  449 QTcH, 106 QRS    Echo 1/31/2022   Summary   -Normal left ventricle size, wall thickness, and systolic function with an   estimated ejection fraction of 60-65%. -No regional wall motion abnormalities are seen. -Grade III diastolic dysfunction with elevated LV filling pressures. -Mild mitral regurgitation is present. -Mitral annular calcification is present.   -The aortic valve is thickened/calcified but opens well with normal   gradients. -Mild tricuspid regurgitation.   -Trivial pulmonic regurgitation present.     Event monitor: 6/21/19  AF burden 3%  Longest AF 3 hours 50 minutes  AF highest 124  AF lowest 70  AF average 96      Echo:  3/6/20  Summary   -Overall left ventricular systolic function is normal.   -Ejection fraction is visually estimated to be 60 %. E/e'= 13.1 cm.   -No regional wall motion abnormalities are noted.   -Normal diastolic function.   -Moderate calcification of the posterior leaflet of the mitral valve.   -Mitral annular calcification is present.   -Mild thickening of anterior leaflet of mitral valve.   -Mild mitral regurgitation is present.   -Aortic valve appears sclerotic but opens adequately.   -IVC size is normal (<2.1cm) and collapses > 50% with respiration consistent   with normal RA pressure (3mmHg).    Echo: 6/21/19  Summary   -Normal left ventricle size, wall thickness, and systolic function with an   estimated ejection fraction of 55-60%.  -No regional wall motion abnormalities are seen.   -Aortic valve appears sclerotic but opens adequately.   -Mitral annular calcification is present.   -Trivial mitral regurgitation.   -Trivial tricuspid regurgitation.   -Diastolic filling parameters suggest grade III diastolic dysfunction. Avg.   E/e'=24.2      I independently reviewed the cardiac diagnostic studies, ECG and relevant imaging studies. Lab Results   Component Value Date    LVEF 63 01/31/2022     Lab Results   Component Value Date    TSH 1.13 11/13/2013         Physical Examination:  Vitals:    05/19/22 1416   BP: 129/86   Pulse: 67   SpO2: 97%      Wt Readings from Last 3 Encounters:   05/19/22 168 lb 9.6 oz (76.5 kg)   03/03/22 176 lb (79.8 kg)   02/16/22 173 lb (78.5 kg)       · Constitutional: Oriented. No distress. · Head: Normocephalic and atraumatic. · Mouth/Throat: Oropharynx is clear and moist.   · Eyes: Conjunctivae normal. EOM are normal.   · Neck: Neck supple. No rigidity. No JVD present. · Cardiovascular: Normal rate, regular rhythm, S1&S2. · Pulmonary/Chest: Bilateral respiratory sounds. No wheezes, No rhonchi. · Abdominal: Soft. Bowel sounds present. No distension, No tenderness. · Musculoskeletal: No tenderness. No edema    · Lymphadenopathy: Has no cervical adenopathy. · Neurological: Alert and oriented. Cranial nerve appears intact, No Gross deficit   · Skin: Skin is warm and dry. No rash noted. Bruising on both hands  · Psychiatric: Has a normal behavior       Review of System:  [x] Full ROS obtained and negative except as mentioned in HPI    Prior to Admission medications    Medication Sig Start Date End Date Taking?  Authorizing Provider   Omega-3 1000 MG CAPS Take by mouth daily   Yes Historical Provider, MD   apixaban (ELIQUIS) 5 MG TABS tablet TAKE 1 TABLET TWICE A DAY 1/12/22  Yes TOM Blas CNP   metoprolol succinate (TOPROL XL) 100 MG extended release tablet TAKE 1 TABLET DAILY 12/14/21  Yes TOM Benitez CNP   bosentan (TRACLEER) 62.5 MG tablet Take 125 mg by mouth 2 times daily  9/1/21  Yes Historical Provider, MD   oxybutynin (DITROPAN) 5 MG tablet TAKE 1 TABLET BY MOUTH THREE TIMES A DAY AS NEEDED 10/9/21  Yes Historical Provider, MD   furosemide (LASIX) 40 MG tablet Take 20 mg (1/2 tablet) daily  Patient taking differently: Take 20 mg by mouth daily  10/13/21  Yes TOM Blas CNP   dilTIAZem (CARDIZEM CD) 180 MG extended release capsule TAKE 1 CAPSULE DAILY 7/6/21  Yes Denice Tinoco MD   Dulaglutide (TRULICITY) 1.5 CJ/9.8OB SOPN 1.5 mg once a week 5/27/21  Yes Historical Provider, MD   methocarbamol (ROBAXIN) 500 MG tablet Take 0.75 tablets by mouth 3 times daily as needed  5/13/21  Yes Historical Provider, MD   Cranberry 400 MG TABS Take 400 mg by mouth daily   Yes Historical Provider, MD   polyethylene glycol (GLYCOLAX) 17 g packet Take 17 g by mouth daily as needed for Constipation    Yes Historical Provider, MD   aspirin (SB LOW DOSE ASA EC) 81 MG EC tablet Take 81 mg by mouth daily   Yes Historical Provider, MD   HYDROcodone-acetaminophen (NORCO) 7.5-325 MG per tablet Take 1 tablet by mouth every 6 hours as needed. Yes Historical Provider, MD   OXYGEN Inhale 2 L into the lungs   Yes Historical Provider, MD   vitamin C (ASCORBIC ACID) 500 MG tablet Take 500 mg by mouth daily    Yes Historical Provider, MD   zinc sulfate (ZINCATE) 220 (50 Zn) MG capsule Take 220 mg by mouth daily   Yes Historical Provider, MD   DULoxetine (CYMBALTA) 60 MG extended release capsule Take 1 capsule by mouth daily  Patient taking differently: Take 60 mg by mouth daily  10/30/20  Yes Papi Lyons APRN - CNP   hydroxychloroquine (PLAQUENIL) 200 MG tablet Take 200 mg by mouth daily   Yes Historical Provider, MD   acetaminophen (TYLENOL) 500 MG tablet Take 500 mg by mouth every 6 hours as needed for Pain   Yes Historical Provider, MD   gabapentin (NEURONTIN) 600 MG tablet Take by mouth. 600mg morning, 600 mg evening, 600mg bedtime   Yes Historical Provider, MD   Calcium Polycarbophil (FIBER-CAPS PO) Take by mouth   Yes Historical Provider, MD   Probiotic Product (PROBIOTIC DAILY PO) Take by mouth daily   Yes Historical Provider, MD   MAGNESIUM OXIDE PO Take 500 mg by mouth daily    Yes Historical Provider, MD   Biotin (BIOTIN 5000) 5 MG CAPS Take 1,000 mcg by mouth daily    Yes Historical Provider, MD   FISH OIL 1,200 mg by Does not apply route daily    Yes Historical Provider, MD   Multiple Vitamin (THERA) TABS Take  by mouth. Yes Historical Provider, MD   PROLIA 60 MG/ML SOSY SC injection Inject 1 mL into the skin once for 1 dose 6/17/21 3/3/22  Thomas Lara MD       Allergies   Allergen Reactions    Adhesive Tape     Collagen     Other     Bactrim [Sulfamethoxazole-Trimethoprim] Other (See Comments)     Body shaking and chills.  Body pain    Cortisone Other (See Comments)     Rash, confusion, hyperactivity    Pcn [Penicillins]     Scopolamine Anxiety  Scopolamine Sulfate Anxiety       Social History:  Reviewed. reports that she quit smoking about 10 years ago. She started smoking about 62 years ago. She has a 147.00 pack-year smoking history. She has never used smokeless tobacco. She reports that she does not drink alcohol and does not use drugs. Family History:  Reviewed. Reviewed. No family history of SCD. Relevant and available labs, and cardiovascular diagnostics reviewed. Reviewed. I independently reviewed relevant and available cardiac diagnostic tests ECG, CXR, Echo, Stress test, Device interrogation, Holter, CT scan. Outside medical records via Care everywhere reviewed and summarized in H&P above. Complex medical condition with multiple medical problems affecting prognosis and outcome of EP interventions       - The patient is counseled to follow a low salt diet to assure blood pressure remains controlled for cardiovascular risk factor modification.   - The patient is counseled to avoid excess caffeine, and energy drinks as this may exacerbated ectopy and arrhythmia. - The patient is counseled to get regular exercise 3-5 times per week to control cardiovascular risk factors. All questions and concerns were addressed to the patient/family. Alternatives to my treatment were discussed. I have discussed the above stated plan and the patient verbalized understanding and agreed with the plan. Scribe attestation: This note was scribed in the presence of Hilda Mane MD by Alicia Alcantara RN    I, Dr. Hilda Mane personally performed the services described in this documentation as scribed by RN in my presence, and it is both accurate and complete.        NOTE: This report was transcribed using voice recognition software. Every effort was made to ensure accuracy, however, inadvertent computerized transcription errors may be present.      Hilda Mane MD, [facility]   Office: [phone]  Fax: [phone]

## 2022-05-19 NOTE — PROGRESS NOTES
Patient comes in for programming evaluation for her pacemaker. All sensing and pacing parameters are within normal range. Battery life 12.0 years  AP 60.0%.  <0.1%. AF with a 0.2% burden. 22 AT/AF episode noted. 6 or 14 successfully pace terminated. Last on 4/6/2022, longest 4 hours 15 minutes. Max V rate 167 bpm.  OBSERVATIONS (2)  · AT/AF >= 6 hr for 1 days. · Avg. Ventricular Rate >= 100 bpm during AT/AF (>= 6 hr) for 1 days. Patient remains on Elqiuis. Metoprolol and Cardizem. No parameters have been changed during the current session. Please see interrogation for more detail. Patient will see Dr. Mireille Dalton today and follow up in 3 months in office or remotely.

## 2022-06-13 RX ORDER — DILTIAZEM HYDROCHLORIDE 180 MG/1
CAPSULE, COATED, EXTENDED RELEASE ORAL
Qty: 90 CAPSULE | Refills: 3 | Status: SHIPPED | OUTPATIENT
Start: 2022-06-13

## 2022-06-13 NOTE — TELEPHONE ENCOUNTER
Received refill request for Diltiazem from [address]      Last OV: 05/19/2022 w/ MXA    Last Refill: 07/06/2021 #90 w/ 3 refills     Next Appointment: on recall list for appt on 05/19/2023 w/ MXA

## 2022-07-06 ENCOUNTER — NURSE ONLY (OUTPATIENT)
Dept: CARDIOLOGY CLINIC | Age: 77
End: 2022-07-06
Payer: MEDICARE

## 2022-07-06 DIAGNOSIS — Z95.0 PACEMAKER: ICD-10-CM

## 2022-07-06 DIAGNOSIS — I49.5 SSS (SICK SINUS SYNDROME) (HCC): ICD-10-CM

## 2022-07-06 PROCEDURE — 93294 REM INTERROG EVL PM/LDLS PM: CPT | Performed by: INTERNAL MEDICINE

## 2022-07-06 PROCEDURE — 93296 REM INTERROG EVL PM/IDS: CPT | Performed by: INTERNAL MEDICINE

## 2022-07-06 NOTE — PROGRESS NOTES
We received remote transmission from patient's monitor at home. Transmission shows normal sensing and pacing function. Noted AT/AF. Pt is on Eliquis. EP physician will review. See interrogation under cardiology tab in the [address] [place] [address] [address] field for more details. End of 91-day monitoring period 7-6-22.

## 2022-08-29 RX ORDER — FUROSEMIDE 40 MG/1
TABLET ORAL
Qty: 90 TABLET | Refills: 3 | Status: SHIPPED | OUTPATIENT
Start: 2022-08-29 | End: 2022-09-08

## 2022-08-29 NOTE — TELEPHONE ENCOUNTER
Received refill request for Lasix from pt to [id number] : 5/19/22 MXA    Last Labs : 8/3/22 CMP    Last Refill : 10/13/21 90 w/3 refills     Next OV : 9/8/22 KAREN

## 2022-08-29 NOTE — TELEPHONE ENCOUNTER
Medication Refill    Medication needing refilled: furosemide (LASIX)      Dosage of the medication: pt asking for 20 mg tab    How are you taking this medication (QD, BID, TID, QID, PRN): take one tab daily    30 or 90 day supply called in: 90 day supply    When will you run out of your medication:    Which Pharmacy are we sending the medication to?: [address], [address] [phone] - F [phone]   [address] [address]   Phone:  [phone]  Fax:  [phone]

## 2022-09-07 NOTE — PROGRESS NOTES
[address]   Advanced Heart Failure/Pulmonary Hypertension  Cardiac Evaluation      Vanessa Elizabeth Conner  YOB: 1945    Date of Visit:  9/8/22    Chief Complaint   Patient presents with    Shortness of Breath     PAH        History of Present Illness:  Robert Almaraz has a history of pulmonary hypertension, Scleroderma, and Crest syndrome with Raynaud's / telangiosis including arthralgia and myalgia. She also has Fibromyalgia which is stable, and severe fatty liver infiltration-borderline elevated LFTs. She had a PFT 6/5/2014--and it shows mild obstructive defect with no response to bronchodilators. Air trapping was present on lung volumes and moderate decrease in diffusion capacity. Clinical correlation is recommended since the severity of her decrease in diffusion capacity might indicate pulmonary hypertension in a patient with scleroderma. She had some hair loss that she thinks was from taking Plaquenil so it was stopped, and the hair loss has lessened. She had an echo, and it did not show PHTN. She is getting echos for surveying scleroderma. She had a dual chamber pacemaker placed per Dr. Lilly Morales on 5-19-20 for SSS with a post conversion pause of up to 10 seconds. At the time, she had an IRL implanted 8-20-19 which was reviewed s/p fall and hip fracture. She could not come in for her appointment for [address] [facility] did not realize she had Covid. She uses oxygen at 2 liter. She states she saw Dr. Chaya Robles 1/29 and he is trying to get her on Tracleer. Discussed that it requires monthly labs. States she had Covid and was very sick but she did not need hospitalized. She feels her breathing is about the same. Her Oxygen is new since Covid and she is wearing it today. Patient is here for follow up for [facility] ([place], [place]) and has her oxygen on and a rolling walker. She is on Tracleer per Dr. Chaya Robles since . She c/o being weak, tired and fatigued and more SOB.   She continues to follow with  Chaya Travis and is due to see him. Labs reviewed and H/H has dropped. Discussed with patient. Discussed getting labs today to try to identity what is making her feel poorly. WHO Group 1, NYHA III    Allergies   Allergen Reactions    Adhesive Tape     Collagen     Other     Bactrim [Sulfamethoxazole-Trimethoprim] Other (See Comments)     Body shaking and chills. Body pain    Cortisone Other (See Comments)     Rash, confusion, hyperactivity    Pcn [Penicillins]     Scopolamine Anxiety    Scopolamine Sulfate Anxiety       Current Outpatient Medications:     nitrofurantoin (MACRODANTIN) 50 MG capsule, Take 50 mg by mouth every morning, Disp: , Rfl:     furosemide (LASIX) 40 MG tablet, Take 20 mg (1/2 tablet) daily, Disp: 90 tablet, Rfl: 3    Methen-Hyosc-Meth Blue-Na Phos (UROGESIC-BLUE) 81.6 MG TABS, 1 tablet in the morning, at noon, in the evening, and at bedtime, Disp: , Rfl:     polycarbophil (FIBERCON) 625 MG tablet, Take 625 mg by mouth in the morning., Disp: , Rfl:     dilTIAZem (CARDIZEM CD) 180 MG extended release capsule, TAKE 1 CAPSULE DAILY, Disp: 90 capsule, Rfl: 3    Omega-3 1000 MG CAPS, Take by mouth daily, Disp: , Rfl:     apixaban (ELIQUIS) 5 MG TABS tablet, TAKE 1 TABLET TWICE A DAY, Disp: 180 tablet, Rfl: 3    metoprolol succinate (TOPROL XL) 100 MG extended release tablet, TAKE 1 TABLET DAILY, Disp: 135 tablet, Rfl: 3    bosentan (TRACLEER) 62.5 MG tablet, Take 125 mg by mouth 2 times daily , Disp: , Rfl:     Dulaglutide (TRULICITY) 1.5 MA/1.5PX SOPN, 1.5 mg once a week, Disp: , Rfl:     methocarbamol (ROBAXIN) 500 MG tablet, Take 0.75 tablets by mouth 3 times daily as needed , Disp: , Rfl:     Cranberry 400 MG TABS, Take 400 mg by mouth daily, Disp: , Rfl:     aspirin 81 MG EC tablet, Take 81 mg by mouth daily, Disp: , Rfl:     HYDROcodone-acetaminophen (NORCO) 7.5-325 MG per tablet, Take 1 tablet by mouth every 6 hours as needed.  , Disp: , Rfl:     OXYGEN, Inhale 2 L into the lungs, Disp: , Rfl:     vitamin C (ASCORBIC ACID) 500 MG tablet, Take 500 mg by mouth daily , Disp: , Rfl:     zinc sulfate (ZINCATE) 220 (50 Zn) MG capsule, Take 220 mg by mouth daily, Disp: , Rfl:     DULoxetine (CYMBALTA) 60 MG extended release capsule, Take 1 capsule by mouth daily (Patient taking differently: Take 60 mg by mouth daily), Disp: 30 capsule, Rfl: 0    hydroxychloroquine (PLAQUENIL) 200 MG tablet, Take 200 mg by mouth daily, Disp: , Rfl:     acetaminophen (TYLENOL) 500 MG tablet, Take 500 mg by mouth every 6 hours as needed for Pain, Disp: , Rfl:     gabapentin (NEURONTIN) 600 MG tablet, Take by mouth.  600mg morning, 600 mg evening, 600mg bedtime, Disp: , Rfl:     Calcium Polycarbophil (FIBER-CAPS PO), Take by mouth, Disp: , Rfl:     Probiotic Product (PROBIOTIC DAILY PO), Take by mouth daily, Disp: , Rfl:     MAGNESIUM OXIDE PO, Take 500 mg by mouth daily , Disp: , Rfl:     Biotin 5 MG CAPS, Take 1,000 mcg by mouth daily , Disp: , Rfl:     FISH OIL, 1,200 mg by Does not apply route daily , Disp: , Rfl:     Multiple Vitamin (THERA) TABS, Take  by mouth.  , Disp: , Rfl:     PROLIA 60 MG/ML SOSY SC injection, Inject 1 mL into the skin once for 1 dose, Disp: 1 Syringe, Rfl: 2      Past Medical History:   Diagnosis Date    Diabetes mellitus ([address]     Fatty liver     Severe    Femur fracture, left ([address] 2021    spontaneous Left femur fracture    Fibromyalgia     Fracture, hip (HCC)     GERD (gastroesophageal reflux disease)     Hypertension     IBS (irritable bowel syndrome)     Osteoarthritis     Peripheral neuropathy     Postmenopausal     Scleroderma ([address]     crest     Past Surgical History:   Procedure Laterality Date    ARTHROPLASTY  08/10/2012    FIFTH TOE LEFT FOOT    BACK SURGERY      CHOLECYSTECTOMY  1989    CYSTOSCOPY  08/01/2018    with botox injection     FEMUR FRACTURE SURGERY      FIBULA FRACTURE SURGERY  2017    3 separate surgeries for a fractured left fibula tibial a closed done by Dr. Kerry Mcgowan HYSTERECTOMY (CERVIX STATUS UNKNOWN)  [address] SURGERY  2007    PARTIAL HIP ARTHROPLASTY Right 2019    Jeffry Finch    THROAT SURGERY  2012    vocal cord polyp     Family History   Problem Relation Age of Onset    Heart Disease Mother     Cancer Mother     Hypertension Mother     Diabetes Mother     Osteoporosis Mother     Stroke Father     Hypertension Father     Osteoporosis Sister     Diabetes Sister     Cancer Other         leukemia     Social History     Socioeconomic History    Marital status:      Spouse name: Not on file    Number of children: Not on file    Years of education: Not on file    Highest education level: Not on file   Occupational History    Not on file   Tobacco Use    Smoking status: Former     Packs/day: 3.00     Years: 49.00     Pack years: 147.00     Types: Cigarettes     Start date: 1960     Quit date: 2011     Years since quittin.0    Smokeless tobacco: Never   Vaping Use    Vaping Use: Never used   Substance and Sexual Activity    Alcohol use: No    Drug use: No    Sexual activity: Not on file   Other Topics Concern    Not on file   Social History Narrative    Not on file     Social Determinants of Health     Financial Resource Strain: Not on file   Food Insecurity: Not on file   Transportation Needs: Not on file   Physical Activity: Not on file   Stress: Not on file   Social Connections: Not on file   Intimate Partner Violence: Not on file   Housing Stability: Not on file       Review of Systems:   Constitutional: there has been no unanticipated weight loss. +Tired, + Fatigue  There's been no change in energy level, sleep pattern, or activity level. Wearing hard sol flat shoe brace right foot   Eyes: No visual changes or diplopia. No scleral icterus. ENT: No Headaches, hearing loss or vertigo. No mouth sores or sore throat. Cardiovascular: Reviewed in HPI  Respiratory: No cough or wheezing, no sputum production. No hematemesis. +SOB  Gastrointestinal: No abdominal pain, appetite loss, blood in stools. No change in bowel or bladder habits. Genitourinary: No dysuria, trouble voiding, or hematuria. Musculoskeletal:  No gait disturbance, weakness or joint complaints. Integumentary: No rash or pruritis. Neurological: No headache, diplopia, change in muscle strength, numbness or tingling. No change in gait, balance, coordination, mood, affect, memory, mentation, behavior. Psychiatric: No anxiety, no depression. Endocrine: No malaise, fatigue or temperature intolerance. No excessive thirst, fluid intake, or urination. No tremor. Hematologic/Lymphatic: No abnormal bruising or bleeding, blood clots or swollen lymph nodes. Allergic/Immunologic: No nasal congestion or hives. Physical Examination:    Body mass index is 25.31 kg/m². Wt Readings from Last 3 Encounters:   09/08/22 161 lb 9.6 oz (73.3 kg)   08/10/22 162 lb (73.5 kg)   05/19/22 168 lb 9.6 oz (76.5 kg)     BP Readings from Last 3 Encounters:   09/08/22 (!) 120/52   08/10/22 (!) 159/66   05/19/22 129/86     Constitutional and General Appearance: In wheel chair, pale  WD/WN in NAD  HEENT:  NC/AT  Skin:  Warm, dry  Hearing:  No problems  Respiratory:  Normal excursion and expansion without use of accessory muscles  Resp Auscultation: Breathing is shallow. SOB with walking   Cardiovascular: The apical impulses not displaced  Heart tones are crisp and normal  Cervical veins are not engorged  The carotid upstroke is normal in amplitude and contour without delay or bruit  JVP less than 8 cm H2O  RRR with nl S1 and S2 without m,r,g  Peripheral pulses are symmetrical and full  There is no clubbing, cyanosis of the extremities. No edema. Dependant cyanosis to feet.    Femoral Arteries: 2+ and equal  Pedal Pulses: 2+ and equal   Abdomen:  No masses or tenderness  Liver/Spleen: No Abnormalities Noted  Neurological/Psychiatric:  Alert and oriented in all spheres  Moves all extremities well  Exhibits normal gait balance and coordination  No abnormalities of mood, affect, memory, mentation, or behavior are noted      Echo 1/31/22   -Normal left ventricle size, wall thickness, and systolic function with an   estimated ejection fraction of 60-65%. -No regional wall motion abnormalities are seen. -Grade III diastolic dysfunction with elevated LV filling pressures. -Mild mitral regurgitation is present. -Mitral annular calcification is present.   -The aortic valve is thickened/calcified but opens well with normal   gradients. Mild tricuspid regurgitation.   -Trivial pulmonic regurgitation present. Echo 8-  [facility]. SUMMARY:  1. Left ventricle: The cavity size was normal. Wall thickness was     normal. Systolic function was normal. The estimated ejection     fraction was in the range of 55% to 60%. 2. Mitral valve: The annulus was mildly to moderately calcified. The leaflets were normal thickness. 3. Right ventricle: The cavity size was normal.  4. Pulmonary arteries: Systolic pressure was moderately increased. Estimated PA peak pressure is 46mm Hg (S). 5. Pericardium, extracardiac: There was no pericardial effusion. Procedures performed: 5-  Indication of the procedure: Renee Toure is a 66 y.o. female with    Symptomatic Sick sinus syndrome; Sinus dysfunction   Her loop recorder showed 1st degree AVB with episodes of atrial fib/flutter with RVR and conversion pauses up to 10 seconds. Insertion of MRI compatible right ventricular  lead under fluoroscopy. Insertion of MRI compatible right atrial lead under fluoroscopy  Insertion of a MRI compatible  dual  chamber Pacemaker/  IV sedation. Programming and analysis of the device  Removal of Implantable Loop recorder      Echo 3-6-2020  -Overall left ventricular systolic function is normal.   -Ejection fraction is visually estimated to be 60 %.  E/e'= 13.1 cm.   -No regional wall motion abnormalities are noted. -Normal diastolic function.   -Moderate calcification of the posterior leaflet of the mitral valve. -Mitral annular calcification is present. -Mild thickening of anterior leaflet of mitral valve. -Mild mitral regurgitation is present.   -Aortic valve appears sclerotic but opens adequately. -IVC size is normal (<2.1cm) and collapses > 50% with respiration consistent   with normal RA pressure (3mmHg). ECHO 6/21/19  -Normal left ventricle size, wall thickness, and systolic function with an estimated ejection fraction of 55-60%. -No regional wall motion abnormalities are seen.   -Aortic valve appears sclerotic but opens adequately. -Mitral annular calcification is present.   -Trivial mitral regurgitation.   -Trivial tricuspid regurgitation.   -Diastolic filling parameters suggest grade III diastolic dysfunction. Avg. E/e'=24.2    ECHO 8/6/2014  Normal left ventricle size wall thickness and systolic function with an  estimated ejection fraction of 55-60%. No regional wall motion abnormalities are seen. Normal diastolic filling  pattern for age. Mitral annular calcification is present. Aortic valve appears sclerotic but opens adequately. No valvular abnormalities noted. Labs were reviewed including labs from other hospital systems through [facility]. Cardiac testing was reviewed including echos, nuclear scans, cardiac catheterization, including from other hospital systems through [facility]. Assessment:  1. PAH (pulmonary artery hypertension) (HCC)    2. Paroxysmal atrial fibrillation ([address]    3. Atrial flutter, unspecified type ([address]    4. Benign essential HTN    5. Scleroderma ([address]    6. SOB (shortness of breath)    7. Iron deficiency anemia, unspecified iron deficiency anemia type    8. Other fatigue    9. Hair loss          1.  Pulmonary artery hypertension:  Stable.  -Echo 8/25/2020> PASP 46mmHg   ~ Started Tracleer per Dr. Rosendo Aguilar (October 2021)  ~ Echo 1/2022 >  The right ventricle is normal in size and function   2.   -Takes Cardizem 180mg (reduced Sept 2020)  -Continue metoprolol.   ~ Heart rate controlled today at 64     3. Benign essential HTN: Stable. BP (!) 120/52   Pulse 68   Ht 5' 7\" (1.702 m)   Wt 161 lb 9.6 oz (73.3 kg)   SpO2 97%   BMI 25.31 kg/m²     ~ Controlled. Normal kidney function. 4. Paroxysmal atrial fibrillation: HR controlled and remains on Toprol & Eliquis. 5. Pacemaker:  Implanted on 5/19/2020 per Dr. Fred Lopes for SSS. 6. Scleroderma:  CREST Syndrome with Raynaud's and telangiectasias including arthralgias and myalgias. Follows with Dr. Willian Soria.   -No open sores to fingers today. Plan:    1. Labs today. 2.  See Dr. Holley Hart in 6 months with an echo  3. Lasix dose tablet will now be 20mg. Sent to pharmacy. 4.  We will call you with labs results. Scribe's attestation: This note was scribed in the presence of Robyn Ferrara M.D. by Jose Bowen RN     The scribe's documentation has been prepared under my direction and personally reviewed by me in its entirety. I confirm that the note above accurately reflects all work, treatment, procedures, and medical decision making performed by me. Time Based Itemization  A total of 40 minutes was spent on today's patient encounter. If applicable, non-patient-facing activities:  ( x)Preparing to see the patient and reviewing records  ( x) Individual interpretation of results  ( ) Discussion or coordination of care with other health care professionals  ( x) Ordering of unique tests, medications, or procedures  ( x) Documentation within the EHR      I appreciate the opportunity of cooperating in the care of this patient.     Robyn Ferrara M.D., [facility]

## 2022-09-08 ENCOUNTER — OFFICE VISIT (OUTPATIENT)
Dept: CARDIOLOGY CLINIC | Age: 77
End: 2022-09-08
Payer: MEDICARE

## 2022-09-08 VITALS
DIASTOLIC BLOOD PRESSURE: 52 MMHG | WEIGHT: 161.6 LBS | OXYGEN SATURATION: 97 % | HEART RATE: 68 BPM | BODY MASS INDEX: 25.36 KG/M2 | SYSTOLIC BLOOD PRESSURE: 120 MMHG | HEIGHT: 67 IN

## 2022-09-08 DIAGNOSIS — R53.83 OTHER FATIGUE: ICD-10-CM

## 2022-09-08 DIAGNOSIS — D50.9 IRON DEFICIENCY ANEMIA, UNSPECIFIED IRON DEFICIENCY ANEMIA TYPE: ICD-10-CM

## 2022-09-08 DIAGNOSIS — I27.21 PAH (PULMONARY ARTERY HYPERTENSION) (HCC): Primary | ICD-10-CM

## 2022-09-08 DIAGNOSIS — I48.0 PAROXYSMAL ATRIAL FIBRILLATION (HCC): ICD-10-CM

## 2022-09-08 DIAGNOSIS — M34.9 SCLERODERMA (HCC): ICD-10-CM

## 2022-09-08 DIAGNOSIS — I27.21 PAH (PULMONARY ARTERY HYPERTENSION) (HCC): ICD-10-CM

## 2022-09-08 DIAGNOSIS — I10 BENIGN ESSENTIAL HTN: ICD-10-CM

## 2022-09-08 DIAGNOSIS — R06.02 SOB (SHORTNESS OF BREATH): ICD-10-CM

## 2022-09-08 DIAGNOSIS — L65.9 HAIR LOSS: ICD-10-CM

## 2022-09-08 DIAGNOSIS — I48.92 ATRIAL FLUTTER, UNSPECIFIED TYPE (HCC): ICD-10-CM

## 2022-09-08 LAB
A/G RATIO: 1.6 (ref 1.1–2.2)
ALBUMIN SERPL-MCNC: 4.1 G/DL (ref 3.4–5)
ALP BLD-CCNC: 131 U/L (ref 40–129)
ALT SERPL-CCNC: 19 U/L (ref 10–40)
ANION GAP SERPL CALCULATED.3IONS-SCNC: 13 MMOL/L (ref 3–16)
AST SERPL-CCNC: 23 U/L (ref 15–37)
BILIRUB SERPL-MCNC: 0.6 MG/DL (ref 0–1)
BUN BLDV-MCNC: 18 MG/DL (ref 7–20)
CALCIUM SERPL-MCNC: 8.7 MG/DL (ref 8.3–10.6)
CHLORIDE BLD-SCNC: 103 MMOL/L (ref 99–110)
CO2: 28 MMOL/L (ref 21–32)
CREAT SERPL-MCNC: 0.8 MG/DL (ref 0.6–1.2)
FERRITIN: 96.8 NG/ML (ref 15–150)
GFR AFRICAN AMERICAN: >60
GFR NON-AFRICAN AMERICAN: >60
GLUCOSE BLD-MCNC: 103 MG/DL (ref 70–99)
HCT VFR BLD CALC: 35.1 % (ref 36–48)
HEMOGLOBIN: 11.5 G/DL (ref 12–16)
IRON SATURATION: 34 % (ref 15–50)
IRON: 89 UG/DL (ref 37–145)
MCH RBC QN AUTO: 28.7 PG (ref 26–34)
MCHC RBC AUTO-ENTMCNC: 32.6 G/DL (ref 31–36)
MCV RBC AUTO: 88 FL (ref 80–100)
PDW BLD-RTO: 14.4 % (ref 12.4–15.4)
PLATELET # BLD: 195 K/UL (ref 135–450)
PMV BLD AUTO: 9.2 FL (ref 5–10.5)
POTASSIUM SERPL-SCNC: 4.6 MMOL/L (ref 3.5–5.1)
PRO-BNP: 628 PG/ML (ref 0–449)
RBC # BLD: 3.99 M/UL (ref 4–5.2)
SODIUM BLD-SCNC: 144 MMOL/L (ref 136–145)
TOTAL IRON BINDING CAPACITY: 260 UG/DL (ref 260–445)
TOTAL PROTEIN: 6.6 G/DL (ref 6.4–8.2)
TSH REFLEX: 1.33 UIU/ML (ref 0.27–4.2)
WBC # BLD: 7.5 K/UL (ref 4–11)

## 2022-09-08 PROCEDURE — G8427 DOCREV CUR MEDS BY ELIG CLIN: HCPCS | Performed by: INTERNAL MEDICINE

## 2022-09-08 PROCEDURE — 1036F TOBACCO NON-USER: CPT | Performed by: INTERNAL MEDICINE

## 2022-09-08 PROCEDURE — 99215 OFFICE O/P EST HI 40 MIN: CPT | Performed by: INTERNAL MEDICINE

## 2022-09-08 PROCEDURE — 1123F ACP DISCUSS/DSCN MKR DOCD: CPT | Performed by: INTERNAL MEDICINE

## 2022-09-08 PROCEDURE — G8399 PT W/DXA RESULTS DOCUMENT: HCPCS | Performed by: INTERNAL MEDICINE

## 2022-09-08 PROCEDURE — 1090F PRES/ABSN URINE INCON ASSESS: CPT | Performed by: INTERNAL MEDICINE

## 2022-09-08 PROCEDURE — G8417 CALC BMI ABV UP PARAM F/U: HCPCS | Performed by: INTERNAL MEDICINE

## 2022-09-08 RX ORDER — NITROFURANTOIN MACROCRYSTALS 50 MG/1
50 CAPSULE ORAL EVERY MORNING
COMMUNITY
Start: 2022-08-12

## 2022-09-08 RX ORDER — FUROSEMIDE 20 MG/1
20 TABLET ORAL DAILY
Qty: 90 TABLET | Refills: 3 | Status: SHIPPED | OUTPATIENT
Start: 2022-09-08

## 2022-09-09 ENCOUNTER — TELEPHONE (OUTPATIENT)
Dept: CARDIOLOGY CLINIC | Age: 77
End: 2022-09-09

## 2022-09-09 NOTE — TELEPHONE ENCOUNTER
----- Message from Enid George MD sent at 9/9/2022  1:22 PM EDT -----  Call patient. Let her know that her labs look good. Fluid level is down from a year ago. Anemia is improving. She is not iron deficient. Her thyroid labs look good. So no changes at this time.   KAREN

## 2022-09-15 ENCOUNTER — TELEPHONE (OUTPATIENT)
Dept: CARDIOLOGY CLINIC | Age: 77
End: 2022-09-15

## 2022-09-15 NOTE — TELEPHONE ENCOUNTER
Patient calling to see why her labs have not beeen faxed to Dr Jorge Carranza office. She would like to have these labs drawn. Dr Jorge Carranza fax number is [phone]. She would like to have a call back when this has been completed.

## 2022-10-05 ENCOUNTER — NURSE ONLY (OUTPATIENT)
Dept: CARDIOLOGY CLINIC | Age: 77
End: 2022-10-05

## 2022-10-05 DIAGNOSIS — Z95.0 PACEMAKER: ICD-10-CM

## 2022-10-05 DIAGNOSIS — I49.5 SSS (SICK SINUS SYNDROME) (HCC): Primary | ICD-10-CM

## 2022-10-05 PROCEDURE — 93296 REM INTERROG EVL PM/IDS: CPT | Performed by: INTERNAL MEDICINE

## 2022-10-05 PROCEDURE — 93294 REM INTERROG EVL PM/LDLS PM: CPT | Performed by: INTERNAL MEDICINE

## 2022-10-05 NOTE — PROGRESS NOTES
We received remote transmission from patient's monitor at home. Transmission shows normal sensing and pacing function. Noted AT/AF. Pt is on Eliquis. EP physician will review. See interrogation under cardiology tab in the [address] [place] [address] [address] field for more details.  < 0.1% (MVP On)  AP 55.5%    End of 91-day monitoring period 10-5-22.

## 2022-11-16 ENCOUNTER — HOSPITAL ENCOUNTER (OUTPATIENT)
Age: 77
Discharge: HOME | End: 2022-11-16

## 2022-11-16 ENCOUNTER — HOSPITAL ENCOUNTER (OUTPATIENT)
Dept: NON INVASIVE DIAGNOSTICS | Age: 77
Discharge: HOME | End: 2022-11-16
Payer: MEDICARE

## 2022-11-16 ENCOUNTER — HOSPITAL ENCOUNTER (OUTPATIENT)
Age: 77
Discharge: HOME | End: 2022-11-16
Payer: MEDICARE

## 2022-11-16 ENCOUNTER — HOSPITAL ENCOUNTER (OUTPATIENT)
Dept: GENERAL RADIOLOGY | Age: 77
Discharge: HOME | End: 2022-11-16
Payer: MEDICARE

## 2022-11-16 DIAGNOSIS — K76.6 PORTOPULMONARY HYPERTENSION (HCC): ICD-10-CM

## 2022-11-16 DIAGNOSIS — I27.20 PROGRESSIVE PULMONARY HYPERTENSION (HCC): ICD-10-CM

## 2022-11-16 DIAGNOSIS — I12.9 HYPERTENSIVE KIDNEY DISEASE: ICD-10-CM

## 2022-11-16 DIAGNOSIS — I27.20 PORTOPULMONARY HYPERTENSION (HCC): ICD-10-CM

## 2022-11-16 LAB
A/G RATIO: 1.7 (ref 1.1–2.2)
ALBUMIN SERPL-MCNC: 4.2 G/DL (ref 3.4–5)
ALP BLD-CCNC: 139 U/L (ref 40–129)
ALT SERPL-CCNC: 16 U/L (ref 10–40)
ANION GAP SERPL CALCULATED.3IONS-SCNC: 9 MMOL/L (ref 3–16)
AST SERPL-CCNC: 23 U/L (ref 15–37)
BILIRUB SERPL-MCNC: 0.5 MG/DL (ref 0–1)
BUN BLDV-MCNC: 21 MG/DL (ref 7–20)
CALCIUM SERPL-MCNC: 9.4 MG/DL (ref 8.3–10.6)
CHLORIDE BLD-SCNC: 101 MMOL/L (ref 99–110)
CO2: 31 MMOL/L (ref 21–32)
CREAT SERPL-MCNC: 0.8 MG/DL (ref 0.6–1.2)
CREATININE URINE: 144.5 MG/DL (ref 28–259)
GFR SERPL CREATININE-BSD FRML MDRD: >60 ML/MIN/{1.73_M2}
GLUCOSE BLD-MCNC: 115 MG/DL (ref 70–99)
HCT VFR BLD CALC: 38 % (ref 36–48)
HEMOGLOBIN: 12.1 G/DL (ref 12–16)
LV EF: 60 %
LVEF MODALITY: NORMAL
MCH RBC QN AUTO: 28.4 PG (ref 26–34)
MCHC RBC AUTO-ENTMCNC: 31.8 G/DL (ref 31–36)
MCV RBC AUTO: 89.2 FL (ref 80–100)
PDW BLD-RTO: 14.4 % (ref 12.4–15.4)
PLATELET # BLD: 149 K/UL (ref 135–450)
PMV BLD AUTO: 9.9 FL (ref 5–10.5)
POTASSIUM SERPL-SCNC: 4.2 MMOL/L (ref 3.5–5.1)
PROTEIN PROTEIN: 17 MG/DL
PROTEIN/CREAT RATIO: 0.1 MG/DL
RBC # BLD: 4.26 M/UL (ref 4–5.2)
SODIUM BLD-SCNC: 141 MMOL/L (ref 136–145)
TOTAL PROTEIN: 6.7 G/DL (ref 6.4–8.2)
WBC # BLD: 6.8 K/UL (ref 4–11)

## 2022-11-16 PROCEDURE — 93306 TTE W/DOPPLER COMPLETE: CPT

## 2022-11-16 PROCEDURE — 36415 COLL VENOUS BLD VENIPUNCTURE: CPT

## 2022-11-16 PROCEDURE — 85027 COMPLETE CBC AUTOMATED: CPT

## 2022-11-16 PROCEDURE — 71046 X-RAY EXAM CHEST 2 VIEWS: CPT

## 2022-11-16 PROCEDURE — 82570 ASSAY OF URINE CREATININE: CPT

## 2022-11-16 PROCEDURE — 84156 ASSAY OF PROTEIN URINE: CPT

## 2022-11-16 PROCEDURE — 80053 COMPREHEN METABOLIC PANEL: CPT

## 2022-12-20 NOTE — TELEPHONE ENCOUNTER
Last OV:  10/13/2021  Garth  Last Labs:  11/16/2020    Next OV: 01/10/2023   device check  Gardenia Patel  Last Refill:   01/12/2022   Gardenia Patel

## 2023-01-10 ENCOUNTER — NURSE ONLY (OUTPATIENT)
Dept: CARDIOLOGY CLINIC | Age: 78
End: 2023-01-10

## 2023-01-10 DIAGNOSIS — I49.5 SSS (SICK SINUS SYNDROME) (HCC): Primary | ICD-10-CM

## 2023-01-10 DIAGNOSIS — Z95.0 PACEMAKER: ICD-10-CM

## 2023-01-10 NOTE — PROGRESS NOTES
We received remote transmission from patient's monitor at home. Transmission shows normal sensing and pacing function. Noted AT/AF. Pt is on Eliquis. EP physician will review. See interrogation under cardiology tab in the [address] [place] [address] [address] field for more details.  < 0.1% (MVP On)  AP 61.5%    End of 91-day monitoring period 1-10-23.

## 2023-01-16 ENCOUNTER — TELEPHONE (OUTPATIENT)
Dept: CARDIOLOGY CLINIC | Age: 78
End: 2023-01-16

## 2023-01-16 NOTE — TELEPHONE ENCOUNTER
----- Message from Shelia Parra MD sent at 1/14/2023 10:48 AM EST -----  F/u in May or June with [facility]

## 2023-03-14 RX ORDER — METOPROLOL SUCCINATE 100 MG/1
TABLET, EXTENDED RELEASE ORAL
Qty: 135 TABLET | Refills: 3 | Status: SHIPPED | OUTPATIENT
Start: 2023-03-14

## 2023-05-23 NOTE — PROGRESS NOTES
[address]   Electrophysiology Follow up   Date: 5/24/2023  I had the privilege of visiting Elizabeth Trujillo in the office. CC: PAF   HPI: Elizabeth Trujillo is a [de-identified] y.o. female with a PMH of atrial fibrillation, pre-syncope, CREST syndrome, HTN and fibromyalgia. Her 30 day monitor reported PAF. She was started on Eliquis. An ILR was implanted on 8/20/19 to better determine whether she has atrial fibrillation. Her loop recorder showed 1st degree AVB with episodes of atrial fib/flutter with RVR and conversion pauses up to 10 seconds. 5/19/20 Insertion of dual chamber pacemaker for symptomatic SSS and removal of ILR. Interval History:  Sherlyn Rutherford presents to the office annual in follow up. She is doing well form a cardiac perspective. Patient denies lightheadedness, dizziness, chest pain, palpitations, orthopnea, edema, presyncope or syncope. Continues to be on oxygen. She complains of dizziness at times. Assessment and plan:   PAF   -ECG today:  sinus    -Denies symptoms with these episodes    -0.7% AT/AF burden per device interrogation   -Patient has a [id number] Score of 5 (age1, HTN, Female, DM),   ~ Continue  on Eliquis 5 mg BID  ~ creatinine 1.09 04/03/2023    -Continue Cardizem and toprol XL  She has slight increase in the burden of her A-fib. However given her underlying lung disease, no aggressive treatment or use of amiodarone is reasonable. We will continue to monitor and treat her medically. Heart rate during episodes of atrial flutter and A-fib are reasonable and in the 1 30-1 50 range. The episodes are not very long and therefore further increasing the dose of medications is not necessary. Pauses/SSS   -s/p Dual chamber PPM 5/19/2020   -The CIED was interrogated and programmed and I supervised and reviewed all the data.  All findings and changes are in device interrogation sheet and reflect my personal interpretation and changes and is scanned to Epic.     10.9   years

## 2023-05-23 NOTE — PROGRESS NOTES
Patient comes in for programming evaluation for her pacemaker. Patient has [employer] [id number] [place] XT DR MRI-5/19/2020  Hx:atrial fibrillation, pre-syncope    All sensing and pacing parameters are within normal range. Battery life 10.9 years  AP 58.3%.  <0.1%. Underlying Junctional Brachycardia today. AT/AF with a 0.7% burden. RVR noted. Last episode on 5/15/2023, longest ~ 11 1/2 hours. OBSERVATIONS   - AT/AF >= 6 h for 4 d.  - Avg. Ventricular Rate >= 100 bpm during AT/AF (>= 6 h) for 4 days.  - Night heart rate over 85 bpm for 7 days. Patient remains on Elqiuis. Metoprolol and Cardizem. Alerts turned on today. Please see interrogation for more detail. Patient will see Dr. Clary Corrales today and follow up in 3 months in office or remotely.

## 2023-05-24 ENCOUNTER — OFFICE VISIT (OUTPATIENT)
Dept: CARDIOLOGY CLINIC | Age: 78
End: 2023-05-24
Payer: MEDICARE

## 2023-05-24 ENCOUNTER — TELEPHONE (OUTPATIENT)
Dept: CARDIOLOGY CLINIC | Age: 78
End: 2023-05-24

## 2023-05-24 ENCOUNTER — NURSE ONLY (OUTPATIENT)
Dept: CARDIOLOGY CLINIC | Age: 78
End: 2023-05-24
Payer: MEDICARE

## 2023-05-24 VITALS
WEIGHT: 179 LBS | SYSTOLIC BLOOD PRESSURE: 124 MMHG | HEIGHT: 67 IN | HEART RATE: 61 BPM | BODY MASS INDEX: 28.09 KG/M2 | DIASTOLIC BLOOD PRESSURE: 60 MMHG | OXYGEN SATURATION: 95 %

## 2023-05-24 DIAGNOSIS — Z95.0 PACEMAKER: ICD-10-CM

## 2023-05-24 DIAGNOSIS — I48.92 ATRIAL FLUTTER, UNSPECIFIED TYPE (HCC): ICD-10-CM

## 2023-05-24 DIAGNOSIS — I27.21 PAH (PULMONARY ARTERY HYPERTENSION) (HCC): ICD-10-CM

## 2023-05-24 DIAGNOSIS — I49.5 SSS (SICK SINUS SYNDROME) (HCC): ICD-10-CM

## 2023-05-24 DIAGNOSIS — I10 BENIGN ESSENTIAL HTN: ICD-10-CM

## 2023-05-24 DIAGNOSIS — I48.0 PAROXYSMAL ATRIAL FIBRILLATION (HCC): ICD-10-CM

## 2023-05-24 DIAGNOSIS — I48.3 TYPICAL ATRIAL FLUTTER (HCC): Primary | ICD-10-CM

## 2023-05-24 DIAGNOSIS — I48.0 PAROXYSMAL ATRIAL FIBRILLATION (HCC): Primary | ICD-10-CM

## 2023-05-24 PROCEDURE — 3074F SYST BP LT 130 MM HG: CPT | Performed by: INTERNAL MEDICINE

## 2023-05-24 PROCEDURE — G8399 PT W/DXA RESULTS DOCUMENT: HCPCS | Performed by: INTERNAL MEDICINE

## 2023-05-24 PROCEDURE — 99214 OFFICE O/P EST MOD 30 MIN: CPT | Performed by: INTERNAL MEDICINE

## 2023-05-24 PROCEDURE — 93000 ELECTROCARDIOGRAM COMPLETE: CPT | Performed by: INTERNAL MEDICINE

## 2023-05-24 PROCEDURE — G8427 DOCREV CUR MEDS BY ELIG CLIN: HCPCS | Performed by: INTERNAL MEDICINE

## 2023-05-24 PROCEDURE — 3078F DIAST BP <80 MM HG: CPT | Performed by: INTERNAL MEDICINE

## 2023-05-24 PROCEDURE — G8417 CALC BMI ABV UP PARAM F/U: HCPCS | Performed by: INTERNAL MEDICINE

## 2023-05-24 PROCEDURE — 1036F TOBACCO NON-USER: CPT | Performed by: INTERNAL MEDICINE

## 2023-05-24 PROCEDURE — 93280 PM DEVICE PROGR EVAL DUAL: CPT | Performed by: INTERNAL MEDICINE

## 2023-05-24 PROCEDURE — 1090F PRES/ABSN URINE INCON ASSESS: CPT | Performed by: INTERNAL MEDICINE

## 2023-05-24 PROCEDURE — 1123F ACP DISCUSS/DSCN MKR DOCD: CPT | Performed by: INTERNAL MEDICINE

## 2023-05-24 NOTE — TELEPHONE ENCOUNTER
LMOM for pt. Needs follow up appointment with KAREN at soonest available.     If have CHF symptoms, schedule asap with CHF NP

## 2023-05-24 NOTE — TELEPHONE ENCOUNTER
Patient had an appointment to see RAVEN today and she states that she was supposed to have an echo and see KAREN in March, 23, and was unable to come in at that time. She had an echo on 11/16/23 and she is wanting to know if she should schedule an appt. With KAREN. Please advise.   Thank you

## 2023-06-08 RX ORDER — DILTIAZEM HYDROCHLORIDE 180 MG/1
CAPSULE, COATED, EXTENDED RELEASE ORAL
Qty: 90 CAPSULE | Refills: 3 | Status: SHIPPED | OUTPATIENT
Start: 2023-06-08

## 2023-06-16 ENCOUNTER — APPOINTMENT (OUTPATIENT)
Dept: GENERAL RADIOLOGY | Age: 78
DRG: 291 | End: 2023-06-16
Payer: MEDICARE

## 2023-06-16 ENCOUNTER — HOSPITAL ENCOUNTER (INPATIENT)
Age: 78
LOS: 5 days | Discharge: HOME HEALTH | DRG: 291 | End: 2023-06-21
Attending: EMERGENCY MEDICINE | Admitting: STUDENT IN AN ORGANIZED HEALTH CARE EDUCATION/TRAINING PROGRAM
Payer: MEDICARE

## 2023-06-16 DIAGNOSIS — I50.9 ACUTE ON CHRONIC CONGESTIVE HEART FAILURE, UNSPECIFIED HEART FAILURE TYPE (HCC): ICD-10-CM

## 2023-06-16 DIAGNOSIS — J44.1 COPD EXACERBATION (HCC): Primary | ICD-10-CM

## 2023-06-16 LAB
ALBUMIN SERPL-MCNC: 4.3 G/DL (ref 3.4–5)
ALBUMIN/GLOB SERPL: 1.5 {RATIO} (ref 1.1–2.2)
ALP SERPL-CCNC: 84 U/L (ref 40–129)
ALT SERPL-CCNC: 17 U/L (ref 10–40)
ANION GAP SERPL CALCULATED.3IONS-SCNC: 12 MMOL/L (ref 3–16)
ANION GAP SERPL CALCULATED.3IONS-SCNC: 9 MMOL/L (ref 3–16)
AST SERPL-CCNC: 35 U/L (ref 15–37)
BASE EXCESS BLDV CALC-SCNC: 8.6 MMOL/L (ref -3–3)
BASOPHILS # BLD: 0.1 K/UL (ref 0–0.2)
BASOPHILS NFR BLD: 0.5 %
BILIRUB SERPL-MCNC: 1.3 MG/DL (ref 0–1)
BILIRUB UR QL STRIP.AUTO: NEGATIVE
BUN SERPL-MCNC: 20 MG/DL (ref 7–20)
BUN SERPL-MCNC: 21 MG/DL (ref 7–20)
CALCIUM SERPL-MCNC: 9.6 MG/DL (ref 8.3–10.6)
CALCIUM SERPL-MCNC: 9.8 MG/DL (ref 8.3–10.6)
CHLORIDE SERPL-SCNC: 97 MMOL/L (ref 99–110)
CHLORIDE SERPL-SCNC: 98 MMOL/L (ref 99–110)
CLARITY UR: CLEAR
CO2 BLDV-SCNC: 84 MMOL/L
CO2 SERPL-SCNC: 31 MMOL/L (ref 21–32)
CO2 SERPL-SCNC: 35 MMOL/L (ref 21–32)
COHGB MFR BLDV: 2.9 % (ref 0–1.5)
COLOR UR: YELLOW
CREAT SERPL-MCNC: 0.8 MG/DL (ref 0.6–1.2)
CREAT SERPL-MCNC: 1 MG/DL (ref 0.6–1.2)
DEPRECATED RDW RBC AUTO: 14.4 % (ref 12.4–15.4)
EKG ATRIAL RATE: 108 BPM
EKG DIAGNOSIS: NORMAL
EKG P-R INTERVAL: 186 MS
EKG Q-T INTERVAL: 302 MS
EKG QRS DURATION: 94 MS
EKG QTC CALCULATION (BAZETT): 404 MS
EKG R AXIS: -50 DEGREES
EKG T AXIS: 104 DEGREES
EKG VENTRICULAR RATE: 108 BPM
EOSINOPHIL # BLD: 0 K/UL (ref 0–0.6)
EOSINOPHIL NFR BLD: 0.4 %
GFR SERPLBLD CREATININE-BSD FMLA CKD-EPI: 57 ML/MIN/{1.73_M2}
GFR SERPLBLD CREATININE-BSD FMLA CKD-EPI: >60 ML/MIN/{1.73_M2}
GLUCOSE SERPL-MCNC: 171 MG/DL (ref 70–99)
GLUCOSE SERPL-MCNC: 176 MG/DL (ref 70–99)
GLUCOSE UR STRIP.AUTO-MCNC: NEGATIVE MG/DL
HCO3 BLDV-SCNC: 35.5 MMOL/L (ref 23–29)
HCT VFR BLD AUTO: 35.3 % (ref 36–48)
HGB BLD-MCNC: 11.2 G/DL (ref 12–16)
HGB UR QL STRIP.AUTO: NEGATIVE
KETONES UR STRIP.AUTO-MCNC: ABNORMAL MG/DL
LEUKOCYTE ESTERASE UR QL STRIP.AUTO: NEGATIVE
LV EF: 63 %
LVEF MODALITY: NORMAL
LYMPHOCYTES # BLD: 0.8 K/UL (ref 1–5.1)
LYMPHOCYTES NFR BLD: 7 %
MAGNESIUM SERPL-MCNC: 1.9 MG/DL (ref 1.8–2.4)
MCH RBC QN AUTO: 27.9 PG (ref 26–34)
MCHC RBC AUTO-ENTMCNC: 31.6 G/DL (ref 31–36)
MCV RBC AUTO: 88.3 FL (ref 80–100)
METHGB MFR BLDV: 0.5 %
MONOCYTES # BLD: 0.8 K/UL (ref 0–1.3)
MONOCYTES NFR BLD: 6.8 %
NEUTROPHILS # BLD: 10.1 K/UL (ref 1.7–7.7)
NEUTROPHILS NFR BLD: 85.3 %
NITRITE UR QL STRIP.AUTO: NEGATIVE
NT-PROBNP SERPL-MCNC: 2025 PG/ML (ref 0–449)
O2 CT VFR BLDV CALC: 16 VOL %
O2 THERAPY: ABNORMAL
PATH INTERP BLD-IMP: NORMAL
PATH INTERP BLD-IMP: NORMAL
PATH INTERP BLD-IMP: YES
PCO2 BLDV: 59.8 MMHG (ref 40–50)
PH BLDV: 7.38 [PH] (ref 7.35–7.45)
PH UR STRIP.AUTO: 7.5 [PH] (ref 5–8)
PLATELET # BLD AUTO: 117 K/UL (ref 135–450)
PMV BLD AUTO: 10.4 FL (ref 5–10.5)
PO2 BLDV: 166 MMHG (ref 25–40)
POTASSIUM SERPL-SCNC: 3.7 MMOL/L (ref 3.5–5.1)
POTASSIUM SERPL-SCNC: 4.3 MMOL/L (ref 3.5–5.1)
PROT SERPL-MCNC: 7.2 G/DL (ref 6.4–8.2)
PROT UR STRIP.AUTO-MCNC: NEGATIVE MG/DL
RBC # BLD AUTO: 4 M/UL (ref 4–5.2)
SAO2 % BLDV: 100 %
SODIUM SERPL-SCNC: 141 MMOL/L (ref 136–145)
SODIUM SERPL-SCNC: 141 MMOL/L (ref 136–145)
SP GR UR STRIP.AUTO: <=1.005 (ref 1–1.03)
TROPONIN, HIGH SENSITIVITY: 27 NG/L (ref 0–14)
TROPONIN, HIGH SENSITIVITY: 27 NG/L (ref 0–14)
UA COMPLETE W REFLEX CULTURE PNL UR: ABNORMAL
UA DIPSTICK W REFLEX MICRO PNL UR: ABNORMAL
URN SPEC COLLECT METH UR: ABNORMAL
UROBILINOGEN UR STRIP-ACNC: 0.2 E.U./DL
WBC # BLD AUTO: 11.9 K/UL (ref 4–11)

## 2023-06-16 PROCEDURE — 93005 ELECTROCARDIOGRAM TRACING: CPT | Performed by: EMERGENCY MEDICINE

## 2023-06-16 PROCEDURE — 6370000000 HC RX 637 (ALT 250 FOR IP): Performed by: EMERGENCY MEDICINE

## 2023-06-16 PROCEDURE — 93306 TTE W/DOPPLER COMPLETE: CPT

## 2023-06-16 PROCEDURE — 94640 AIRWAY INHALATION TREATMENT: CPT

## 2023-06-16 PROCEDURE — 93010 ELECTROCARDIOGRAM REPORT: CPT | Performed by: INTERNAL MEDICINE

## 2023-06-16 PROCEDURE — 81003 URINALYSIS AUTO W/O SCOPE: CPT

## 2023-06-16 PROCEDURE — 6360000002 HC RX W HCPCS: Performed by: EMERGENCY MEDICINE

## 2023-06-16 PROCEDURE — 36415 COLL VENOUS BLD VENIPUNCTURE: CPT

## 2023-06-16 PROCEDURE — 2700000000 HC OXYGEN THERAPY PER DAY

## 2023-06-16 PROCEDURE — 96374 THER/PROPH/DIAG INJ IV PUSH: CPT

## 2023-06-16 PROCEDURE — 71045 X-RAY EXAM CHEST 1 VIEW: CPT

## 2023-06-16 PROCEDURE — 82803 BLOOD GASES ANY COMBINATION: CPT

## 2023-06-16 PROCEDURE — 99285 EMERGENCY DEPT VISIT HI MDM: CPT

## 2023-06-16 PROCEDURE — 5A2204Z RESTORATION OF CARDIAC RHYTHM, SINGLE: ICD-10-PCS | Performed by: INTERNAL MEDICINE

## 2023-06-16 PROCEDURE — 6360000002 HC RX W HCPCS: Performed by: STUDENT IN AN ORGANIZED HEALTH CARE EDUCATION/TRAINING PROGRAM

## 2023-06-16 PROCEDURE — 85025 COMPLETE CBC W/AUTO DIFF WBC: CPT

## 2023-06-16 PROCEDURE — 6370000000 HC RX 637 (ALT 250 FOR IP): Performed by: STUDENT IN AN ORGANIZED HEALTH CARE EDUCATION/TRAINING PROGRAM

## 2023-06-16 PROCEDURE — 83735 ASSAY OF MAGNESIUM: CPT

## 2023-06-16 PROCEDURE — 2060000000 HC ICU INTERMEDIATE R&B

## 2023-06-16 PROCEDURE — 84484 ASSAY OF TROPONIN QUANT: CPT

## 2023-06-16 PROCEDURE — 83880 ASSAY OF NATRIURETIC PEPTIDE: CPT

## 2023-06-16 PROCEDURE — 80053 COMPREHEN METABOLIC PANEL: CPT

## 2023-06-16 PROCEDURE — 93005 ELECTROCARDIOGRAM TRACING: CPT | Performed by: INTERNAL MEDICINE

## 2023-06-16 PROCEDURE — 94760 N-INVAS EAR/PLS OXIMETRY 1: CPT

## 2023-06-16 RX ORDER — METOPROLOL SUCCINATE 50 MG/1
100 TABLET, EXTENDED RELEASE ORAL DAILY
Status: DISCONTINUED | OUTPATIENT
Start: 2023-06-16 | End: 2023-06-21 | Stop reason: HOSPADM

## 2023-06-16 RX ORDER — POTASSIUM CHLORIDE 7.45 MG/ML
10 INJECTION INTRAVENOUS PRN
Status: DISCONTINUED | OUTPATIENT
Start: 2023-06-16 | End: 2023-06-21 | Stop reason: HOSPADM

## 2023-06-16 RX ORDER — POTASSIUM CHLORIDE 20 MEQ/1
40 TABLET, EXTENDED RELEASE ORAL PRN
Status: DISCONTINUED | OUTPATIENT
Start: 2023-06-16 | End: 2023-06-21 | Stop reason: HOSPADM

## 2023-06-16 RX ORDER — GABAPENTIN 300 MG/1
600 CAPSULE ORAL 3 TIMES DAILY
Status: DISCONTINUED | OUTPATIENT
Start: 2023-06-16 | End: 2023-06-21 | Stop reason: HOSPADM

## 2023-06-16 RX ORDER — BOSENTAN 125 MG/1
125 TABLET, FILM COATED ORAL 2 TIMES DAILY
Status: DISCONTINUED | OUTPATIENT
Start: 2023-06-16 | End: 2023-06-21 | Stop reason: HOSPADM

## 2023-06-16 RX ORDER — HYDROXYCHLOROQUINE SULFATE 200 MG/1
200 TABLET, FILM COATED ORAL DAILY
Status: DISCONTINUED | OUTPATIENT
Start: 2023-06-16 | End: 2023-06-21 | Stop reason: HOSPADM

## 2023-06-16 RX ORDER — IPRATROPIUM BROMIDE AND ALBUTEROL SULFATE 2.5; .5 MG/3ML; MG/3ML
3 SOLUTION RESPIRATORY (INHALATION) ONCE
Status: COMPLETED | OUTPATIENT
Start: 2023-06-16 | End: 2023-06-16

## 2023-06-16 RX ORDER — ASPIRIN 81 MG/1
81 TABLET ORAL DAILY
Status: DISCONTINUED | OUTPATIENT
Start: 2023-06-16 | End: 2023-06-21 | Stop reason: HOSPADM

## 2023-06-16 RX ORDER — DULOXETIN HYDROCHLORIDE 60 MG/1
60 CAPSULE, DELAYED RELEASE ORAL DAILY
Status: DISCONTINUED | OUTPATIENT
Start: 2023-06-16 | End: 2023-06-21 | Stop reason: HOSPADM

## 2023-06-16 RX ORDER — HYDROCODONE BITARTRATE AND ACETAMINOPHEN 7.5; 325 MG/1; MG/1
1 TABLET ORAL EVERY 6 HOURS PRN
Status: DISCONTINUED | OUTPATIENT
Start: 2023-06-16 | End: 2023-06-21 | Stop reason: HOSPADM

## 2023-06-16 RX ORDER — DILTIAZEM HYDROCHLORIDE 180 MG/1
180 CAPSULE, COATED, EXTENDED RELEASE ORAL DAILY
Status: DISCONTINUED | OUTPATIENT
Start: 2023-06-16 | End: 2023-06-21 | Stop reason: HOSPADM

## 2023-06-16 RX ORDER — FUROSEMIDE 10 MG/ML
40 INJECTION INTRAMUSCULAR; INTRAVENOUS ONCE
Status: COMPLETED | OUTPATIENT
Start: 2023-06-16 | End: 2023-06-16

## 2023-06-16 RX ORDER — FUROSEMIDE 10 MG/ML
40 INJECTION INTRAMUSCULAR; INTRAVENOUS 2 TIMES DAILY
Status: DISCONTINUED | OUTPATIENT
Start: 2023-06-16 | End: 2023-06-20

## 2023-06-16 RX ORDER — MAGNESIUM SULFATE IN WATER 40 MG/ML
2000 INJECTION, SOLUTION INTRAVENOUS PRN
Status: DISCONTINUED | OUTPATIENT
Start: 2023-06-16 | End: 2023-06-21 | Stop reason: HOSPADM

## 2023-06-16 RX ADMIN — APIXABAN 5 MG: 5 TABLET, FILM COATED ORAL at 20:10

## 2023-06-16 RX ADMIN — DULOXETINE HYDROCHLORIDE 60 MG: 60 CAPSULE, DELAYED RELEASE ORAL at 09:10

## 2023-06-16 RX ADMIN — METOPROLOL SUCCINATE 100 MG: 50 TABLET, EXTENDED RELEASE ORAL at 09:10

## 2023-06-16 RX ADMIN — IPRATROPIUM BROMIDE AND ALBUTEROL SULFATE 3 DOSE: .5; 3 SOLUTION RESPIRATORY (INHALATION) at 01:23

## 2023-06-16 RX ADMIN — NITROGLYCERIN 0.5 INCH: 20 OINTMENT TOPICAL at 14:15

## 2023-06-16 RX ADMIN — ASPIRIN 81 MG: 81 TABLET, COATED ORAL at 09:10

## 2023-06-16 RX ADMIN — FUROSEMIDE 40 MG: 10 INJECTION, SOLUTION INTRAMUSCULAR; INTRAVENOUS at 20:10

## 2023-06-16 RX ADMIN — FUROSEMIDE 40 MG: 10 INJECTION, SOLUTION INTRAMUSCULAR; INTRAVENOUS at 09:10

## 2023-06-16 RX ADMIN — APIXABAN 5 MG: 5 TABLET, FILM COATED ORAL at 05:43

## 2023-06-16 RX ADMIN — NITROGLYCERIN 0.5 INCH: 20 OINTMENT TOPICAL at 05:43

## 2023-06-16 RX ADMIN — NITROGLYCERIN 0.5 INCH: 20 OINTMENT TOPICAL at 20:10

## 2023-06-16 RX ADMIN — GABAPENTIN 600 MG: 300 CAPSULE ORAL at 20:10

## 2023-06-16 RX ADMIN — DILTIAZEM HYDROCHLORIDE 180 MG: 180 CAPSULE, EXTENDED RELEASE ORAL at 09:10

## 2023-06-16 RX ADMIN — FUROSEMIDE 40 MG: 10 INJECTION, SOLUTION INTRAMUSCULAR; INTRAVENOUS at 02:19

## 2023-06-16 RX ADMIN — NITROGLYCERIN 0.5 INCH: 20 OINTMENT TOPICAL at 03:14

## 2023-06-16 RX ADMIN — GABAPENTIN 600 MG: 300 CAPSULE ORAL at 14:15

## 2023-06-16 RX ADMIN — HYDROXYCHLOROQUINE SULFATE 200 MG: 200 TABLET ORAL at 09:10

## 2023-06-16 RX ADMIN — GABAPENTIN 600 MG: 300 CAPSULE ORAL at 09:10

## 2023-06-16 RX ADMIN — BOSENTAN 125 MG: 125 TABLET, FILM COATED ORAL at 20:10

## 2023-06-16 ASSESSMENT — LIFESTYLE VARIABLES
HOW MANY STANDARD DRINKS CONTAINING ALCOHOL DO YOU HAVE ON A TYPICAL DAY: PATIENT DOES NOT DRINK
HOW OFTEN DO YOU HAVE A DRINK CONTAINING ALCOHOL: NEVER

## 2023-06-16 ASSESSMENT — PAIN - FUNCTIONAL ASSESSMENT: PAIN_FUNCTIONAL_ASSESSMENT: 0-10

## 2023-06-16 ASSESSMENT — PAIN SCALES - GENERAL: PAINLEVEL_OUTOF10: 0

## 2023-06-17 LAB
ANION GAP SERPL CALCULATED.3IONS-SCNC: 9 MMOL/L (ref 3–16)
BASOPHILS # BLD: 0.1 K/UL (ref 0–0.2)
BASOPHILS NFR BLD: 0.7 %
BUN SERPL-MCNC: 21 MG/DL (ref 7–20)
CALCIUM SERPL-MCNC: 9 MG/DL (ref 8.3–10.6)
CHLORIDE SERPL-SCNC: 98 MMOL/L (ref 99–110)
CO2 SERPL-SCNC: 36 MMOL/L (ref 21–32)
CREAT SERPL-MCNC: 0.9 MG/DL (ref 0.6–1.2)
DEPRECATED RDW RBC AUTO: 14.1 % (ref 12.4–15.4)
EKG ATRIAL RATE: 108 BPM
EKG DIAGNOSIS: NORMAL
EKG Q-T INTERVAL: 366 MS
EKG QRS DURATION: 100 MS
EKG QTC CALCULATION (BAZETT): 508 MS
EKG R AXIS: -54 DEGREES
EKG T AXIS: 104 DEGREES
EKG VENTRICULAR RATE: 116 BPM
EOSINOPHIL # BLD: 0.2 K/UL (ref 0–0.6)
EOSINOPHIL NFR BLD: 1.8 %
GFR SERPLBLD CREATININE-BSD FMLA CKD-EPI: >60 ML/MIN/{1.73_M2}
GLUCOSE BLD-MCNC: 132 MG/DL (ref 70–99)
GLUCOSE BLD-MCNC: 201 MG/DL (ref 70–99)
GLUCOSE SERPL-MCNC: 164 MG/DL (ref 70–99)
HCT VFR BLD AUTO: 35.9 % (ref 36–48)
HGB BLD-MCNC: 11.6 G/DL (ref 12–16)
LYMPHOCYTES # BLD: 1.5 K/UL (ref 1–5.1)
LYMPHOCYTES NFR BLD: 17 %
MAGNESIUM SERPL-MCNC: 2 MG/DL (ref 1.8–2.4)
MCH RBC QN AUTO: 28.5 PG (ref 26–34)
MCHC RBC AUTO-ENTMCNC: 32.4 G/DL (ref 31–36)
MCV RBC AUTO: 87.9 FL (ref 80–100)
MONOCYTES # BLD: 0.8 K/UL (ref 0–1.3)
MONOCYTES NFR BLD: 9.9 %
NEUTROPHILS # BLD: 6.1 K/UL (ref 1.7–7.7)
NEUTROPHILS NFR BLD: 70.6 %
PERFORMED ON: ABNORMAL
PERFORMED ON: ABNORMAL
PLATELET # BLD AUTO: 132 K/UL (ref 135–450)
PMV BLD AUTO: 10.1 FL (ref 5–10.5)
POTASSIUM SERPL-SCNC: 3.5 MMOL/L (ref 3.5–5.1)
RBC # BLD AUTO: 4.08 M/UL (ref 4–5.2)
SODIUM SERPL-SCNC: 143 MMOL/L (ref 136–145)
WBC # BLD AUTO: 8.6 K/UL (ref 4–11)

## 2023-06-17 PROCEDURE — 6370000000 HC RX 637 (ALT 250 FOR IP): Performed by: STUDENT IN AN ORGANIZED HEALTH CARE EDUCATION/TRAINING PROGRAM

## 2023-06-17 PROCEDURE — 97162 PT EVAL MOD COMPLEX 30 MIN: CPT

## 2023-06-17 PROCEDURE — 2500000003 HC RX 250 WO HCPCS: Performed by: NURSE PRACTITIONER

## 2023-06-17 PROCEDURE — 36415 COLL VENOUS BLD VENIPUNCTURE: CPT

## 2023-06-17 PROCEDURE — 97166 OT EVAL MOD COMPLEX 45 MIN: CPT

## 2023-06-17 PROCEDURE — 6370000000 HC RX 637 (ALT 250 FOR IP): Performed by: INTERNAL MEDICINE

## 2023-06-17 PROCEDURE — 97535 SELF CARE MNGMENT TRAINING: CPT

## 2023-06-17 PROCEDURE — 6370000000 HC RX 637 (ALT 250 FOR IP): Performed by: EMERGENCY MEDICINE

## 2023-06-17 PROCEDURE — 83036 HEMOGLOBIN GLYCOSYLATED A1C: CPT

## 2023-06-17 PROCEDURE — 93010 ELECTROCARDIOGRAM REPORT: CPT | Performed by: INTERNAL MEDICINE

## 2023-06-17 PROCEDURE — 83735 ASSAY OF MAGNESIUM: CPT

## 2023-06-17 PROCEDURE — 97530 THERAPEUTIC ACTIVITIES: CPT

## 2023-06-17 PROCEDURE — 80048 BASIC METABOLIC PNL TOTAL CA: CPT

## 2023-06-17 PROCEDURE — 2060000000 HC ICU INTERMEDIATE R&B

## 2023-06-17 PROCEDURE — 6360000002 HC RX W HCPCS: Performed by: STUDENT IN AN ORGANIZED HEALTH CARE EDUCATION/TRAINING PROGRAM

## 2023-06-17 PROCEDURE — 85025 COMPLETE CBC W/AUTO DIFF WBC: CPT

## 2023-06-17 RX ORDER — INSULIN LISPRO 100 [IU]/ML
0-4 INJECTION, SOLUTION INTRAVENOUS; SUBCUTANEOUS
Status: DISCONTINUED | OUTPATIENT
Start: 2023-06-17 | End: 2023-06-21 | Stop reason: HOSPADM

## 2023-06-17 RX ORDER — DEXTROSE MONOHYDRATE 100 MG/ML
INJECTION, SOLUTION INTRAVENOUS CONTINUOUS PRN
Status: DISCONTINUED | OUTPATIENT
Start: 2023-06-17 | End: 2023-06-21 | Stop reason: HOSPADM

## 2023-06-17 RX ORDER — INSULIN LISPRO 100 [IU]/ML
0-4 INJECTION, SOLUTION INTRAVENOUS; SUBCUTANEOUS NIGHTLY
Status: DISCONTINUED | OUTPATIENT
Start: 2023-06-17 | End: 2023-06-21 | Stop reason: HOSPADM

## 2023-06-17 RX ORDER — METHOCARBAMOL 500 MG/1
500 TABLET, FILM COATED ORAL 3 TIMES DAILY
Status: DISCONTINUED | OUTPATIENT
Start: 2023-06-17 | End: 2023-06-21 | Stop reason: HOSPADM

## 2023-06-17 RX ORDER — METOPROLOL TARTRATE 5 MG/5ML
5 INJECTION INTRAVENOUS ONCE
Status: COMPLETED | OUTPATIENT
Start: 2023-06-17 | End: 2023-06-17

## 2023-06-17 RX ADMIN — GABAPENTIN 600 MG: 300 CAPSULE ORAL at 22:01

## 2023-06-17 RX ADMIN — METHOCARBAMOL TABLETS 500 MG: 500 TABLET, COATED ORAL at 18:20

## 2023-06-17 RX ADMIN — HYDROXYCHLOROQUINE SULFATE 200 MG: 200 TABLET ORAL at 08:13

## 2023-06-17 RX ADMIN — FUROSEMIDE 40 MG: 10 INJECTION, SOLUTION INTRAMUSCULAR; INTRAVENOUS at 08:07

## 2023-06-17 RX ADMIN — APIXABAN 5 MG: 5 TABLET, FILM COATED ORAL at 08:12

## 2023-06-17 RX ADMIN — DILTIAZEM HYDROCHLORIDE 180 MG: 180 CAPSULE, EXTENDED RELEASE ORAL at 08:13

## 2023-06-17 RX ADMIN — FUROSEMIDE 40 MG: 10 INJECTION, SOLUTION INTRAMUSCULAR; INTRAVENOUS at 18:20

## 2023-06-17 RX ADMIN — METOPROLOL TARTRATE 5 MG: 5 INJECTION INTRAVENOUS at 01:33

## 2023-06-17 RX ADMIN — GABAPENTIN 600 MG: 300 CAPSULE ORAL at 08:12

## 2023-06-17 RX ADMIN — BOSENTAN 125 MG: 125 TABLET, FILM COATED ORAL at 22:01

## 2023-06-17 RX ADMIN — METOPROLOL SUCCINATE 100 MG: 50 TABLET, EXTENDED RELEASE ORAL at 08:13

## 2023-06-17 RX ADMIN — ASPIRIN 81 MG: 81 TABLET, COATED ORAL at 08:14

## 2023-06-17 RX ADMIN — GABAPENTIN 600 MG: 300 CAPSULE ORAL at 13:12

## 2023-06-17 RX ADMIN — HYDROCODONE BITARTRATE AND ACETAMINOPHEN 1 TABLET: 7.5; 325 TABLET ORAL at 13:12

## 2023-06-17 RX ADMIN — BOSENTAN 125 MG: 125 TABLET, FILM COATED ORAL at 08:12

## 2023-06-17 RX ADMIN — DULOXETINE HYDROCHLORIDE 60 MG: 60 CAPSULE, DELAYED RELEASE ORAL at 08:13

## 2023-06-17 RX ADMIN — NITROGLYCERIN 0.5 INCH: 20 OINTMENT TOPICAL at 12:27

## 2023-06-17 RX ADMIN — APIXABAN 5 MG: 5 TABLET, FILM COATED ORAL at 22:00

## 2023-06-17 RX ADMIN — NITROGLYCERIN 0.5 INCH: 20 OINTMENT TOPICAL at 18:20

## 2023-06-17 ASSESSMENT — PAIN SCALES - GENERAL
PAINLEVEL_OUTOF10: 0
PAINLEVEL_OUTOF10: 7
PAINLEVEL_OUTOF10: 0

## 2023-06-17 ASSESSMENT — PAIN DESCRIPTION - LOCATION: LOCATION: NECK

## 2023-06-18 LAB
ANION GAP SERPL CALCULATED.3IONS-SCNC: 6 MMOL/L (ref 3–16)
BUN SERPL-MCNC: 33 MG/DL (ref 7–20)
CALCIUM SERPL-MCNC: 8.9 MG/DL (ref 8.3–10.6)
CHLORIDE SERPL-SCNC: 101 MMOL/L (ref 99–110)
CO2 SERPL-SCNC: 37 MMOL/L (ref 21–32)
CREAT SERPL-MCNC: 1 MG/DL (ref 0.6–1.2)
EST. AVERAGE GLUCOSE BLD GHB EST-MCNC: 114 MG/DL
GFR SERPLBLD CREATININE-BSD FMLA CKD-EPI: 57 ML/MIN/{1.73_M2}
GLUCOSE BLD-MCNC: 116 MG/DL (ref 70–99)
GLUCOSE BLD-MCNC: 154 MG/DL (ref 70–99)
GLUCOSE BLD-MCNC: 165 MG/DL (ref 70–99)
GLUCOSE BLD-MCNC: 205 MG/DL (ref 70–99)
GLUCOSE SERPL-MCNC: 147 MG/DL (ref 70–99)
HBA1C MFR BLD: 5.6 %
MAGNESIUM SERPL-MCNC: 2.1 MG/DL (ref 1.8–2.4)
PERFORMED ON: ABNORMAL
POTASSIUM SERPL-SCNC: 3.6 MMOL/L (ref 3.5–5.1)
SODIUM SERPL-SCNC: 144 MMOL/L (ref 136–145)

## 2023-06-18 PROCEDURE — 6370000000 HC RX 637 (ALT 250 FOR IP): Performed by: EMERGENCY MEDICINE

## 2023-06-18 PROCEDURE — 2060000000 HC ICU INTERMEDIATE R&B

## 2023-06-18 PROCEDURE — 80048 BASIC METABOLIC PNL TOTAL CA: CPT

## 2023-06-18 PROCEDURE — 6370000000 HC RX 637 (ALT 250 FOR IP): Performed by: STUDENT IN AN ORGANIZED HEALTH CARE EDUCATION/TRAINING PROGRAM

## 2023-06-18 PROCEDURE — 83735 ASSAY OF MAGNESIUM: CPT

## 2023-06-18 PROCEDURE — 6360000002 HC RX W HCPCS: Performed by: INTERNAL MEDICINE

## 2023-06-18 PROCEDURE — 2580000003 HC RX 258: Performed by: INTERNAL MEDICINE

## 2023-06-18 PROCEDURE — 6360000002 HC RX W HCPCS: Performed by: STUDENT IN AN ORGANIZED HEALTH CARE EDUCATION/TRAINING PROGRAM

## 2023-06-18 PROCEDURE — 6370000000 HC RX 637 (ALT 250 FOR IP): Performed by: INTERNAL MEDICINE

## 2023-06-18 PROCEDURE — 36415 COLL VENOUS BLD VENIPUNCTURE: CPT

## 2023-06-18 PROCEDURE — 99223 1ST HOSP IP/OBS HIGH 75: CPT | Performed by: INTERNAL MEDICINE

## 2023-06-18 RX ORDER — DIGOXIN 125 MCG
125 TABLET ORAL DAILY
Status: DISCONTINUED | OUTPATIENT
Start: 2023-06-18 | End: 2023-06-21 | Stop reason: HOSPADM

## 2023-06-18 RX ORDER — DIGOXIN 125 MCG
125 TABLET ORAL DAILY
Qty: 30 TABLET | Refills: 3 | Status: SHIPPED | OUTPATIENT
Start: 2023-06-18

## 2023-06-18 RX ORDER — FUROSEMIDE 20 MG/1
40 TABLET ORAL DAILY
Qty: 90 TABLET | Refills: 3 | Status: SHIPPED | OUTPATIENT
Start: 2023-06-18 | End: 2023-06-21 | Stop reason: HOSPADM

## 2023-06-18 RX ADMIN — APIXABAN 5 MG: 5 TABLET, FILM COATED ORAL at 10:17

## 2023-06-18 RX ADMIN — METOPROLOL SUCCINATE 100 MG: 50 TABLET, EXTENDED RELEASE ORAL at 10:18

## 2023-06-18 RX ADMIN — DIGOXIN 125 MCG: 125 TABLET ORAL at 10:18

## 2023-06-18 RX ADMIN — NITROGLYCERIN 0.5 INCH: 20 OINTMENT TOPICAL at 17:52

## 2023-06-18 RX ADMIN — METHOCARBAMOL TABLETS 500 MG: 500 TABLET, COATED ORAL at 10:17

## 2023-06-18 RX ADMIN — DULOXETINE HYDROCHLORIDE 60 MG: 60 CAPSULE, DELAYED RELEASE ORAL at 10:18

## 2023-06-18 RX ADMIN — GABAPENTIN 600 MG: 300 CAPSULE ORAL at 12:56

## 2023-06-18 RX ADMIN — HYDROCODONE BITARTRATE AND ACETAMINOPHEN 1 TABLET: 7.5; 325 TABLET ORAL at 10:25

## 2023-06-18 RX ADMIN — ASPIRIN 81 MG: 81 TABLET, COATED ORAL at 10:17

## 2023-06-18 RX ADMIN — INSULIN LISPRO 1 UNITS: 100 INJECTION, SOLUTION INTRAVENOUS; SUBCUTANEOUS at 11:54

## 2023-06-18 RX ADMIN — METHOCARBAMOL TABLETS 500 MG: 500 TABLET, COATED ORAL at 20:40

## 2023-06-18 RX ADMIN — HYDROCODONE BITARTRATE AND ACETAMINOPHEN 1 TABLET: 7.5; 325 TABLET ORAL at 22:41

## 2023-06-18 RX ADMIN — HYDROCODONE BITARTRATE AND ACETAMINOPHEN 1 TABLET: 7.5; 325 TABLET ORAL at 00:16

## 2023-06-18 RX ADMIN — NITROGLYCERIN 0.5 INCH: 20 OINTMENT TOPICAL at 12:56

## 2023-06-18 RX ADMIN — DEXTROSE MONOHYDRATE 150 MG: 50 INJECTION, SOLUTION INTRAVENOUS at 14:13

## 2023-06-18 RX ADMIN — BOSENTAN 125 MG: 125 TABLET, FILM COATED ORAL at 10:18

## 2023-06-18 RX ADMIN — FUROSEMIDE 40 MG: 10 INJECTION, SOLUTION INTRAMUSCULAR; INTRAVENOUS at 10:17

## 2023-06-18 RX ADMIN — AMIODARONE HYDROCHLORIDE 1 MG/MIN: 50 INJECTION, SOLUTION INTRAVENOUS at 14:32

## 2023-06-18 RX ADMIN — GABAPENTIN 600 MG: 300 CAPSULE ORAL at 20:40

## 2023-06-18 RX ADMIN — AMIODARONE HYDROCHLORIDE 0.5 MG/MIN: 50 INJECTION, SOLUTION INTRAVENOUS at 23:49

## 2023-06-18 RX ADMIN — FUROSEMIDE 40 MG: 10 INJECTION, SOLUTION INTRAMUSCULAR; INTRAVENOUS at 17:52

## 2023-06-18 RX ADMIN — GABAPENTIN 600 MG: 300 CAPSULE ORAL at 10:17

## 2023-06-18 RX ADMIN — DILTIAZEM HYDROCHLORIDE 180 MG: 180 CAPSULE, EXTENDED RELEASE ORAL at 10:17

## 2023-06-18 RX ADMIN — APIXABAN 5 MG: 5 TABLET, FILM COATED ORAL at 20:40

## 2023-06-18 RX ADMIN — BOSENTAN 125 MG: 125 TABLET, FILM COATED ORAL at 20:40

## 2023-06-18 RX ADMIN — HYDROXYCHLOROQUINE SULFATE 200 MG: 200 TABLET ORAL at 10:18

## 2023-06-18 RX ADMIN — METHOCARBAMOL TABLETS 500 MG: 500 TABLET, COATED ORAL at 12:59

## 2023-06-18 ASSESSMENT — PAIN - FUNCTIONAL ASSESSMENT: PAIN_FUNCTIONAL_ASSESSMENT: ACTIVITIES ARE NOT PREVENTED

## 2023-06-18 ASSESSMENT — PAIN DESCRIPTION - DESCRIPTORS
DESCRIPTORS: ACHING
DESCRIPTORS: ACHING;DISCOMFORT
DESCRIPTORS: BURNING

## 2023-06-18 ASSESSMENT — PAIN DESCRIPTION - LOCATION
LOCATION: GENERALIZED
LOCATION: FOOT
LOCATION: FOOT

## 2023-06-18 ASSESSMENT — PAIN DESCRIPTION - ORIENTATION: ORIENTATION: LEFT

## 2023-06-18 ASSESSMENT — PAIN SCALES - GENERAL
PAINLEVEL_OUTOF10: 3
PAINLEVEL_OUTOF10: 7
PAINLEVEL_OUTOF10: 0
PAINLEVEL_OUTOF10: 7
PAINLEVEL_OUTOF10: 7
PAINLEVEL_OUTOF10: 3

## 2023-06-19 PROBLEM — I27.20 PULMONARY HTN (HCC): Status: ACTIVE | Noted: 2023-06-19

## 2023-06-19 LAB
ANION GAP SERPL CALCULATED.3IONS-SCNC: 9 MMOL/L (ref 3–16)
BUN SERPL-MCNC: 35 MG/DL (ref 7–20)
CALCIUM SERPL-MCNC: 8.8 MG/DL (ref 8.3–10.6)
CHLORIDE SERPL-SCNC: 97 MMOL/L (ref 99–110)
CO2 SERPL-SCNC: 35 MMOL/L (ref 21–32)
CREAT SERPL-MCNC: 1.2 MG/DL (ref 0.6–1.2)
EKG ATRIAL RATE: 62 BPM
EKG DIAGNOSIS: NORMAL
EKG P AXIS: 70 DEGREES
EKG P-R INTERVAL: 254 MS
EKG Q-T INTERVAL: 448 MS
EKG QRS DURATION: 106 MS
EKG QTC CALCULATION (BAZETT): 454 MS
EKG R AXIS: -51 DEGREES
EKG T AXIS: 99 DEGREES
EKG VENTRICULAR RATE: 62 BPM
GFR SERPLBLD CREATININE-BSD FMLA CKD-EPI: 46 ML/MIN/{1.73_M2}
GLUCOSE BLD-MCNC: 130 MG/DL (ref 70–99)
GLUCOSE BLD-MCNC: 163 MG/DL (ref 70–99)
GLUCOSE BLD-MCNC: 182 MG/DL (ref 70–99)
GLUCOSE BLD-MCNC: 196 MG/DL (ref 70–99)
GLUCOSE SERPL-MCNC: 142 MG/DL (ref 70–99)
MAGNESIUM SERPL-MCNC: 2.1 MG/DL (ref 1.8–2.4)
NT-PROBNP SERPL-MCNC: 1371 PG/ML (ref 0–449)
PERFORMED ON: ABNORMAL
POTASSIUM SERPL-SCNC: 4.1 MMOL/L (ref 3.5–5.1)
SODIUM SERPL-SCNC: 141 MMOL/L (ref 136–145)

## 2023-06-19 PROCEDURE — 93010 ELECTROCARDIOGRAM REPORT: CPT | Performed by: INTERNAL MEDICINE

## 2023-06-19 PROCEDURE — 97535 SELF CARE MNGMENT TRAINING: CPT

## 2023-06-19 PROCEDURE — 2700000000 HC OXYGEN THERAPY PER DAY

## 2023-06-19 PROCEDURE — 7100000010 HC PHASE II RECOVERY - FIRST 15 MIN

## 2023-06-19 PROCEDURE — 2580000003 HC RX 258: Performed by: INTERNAL MEDICINE

## 2023-06-19 PROCEDURE — 6370000000 HC RX 637 (ALT 250 FOR IP): Performed by: STUDENT IN AN ORGANIZED HEALTH CARE EDUCATION/TRAINING PROGRAM

## 2023-06-19 PROCEDURE — 80048 BASIC METABOLIC PNL TOTAL CA: CPT

## 2023-06-19 PROCEDURE — 83880 ASSAY OF NATRIURETIC PEPTIDE: CPT

## 2023-06-19 PROCEDURE — 94761 N-INVAS EAR/PLS OXIMETRY MLT: CPT

## 2023-06-19 PROCEDURE — 6360000002 HC RX W HCPCS: Performed by: STUDENT IN AN ORGANIZED HEALTH CARE EDUCATION/TRAINING PROGRAM

## 2023-06-19 PROCEDURE — 94760 N-INVAS EAR/PLS OXIMETRY 1: CPT

## 2023-06-19 PROCEDURE — 36415 COLL VENOUS BLD VENIPUNCTURE: CPT

## 2023-06-19 PROCEDURE — 92960 CARDIOVERSION ELECTRIC EXT: CPT | Performed by: INTERNAL MEDICINE

## 2023-06-19 PROCEDURE — 99152 MOD SED SAME PHYS/QHP 5/>YRS: CPT | Performed by: INTERNAL MEDICINE

## 2023-06-19 PROCEDURE — 93005 ELECTROCARDIOGRAM TRACING: CPT | Performed by: INTERNAL MEDICINE

## 2023-06-19 PROCEDURE — 99223 1ST HOSP IP/OBS HIGH 75: CPT | Performed by: INTERNAL MEDICINE

## 2023-06-19 PROCEDURE — 6370000000 HC RX 637 (ALT 250 FOR IP): Performed by: INTERNAL MEDICINE

## 2023-06-19 PROCEDURE — 2060000000 HC ICU INTERMEDIATE R&B

## 2023-06-19 PROCEDURE — 83735 ASSAY OF MAGNESIUM: CPT

## 2023-06-19 PROCEDURE — 92960 CARDIOVERSION ELECTRIC EXT: CPT

## 2023-06-19 RX ORDER — SODIUM CHLORIDE 0.9 % (FLUSH) 0.9 %
5-40 SYRINGE (ML) INJECTION EVERY 12 HOURS SCHEDULED
Status: DISCONTINUED | OUTPATIENT
Start: 2023-06-19 | End: 2023-06-21 | Stop reason: HOSPADM

## 2023-06-19 RX ORDER — SODIUM CHLORIDE 9 MG/ML
INJECTION, SOLUTION INTRAVENOUS PRN
Status: DISCONTINUED | OUTPATIENT
Start: 2023-06-19 | End: 2023-06-21 | Stop reason: HOSPADM

## 2023-06-19 RX ORDER — SODIUM CHLORIDE 0.9 % (FLUSH) 0.9 %
5-40 SYRINGE (ML) INJECTION PRN
Status: DISCONTINUED | OUTPATIENT
Start: 2023-06-19 | End: 2023-06-21 | Stop reason: HOSPADM

## 2023-06-19 RX ADMIN — GABAPENTIN 600 MG: 300 CAPSULE ORAL at 08:31

## 2023-06-19 RX ADMIN — DIGOXIN 125 MCG: 125 TABLET ORAL at 08:30

## 2023-06-19 RX ADMIN — DILTIAZEM HYDROCHLORIDE 180 MG: 180 CAPSULE, EXTENDED RELEASE ORAL at 08:29

## 2023-06-19 RX ADMIN — BOSENTAN 125 MG: 125 TABLET, FILM COATED ORAL at 08:31

## 2023-06-19 RX ADMIN — METHOCARBAMOL TABLETS 500 MG: 500 TABLET, COATED ORAL at 14:19

## 2023-06-19 RX ADMIN — FUROSEMIDE 40 MG: 10 INJECTION, SOLUTION INTRAMUSCULAR; INTRAVENOUS at 17:13

## 2023-06-19 RX ADMIN — SODIUM CHLORIDE, PRESERVATIVE FREE 10 ML: 5 INJECTION INTRAVENOUS at 20:43

## 2023-06-19 RX ADMIN — METHOCARBAMOL TABLETS 500 MG: 500 TABLET, COATED ORAL at 20:43

## 2023-06-19 RX ADMIN — APIXABAN 5 MG: 5 TABLET, FILM COATED ORAL at 08:31

## 2023-06-19 RX ADMIN — FUROSEMIDE 40 MG: 10 INJECTION, SOLUTION INTRAMUSCULAR; INTRAVENOUS at 08:29

## 2023-06-19 RX ADMIN — GABAPENTIN 600 MG: 300 CAPSULE ORAL at 14:19

## 2023-06-19 RX ADMIN — ASPIRIN 81 MG: 81 TABLET, COATED ORAL at 08:29

## 2023-06-19 RX ADMIN — HYDROCODONE BITARTRATE AND ACETAMINOPHEN 1 TABLET: 7.5; 325 TABLET ORAL at 05:16

## 2023-06-19 RX ADMIN — GABAPENTIN 600 MG: 300 CAPSULE ORAL at 20:43

## 2023-06-19 RX ADMIN — HYDROXYCHLOROQUINE SULFATE 200 MG: 200 TABLET ORAL at 08:30

## 2023-06-19 RX ADMIN — DULOXETINE HYDROCHLORIDE 60 MG: 60 CAPSULE, DELAYED RELEASE ORAL at 08:32

## 2023-06-19 RX ADMIN — BOSENTAN 125 MG: 125 TABLET, FILM COATED ORAL at 20:44

## 2023-06-19 RX ADMIN — METOPROLOL SUCCINATE 100 MG: 50 TABLET, EXTENDED RELEASE ORAL at 08:29

## 2023-06-19 RX ADMIN — APIXABAN 5 MG: 5 TABLET, FILM COATED ORAL at 20:42

## 2023-06-19 RX ADMIN — METHOCARBAMOL TABLETS 500 MG: 500 TABLET, COATED ORAL at 08:30

## 2023-06-19 ASSESSMENT — PAIN DESCRIPTION - LOCATION: LOCATION: FOOT

## 2023-06-19 ASSESSMENT — PAIN SCALES - GENERAL
PAINLEVEL_OUTOF10: 8
PAINLEVEL_OUTOF10: 0

## 2023-06-19 NOTE — CONSULTS
[address]   Electrophysiology Consultation   Date: 6/19/2023  Reason for Consultation: Shortness of breath, diastolic heart failure, atrial fibrillation  Consult Requesting Physician: Vale Gamboa MD     Chief Complaint   Patient presents with    Shortness of Breath     PT brought from [facility] by [facility]  EMS. Pt c/o SOB starting 4 days ago, normally wears 3 liters satting 82-85, switched to 6 liters by EMS and came up to 96%. HPI: Erik Ortiz is a [de-identified] y.o. female with history of paroxysmal atrial fibrillation, presyncope, crest syndrome, hypertension, fibromyalgia who was recently seen in the office was admitted due to shortness of breath. She has history of pulmonary pretension and status dual-chamber pacemaker. On admission she was diagnosed with acute on chronic diastolic heart failure. She has chronic respiratory failure on 3 L oxygen at home. She was in sinus rhythm on the admission day. Later she went into atrial fibrillation. She was rate controlled and cardioverted this morning. Past Medical History:   Diagnosis Date    Diabetes mellitus ([address]     Fatty liver     Severe    Femur fracture, left ([address] 2021    spontaneous Left femur fracture    Fibromyalgia     Fracture, hip (HCC)     GERD (gastroesophageal reflux disease)     Hypertension     IBS (irritable bowel syndrome)     Osteoarthritis     Peripheral neuropathy     Postmenopausal     Scleroderma ([address]     crest        Past Surgical History:   Procedure Laterality Date    ARTHROPLASTY  08/10/2012    FIFTH TOE LEFT FOOT    BACK SURGERY      CHOLECYSTECTOMY  1989    CYSTOSCOPY  08/01/2018    with botox injection     FEMUR FRACTURE SURGERY      FIBULA FRACTURE SURGERY  2017    3 separate surgeries for a fractured left fibula tibial a closed done by Dr. Calli Persaud ([address] [place] [address] Dr Rodriguez    PARTIAL HIP ARTHROPLASTY Right 09/2019    Jeffry Hamilton

## 2023-06-19 NOTE — PLAN OF CARE
Problem: Skin/Tissue Integrity  Goal: Absence of new skin breakdown  Description: 1. Monitor for areas of redness and/or skin breakdown  2. Assess vascular access sites hourly  3. Every 4-6 hours minimum:  Change oxygen saturation probe site  4. Every 4-6 hours:  If on nasal continuous positive airway pressure, respiratory therapy assess nares and determine need for appliance change or resting period.   Outcome: Progressing     Problem: Safety - Adult  Goal: Free from fall injury  Outcome: Progressing     Problem: ABCDS Injury Assessment  Goal: Absence of physical injury  Outcome: Progressing     Problem: Respiratory - Adult  Goal: Achieves optimal ventilation and oxygenation  Outcome: Progressing     Problem: Skin/Tissue Integrity - Adult  Goal: Skin integrity remains intact  Outcome: Progressing     Problem: Genitourinary - Adult  Goal: Absence of urinary retention  Outcome: Progressing     Problem: Metabolic/Fluid and Electrolytes - Adult  Goal: Electrolytes maintained within normal limits  Outcome: Progressing     Problem: Metabolic/Fluid and Electrolytes - Adult  Goal: Hemodynamic stability and optimal renal function maintained  Outcome: Progressing

## 2023-06-19 NOTE — PROCEDURES
[facility]     Electrophysiology Procedure Note       Date of Procedure: 6/19/2023  Patient's Name: Mckinley Head  YOB: 1945   Medical Record Number: [id number]  Procedure Performed by: Carroll Carter MD    Procedures performed:  IV sedation. External Electrical cardioversion   Mallampati3  ASA 3    Indication of the procedure:  atrial fibrillation      Details of procedure: The patient was brought to the cath lab area in a fasting and non-sedated state. The risks, benefits and alternatives of the procedure were discussed with the patient. The patient opted to proceed with the procedure. Written informed consent was signed and placed in the chart. A timeout protocol was completed to identify the patient and the procedure being performed. I pushed 25 mg Brevital.   We monitored the patient's level of consciousness and vital signs/physiologic status throughout the procedure duration (see start and stop times below). Sedation:     Sedation start: 0955  Sedation stop: 1010     Patient is on chronic anticoagulation therapy. Then we used Brevital for sedation and electrical DC cardioversion was perfomred using 200J, synchronized shock. Patient was converted to sinus rhythm at 62 bpm. The patient tolerated the procedure well and there were no complications. Conclusion:   Successful external DC cardioversion of atrial fibrillation      Plan:   The patient can be discharged if remains stable. Will continue with medical therapy.

## 2023-06-19 NOTE — PRE SEDATION
tablet Take 1 tablet by mouth every 6 hours as needed. Historical Provider, MD   OXYGEN Inhale 2 L into the lungs    Historical Provider, MD   vitamin C (ASCORBIC ACID) 500 MG tablet Take 1 tablet by mouth daily    Historical Provider, MD   zinc sulfate (ZINCATE) 220 (50 Zn) MG capsule Take 220 mg by mouth daily    Historical Provider, MD   DULoxetine (CYMBALTA) 60 MG extended release capsule Take 1 capsule by mouth daily  Patient taking differently: Take 1 capsule by mouth daily 10/30/20   TOM Soriano - CNP   hydroxychloroquine (PLAQUENIL) 200 MG tablet Take 1 tablet by mouth daily    Historical Provider, MD   acetaminophen (TYLENOL) 500 MG tablet Take 1 tablet by mouth every 6 hours as needed for Pain    Historical Provider, MD   gabapentin (NEURONTIN) 600 MG tablet Take by mouth. 600mg morning, 600 mg evening, 600mg bedtime    Historical Provider, MD   Calcium Polycarbophil (FIBER-CAPS PO) Take by mouth    Historical Provider, MD   Probiotic Product (PROBIOTIC DAILY PO) Take by mouth daily    Historical Provider, MD   MAGNESIUM OXIDE PO Take 500 mg by mouth daily     Historical Provider, MD   Biotin 5 MG CAPS Take 1,000 mcg by mouth daily     Historical Provider, MD   FISH OIL 1,200 mg by Does not apply route daily    Historical Provider, MD   Multiple Vitamin (THERA) TABS Take  by mouth. Historical Provider, MD     Coumadin Use Last 7 Days:  no  Antiplatelet drug therapy use last 7 days: Other anticoagulant use last 7 days: yes -   Additional Medication Information:        Pre-Sedation Documentation and Exam:   I have personally completed a history, physical exam & review of systems for this patient (see notes).     Mallampati Airway Assessment:  Mallampati Class II - (soft palate, fauces & uvula are visible)    Prior History of Anesthesia Complications:   none    ASA Classification:  Class 3 - A patient with severe systemic disease that limits activity but is not incapacitating    Sedation/

## 2023-06-20 LAB
ANION GAP SERPL CALCULATED.3IONS-SCNC: 6 MMOL/L (ref 3–16)
BUN SERPL-MCNC: 36 MG/DL (ref 7–20)
CALCIUM SERPL-MCNC: 9 MG/DL (ref 8.3–10.6)
CHLORIDE SERPL-SCNC: 99 MMOL/L (ref 99–110)
CO2 SERPL-SCNC: 36 MMOL/L (ref 21–32)
CREAT SERPL-MCNC: 1 MG/DL (ref 0.6–1.2)
GFR SERPLBLD CREATININE-BSD FMLA CKD-EPI: 57 ML/MIN/{1.73_M2}
GLUCOSE BLD-MCNC: 143 MG/DL (ref 70–99)
GLUCOSE BLD-MCNC: 185 MG/DL (ref 70–99)
GLUCOSE BLD-MCNC: 201 MG/DL (ref 70–99)
GLUCOSE BLD-MCNC: 99 MG/DL (ref 70–99)
GLUCOSE SERPL-MCNC: 152 MG/DL (ref 70–99)
MAGNESIUM SERPL-MCNC: 2.4 MG/DL (ref 1.8–2.4)
PERFORMED ON: ABNORMAL
PERFORMED ON: NORMAL
POTASSIUM SERPL-SCNC: 4 MMOL/L (ref 3.5–5.1)
SODIUM SERPL-SCNC: 141 MMOL/L (ref 136–145)

## 2023-06-20 PROCEDURE — 80048 BASIC METABOLIC PNL TOTAL CA: CPT

## 2023-06-20 PROCEDURE — 2060000000 HC ICU INTERMEDIATE R&B

## 2023-06-20 PROCEDURE — 36415 COLL VENOUS BLD VENIPUNCTURE: CPT

## 2023-06-20 PROCEDURE — 83540 ASSAY OF IRON: CPT

## 2023-06-20 PROCEDURE — 82607 VITAMIN B-12: CPT

## 2023-06-20 PROCEDURE — 6370000000 HC RX 637 (ALT 250 FOR IP): Performed by: STUDENT IN AN ORGANIZED HEALTH CARE EDUCATION/TRAINING PROGRAM

## 2023-06-20 PROCEDURE — 83550 IRON BINDING TEST: CPT

## 2023-06-20 PROCEDURE — 82728 ASSAY OF FERRITIN: CPT

## 2023-06-20 PROCEDURE — 97535 SELF CARE MNGMENT TRAINING: CPT

## 2023-06-20 PROCEDURE — 6360000002 HC RX W HCPCS: Performed by: STUDENT IN AN ORGANIZED HEALTH CARE EDUCATION/TRAINING PROGRAM

## 2023-06-20 PROCEDURE — 83735 ASSAY OF MAGNESIUM: CPT

## 2023-06-20 PROCEDURE — 6370000000 HC RX 637 (ALT 250 FOR IP): Performed by: EMERGENCY MEDICINE

## 2023-06-20 PROCEDURE — 6370000000 HC RX 637 (ALT 250 FOR IP): Performed by: INTERNAL MEDICINE

## 2023-06-20 PROCEDURE — 82746 ASSAY OF FOLIC ACID SERUM: CPT

## 2023-06-20 PROCEDURE — 2580000003 HC RX 258: Performed by: INTERNAL MEDICINE

## 2023-06-20 RX ORDER — SENNA AND DOCUSATE SODIUM 50; 8.6 MG/1; MG/1
2 TABLET, FILM COATED ORAL DAILY
Status: DISCONTINUED | OUTPATIENT
Start: 2023-06-20 | End: 2023-06-21 | Stop reason: HOSPADM

## 2023-06-20 RX ORDER — POLYETHYLENE GLYCOL 3350 17 G/17G
17 POWDER, FOR SOLUTION ORAL DAILY
Status: DISCONTINUED | OUTPATIENT
Start: 2023-06-20 | End: 2023-06-21 | Stop reason: HOSPADM

## 2023-06-20 RX ORDER — FUROSEMIDE 40 MG/1
40 TABLET ORAL 2 TIMES DAILY
Status: DISCONTINUED | OUTPATIENT
Start: 2023-06-20 | End: 2023-06-21 | Stop reason: HOSPADM

## 2023-06-20 RX ADMIN — FUROSEMIDE 40 MG: 40 TABLET ORAL at 17:52

## 2023-06-20 RX ADMIN — POLYETHYLENE GLYCOL 3350 17 G: 17 POWDER, FOR SOLUTION ORAL at 17:52

## 2023-06-20 RX ADMIN — GABAPENTIN 600 MG: 300 CAPSULE ORAL at 14:21

## 2023-06-20 RX ADMIN — SODIUM CHLORIDE, PRESERVATIVE FREE 10 ML: 5 INJECTION INTRAVENOUS at 08:44

## 2023-06-20 RX ADMIN — BOSENTAN 125 MG: 125 TABLET, FILM COATED ORAL at 08:44

## 2023-06-20 RX ADMIN — DILTIAZEM HYDROCHLORIDE 180 MG: 180 CAPSULE, EXTENDED RELEASE ORAL at 08:43

## 2023-06-20 RX ADMIN — DULOXETINE HYDROCHLORIDE 60 MG: 60 CAPSULE, DELAYED RELEASE ORAL at 08:43

## 2023-06-20 RX ADMIN — FUROSEMIDE 40 MG: 10 INJECTION, SOLUTION INTRAMUSCULAR; INTRAVENOUS at 08:44

## 2023-06-20 RX ADMIN — HYDROCODONE BITARTRATE AND ACETAMINOPHEN 1 TABLET: 7.5; 325 TABLET ORAL at 23:03

## 2023-06-20 RX ADMIN — BOSENTAN 125 MG: 125 TABLET, FILM COATED ORAL at 20:50

## 2023-06-20 RX ADMIN — INSULIN LISPRO 1 UNITS: 100 INJECTION, SOLUTION INTRAVENOUS; SUBCUTANEOUS at 12:32

## 2023-06-20 RX ADMIN — APIXABAN 5 MG: 5 TABLET, FILM COATED ORAL at 08:44

## 2023-06-20 RX ADMIN — NITROGLYCERIN 0.5 INCH: 20 OINTMENT TOPICAL at 17:52

## 2023-06-20 RX ADMIN — APIXABAN 5 MG: 5 TABLET, FILM COATED ORAL at 20:50

## 2023-06-20 RX ADMIN — HYDROXYCHLOROQUINE SULFATE 200 MG: 200 TABLET ORAL at 08:43

## 2023-06-20 RX ADMIN — GABAPENTIN 600 MG: 300 CAPSULE ORAL at 20:50

## 2023-06-20 RX ADMIN — NITROGLYCERIN 0.5 INCH: 20 OINTMENT TOPICAL at 12:32

## 2023-06-20 RX ADMIN — METHOCARBAMOL TABLETS 500 MG: 500 TABLET, COATED ORAL at 14:21

## 2023-06-20 RX ADMIN — GABAPENTIN 600 MG: 300 CAPSULE ORAL at 08:43

## 2023-06-20 RX ADMIN — NITROGLYCERIN 0.5 INCH: 20 OINTMENT TOPICAL at 23:03

## 2023-06-20 RX ADMIN — DIGOXIN 125 MCG: 125 TABLET ORAL at 08:44

## 2023-06-20 RX ADMIN — METHOCARBAMOL TABLETS 500 MG: 500 TABLET, COATED ORAL at 20:50

## 2023-06-20 RX ADMIN — METHOCARBAMOL TABLETS 500 MG: 500 TABLET, COATED ORAL at 08:44

## 2023-06-20 RX ADMIN — METOPROLOL SUCCINATE 100 MG: 50 TABLET, EXTENDED RELEASE ORAL at 08:43

## 2023-06-20 RX ADMIN — ASPIRIN 81 MG: 81 TABLET, COATED ORAL at 08:44

## 2023-06-20 ASSESSMENT — PAIN SCALES - GENERAL
PAINLEVEL_OUTOF10: 4
PAINLEVEL_OUTOF10: 5
PAINLEVEL_OUTOF10: 3
PAINLEVEL_OUTOF10: 0
PAINLEVEL_OUTOF10: 0

## 2023-06-20 ASSESSMENT — PAIN DESCRIPTION - LOCATION
LOCATION: GENERALIZED
LOCATION: FOOT
LOCATION: GENERALIZED

## 2023-06-20 ASSESSMENT — PAIN DESCRIPTION - DESCRIPTORS
DESCRIPTORS: ACHING
DESCRIPTORS: ACHING

## 2023-06-20 ASSESSMENT — PAIN DESCRIPTION - ORIENTATION: ORIENTATION: LEFT

## 2023-06-20 NOTE — PLAN OF CARE
Problem: Discharge Planning  Goal: Discharge to home or other facility with appropriate resources  Outcome: Progressing     Problem: Skin/Tissue Integrity  Goal: Absence of new skin breakdown  Description: 1. Monitor for areas of redness and/or skin breakdown  2. Assess vascular access sites hourly  3. Every 4-6 hours minimum:  Change oxygen saturation probe site  4. Every 4-6 hours:  If on nasal continuous positive airway pressure, respiratory therapy assess nares and determine need for appliance change or resting period.   Outcome: Progressing     Problem: Safety - Adult  Goal: Free from fall injury  Outcome: Progressing     Problem: ABCDS Injury Assessment  Goal: Absence of physical injury  Outcome: Progressing     Problem: Respiratory - Adult  Goal: Achieves optimal ventilation and oxygenation  Outcome: Progressing     Problem: Skin/Tissue Integrity - Adult  Goal: Skin integrity remains intact  Outcome: Progressing     Problem: Musculoskeletal - Adult  Goal: Return mobility to safest level of function  Outcome: Progressing     Problem: Genitourinary - Adult  Goal: Absence of urinary retention  Outcome: Progressing     Problem: Metabolic/Fluid and Electrolytes - Adult  Goal: Electrolytes maintained within normal limits  Outcome: Progressing  Goal: Hemodynamic stability and optimal renal function maintained  Outcome: Progressing     Problem: Hematologic - Adult  Goal: Maintains hematologic stability  Outcome: Progressing     Problem: Neurosensory - Adult  Goal: Achieves stable or improved neurological status  Outcome: Progressing     Problem: Cardiovascular - Adult  Goal: Maintains optimal cardiac output and hemodynamic stability  Outcome: Progressing     Problem: Chronic Conditions and Co-morbidities  Goal: Patient's chronic conditions and co-morbidity symptoms are monitored and maintained or improved  Outcome: Progressing     Problem: Pain  Goal: Verbalizes/displays adequate comfort level or baseline

## 2023-06-20 NOTE — CARE COORDINATION
Discharge Planning:     (CM) spoke with Patient about D/C planning as Dr. Josse Dunne had advised that Patient was hesitant to return home today. CM spoke with Patient who advised she did want to stay tonight and that she will be comfortable going home tomorrow morning around 10/11AM is when her family can get her. CM reminded Pt she had secured [address] for Patient and she is happy with that. Patient declined having any concerns about going home tomorrow. CM team to follow for any additional needs.        Electronically signed by KARMEN Magallanes on 6/20/2023 at 3:49 PM

## 2023-06-21 VITALS
OXYGEN SATURATION: 100 % | WEIGHT: 175.1 LBS | RESPIRATION RATE: 16 BRPM | DIASTOLIC BLOOD PRESSURE: 68 MMHG | HEIGHT: 67 IN | BODY MASS INDEX: 27.48 KG/M2 | SYSTOLIC BLOOD PRESSURE: 126 MMHG | HEART RATE: 64 BPM | TEMPERATURE: 97.6 F

## 2023-06-21 LAB
ALBUMIN SERPL-MCNC: 3.6 G/DL (ref 3.4–5)
ALBUMIN/GLOB SERPL: 1.3 {RATIO} (ref 1.1–2.2)
ALP SERPL-CCNC: 64 U/L (ref 40–129)
ALT SERPL-CCNC: 11 U/L (ref 10–40)
ANION GAP SERPL CALCULATED.3IONS-SCNC: 8 MMOL/L (ref 3–16)
AST SERPL-CCNC: 17 U/L (ref 15–37)
BILIRUB SERPL-MCNC: 0.4 MG/DL (ref 0–1)
BUN SERPL-MCNC: 29 MG/DL (ref 7–20)
CALCIUM SERPL-MCNC: 8.7 MG/DL (ref 8.3–10.6)
CHLORIDE SERPL-SCNC: 100 MMOL/L (ref 99–110)
CO2 SERPL-SCNC: 34 MMOL/L (ref 21–32)
CREAT SERPL-MCNC: 0.9 MG/DL (ref 0.6–1.2)
DEPRECATED RDW RBC AUTO: 14 % (ref 12.4–15.4)
FERRITIN SERPL IA-MCNC: 169.6 NG/ML (ref 15–150)
FOLATE SERPL-MCNC: >20 NG/ML (ref 4.78–24.2)
GFR SERPLBLD CREATININE-BSD FMLA CKD-EPI: >60 ML/MIN/{1.73_M2}
GLUCOSE BLD-MCNC: 121 MG/DL (ref 70–99)
GLUCOSE BLD-MCNC: 270 MG/DL (ref 70–99)
GLUCOSE SERPL-MCNC: 125 MG/DL (ref 70–99)
HCT VFR BLD AUTO: 33.9 % (ref 36–48)
HGB BLD-MCNC: 11 G/DL (ref 12–16)
IRON SATN MFR SERPL: 20 % (ref 15–50)
IRON SERPL-MCNC: 56 UG/DL (ref 37–145)
MAGNESIUM SERPL-MCNC: 2.3 MG/DL (ref 1.8–2.4)
MCH RBC QN AUTO: 28.5 PG (ref 26–34)
MCHC RBC AUTO-ENTMCNC: 32.4 G/DL (ref 31–36)
MCV RBC AUTO: 88.1 FL (ref 80–100)
PERFORMED ON: ABNORMAL
PERFORMED ON: ABNORMAL
PHOSPHATE SERPL-MCNC: 3.3 MG/DL (ref 2.5–4.9)
PLATELET # BLD AUTO: 153 K/UL (ref 135–450)
PMV BLD AUTO: 9 FL (ref 5–10.5)
POTASSIUM SERPL-SCNC: 4 MMOL/L (ref 3.5–5.1)
PROT SERPL-MCNC: 6.4 G/DL (ref 6.4–8.2)
RBC # BLD AUTO: 3.84 M/UL (ref 4–5.2)
SODIUM SERPL-SCNC: 142 MMOL/L (ref 136–145)
TIBC SERPL-MCNC: 275 UG/DL (ref 260–445)
VIT B12 SERPL-MCNC: 612 PG/ML (ref 211–911)
WBC # BLD AUTO: 5.9 K/UL (ref 4–11)

## 2023-06-21 PROCEDURE — 97530 THERAPEUTIC ACTIVITIES: CPT

## 2023-06-21 PROCEDURE — 83735 ASSAY OF MAGNESIUM: CPT

## 2023-06-21 PROCEDURE — 97116 GAIT TRAINING THERAPY: CPT

## 2023-06-21 PROCEDURE — 36415 COLL VENOUS BLD VENIPUNCTURE: CPT

## 2023-06-21 PROCEDURE — 97110 THERAPEUTIC EXERCISES: CPT

## 2023-06-21 PROCEDURE — 2580000003 HC RX 258: Performed by: INTERNAL MEDICINE

## 2023-06-21 PROCEDURE — 84100 ASSAY OF PHOSPHORUS: CPT

## 2023-06-21 PROCEDURE — 80053 COMPREHEN METABOLIC PANEL: CPT

## 2023-06-21 PROCEDURE — 6370000000 HC RX 637 (ALT 250 FOR IP): Performed by: STUDENT IN AN ORGANIZED HEALTH CARE EDUCATION/TRAINING PROGRAM

## 2023-06-21 PROCEDURE — 85027 COMPLETE CBC AUTOMATED: CPT

## 2023-06-21 PROCEDURE — 6370000000 HC RX 637 (ALT 250 FOR IP): Performed by: INTERNAL MEDICINE

## 2023-06-21 RX ORDER — POTASSIUM CHLORIDE 20 MEQ/1
20 TABLET, EXTENDED RELEASE ORAL DAILY
Qty: 30 TABLET | Refills: 0 | Status: SHIPPED | OUTPATIENT
Start: 2023-06-21

## 2023-06-21 RX ORDER — FUROSEMIDE 40 MG/1
40 TABLET ORAL 2 TIMES DAILY
Qty: 60 TABLET | Refills: 3 | Status: SHIPPED | OUTPATIENT
Start: 2023-06-21

## 2023-06-21 RX ADMIN — GABAPENTIN 600 MG: 300 CAPSULE ORAL at 08:03

## 2023-06-21 RX ADMIN — SODIUM CHLORIDE, PRESERVATIVE FREE 10 ML: 5 INJECTION INTRAVENOUS at 08:06

## 2023-06-21 RX ADMIN — DILTIAZEM HYDROCHLORIDE 180 MG: 180 CAPSULE, EXTENDED RELEASE ORAL at 08:05

## 2023-06-21 RX ADMIN — APIXABAN 5 MG: 5 TABLET, FILM COATED ORAL at 08:03

## 2023-06-21 RX ADMIN — HYDROXYCHLOROQUINE SULFATE 200 MG: 200 TABLET ORAL at 08:03

## 2023-06-21 RX ADMIN — DULOXETINE HYDROCHLORIDE 60 MG: 60 CAPSULE, DELAYED RELEASE ORAL at 08:05

## 2023-06-21 RX ADMIN — INSULIN LISPRO 2 UNITS: 100 INJECTION, SOLUTION INTRAVENOUS; SUBCUTANEOUS at 12:25

## 2023-06-21 RX ADMIN — BOSENTAN 125 MG: 125 TABLET, FILM COATED ORAL at 08:05

## 2023-06-21 RX ADMIN — METOPROLOL SUCCINATE 100 MG: 50 TABLET, EXTENDED RELEASE ORAL at 08:04

## 2023-06-21 RX ADMIN — DIGOXIN 125 MCG: 125 TABLET ORAL at 08:03

## 2023-06-21 RX ADMIN — METHOCARBAMOL TABLETS 500 MG: 500 TABLET, COATED ORAL at 12:24

## 2023-06-21 RX ADMIN — METHOCARBAMOL TABLETS 500 MG: 500 TABLET, COATED ORAL at 08:04

## 2023-06-21 RX ADMIN — FUROSEMIDE 40 MG: 40 TABLET ORAL at 08:04

## 2023-06-21 RX ADMIN — ASPIRIN 81 MG: 81 TABLET, COATED ORAL at 08:04

## 2023-06-21 RX ADMIN — GABAPENTIN 600 MG: 300 CAPSULE ORAL at 12:24

## 2023-06-21 ASSESSMENT — PAIN SCALES - GENERAL: PAINLEVEL_OUTOF10: 0

## 2023-06-21 NOTE — DISCHARGE SUMMARY
V2.0  Discharge Summary    Name:  Caryn Grullon /Age/Sex: 1945 ([de-identified] y.o. female)   Admit Date: 2023  Discharge Date: 23    MRN & CSN:  [id number] & [id number] Encounter Date and Time 23 11:51 AM EDT    Attending:  Amie Salmon MD Discharging Provider: Amie Salmon MD       Hospital Course:     Brief HPI: Caryn Grullon is a [de-identified] y.o. female with history of paroxysmal atrial fibrillation, sick sinus syndrome status post permanent pacemaker, hypertension, restrictive lung disease, severe MR, pulmonary arterial hypertension, GERD, type 2 diabetes admitted for acute on chronic respiratory failure with hypoxia due to acute on chronic diastolic heart failure and pulmonary hypertension. Acute on chronic diastolic heart failure:  Echocardiogram with preserved EF and grade 3 diastolic dysfunction. Improved on CHF protocol with IV Lasix. Outpatient follow-up at the CHF clinic. PAF with RVR:   s/p successful DC cardioversion on 2023. Currently on digoxin, cardizem and BB  On AC with Eliquis. Outpatient follow-up with EP. T2DM controlled: Resumed home medications on discharge. A1c 5.6% 23. GERD: Stable on PPI     Benign essential HTN: Stable on metoprolol, diltiazem     Pulmonary arterial hypertension: On bosentan     SSS status post Pacemaker: Normal function as per last interrogation. Chronic obstructive pulmonary disease: Without exacerbation. Continue with inhalers. Chronic respiratory failure with hypoxia due to pulmonary HTN/COPD:  On 3 L oxygen at home - at baseline. The patient expressed appropriate understanding of, and agreement with the discharge recommendations, medications, and plan.      Consults this admission:  IP CONSULT TO HEART FAILURE NURSE/COORDINATOR  IP CONSULT TO DIETITIAN  IP CONSULT TO CARDIOLOGY  IP CONSULT TO HOME CARE NEEDS    Discharge Diagnosis:   Acute on chronic congestive heart failure [facility])      Discharge

## 2023-06-21 NOTE — PROGRESS NOTES
[address] [facility] Msb Department   Phone: [phone]    Occupational Therapy    [] Initial Evaluation            [x] Daily Treatment Note         [] Discharge Summary      Patient: Ruby Allen   : 1945   MRN: [id number]   Date of Service:  2023    Admitting Diagnosis:  Acute on chronic congestive heart failure [facility])  Current Admission Summary: This is a [de-identified] y.o. female who presents to the ED for shortness of breath. Progressively worsening over 4 days. Not quite sure why she is having this. No chest pain although she is having soreness in her chest from breathing heavily. She typically wears 3 L of oxygen at baseline. She was saturating 82 to 85% and was increased to 6 L of oxygen. Her breathing is improved with this change. No nausea or vomiting. No fevers or chills or cough. She is having some rhinorrhea. She thinks that she has gained a few pounds recently. Has been having some dysuria and is on macrobid. Past Medical History:  has a past medical history of Diabetes mellitus ([address] Fatty liver, Femur fracture, left ([address] Fibromyalgia, Fracture, hip ([address] GERD (gastroesophageal reflux disease), Hypertension, IBS (irritable bowel syndrome), Osteoarthritis, Peripheral neuropathy, Postmenopausal, and Scleroderma ([address] Past Surgical History:  has a past surgical history that includes Hysterectomy (); Cholecystectomy (); Lumbar disc surgery (); Throat surgery (); arthroplasty (08/10/2012); Fibula Fracture Surgery (); back surgery; Cystocopy (2018); Partial hip arthroplasty (Right, 2019); and Femur fracture surgery. Discharge Recommendations: Ruby Allen scored a 19/24 on the AM-PAC ADL Inpatient form. Current research shows that an AM-PAC score of 18 or greater is typically associated with a discharge to the patient's home setting.  Based on the patient's AM-PAC score, and their current ADL deficits,
Hospitalist Progress Note      PCP: Birdie Hopkins    Date of Admission: 6/16/2023    LOS: 4    Chief Complaint:   Chief Complaint   Patient presents with    Shortness of Breath     PT brought from [facility] by [facility]  EMS. Pt c/o SOB starting 4 days ago, normally wears 3 liters satting 82-85, switched to 6 liters by EMS and came up to 96%. Case Summary:   [de-identified] lady with history of paroxysmal atrial fibrillation, sick sinus syndrome status post permanent pacemaker, hypertension, restrictive lung disease, severe MR, pulmonary arterial hypertension, GERD, type 2 diabetes who is admitted for acute on chronic respiratory failure with hypoxia due to acute on chronic diastolic heart failure and pulmonary hypertension        Active Hospital Problems    Diagnosis Date Noted    Pulmonary HTN ([facility] [I27.20] 06/19/2023    Acute on chronic congestive heart failure ([address] [I50.9] 06/16/2023    Chronic obstructive pulmonary disease ([address] [J44.9] 09/16/2020    Pacemaker [Z95.0] 05/19/2020    SSS (sick sinus syndrome) ([facility] [I49.5] 04/29/2020    Benign essential HTN [I10] 02/12/2020    PAF (paroxysmal atrial fibrillation) ([facility] [I48.0] 08/27/2019    Diabetes type 2, controlled ([address] [E11.9] 08/10/2012    GERD (gastroesophageal reflux disease) [K21.9] 08/10/2012         Principal Problem:    Acute on chronic diastolic heart failure ([address]): POA - resolving now   Echocardiogram with preserved EF and grade 3 diastolic dysfunction. Monitor on CHF protocol with oral Lasix. Paroxysmal Atrial fibrillation with rapid ventricular response - POA    S/p CV 6/19/2023. Currently on digoxin, cardizem and BB per cardiology recs    On AC w eliquis       Diabetes type 2, controlled ([address]): On sliding scale insulin - controlled       GERD (gastroesophageal reflux disease): Stable      Benign essential HTN: Stable on metoprolol, diltiazem      Pulmonary arterial hypertension:  On bosentan      SSS
Hospitalist Progress Note      PCP: Robyn Shankar    Date of Admission: 6/16/2023    LOS: 3    Chief Complaint:   Chief Complaint   Patient presents with    Shortness of Breath     PT brought from [facility] by [facility]  EMS. Pt c/o SOB starting 4 days ago, normally wears 3 liters satting 82-85, switched to 6 liters by EMS and came up to 96%. Case Summary:   78-year-old lady with history of paroxysmal atrial fibrillation, sick sinus syndrome status post permanent pacemaker, hypertension, restrictive lung disease, severe MR, pulmonary arterial hypertension, GERD, type 2 diabetes who is admitted for acute on chronic respiratory failure with hypoxia due to acute on chronic diastolic heart failure and pulmonary hypertension    6/19 - has been transitioned to PO regimen - received CV today by cardiology team       [address] Problems    Diagnosis Date Noted    Heart failure ([facility] [I50.9] 06/16/2023    Chronic obstructive pulmonary disease ([facility] [J44.9] 09/16/2020    Pacemaker [Z95.0] 05/19/2020    SSS (sick sinus syndrome) ([facility] [I49.5] 04/29/2020    Benign essential HTN [I10] 02/12/2020    PAF (paroxysmal atrial fibrillation) ([address] [I48.0] 08/27/2019    Diabetes type 2, controlled ([facility] [E11.9] 08/10/2012    GERD (gastroesophageal reflux disease) [K21.9] 08/10/2012         Principal Problem:    Acute on chronic diastolic heart failure ([address]): POA - resolving now   Echocardiogram with preserved EF and grade 3 diastolic dysfunction. On oral diuretics and disease modifying meds    Home regimen has been optimized    Cardiologist is following         Paroxysmal Atrial fibrillation with rapid ventricular response - POA    S/p CV today    Currently on digoxin, cardizem and BB per cardiology recs    On AC w eliquis       Diabetes type 2, controlled ([address]): On sliding scale insulin - controlled       GERD (gastroesophageal reflux disease):  Stable      Benign essential HTN: Stable on
Nutrition Education    Pt with hx of CHF and admission triggered RD for diet education. Provided heart failure diet education during previous admission on 6/16. Pt has no new questions and understands fluid restriction. She has hx of DM and is not currently on a carb control diet. Will add modifer to existing low Na+ / 1800 mL fluid restricted diet. Time spent with patient: 5 minutes.        Lori Betancourt RD, LD  Contact Number: [phone]
[address] Department   Phone: [phone]    Physical Therapy    [] Initial Evaluation            [x] Daily Treatment Note         [] Discharge Summary      Patient: Elias Smith   : 1945   MRN: [id number]   Date of Service:  2023  Admitting Diagnosis: Acute on chronic congestive heart failure [facility])  Current Admission Summary: This is a [de-identified] y.o. female who presents to the ED for shortness of breath. Progressively worsening over 4 days. Not quite sure why she is having this. No chest pain although she is having soreness in her chest from breathing heavily. She typically wears 3 L of oxygen at baseline. She was saturating 82 to 85% and was increased to 6 L of oxygen. Her breathing is improved with this change. No nausea or vomiting. No fevers or chills or cough. She is having some rhinorrhea. She thinks that she has gained a few pounds recently. Has been having some dysuria and is on macrobid. Past Medical History:  has a past medical history of Diabetes mellitus ([address] Fatty liver, Femur fracture, left ([address] Fibromyalgia, Fracture, hip ([address] GERD (gastroesophageal reflux disease), Hypertension, IBS (irritable bowel syndrome), Osteoarthritis, Peripheral neuropathy, Postmenopausal, and Scleroderma ([address] Past Surgical History:  has a past surgical history that includes Hysterectomy (); Cholecystectomy (); Lumbar disc surgery (); Throat surgery (); arthroplasty (08/10/2012); Fibula Fracture Surgery (); back surgery; Cystocopy (2018); Partial hip arthroplasty (Right, 2019); and Femur fracture surgery. Discharge Recommendations: Elias Smith scored a 18/24 on the AM-PAC short mobility form. Current research shows that an AM-PAC score of 18 or greater is typically associated with a discharge to the patient's home setting.  Based on the patient's AM-PAC score and their current functional mobility deficits, it is
[x] Right     Hobbies: spending time with her cats  Recent Falls: none, last fall was over a year ago  On 3L of O2 normally    Subjective  General: Pt in semi-fowlers position in bed upon entry, pleasant and agreeable to OT session. Pain: 0/10  Pain Interventions: not applicable     Activities of Daily Living  Basic Activities of Daily Living  Grooming: setup assistance stand by assistance  Grooming Comments: in stance at sink to perform oral care  Lower Extremity Dressing: moderate assistance assist to doff/andre brief while supine in bed performing multiple rolls-pt declined completing task in stance, pt demo ability to andre B socks with increased time and effort utilizing crossed leg method seated EOB  Toileting: dependent. Toileting Comments: purewick catheter present, assist for clothing management   Instrumental Activities of Daily Living  No IADL completed on this date. Functional Mobility  Bed Mobility  Supine to Sit: stand by assistance  Scooting: stand by assistance  Comments: HOB slightly elevated, use of bed rail  Transfers  Sit to stand transfer:contact guard assistance  Stand to sit transfer: contact guard assistance  Comments: good awareness of hand placement  Functional Mobility:  Sitting Balance: supervision. Standing Balance: contact guard assistance.     Functional Mobility: .  contact guard assistance  Functional Mobility Activity: to/from bathroom  Functional Mobility Device Use: rolling walker  Functional Mobility Comment: slow guero     Other Therapeutic Interventions    Functional Outcomes  AM-PAC Inpatient Daily Activity Raw Score: 16    Cognition  WFL  Orientation:    alert and oriented x 4  Command Following:   [facility]     Education  Barriers To Learning: none  Patient Education: patient educated on goals, OT role and benefits, plan of care, ADL adaptive strategies, energy conservation, disease specific education, transfer training, discharge recommendations  Learning Assessment:

## 2023-06-21 NOTE — PLAN OF CARE
Problem: Discharge Planning  Goal: Discharge to home or other facility with appropriate resources  Outcome: Progressing  Flowsheets (Taken 6/21/2023 0122)  Discharge to home or other facility with appropriate resources: Identify discharge learning needs (meds, wound care, etc)     Problem: Skin/Tissue Integrity  Goal: Absence of new skin breakdown  Description: 1. Monitor for areas of redness and/or skin breakdown  2. Assess vascular access sites hourly  3. Every 4-6 hours minimum:  Change oxygen saturation probe site  4. Every 4-6 hours:  If on nasal continuous positive airway pressure, respiratory therapy assess nares and determine need for appliance change or resting period.   Outcome: Progressing     Problem: ABCDS Injury Assessment  Goal: Absence of physical injury  Outcome: Progressing     Problem: Respiratory - Adult  Goal: Achieves optimal ventilation and oxygenation  Outcome: Progressing     Problem: Musculoskeletal - Adult  Goal: Return mobility to safest level of function  Outcome: Progressing     Problem: Genitourinary - Adult  Goal: Absence of urinary retention  Outcome: Progressing     Problem: Metabolic/Fluid and Electrolytes - Adult  Goal: Electrolytes maintained within normal limits  Outcome: Progressing     Problem: Metabolic/Fluid and Electrolytes - Adult  Goal: Hemodynamic stability and optimal renal function maintained  Outcome: Progressing     Problem: Cardiovascular - Adult  Goal: Maintains optimal cardiac output and hemodynamic stability  Outcome: Progressing     Problem: Chronic Conditions and Co-morbidities  Goal: Patient's chronic conditions and co-morbidity symptoms are monitored and maintained or improved  Outcome: Progressing     Problem: Pain  Goal: Verbalizes/displays adequate comfort level or baseline comfort level  Outcome: Progressing

## 2023-06-21 NOTE — DISCHARGE INSTRUCTIONS
Your information:  Name: Vida Anderson  : 1945    Your Discharge Instructions    What to do after you leave the hospital:    Read, review and familiarize yourself with the information provided below and in a separate packet on   Heart Failure; COPD    Diet: Diabetic, Low sodium diet; 1800 ml fluid restriction    Recommended activity:   as tolerated  Avoid strenuous activity until instructed by your physician to resume; balance rest with periods of light to normal activity. If you experience any of the following: Unusual or inadequately controlled pain; unusual transient shortness of breath; recurrent or persistent nausea, heartburn, palpitations or lightheadedness; increased swelling; increased fatigue; fever >100; please follow up with @PCP@ [unfilled] or go to the Emergency Room. Home Health/ Outpatient Services:   [facility]  [id number] [place] [address] [facility]  Phone: [phone]   Fax: [phone]      Information obtained by:  By signing below, I understand and acknowledge receipt of the instructions indicated above, and I understand that if any problems occur once I leave the hospital I am to contact [email or web address]@.

## 2023-06-21 NOTE — CARE COORDINATION
06/21/23 1143   IMM Letter   IMM Letter given to Patient/Family/Significant other/Guardian/POA/by: Lisa Santana RN CM   IMM Letter time given: 2706  (copy made for hard chart)

## 2023-06-22 NOTE — PROGRESS NOTES
[address]   Congestive Heart Failure    Primary Care Doctor:  Devon Busby  Primary Cardiologist: Eliazar Vallecillo     Last/Next OV: sep 2022    Chief Complaint:  SOB    History of Present Illness:  Filemon Daigle is a [de-identified] y.o. female with PMH PAF, SSS s/p PPM , HTN, restrictive lung disease, severe MR, PAH, DM and HFpEF who presents today for hospital f/u. Filemon Daigle was admitted 6/16/23 and discharged 6/21/23. Hospital course: Acute on chronic diastolic heart failure:  Echocardiogram with preserved EF and grade 3 diastolic dysfunction. Improved on CHF protocol with IV Lasix. Outpatient follow-up at the CHF clinic. PAF with RVR:   s/p successful DC cardioversion on 6/19/2023. Currently on digoxin, cardizem and BB  On AC with Eliquis. Outpatient follow-up with EP. Pulmonary arterial hypertension: On bosentan  SSS status post Pacemaker: Normal function as per last interrogation. Chronic obstructive pulmonary disease: Without exacerbation. Continue with inhalers. Since hospitalization she reports improved dyspnea, mild edema and denies chest pain, palpitations, orthopnea, PND, exertional chest pressure/discomfort, fatigue, early saiety, edema, syncope. She is on a higher dose of lasix and her baseline wt is lower now. She is asking if she should continue medication given to her per urology for incontinence. hospital weight on d/c was 175lb and discharge home weight was 173lb. Daily wts since that time have remained stable. Today's home wt: 172        Baseline Weight: 173  Wt Readings from Last 3 Encounters:   06/21/23 175 lb 1.6 oz (79.4 kg)   05/24/23 179 lb (81.2 kg)   04/12/23 173 lb (78.5 kg)      Admit BNP: 2025   Discharge BNP: 1371      EF: 60%  Cardiac Imaging: Echo 6/16/23:    Summary   Normal left ventricle size, wall thickness, and systolic function with an   estimated ejection fraction of 60-65%. Grade III diastolic dysfunction with elevated LV filling pressures.

## 2023-06-23 ENCOUNTER — OFFICE VISIT (OUTPATIENT)
Dept: CARDIOLOGY CLINIC | Age: 78
End: 2023-06-23

## 2023-06-23 VITALS
WEIGHT: 174 LBS | DIASTOLIC BLOOD PRESSURE: 58 MMHG | BODY MASS INDEX: 27.31 KG/M2 | OXYGEN SATURATION: 95 % | HEIGHT: 67 IN | HEART RATE: 68 BPM | SYSTOLIC BLOOD PRESSURE: 124 MMHG

## 2023-06-23 DIAGNOSIS — I27.21 PAH (PULMONARY ARTERY HYPERTENSION) (HCC): ICD-10-CM

## 2023-06-23 DIAGNOSIS — I48.0 PAF (PAROXYSMAL ATRIAL FIBRILLATION) (HCC): ICD-10-CM

## 2023-06-23 DIAGNOSIS — I10 BENIGN ESSENTIAL HTN: ICD-10-CM

## 2023-06-23 DIAGNOSIS — R06.02 SOB (SHORTNESS OF BREATH): ICD-10-CM

## 2023-06-23 DIAGNOSIS — I50.33 ACUTE ON CHRONIC DIASTOLIC CONGESTIVE HEART FAILURE (HCC): Primary | ICD-10-CM

## 2023-06-23 ASSESSMENT — ENCOUNTER SYMPTOMS
GASTROINTESTINAL NEGATIVE: 1
SHORTNESS OF BREATH: 1

## 2023-06-23 NOTE — PATIENT INSTRUCTIONS
Instructions:   Medications: continue current meds, call if wt is over 175 or under 171  Labs: 2 weeks  Lifestyle Recommendations: Weigh yourself every day in the morning after urination, call Latoya Leon if wt increases 2-3lb in one day or 5lb in one week, Limit sodium to 3000mg/day and fluids to 2L or 64oz/day.    Follow up: 3- 4weeks        [facility] Resource Line: [phone]

## 2023-06-30 ENCOUNTER — TELEPHONE (OUTPATIENT)
Dept: CARDIOLOGY CLINIC | Age: 78
End: 2023-06-30

## 2023-06-30 RX ORDER — POTASSIUM CHLORIDE 20 MEQ/1
20 TABLET, EXTENDED RELEASE ORAL DAILY
Qty: 90 TABLET | Refills: 1 | Status: SHIPPED | OUTPATIENT
Start: 2023-06-30

## 2023-06-30 RX ORDER — POTASSIUM CHLORIDE 20 MEQ/1
20 TABLET, EXTENDED RELEASE ORAL DAILY
Qty: 90 TABLET | Refills: 0 | Status: CANCELLED | OUTPATIENT
Start: 2023-06-30

## 2023-07-06 ENCOUNTER — TELEPHONE (OUTPATIENT)
Dept: VASCULAR SURGERY | Age: 78
End: 2023-07-06

## 2023-07-06 NOTE — TELEPHONE ENCOUNTER
Called patient to schedule an apt with DR Funmilayo Rausch regarding pvd. Swelling in legs.  She had just got out of the shower and was going to call back to schedule apt.[email or web address]

## 2023-07-13 ASSESSMENT — ENCOUNTER SYMPTOMS
GASTROINTESTINAL NEGATIVE: 1
SHORTNESS OF BREATH: 1

## 2023-07-17 ENCOUNTER — TELEPHONE (OUTPATIENT)
Dept: VASCULAR SURGERY | Age: 78
End: 2023-07-17

## 2023-07-17 ENCOUNTER — OFFICE VISIT (OUTPATIENT)
Dept: CARDIOLOGY CLINIC | Age: 78
End: 2023-07-17
Payer: MEDICARE

## 2023-07-17 VITALS
DIASTOLIC BLOOD PRESSURE: 62 MMHG | HEART RATE: 61 BPM | HEIGHT: 67 IN | BODY MASS INDEX: 28.25 KG/M2 | WEIGHT: 180 LBS | OXYGEN SATURATION: 96 % | SYSTOLIC BLOOD PRESSURE: 138 MMHG

## 2023-07-17 DIAGNOSIS — I27.21 PAH (PULMONARY ARTERY HYPERTENSION) (HCC): ICD-10-CM

## 2023-07-17 DIAGNOSIS — I10 BENIGN ESSENTIAL HTN: ICD-10-CM

## 2023-07-17 DIAGNOSIS — I50.33 ACUTE ON CHRONIC DIASTOLIC CONGESTIVE HEART FAILURE (HCC): Primary | ICD-10-CM

## 2023-07-17 DIAGNOSIS — I48.0 PAF (PAROXYSMAL ATRIAL FIBRILLATION) (HCC): ICD-10-CM

## 2023-07-17 PROCEDURE — G8427 DOCREV CUR MEDS BY ELIG CLIN: HCPCS | Performed by: NURSE PRACTITIONER

## 2023-07-17 PROCEDURE — G8417 CALC BMI ABV UP PARAM F/U: HCPCS | Performed by: NURSE PRACTITIONER

## 2023-07-17 PROCEDURE — G8399 PT W/DXA RESULTS DOCUMENT: HCPCS | Performed by: NURSE PRACTITIONER

## 2023-07-17 PROCEDURE — 1090F PRES/ABSN URINE INCON ASSESS: CPT | Performed by: NURSE PRACTITIONER

## 2023-07-17 PROCEDURE — 3074F SYST BP LT 130 MM HG: CPT | Performed by: NURSE PRACTITIONER

## 2023-07-17 PROCEDURE — 1111F DSCHRG MED/CURRENT MED MERGE: CPT | Performed by: NURSE PRACTITIONER

## 2023-07-17 PROCEDURE — 1123F ACP DISCUSS/DSCN MKR DOCD: CPT | Performed by: NURSE PRACTITIONER

## 2023-07-17 PROCEDURE — 3078F DIAST BP <80 MM HG: CPT | Performed by: NURSE PRACTITIONER

## 2023-07-17 PROCEDURE — 99214 OFFICE O/P EST MOD 30 MIN: CPT | Performed by: NURSE PRACTITIONER

## 2023-07-17 PROCEDURE — 1036F TOBACCO NON-USER: CPT | Performed by: NURSE PRACTITIONER

## 2023-07-17 RX ORDER — SPIRONOLACTONE 25 MG/1
25 TABLET ORAL DAILY
Qty: 90 TABLET | Refills: 0 | Status: SHIPPED | OUTPATIENT
Start: 2023-07-17

## 2023-07-17 RX ORDER — DIGOXIN 125 MCG
125 TABLET ORAL DAILY
Qty: 90 TABLET | Refills: 3 | Status: SHIPPED | OUTPATIENT
Start: 2023-07-17

## 2023-07-17 RX ORDER — NITROFURANTOIN 25; 75 MG/1; MG/1
100 CAPSULE ORAL DAILY
COMMUNITY

## 2023-07-17 NOTE — TELEPHONE ENCOUNTER
Dr Penny Zhu received an outside referral from [facility] [place] [facility]. I have called patient several times to schedule but have been unable to reach her. If she calls back she can be scheduled for new consultation for PAD. Will need to know if she had testing.

## 2023-07-17 NOTE — PATIENT INSTRUCTIONS
Instructions:   Medications: start spironolactone one pill once a day and stop potassium, call if your wt does not come back down to 172  Labs: 2 weeks  Lifestyle Recommendations: Weigh yourself every day in the morning after urination, call Carlee Zhang if wt increases 2-3lb in one day or 5lb in one week, Limit sodium to 3000mg/day and fluids to 2L or 64oz/day.    Follow up: 6 weeks with Demarco Bowen CHF Resource Line: [phone]

## 2023-07-18 ENCOUNTER — NURSE ONLY (OUTPATIENT)
Dept: CARDIOLOGY CLINIC | Age: 78
End: 2023-07-18
Payer: MEDICARE

## 2023-07-18 DIAGNOSIS — Z95.0 PACEMAKER: ICD-10-CM

## 2023-07-18 DIAGNOSIS — I49.5 SSS (SICK SINUS SYNDROME) (HCC): Primary | ICD-10-CM

## 2023-07-18 PROCEDURE — 93296 REM INTERROG EVL PM/IDS: CPT | Performed by: INTERNAL MEDICINE

## 2023-07-18 PROCEDURE — 93294 REM INTERROG EVL PM/LDLS PM: CPT | Performed by: INTERNAL MEDICINE

## 2023-07-18 ASSESSMENT — ENCOUNTER SYMPTOMS: COUGH: 1

## 2023-07-18 NOTE — PROGRESS NOTES
We received remote transmission from patient's monitor at home. Transmission shows normal sensing and pacing function. Noted AT/AF. Pt is on Eliquis. EP physician will review. See interrogation under cardiology tab in the [address] field for more details.  1.6% (MVP On)  AP 78.6%    End of 91-day monitoring period 7-18-23.

## 2023-08-03 ENCOUNTER — HOSPITAL ENCOUNTER (OUTPATIENT)
Age: 78
Discharge: HOME | End: 2023-08-03
Payer: MEDICARE

## 2023-08-03 DIAGNOSIS — I50.33 ACUTE ON CHRONIC DIASTOLIC CONGESTIVE HEART FAILURE (HCC): ICD-10-CM

## 2023-08-03 LAB
ANION GAP SERPL CALCULATED.3IONS-SCNC: 8 MMOL/L (ref 3–16)
BUN SERPL-MCNC: 17 MG/DL (ref 7–20)
CALCIUM SERPL-MCNC: 9.8 MG/DL (ref 8.3–10.6)
CHLORIDE SERPL-SCNC: 98 MMOL/L (ref 99–110)
CO2 SERPL-SCNC: 35 MMOL/L (ref 21–32)
CREAT SERPL-MCNC: 1.2 MG/DL (ref 0.6–1.2)
GFR SERPLBLD CREATININE-BSD FMLA CKD-EPI: 46 ML/MIN/{1.73_M2}
GLUCOSE SERPL-MCNC: 227 MG/DL (ref 70–99)
NT-PROBNP SERPL-MCNC: 374 PG/ML (ref 0–449)
POTASSIUM SERPL-SCNC: 4.4 MMOL/L (ref 3.5–5.1)
SODIUM SERPL-SCNC: 141 MMOL/L (ref 136–145)

## 2023-08-03 PROCEDURE — 80048 BASIC METABOLIC PNL TOTAL CA: CPT

## 2023-08-03 PROCEDURE — 83880 ASSAY OF NATRIURETIC PEPTIDE: CPT

## 2023-08-03 PROCEDURE — 36415 COLL VENOUS BLD VENIPUNCTURE: CPT

## 2023-08-03 NOTE — RESULT ENCOUNTER NOTE
As discussed with you today, follow-up with your primary care provider and cardiologist this week or next for reevaluation of your chest pain. Meanwhile return to emergency department if you develop any difficulty breathing, coughing up blood, passing out, fever, worsening symptoms or any new concerns. Reviewed.     Kent Brittle, MD [facility]

## 2023-08-08 ENCOUNTER — TELEPHONE (OUTPATIENT)
Dept: CARDIOLOGY CLINIC | Age: 78
End: 2023-08-08

## 2023-08-08 NOTE — TELEPHONE ENCOUNTER
----- Message from TOM Garnett CNP sent at 8/4/2023  8:29 AM EDT -----  Labs look great, no new orders.  Todd Gaona

## 2023-08-24 ASSESSMENT — ENCOUNTER SYMPTOMS
SHORTNESS OF BREATH: 1
GASTROINTESTINAL NEGATIVE: 1

## 2023-08-24 NOTE — PROGRESS NOTES
36 06/20/2023 04:37 AM    CREATININE 1.2 08/03/2023 12:09 PM    CREATININE 0.9 06/21/2023 04:40 AM    CREATININE 1.0 06/20/2023 04:37 AM     BNP:   Lab Results   Component Value Date/Time    PROBNP 374 08/03/2023 12:09 PM    PROBNP 1,371 06/19/2023 04:29 AM    PROBNP 2,025 06/16/2023 01:24 AM     Iron Studies:    Lab Results   Component Value Date/Time    FERRITIN 169.6 06/20/2023 04:37 AM    FERRITIN 96.8 09/08/2022 12:18 PM     Lab Results   Component Value Date    IRON 56 06/20/2023    TIBC 275 06/20/2023    FERRITIN 169.6 (H) 06/20/2023        Assessment/Plan:    1.  chronic diastolic congestive heart failure ([address]) - compensated, decrease lasix to daily with extra prn, continue rudi    2. SOB (shortness of breath) - improved   3. Benign essential HTN - controlled   4. PAH (pulmonary artery hypertension) ([facility]) - stable, continue tracleer   5. PAF (paroxysmal atrial fibrillation) ([facility]) - s/p DCCV, continue cardizem, toprol and AC         Instructions:   Medications: decrease lasix to once a day, take extra if your wt is over 172lb on your scale  Labs: 2 weeks with nephrology labs  Lifestyle Recommendations: Weigh yourself every day in the morning after urination, call Oralia Campos if wt increases 2-3lb in one day or 5lb in one week, Limit sodium to 3000mg/day and fluids to 2L or 64oz/day.    Follow up: 3-4 months with Daisha Linares CHF Resource Line: [phone]      I appreciate the opportunity of cooperating in the care of this individual.    Arnav Smith, TOM - CNP, 8/24/2023, 9:28 AM

## 2023-08-28 ENCOUNTER — OFFICE VISIT (OUTPATIENT)
Dept: CARDIOLOGY CLINIC | Age: 78
End: 2023-08-28
Payer: MEDICARE

## 2023-08-28 VITALS
HEIGHT: 67 IN | OXYGEN SATURATION: 96 % | SYSTOLIC BLOOD PRESSURE: 122 MMHG | WEIGHT: 175 LBS | BODY MASS INDEX: 27.47 KG/M2 | HEART RATE: 60 BPM | DIASTOLIC BLOOD PRESSURE: 60 MMHG

## 2023-08-28 DIAGNOSIS — I50.33 ACUTE ON CHRONIC DIASTOLIC CONGESTIVE HEART FAILURE (HCC): Primary | ICD-10-CM

## 2023-08-28 DIAGNOSIS — I10 BENIGN ESSENTIAL HTN: ICD-10-CM

## 2023-08-28 DIAGNOSIS — I27.21 PAH (PULMONARY ARTERY HYPERTENSION) (HCC): ICD-10-CM

## 2023-08-28 DIAGNOSIS — I48.0 PAF (PAROXYSMAL ATRIAL FIBRILLATION) (HCC): ICD-10-CM

## 2023-08-28 PROCEDURE — 1090F PRES/ABSN URINE INCON ASSESS: CPT | Performed by: NURSE PRACTITIONER

## 2023-08-28 PROCEDURE — G8399 PT W/DXA RESULTS DOCUMENT: HCPCS | Performed by: NURSE PRACTITIONER

## 2023-08-28 PROCEDURE — 99214 OFFICE O/P EST MOD 30 MIN: CPT | Performed by: NURSE PRACTITIONER

## 2023-08-28 PROCEDURE — 1036F TOBACCO NON-USER: CPT | Performed by: NURSE PRACTITIONER

## 2023-08-28 PROCEDURE — 1123F ACP DISCUSS/DSCN MKR DOCD: CPT | Performed by: NURSE PRACTITIONER

## 2023-08-28 PROCEDURE — G8417 CALC BMI ABV UP PARAM F/U: HCPCS | Performed by: NURSE PRACTITIONER

## 2023-08-28 PROCEDURE — 3078F DIAST BP <80 MM HG: CPT | Performed by: NURSE PRACTITIONER

## 2023-08-28 PROCEDURE — G8427 DOCREV CUR MEDS BY ELIG CLIN: HCPCS | Performed by: NURSE PRACTITIONER

## 2023-08-28 PROCEDURE — 3074F SYST BP LT 130 MM HG: CPT | Performed by: NURSE PRACTITIONER

## 2023-08-28 RX ORDER — FUROSEMIDE 40 MG/1
40 TABLET ORAL DAILY
Qty: 60 TABLET | Refills: 3 | Status: SHIPPED | OUTPATIENT
Start: 2023-08-28

## 2023-08-28 NOTE — PATIENT INSTRUCTIONS
Instructions:   Medications: decrease lasix to once a day, take extra if your wt is over 172lb on your scale  Labs: 2 weeks with nephrology labs  Lifestyle Recommendations: Weigh yourself every day in the morning after urination, call Oralia Campos if wt increases 2-3lb in one day or 5lb in one week, Limit sodium to 3000mg/day and fluids to 2L or 64oz/day.    Follow up: 3-4 months with Daisha Linares [facility] Resource Line: [phone]

## 2023-09-13 ENCOUNTER — TELEPHONE (OUTPATIENT)
Dept: CARDIOLOGY CLINIC | Age: 78
End: 2023-09-13

## 2023-09-13 NOTE — TELEPHONE ENCOUNTER
Medication Refill    Medication needing refilled:  spironolactone (ALDACTONE) 25 MG tablet     Dosage of the medication:    How are you taking this medication (QD, BID, TID, QID, PRN):  Take 1 tablet by mouth daily    30 or 90 day supply called in:  90 days, requesting refills as well    When will you run out of your medication:    Which Pharmacy are we sending the medication to?:  [facility], [address] [place] [address] [phone]

## 2023-09-14 RX ORDER — SPIRONOLACTONE 25 MG/1
25 TABLET ORAL DAILY
Qty: 90 TABLET | Refills: 0 | Status: SHIPPED | OUTPATIENT
Start: 2023-09-14

## 2023-10-24 ENCOUNTER — TELEPHONE (OUTPATIENT)
Dept: CARDIOLOGY CLINIC | Age: 78
End: 2023-10-24

## 2023-10-24 DIAGNOSIS — I50.33 ACUTE ON CHRONIC DIASTOLIC CONGESTIVE HEART FAILURE (HCC): ICD-10-CM

## 2023-10-24 DIAGNOSIS — I10 BENIGN ESSENTIAL HTN: Primary | ICD-10-CM

## 2023-10-24 NOTE — TELEPHONE ENCOUNTER
Spoke to patient she has not had any today. She had a bad muscle spasm last night in her bad leg. She has a spasm every once in a while could this be from the medication change stopping potassium and starting spironolactone?

## 2023-10-25 NOTE — TELEPHONE ENCOUNTER
We should get labs and check potassium and magnesium to see if that is the cause of muscle cramps.  Patrick Bueno

## 2023-10-25 NOTE — TELEPHONE ENCOUNTER
Tried to reach patient, [facility] for patient to get labs done asap. Magnesium lab order placed. Asked patient to call with any questions or concerns. Tried to reach patient [facility] for her to return our call about her muscle cramps and NPKV lab orders.

## 2023-10-31 ENCOUNTER — HOSPITAL ENCOUNTER (OUTPATIENT)
Age: 78
Discharge: HOME | End: 2023-10-31
Payer: MEDICARE

## 2023-10-31 DIAGNOSIS — I10 BENIGN ESSENTIAL HTN: ICD-10-CM

## 2023-10-31 DIAGNOSIS — R06.02 SOB (SHORTNESS OF BREATH): ICD-10-CM

## 2023-10-31 DIAGNOSIS — I50.33 ACUTE ON CHRONIC DIASTOLIC CONGESTIVE HEART FAILURE (HCC): ICD-10-CM

## 2023-10-31 LAB
ANION GAP SERPL CALCULATED.3IONS-SCNC: 9 MMOL/L (ref 3–16)
BUN SERPL-MCNC: 23 MG/DL (ref 7–20)
CALCIUM SERPL-MCNC: 9.5 MG/DL (ref 8.3–10.6)
CHLORIDE SERPL-SCNC: 93 MMOL/L (ref 99–110)
CO2 SERPL-SCNC: 34 MMOL/L (ref 21–32)
CREAT SERPL-MCNC: 1.3 MG/DL (ref 0.6–1.2)
GFR SERPLBLD CREATININE-BSD FMLA CKD-EPI: 41 ML/MIN/{1.73_M2}
GLUCOSE SERPL-MCNC: 268 MG/DL (ref 70–99)
MAGNESIUM SERPL-MCNC: 2.5 MG/DL (ref 1.8–2.4)
NT-PROBNP SERPL-MCNC: 474 PG/ML (ref 0–449)
POTASSIUM SERPL-SCNC: 4.8 MMOL/L (ref 3.5–5.1)
SODIUM SERPL-SCNC: 136 MMOL/L (ref 136–145)

## 2023-10-31 PROCEDURE — 36415 COLL VENOUS BLD VENIPUNCTURE: CPT

## 2023-10-31 PROCEDURE — 83735 ASSAY OF MAGNESIUM: CPT

## 2023-10-31 PROCEDURE — 83880 ASSAY OF NATRIURETIC PEPTIDE: CPT

## 2023-10-31 PROCEDURE — 80048 BASIC METABOLIC PNL TOTAL CA: CPT

## 2023-11-01 ENCOUNTER — TELEPHONE (OUTPATIENT)
Dept: CARDIOLOGY CLINIC | Age: 78
End: 2023-11-01

## 2023-11-01 NOTE — TELEPHONE ENCOUNTER
----- Message from TOM Donohue CNP sent at 11/1/2023  8:20 AM EDT -----  Labs look stable, no new orders. Marcelina Certain to reach patient [facility] about lab results. Asked patient to call with any questions or concerns.

## 2023-11-01 NOTE — TELEPHONE ENCOUNTER
----- Message from TOM Garnett CNP sent at 11/1/2023  8:20 AM EDT -----  Labs look stable, no new orders.  Todd Gaona

## 2023-11-12 NOTE — PROGRESS NOTES
+      Labs  Lab Results   Component Value Date/Time    WBC 5.9 06/21/2023 04:40 AM    HGB 11.0 06/21/2023 04:40 AM    HCT 33.9 06/21/2023 04:40 AM    MCV 88.1 06/21/2023 04:40 AM     06/21/2023 04:40 AM     Lab Results   Component Value Date/Time     10/31/2023 02:54 PM    K 4.8 10/31/2023 02:54 PM    K 4.3 06/16/2023 01:24 AM    CL 93 10/31/2023 02:54 PM    CO2 34 10/31/2023 02:54 PM    PHOS 3.3 06/21/2023 04:40 AM    BUN 23 10/31/2023 02:54 PM    CREATININE 1.3 10/31/2023 02:54 PM      No results found for: \"GLU\"    Imaging:     10/31/2023:  Lower extremity arterial Doppler ultrasound was done and ABIs were   noted as follows. In the right lower extremity the NABIL is 0.85 and   0.83. In the left lower extremity NABIL 0.73 and 0.73. Overall it   indicates mild to moderate arterial insufficiency in bilateral   lower extremity. If patient does have significant stigmata of PAD   then CT angio is recommended. 10/2022:  Report from an outside location [facility]) show normal NABIL and TBI bilaterally. Very mild abnormal PVR waveforms    No orders to display     No results found. Assessment:   Peripheral neuropathy  Mild peripheral vascular disease      Plan:  22-year-old female with pain in her feet particularly on her left foot. Likely related to neuropathy however she does have some underlying mild arterial insufficiency based on NABIL testing. Given her diabetic status recommend a formal arterial duplex to ensure adequate peripheral circulation. I do not expect it to be normal however want to ensure that there is adequate flow in that arterial disease is not contributing to her neuropathy. She is nonambulatory and on home oxygen which would limit intervention options. She would be a candidate likely for an angiogram however would not be a candidate for surgical bypass if it ever came to that point. We will contact with results of the study      Chang Allen M.D., FACS.  11/12/2023  2:20 PM

## 2023-11-14 ENCOUNTER — OFFICE VISIT (OUTPATIENT)
Dept: VASCULAR SURGERY | Age: 78
End: 2023-11-14
Payer: MEDICARE

## 2023-11-14 VITALS
HEIGHT: 67 IN | BODY MASS INDEX: 26.06 KG/M2 | WEIGHT: 166 LBS | SYSTOLIC BLOOD PRESSURE: 118 MMHG | DIASTOLIC BLOOD PRESSURE: 78 MMHG

## 2023-11-14 DIAGNOSIS — I70.223 ATHEROSCLEROSIS OF NATIVE ARTERIES OF EXTREMITIES WITH REST PAIN, BILATERAL LEGS (HCC): Primary | ICD-10-CM

## 2023-11-14 PROCEDURE — 3074F SYST BP LT 130 MM HG: CPT | Performed by: SURGERY

## 2023-11-14 PROCEDURE — G8484 FLU IMMUNIZE NO ADMIN: HCPCS | Performed by: SURGERY

## 2023-11-14 PROCEDURE — G8417 CALC BMI ABV UP PARAM F/U: HCPCS | Performed by: SURGERY

## 2023-11-14 PROCEDURE — 99203 OFFICE O/P NEW LOW 30 MIN: CPT | Performed by: SURGERY

## 2023-11-14 PROCEDURE — 1090F PRES/ABSN URINE INCON ASSESS: CPT | Performed by: SURGERY

## 2023-11-14 PROCEDURE — G8399 PT W/DXA RESULTS DOCUMENT: HCPCS | Performed by: SURGERY

## 2023-11-14 PROCEDURE — 1123F ACP DISCUSS/DSCN MKR DOCD: CPT | Performed by: SURGERY

## 2023-11-14 PROCEDURE — 3078F DIAST BP <80 MM HG: CPT | Performed by: SURGERY

## 2023-11-14 PROCEDURE — G8427 DOCREV CUR MEDS BY ELIG CLIN: HCPCS | Performed by: SURGERY

## 2023-11-14 PROCEDURE — 1036F TOBACCO NON-USER: CPT | Performed by: SURGERY

## 2023-11-22 ENCOUNTER — PROCEDURE VISIT (OUTPATIENT)
Dept: VASCULAR SURGERY | Age: 78
End: 2023-11-22
Payer: MEDICARE

## 2023-11-22 DIAGNOSIS — I70.223 ATHEROSCLEROSIS OF NATIVE ARTERIES OF EXTREMITIES WITH REST PAIN, BILATERAL LEGS (HCC): ICD-10-CM

## 2023-11-22 PROCEDURE — 93925 LOWER EXTREMITY STUDY: CPT | Performed by: SURGERY

## 2023-12-07 ENCOUNTER — TELEPHONE (OUTPATIENT)
Dept: VASCULAR SURGERY | Age: 78
End: 2023-12-07

## 2023-12-07 NOTE — TELEPHONE ENCOUNTER
Left message with results of BLE arterial duplex which shows some mild disease in BLE, right NABIL 0.97 and left NABIL 0.85. Recommend watching for now and call with worsening symptoms or develops wound.

## 2023-12-11 ASSESSMENT — ENCOUNTER SYMPTOMS
SHORTNESS OF BREATH: 1
GASTROINTESTINAL NEGATIVE: 1

## 2023-12-11 NOTE — PROGRESS NOTES
8/28/23   Thamas Spurling, APRN - CNP   nitrofurantoin, macrocrystal-monohydrate, (MACROBID) 100 MG capsule Take 1 capsule by mouth daily    Yaneth Duke MD   digoxin (LANOXIN) 125 MCG tablet Take 1 tablet by mouth daily 7/17/23   Thamas Spurling, APRN - CNP   dilTIAZem (CARDIZEM CD) 180 MG extended release capsule TAKE 1 CAPSULE DAILY 6/8/23   Sarika Conklin MD   metoprolol succinate (TOPROL XL) 100 MG extended release tablet TAKE 1 TABLET DAILY 3/14/23   Sarika Conklin MD   apixaban (ELIQUIS) 5 MG TABS tablet TAKE 1 TABLET TWICE A DAY 12/20/22   TOM Dillard CNP   polycarbophil (FIBERCON) 625 MG tablet Take 1 tablet by mouth daily  Patient not taking: Reported on 8/28/2023    Yaneth Duke MD   Omega-3 1000 MG CAPS Take by mouth daily    Yaneth Duke MD   bosentan (TRACLEER) 125 MG tablet Take 1 tablet by mouth 2 times daily 9/1/21   Yaneth Duke MD   PROLIA 60 MG/ML SOSY SC injection Inject 1 mL into the skin once for 1 dose 6/17/21 9/13/23  Lachelle Benavidez MD   Dulaglutide (TRULICITY) 1.5 TF/2.6BM SOPN 0.5 mLs once a week 5/27/21   Yaneth Duke MD   methocarbamol (ROBAXIN) 500 MG tablet Take 0.75 tablets by mouth 3 times daily as needed  5/13/21   Yaneth Duke MD   Cranberry 400 MG TABS Take 400 mg by mouth daily    Yaneth Duke MD   aspirin 81 MG EC tablet Take 1 tablet by mouth daily    Yaneth Duke MD   HYDROcodone-acetaminophen (NORCO) 7.5-325 MG per tablet Take 1 tablet by mouth every 6 hours as needed.     Yaneth Duke MD   OXYGEN Inhale 3 L into the lungs    Yaneth Duke MD   vitamin C (ASCORBIC ACID) 500 MG tablet Take 1 tablet by mouth daily    Yaneth Duke MD   zinc sulfate (ZINCATE) 220 (50 Zn) MG capsule Take 220 mg by mouth daily    Yaneth Duke MD   DULoxetine (CYMBALTA) 60 MG extended release capsule Take 1 capsule by mouth daily  Patient taking differently: Take 1 capsule by mouth

## 2023-12-12 ENCOUNTER — OFFICE VISIT (OUTPATIENT)
Dept: CARDIOLOGY CLINIC | Age: 78
End: 2023-12-12
Payer: MEDICARE

## 2023-12-12 VITALS
DIASTOLIC BLOOD PRESSURE: 60 MMHG | HEART RATE: 60 BPM | HEIGHT: 67 IN | SYSTOLIC BLOOD PRESSURE: 120 MMHG | BODY MASS INDEX: 27 KG/M2 | OXYGEN SATURATION: 96 % | WEIGHT: 172 LBS

## 2023-12-12 DIAGNOSIS — M34.9 SCLERODERMA (HCC): Chronic | ICD-10-CM

## 2023-12-12 DIAGNOSIS — I50.32 CHRONIC DIASTOLIC CONGESTIVE HEART FAILURE (HCC): Primary | ICD-10-CM

## 2023-12-12 DIAGNOSIS — I27.20 PULMONARY HTN (HCC): ICD-10-CM

## 2023-12-12 DIAGNOSIS — I73.9 PAD (PERIPHERAL ARTERY DISEASE) (HCC): ICD-10-CM

## 2023-12-12 DIAGNOSIS — I10 BENIGN ESSENTIAL HTN: ICD-10-CM

## 2023-12-12 PROCEDURE — 1036F TOBACCO NON-USER: CPT | Performed by: NURSE PRACTITIONER

## 2023-12-12 PROCEDURE — 3074F SYST BP LT 130 MM HG: CPT | Performed by: NURSE PRACTITIONER

## 2023-12-12 PROCEDURE — G8417 CALC BMI ABV UP PARAM F/U: HCPCS | Performed by: NURSE PRACTITIONER

## 2023-12-12 PROCEDURE — G8427 DOCREV CUR MEDS BY ELIG CLIN: HCPCS | Performed by: NURSE PRACTITIONER

## 2023-12-12 PROCEDURE — 1090F PRES/ABSN URINE INCON ASSESS: CPT | Performed by: NURSE PRACTITIONER

## 2023-12-12 PROCEDURE — 1123F ACP DISCUSS/DSCN MKR DOCD: CPT | Performed by: NURSE PRACTITIONER

## 2023-12-12 PROCEDURE — G8399 PT W/DXA RESULTS DOCUMENT: HCPCS | Performed by: NURSE PRACTITIONER

## 2023-12-12 PROCEDURE — 3078F DIAST BP <80 MM HG: CPT | Performed by: NURSE PRACTITIONER

## 2023-12-12 PROCEDURE — G8484 FLU IMMUNIZE NO ADMIN: HCPCS | Performed by: NURSE PRACTITIONER

## 2023-12-12 PROCEDURE — 99214 OFFICE O/P EST MOD 30 MIN: CPT | Performed by: NURSE PRACTITIONER

## 2023-12-12 RX ORDER — ATORVASTATIN CALCIUM 10 MG/1
10 TABLET, FILM COATED ORAL DAILY
Qty: 90 TABLET | Refills: 1 | Status: SHIPPED | OUTPATIENT
Start: 2023-12-12

## 2023-12-12 RX ORDER — CIPROFLOXACIN 500 MG/1
500 TABLET, FILM COATED ORAL 2 TIMES DAILY
COMMUNITY
Start: 2023-12-05

## 2023-12-12 NOTE — PATIENT INSTRUCTIONS
Instructions:   Medications: start lipitor   Labs: with other labs  Lifestyle Recommendations: Weigh yourself every day in the morning after urination, call Brittany Syed if wt increases 2-3lb in one day or 5lb in one week, Limit sodium to 3000mg/day and fluids to 2L or 64oz/day.    Follow up: 4-6 months with Leonardo Betancourt CHF Resource Line: [phone]

## 2024-01-18 ENCOUNTER — TELEPHONE (OUTPATIENT)
Dept: CARDIOLOGY CLINIC | Age: 79
End: 2024-01-18

## 2024-01-18 NOTE — TELEPHONE ENCOUNTER
EP/Palpitations/Fast Heart Rate:    1) When did your symptoms start? Last night it was mild, this morning she is so weak she can't lift her arms   Are your symptoms constant or do they come and go? Symptoms are constant she is light headed and so weak she could not shower    2) Can you tell me what your heart rate is? 120's to 138    3) Have you taken any medication for this today? Yes around 10   If so what? Eliquis, digoxin metoprolol diltiazem, possibly more, she takes too many to name she stated.    4) Any recent medication changes? Low dose antibiotics for UTI's, she started a new one recently, but has been on antibiotics for a while.

## 2024-01-18 NOTE — TELEPHONE ENCOUNTER
We received remote transmission from patient's monitor at home. Transmission shows normal sensing and pacing function.   Current ECG   ASVP-possibly in a slow AT with PVCs.  No episodes noted.   EP will review. See interrogation in [facility] for more details.

## 2024-01-18 NOTE — TELEPHONE ENCOUNTER
Per PHI left detailed message on pts voicemail. Asked pt to return call to confirm she received the message

## 2024-01-22 NOTE — RESULT ENCOUNTER NOTE
"     Hub staff attempted to follow warm transfer process and was unsuccessful     Caller: Jessy Melchor \"NEFTALI\"     Relationship to patient: SELF    Best call back number: [phone]     Patient is needing: PT IS 21 WKS PREGNANT. SHE WAS LAST SEEN ON 1-12-24. SHE TOOK HER LAST DOSE OF ANTIBIOTICS THIS MORNING.    OVER THE WEEKEND SHE HAD STRONG URGES ON SAT AND SUN, SHE IS HAVING FLANK PAIN ON THE OPPOSITE SIDE OF WHERE SHE HAD A KIDNEY STONE.     PLEASE GIVE PT A CALL BACK TO DISCUSS. SHE WANTS TO MAKE SURE HER PAIN IS KIDNEY STONE RELATED AND NOT UTI RELATED.  " Reviewed.     Mallory Hinton MD [facility]

## 2024-01-26 PROCEDURE — 93294 REM INTERROG EVL PM/LDLS PM: CPT | Performed by: INTERNAL MEDICINE

## 2024-01-26 PROCEDURE — 93296 REM INTERROG EVL PM/IDS: CPT | Performed by: INTERNAL MEDICINE

## 2024-02-01 NOTE — PROGRESS NOTES
[facility]   Electrophysiology  CHRISTIANO Rodriguez  Attending EP: Dr. Elkins    Date: 2/8/2024  I had the privilege of visiting Isela Conner in the office.     Chief Complaint:   Chief Complaint   Patient presents with    Atrial Fibrillation    Dizziness     Lightheaded, fatigue.      History of Present Illness: History obtained from patient and medical record.    Isela Conner is 78 y.o. female with a past medical history of pulmonary hypertension, scleroderma, tachycardia, atrial fibrillation, HTN, fibromyalgia, and Crest syndrome with Raynaud's/telangiosis. She wore an event monitor that showed PAF and she was started on eliquis. S/p ILR implant (8/20/19). She was found to have prolonged pauses with near syncope s/p PPM implant (5/19/20).     -Interval history: Today, Isela Conner is being seen for urgent follow up. Her children are present for the visit. She has not been feeling well. She has been in atrial tachycardia on her device. She is very sedentary at home due to her PAH/COPD.     Denies having chest pain, palpitations, orthopnea/PND, cough, or dizziness at the time of this visit.    With regard to medication therapy the patient has been compliant with prescribed regimen. They have tolerated therapy to date.     Allergies:  Allergies   Allergen Reactions    Adhesive Tape     Collagen     Other     Bactrim [Sulfamethoxazole-Trimethoprim] Other (See Comments)     Body shaking and chills. Body pain    Cortisone Other (See Comments)     Rash, confusion, hyperactivity    Pcn [Penicillins]     Scopolamine Anxiety    Scopolamine Sulfate Anxiety     Home Medications:  Prior to Visit Medications    Medication Sig Taking? Authorizing Provider   spironolactone (ALDACTONE) 25 MG tablet TAKE 1 TABLET DAILY Yes Jessi Peralta APRN - CNP   apixaban (ELIQUIS) 5 MG TABS tablet TAKE 1 TABLET TWICE A DAY Yes Ty Liang APRN - CNP   ciprofloxacin (CIPRO) 500 MG tablet Take 1 tablet by mouth 2

## 2024-02-08 ENCOUNTER — OFFICE VISIT (OUTPATIENT)
Dept: CARDIOLOGY CLINIC | Age: 79
End: 2024-02-08
Payer: MEDICARE

## 2024-02-08 ENCOUNTER — NURSE ONLY (OUTPATIENT)
Dept: CARDIOLOGY CLINIC | Age: 79
End: 2024-02-08

## 2024-02-08 VITALS
OXYGEN SATURATION: 99 % | HEART RATE: 72 BPM | SYSTOLIC BLOOD PRESSURE: 120 MMHG | WEIGHT: 167.5 LBS | HEIGHT: 67 IN | DIASTOLIC BLOOD PRESSURE: 82 MMHG | BODY MASS INDEX: 26.29 KG/M2

## 2024-02-08 DIAGNOSIS — Z95.0 PACEMAKER: Primary | ICD-10-CM

## 2024-02-08 DIAGNOSIS — I48.0 PAF (PAROXYSMAL ATRIAL FIBRILLATION) (HCC): ICD-10-CM

## 2024-02-08 DIAGNOSIS — I10 BENIGN ESSENTIAL HTN: ICD-10-CM

## 2024-02-08 DIAGNOSIS — I48.92 ATRIAL FLUTTER, UNSPECIFIED TYPE (HCC): ICD-10-CM

## 2024-02-08 DIAGNOSIS — Z95.0 PACEMAKER: ICD-10-CM

## 2024-02-08 DIAGNOSIS — I49.5 SSS (SICK SINUS SYNDROME) (HCC): ICD-10-CM

## 2024-02-08 DIAGNOSIS — I27.21 PAH (PULMONARY ARTERY HYPERTENSION) (HCC): ICD-10-CM

## 2024-02-08 DIAGNOSIS — I48.0 PAF (PAROXYSMAL ATRIAL FIBRILLATION) (HCC): Primary | ICD-10-CM

## 2024-02-08 PROCEDURE — 93000 ELECTROCARDIOGRAM COMPLETE: CPT | Performed by: INTERNAL MEDICINE

## 2024-02-08 PROCEDURE — G8417 CALC BMI ABV UP PARAM F/U: HCPCS | Performed by: NURSE PRACTITIONER

## 2024-02-08 PROCEDURE — 1036F TOBACCO NON-USER: CPT | Performed by: NURSE PRACTITIONER

## 2024-02-08 PROCEDURE — 3074F SYST BP LT 130 MM HG: CPT | Performed by: NURSE PRACTITIONER

## 2024-02-08 PROCEDURE — G8427 DOCREV CUR MEDS BY ELIG CLIN: HCPCS | Performed by: NURSE PRACTITIONER

## 2024-02-08 PROCEDURE — G8484 FLU IMMUNIZE NO ADMIN: HCPCS | Performed by: NURSE PRACTITIONER

## 2024-02-08 PROCEDURE — 1090F PRES/ABSN URINE INCON ASSESS: CPT | Performed by: NURSE PRACTITIONER

## 2024-02-08 PROCEDURE — G8399 PT W/DXA RESULTS DOCUMENT: HCPCS | Performed by: NURSE PRACTITIONER

## 2024-02-08 PROCEDURE — 3079F DIAST BP 80-89 MM HG: CPT | Performed by: NURSE PRACTITIONER

## 2024-02-08 PROCEDURE — 1123F ACP DISCUSS/DSCN MKR DOCD: CPT | Performed by: NURSE PRACTITIONER

## 2024-02-12 ENCOUNTER — TELEPHONE (OUTPATIENT)
Dept: CARDIOLOGY CLINIC | Age: 79
End: 2024-02-12

## 2024-02-12 NOTE — TELEPHONE ENCOUNTER
Spoke with the pt and got her scheduled. She didn't want anything early. She will call me back if this time doesn't work for her ride. We went over instructions below and she verbalized understanding.     Procedure -  cardioversion.  Date: 2/16/24  Arrival time: 10:00 am   Procedure time: 11:00 am      Our  will call you to discuss a date for your procedure.     The day of your procedure you will need to check in at the Registration Desk in the Main Lobby.     PRE-PROCEDURE INSTRUCTIONS              Do not eat or drink anything after midnight the night before your procedure.              Take all your medications with a sip of water in the morning.              Do not hold your Eliquis              Please have a responsible adult to drive you home after your procedure.     If you have any questions regarding your procedure itself or your medications, please call [phone] and ask to speak to an EP nurse.       Qgenda updated / Added in Epic / emailed cath lab

## 2024-02-12 NOTE — TELEPHONE ENCOUNTER
I spoke with pt and relayed message per RHRN. She told me that her HR at 123, 53,83. According to her pulse ox. She stated that her HR is very erratic.     I advised her to sent a remote transmission.

## 2024-02-12 NOTE — TELEPHONE ENCOUNTER
Pt states her pulse oximeter is reading 120-124 and she is feeling lightheaded and headache. Please call to advise.

## 2024-02-12 NOTE — TELEPHONE ENCOUNTER
Please see what her BP is running. If stable, let's have her increase her cardizem to 240 mg BID. If she does not convert by tomorrow mid day, then we need to schedule her for a cardioversion    TOM Rodriguez-CNP

## 2024-02-12 NOTE — TELEPHONE ENCOUNTER
Spoke with Pt, pt states she currently feels light headed and tired all the time. Her current b/p is 111/68 and hr 128. Current MG of Cardizem she has is 180. Please advise.

## 2024-02-12 NOTE — TELEPHONE ENCOUNTER
Current ECG shows ASVP at 125 bpm.  Cardiac Compass Shows HR has stayed ~ 120 bpm.  Please advise.

## 2024-02-12 NOTE — TELEPHONE ENCOUNTER
Pt states is taking the extra cardizem 2 a day. Asking if she is to continue that. If so she needs [facility].

## 2024-02-12 NOTE — TELEPHONE ENCOUNTER
Let's just go ahead and schedule her for cardioversion this week with Dr. Brittni Liang, APRN-CNP

## 2024-02-16 ENCOUNTER — HOSPITAL ENCOUNTER (OUTPATIENT)
Dept: CARDIAC CATH/INVASIVE PROCEDURES | Age: 79
Discharge: HOME | End: 2024-02-16
Attending: INTERNAL MEDICINE | Admitting: INTERNAL MEDICINE
Payer: MEDICARE

## 2024-02-16 VITALS
OXYGEN SATURATION: 100 % | WEIGHT: 167 LBS | RESPIRATION RATE: 17 BRPM | HEART RATE: 60 BPM | BODY MASS INDEX: 26.21 KG/M2 | TEMPERATURE: 97.9 F | HEIGHT: 67 IN | SYSTOLIC BLOOD PRESSURE: 120 MMHG | DIASTOLIC BLOOD PRESSURE: 45 MMHG

## 2024-02-16 DIAGNOSIS — M48.061 SPINAL STENOSIS OF LUMBAR REGION WITHOUT NEUROGENIC CLAUDICATION: ICD-10-CM

## 2024-02-16 DIAGNOSIS — M79.7 FIBROMYALGIA: ICD-10-CM

## 2024-02-16 LAB
DEPRECATED RDW RBC AUTO: 13.2 % (ref 12.4–15.4)
HCT VFR BLD AUTO: 38.9 % (ref 36–48)
HGB BLD-MCNC: 13.2 G/DL (ref 12–16)
MCH RBC QN AUTO: 30.6 PG (ref 26–34)
MCHC RBC AUTO-ENTMCNC: 33.9 G/DL (ref 31–36)
MCV RBC AUTO: 90 FL (ref 80–100)
PLATELET # BLD AUTO: 169 K/UL (ref 135–450)
PMV BLD AUTO: 10.4 FL (ref 5–10.5)
RBC # BLD AUTO: 4.32 M/UL (ref 4–5.2)
WBC # BLD AUTO: 11.2 K/UL (ref 4–11)

## 2024-02-16 PROCEDURE — 36415 COLL VENOUS BLD VENIPUNCTURE: CPT

## 2024-02-16 PROCEDURE — 85027 COMPLETE CBC AUTOMATED: CPT

## 2024-02-16 PROCEDURE — 99212 OFFICE O/P EST SF 10 MIN: CPT

## 2024-02-16 RX ORDER — DIGOXIN 125 MCG
125 TABLET ORAL EVERY OTHER DAY
Qty: 90 TABLET | Refills: 3 | Status: SHIPPED | OUTPATIENT
Start: 2024-02-16

## 2024-02-16 RX ORDER — SODIUM CHLORIDE 0.9 % (FLUSH) 0.9 %
5-40 SYRINGE (ML) INJECTION EVERY 12 HOURS SCHEDULED
Status: DISCONTINUED | OUTPATIENT
Start: 2024-02-16 | End: 2024-02-19 | Stop reason: HOSPADM

## 2024-02-16 RX ORDER — AMIODARONE HYDROCHLORIDE 200 MG/1
200 TABLET ORAL DAILY
Qty: 30 TABLET | Refills: 3 | Status: SHIPPED | OUTPATIENT
Start: 2024-02-16 | End: 2024-02-22 | Stop reason: SDUPTHER

## 2024-02-16 RX ORDER — SODIUM CHLORIDE 0.9 % (FLUSH) 0.9 %
5-40 SYRINGE (ML) INJECTION PRN
Status: DISCONTINUED | OUTPATIENT
Start: 2024-02-16 | End: 2024-02-19 | Stop reason: HOSPADM

## 2024-02-16 RX ORDER — SODIUM CHLORIDE 9 MG/ML
INJECTION, SOLUTION INTRAVENOUS PRN
Status: DISCONTINUED | OUTPATIENT
Start: 2024-02-16 | End: 2024-02-19 | Stop reason: HOSPADM

## 2024-02-16 NOTE — H&P
[facility] [place]   Electrophysiology      CC: PAF   HPI: Isela Conner is a 78 y.o. female with a PMH of atrial fibrillation, pre-syncope, CREST syndrome, HTN and fibromyalgia.    Her 30 day monitor reported PAF.  She was started on Eliquis. An ILR was implanted on 8/20/19 to better determine whether she has atrial fibrillation.     Her loop recorder showed 1st degree AVB with episodes of atrial fib/flutter with RVR and conversion pauses up to 10 seconds.     5/19/20 Insertion of dual chamber pacemaker for symptomatic SSS and removal of ILR.    Interval History:  aBrbara presents to the office annual in follow up. She is doing well form a cardiac perspective.  Patient denies lightheadedness, dizziness, chest pain, palpitations, orthopnea, edema, presyncope or syncope.    Continues to be on oxygen.  She complains of dizziness at times.    Assessment and plan:   PAF   -ECG today:  Atrial fibrillation    -Denies symptoms with these episodes    -0.7% AT/AF burden per device interrogation   -Patient has a [id number] Score of 5 (age1, HTN, Female, DM),   ~ Continue  on Eliquis 5 mg BID  ~ creatinine 1.09 04/03/2023    -Continue Cardizem and toprol XL  She has slight increase in the burden of her A-fib.  However given her underlying lung disease, no aggressive treatment or use of amiodarone is reasonable.  We will continue to monitor and treat her medically.  Heart rate during episodes of atrial flutter and A-fib are reasonable and in the 1 30-1 50 range.  The episodes are not very long and therefore further increasing the dose of medications is not necessary.    Recurrent Atrial fibrillation       Pauses/SSS   -s/p Dual chamber PPM 5/19/2020   -The CIED was interrogated and programmed and I supervised and reviewed all the data. All findings and changes are in device interrogation sheet and reflect my personal interpretation and changes and is scanned to Epic.     10.9   years remaining, AP  58.3 % ,   < 0. 1%

## 2024-02-16 NOTE — PROGRESS NOTES
CATH LAB PROCEDURE LOG - CARDIOVERSION      PRE PROCEDURE    DATE: 2/16/2024 ARRIVAL TO CATH LAB: 10:16 AM    ID & ALLERGY BAND: On    CONSENT: Yes    NPO SINCE: Midnight    IV SITE: Started in left arm.     Pt arrived to Cath Lab.   Plan of Care: Hemodynamics and cardiac rhythm will remain stable. Comfort level will be maintained. Respiratory function will remain adequate. Pt/family will verbalize understanding of the procedure. Procedure will be tolerated without complications. Patient will recover from procedure without complications.   ID armband on patient and identification verified.   Informed consent obtained.   Non invasive blood pressure cuff applied, monitoring initiated.   EKG pads and pulse oximeter applied, monitoring initiated.   Instructions given. Patient and / or family verbalize understanding.   H&P will be documented by physician in Epic.   Pt has been NPO since midnight.     Post DCCV EKG ordered? Yes    Pre CV Rhythm:  Paced , Normal Sinus, procedure canceled, Dr. Elkins in to see pt.        POST PROCEDURE    DISCHARGE TIME: 12:20 PM PIV removed at time of discharge, catheter intact, and no site complications noted.     Patient/family given discharge instructions. Patient/family verbalize understanding of discharge instructions, all questions addressed, copy given to patient/family. Pt transferred to vehicle via wheelchair to be discharged home with family.

## 2024-02-16 NOTE — DISCHARGE INSTRUCTIONS
CARDIOVERSION DISCHARGE INSTRUCTIONS    No driving for 24 hours. We strongly recommend that a responsible adult stay with you for the next 24 hours.    Continue Eliquis.    Hydrocortisone 1% cream to reddened areas as needed.    Follow up appointment:

## 2024-02-19 LAB
EKG ATRIAL RATE: 71 BPM
EKG DIAGNOSIS: NORMAL
EKG P AXIS: 71 DEGREES
EKG P-R INTERVAL: 192 MS
EKG Q-T INTERVAL: 408 MS
EKG QRS DURATION: 150 MS
EKG QTC CALCULATION (BAZETT): 443 MS
EKG R AXIS: -23 DEGREES
EKG T AXIS: 112 DEGREES
EKG VENTRICULAR RATE: 71 BPM

## 2024-02-22 RX ORDER — AMIODARONE HYDROCHLORIDE 200 MG/1
200 TABLET ORAL DAILY
Qty: 90 TABLET | Refills: 1 | Status: SHIPPED | OUTPATIENT
Start: 2024-02-22

## 2024-02-22 NOTE — TELEPHONE ENCOUNTER
Patient has has a new pharmacy for Rx's please send 90 day supply for this was sent to [facility] on 2/16/2024. Also this request is requiring TSH panel e drawn. Advise on lab.

## 2024-02-22 NOTE — TELEPHONE ENCOUNTER
Medication Refill  *new pharmacy below*  Medication needing refilled:  amiodarone (CORDARONE)   Dosage of the medication:  200 MG tablet   How are you taking this medication (QD, BID, TID, QID, PRN):  Take 1 tablet by mouth daily   30 or 90 day supply called in:  90 day supply  When will you run out of your medication:  Three weeks left  Which Pharmacy are we sending the medication to?:  [facility] [place], [address] [phone] - F [phone]

## 2024-03-06 ENCOUNTER — TELEPHONE (OUTPATIENT)
Dept: CARDIOLOGY CLINIC | Age: 79
End: 2024-03-06

## 2024-03-06 NOTE — TELEPHONE ENCOUNTER
We received remote transmission from patient's monitor at home. Transmission shows normal sensing and pacing function.   Current ECG shows ASVP at ~ 95 bpm.  No episodes noted but Cardiac Compass shows recent increase in HR.  EP will review. See interrogation in [facility] for more details.

## 2024-03-06 NOTE — TELEPHONE ENCOUNTER
Called and spoke to PT. PT states she's  been feeling unwell since Monday. She stated she's been feeling light headed, fatique, and SOB. PT stated she hasn't had an appetite since Monday but she's going to try and have dinner this evening. I had PT checked her vitals while on the phone  /50 P 97.

## 2024-03-06 NOTE — TELEPHONE ENCOUNTER
Spoke with Barbara and talked her through a transmission. She is taking eliquis  and amiodarone as directed is currently taking cardizem 180.  She states her symptoms come and go with heart rates to 110.     Will watch for transmission.Advised to go to ER if symptoms are severe.

## 2024-03-06 NOTE — TELEPHONE ENCOUNTER
Pt states for the past 3 days she has been in afib.  When she woke up Monday she was dizzy and she is sob and can't do anything. This has caused her to be immobile because she is so fatigue simple things ware her out and causes her to be sob.  She also reports having a headache she has been taking Tylenol, and it only helps for a little bit.    Please advise if there is anything that can be done.    She had not sent a  transmission, I requested she send one she said she would try.    Please advise.

## 2024-03-07 ENCOUNTER — HOSPITAL ENCOUNTER (INPATIENT)
Age: 79
LOS: 2 days | Discharge: HOME | DRG: 690 | End: 2024-03-09
Attending: EMERGENCY MEDICINE | Admitting: HOSPITALIST
Payer: MEDICARE

## 2024-03-07 ENCOUNTER — APPOINTMENT (OUTPATIENT)
Dept: GENERAL RADIOLOGY | Age: 79
DRG: 690 | End: 2024-03-07
Payer: MEDICARE

## 2024-03-07 DIAGNOSIS — R73.9 HYPERGLYCEMIA: ICD-10-CM

## 2024-03-07 DIAGNOSIS — N30.01 ACUTE CYSTITIS WITH HEMATURIA: Primary | ICD-10-CM

## 2024-03-07 DIAGNOSIS — B37.49 YEAST UTI: ICD-10-CM

## 2024-03-07 PROBLEM — N39.0 UTI (URINARY TRACT INFECTION): Status: ACTIVE | Noted: 2024-03-07

## 2024-03-07 LAB
ALBUMIN SERPL-MCNC: 4.4 G/DL (ref 3.4–5)
ALBUMIN/GLOB SERPL: 1.6 {RATIO} (ref 1.1–2.2)
ALP SERPL-CCNC: 83 U/L (ref 40–129)
ALT SERPL-CCNC: 15 U/L (ref 10–40)
ANION GAP SERPL CALCULATED.3IONS-SCNC: 7 MMOL/L (ref 3–16)
AST SERPL-CCNC: 18 U/L (ref 15–37)
BACTERIA URNS QL MICRO: ABNORMAL /HPF
BASOPHILS # BLD: 0.1 K/UL (ref 0–0.2)
BASOPHILS NFR BLD: 0.9 %
BILIRUB SERPL-MCNC: 0.5 MG/DL (ref 0–1)
BILIRUB UR QL STRIP.AUTO: NEGATIVE
BUN SERPL-MCNC: 22 MG/DL (ref 7–20)
CALCIUM SERPL-MCNC: 9.2 MG/DL (ref 8.3–10.6)
CHLORIDE SERPL-SCNC: 92 MMOL/L (ref 99–110)
CLARITY UR: ABNORMAL
CO2 SERPL-SCNC: 32 MMOL/L (ref 21–32)
COLOR UR: YELLOW
CREAT SERPL-MCNC: 1.3 MG/DL (ref 0.6–1.2)
D DIMER: 0.73 UG/ML FEU (ref 0–0.6)
DEPRECATED RDW RBC AUTO: 12.7 % (ref 12.4–15.4)
EOSINOPHIL # BLD: 0.1 K/UL (ref 0–0.6)
EOSINOPHIL NFR BLD: 0.8 %
EPI CELLS #/AREA URNS AUTO: 5 /HPF (ref 0–5)
GFR SERPLBLD CREATININE-BSD FMLA CKD-EPI: 41 ML/MIN/{1.73_M2}
GLUCOSE BLD-MCNC: 430 MG/DL (ref 70–99)
GLUCOSE BLD-MCNC: 445 MG/DL (ref 70–99)
GLUCOSE SERPL-MCNC: 493 MG/DL (ref 70–99)
GLUCOSE UR STRIP.AUTO-MCNC: >=1000 MG/DL
HCT VFR BLD AUTO: 40 % (ref 36–48)
HGB BLD-MCNC: 13.5 G/DL (ref 12–16)
HGB UR QL STRIP.AUTO: ABNORMAL
HYALINE CASTS #/AREA URNS AUTO: 2 /LPF (ref 0–8)
KETONES UR STRIP.AUTO-MCNC: NEGATIVE MG/DL
LEUKOCYTE ESTERASE UR QL STRIP.AUTO: ABNORMAL
LYMPHOCYTES # BLD: 1.5 K/UL (ref 1–5.1)
LYMPHOCYTES NFR BLD: 16.2 %
MAGNESIUM SERPL-MCNC: 2.8 MG/DL (ref 1.8–2.4)
MCH RBC QN AUTO: 30.6 PG (ref 26–34)
MCHC RBC AUTO-ENTMCNC: 33.7 G/DL (ref 31–36)
MCV RBC AUTO: 90.8 FL (ref 80–100)
MONOCYTES # BLD: 0.6 K/UL (ref 0–1.3)
MONOCYTES NFR BLD: 6.7 %
NEUTROPHILS # BLD: 7.2 K/UL (ref 1.7–7.7)
NEUTROPHILS NFR BLD: 75.4 %
NITRITE UR QL STRIP.AUTO: NEGATIVE
NT-PROBNP SERPL-MCNC: 1172 PG/ML (ref 0–449)
PERFORMED ON: ABNORMAL
PERFORMED ON: ABNORMAL
PH UR STRIP.AUTO: 5 [PH] (ref 5–8)
PLATELET # BLD AUTO: 161 K/UL (ref 135–450)
PMV BLD AUTO: 10.4 FL (ref 5–10.5)
POTASSIUM SERPL-SCNC: 5 MMOL/L (ref 3.5–5.1)
PROT SERPL-MCNC: 7.2 G/DL (ref 6.4–8.2)
PROT UR STRIP.AUTO-MCNC: 30 MG/DL
RBC # BLD AUTO: 4.4 M/UL (ref 4–5.2)
RBC CLUMPS #/AREA URNS AUTO: 222 /HPF (ref 0–4)
SODIUM SERPL-SCNC: 131 MMOL/L (ref 136–145)
SP GR UR STRIP.AUTO: >=1.03 (ref 1–1.03)
T4 FREE SERPL-MCNC: 1.3 NG/DL (ref 0.9–1.8)
TROPONIN, HIGH SENSITIVITY: 25 NG/L (ref 0–14)
TROPONIN, HIGH SENSITIVITY: 26 NG/L (ref 0–14)
TSH SERPL DL<=0.005 MIU/L-ACNC: 5.55 UIU/ML (ref 0.27–4.2)
UA COMPLETE W REFLEX CULTURE PNL UR: YES
UA DIPSTICK W REFLEX MICRO PNL UR: YES
URN SPEC COLLECT METH UR: ABNORMAL
UROBILINOGEN UR STRIP-ACNC: 0.2 E.U./DL
WBC # BLD AUTO: 9.5 K/UL (ref 4–11)
WBC #/AREA URNS AUTO: 565 /HPF (ref 0–5)
YEAST URNS QL MICRO: PRESENT /HPF

## 2024-03-07 PROCEDURE — 6370000000 HC RX 637 (ALT 250 FOR IP): Performed by: HOSPITALIST

## 2024-03-07 PROCEDURE — 85025 COMPLETE CBC W/AUTO DIFF WBC: CPT

## 2024-03-07 PROCEDURE — 87086 URINE CULTURE/COLONY COUNT: CPT

## 2024-03-07 PROCEDURE — 84443 ASSAY THYROID STIM HORMONE: CPT

## 2024-03-07 PROCEDURE — 6370000000 HC RX 637 (ALT 250 FOR IP): Performed by: NURSE PRACTITIONER

## 2024-03-07 PROCEDURE — 1200000000 HC SEMI PRIVATE

## 2024-03-07 PROCEDURE — 83880 ASSAY OF NATRIURETIC PEPTIDE: CPT

## 2024-03-07 PROCEDURE — 2580000003 HC RX 258: Performed by: EMERGENCY MEDICINE

## 2024-03-07 PROCEDURE — 81001 URINALYSIS AUTO W/SCOPE: CPT

## 2024-03-07 PROCEDURE — 93005 ELECTROCARDIOGRAM TRACING: CPT | Performed by: EMERGENCY MEDICINE

## 2024-03-07 PROCEDURE — 96365 THER/PROPH/DIAG IV INF INIT: CPT

## 2024-03-07 PROCEDURE — 84439 ASSAY OF FREE THYROXINE: CPT

## 2024-03-07 PROCEDURE — 80053 COMPREHEN METABOLIC PANEL: CPT

## 2024-03-07 PROCEDURE — 85379 FIBRIN DEGRADATION QUANT: CPT

## 2024-03-07 PROCEDURE — 99285 EMERGENCY DEPT VISIT HI MDM: CPT

## 2024-03-07 PROCEDURE — 84484 ASSAY OF TROPONIN QUANT: CPT

## 2024-03-07 PROCEDURE — 83735 ASSAY OF MAGNESIUM: CPT

## 2024-03-07 PROCEDURE — 6360000002 HC RX W HCPCS: Performed by: EMERGENCY MEDICINE

## 2024-03-07 PROCEDURE — 6370000000 HC RX 637 (ALT 250 FOR IP): Performed by: EMERGENCY MEDICINE

## 2024-03-07 PROCEDURE — 71045 X-RAY EXAM CHEST 1 VIEW: CPT

## 2024-03-07 RX ORDER — SODIUM CHLORIDE 0.9 % (FLUSH) 0.9 %
5-40 SYRINGE (ML) INJECTION PRN
Status: DISCONTINUED | OUTPATIENT
Start: 2024-03-07 | End: 2024-03-09 | Stop reason: HOSPADM

## 2024-03-07 RX ORDER — MAGNESIUM SULFATE IN WATER 40 MG/ML
2000 INJECTION, SOLUTION INTRAVENOUS PRN
Status: DISCONTINUED | OUTPATIENT
Start: 2024-03-07 | End: 2024-03-09 | Stop reason: HOSPADM

## 2024-03-07 RX ORDER — SODIUM CHLORIDE 9 MG/ML
INJECTION, SOLUTION INTRAVENOUS PRN
Status: DISCONTINUED | OUTPATIENT
Start: 2024-03-07 | End: 2024-03-09 | Stop reason: HOSPADM

## 2024-03-07 RX ORDER — METOPROLOL SUCCINATE 50 MG/1
100 TABLET, EXTENDED RELEASE ORAL DAILY
Status: DISCONTINUED | OUTPATIENT
Start: 2024-03-08 | End: 2024-03-09 | Stop reason: HOSPADM

## 2024-03-07 RX ORDER — ONDANSETRON 4 MG/1
4 TABLET, ORALLY DISINTEGRATING ORAL EVERY 8 HOURS PRN
Status: DISCONTINUED | OUTPATIENT
Start: 2024-03-07 | End: 2024-03-09 | Stop reason: HOSPADM

## 2024-03-07 RX ORDER — POLYETHYLENE GLYCOL 3350 17 G/17G
17 POWDER, FOR SOLUTION ORAL DAILY PRN
Status: DISCONTINUED | OUTPATIENT
Start: 2024-03-07 | End: 2024-03-09 | Stop reason: HOSPADM

## 2024-03-07 RX ORDER — INSULIN LISPRO 100 [IU]/ML
0-8 INJECTION, SOLUTION INTRAVENOUS; SUBCUTANEOUS
Status: DISCONTINUED | OUTPATIENT
Start: 2024-03-07 | End: 2024-03-09 | Stop reason: HOSPADM

## 2024-03-07 RX ORDER — DILTIAZEM HYDROCHLORIDE 180 MG/1
180 CAPSULE, COATED, EXTENDED RELEASE ORAL DAILY
Status: DISCONTINUED | OUTPATIENT
Start: 2024-03-08 | End: 2024-03-09 | Stop reason: HOSPADM

## 2024-03-07 RX ORDER — GLUCAGON 1 MG/ML
1 KIT INJECTION PRN
Status: DISCONTINUED | OUTPATIENT
Start: 2024-03-07 | End: 2024-03-09 | Stop reason: HOSPADM

## 2024-03-07 RX ORDER — FLUCONAZOLE 2 MG/ML
200 INJECTION, SOLUTION INTRAVENOUS ONCE
Status: COMPLETED | OUTPATIENT
Start: 2024-03-07 | End: 2024-03-07

## 2024-03-07 RX ORDER — DEXTROSE MONOHYDRATE 100 MG/ML
INJECTION, SOLUTION INTRAVENOUS CONTINUOUS PRN
Status: DISCONTINUED | OUTPATIENT
Start: 2024-03-07 | End: 2024-03-09 | Stop reason: HOSPADM

## 2024-03-07 RX ORDER — SODIUM CHLORIDE 0.9 % (FLUSH) 0.9 %
5-40 SYRINGE (ML) INJECTION EVERY 12 HOURS SCHEDULED
Status: DISCONTINUED | OUTPATIENT
Start: 2024-03-07 | End: 2024-03-09 | Stop reason: HOSPADM

## 2024-03-07 RX ORDER — AMIODARONE HYDROCHLORIDE 200 MG/1
200 TABLET ORAL DAILY
Status: DISCONTINUED | OUTPATIENT
Start: 2024-03-08 | End: 2024-03-09 | Stop reason: HOSPADM

## 2024-03-07 RX ORDER — DULOXETIN HYDROCHLORIDE 60 MG/1
60 CAPSULE, DELAYED RELEASE ORAL DAILY
Status: DISCONTINUED | OUTPATIENT
Start: 2024-03-08 | End: 2024-03-09 | Stop reason: HOSPADM

## 2024-03-07 RX ORDER — ACETAMINOPHEN 325 MG/1
650 TABLET ORAL EVERY 6 HOURS PRN
Status: DISCONTINUED | OUTPATIENT
Start: 2024-03-07 | End: 2024-03-09 | Stop reason: HOSPADM

## 2024-03-07 RX ORDER — INSULIN GLARGINE 100 [IU]/ML
0.25 INJECTION, SOLUTION SUBCUTANEOUS NIGHTLY
Status: DISCONTINUED | OUTPATIENT
Start: 2024-03-07 | End: 2024-03-09 | Stop reason: HOSPADM

## 2024-03-07 RX ORDER — 0.9 % SODIUM CHLORIDE 0.9 %
1000 INTRAVENOUS SOLUTION INTRAVENOUS ONCE
Status: COMPLETED | OUTPATIENT
Start: 2024-03-07 | End: 2024-03-07

## 2024-03-07 RX ORDER — POTASSIUM CHLORIDE 20 MEQ/1
40 TABLET, EXTENDED RELEASE ORAL PRN
Status: DISCONTINUED | OUTPATIENT
Start: 2024-03-07 | End: 2024-03-09 | Stop reason: HOSPADM

## 2024-03-07 RX ORDER — ONDANSETRON 2 MG/ML
4 INJECTION INTRAMUSCULAR; INTRAVENOUS EVERY 6 HOURS PRN
Status: DISCONTINUED | OUTPATIENT
Start: 2024-03-07 | End: 2024-03-09 | Stop reason: HOSPADM

## 2024-03-07 RX ORDER — SODIUM CHLORIDE 9 MG/ML
INJECTION, SOLUTION INTRAVENOUS CONTINUOUS
Status: DISCONTINUED | OUTPATIENT
Start: 2024-03-07 | End: 2024-03-09 | Stop reason: HOSPADM

## 2024-03-07 RX ORDER — SPIRONOLACTONE 25 MG/1
25 TABLET ORAL DAILY
Status: DISCONTINUED | OUTPATIENT
Start: 2024-03-08 | End: 2024-03-09 | Stop reason: HOSPADM

## 2024-03-07 RX ORDER — ACETAMINOPHEN 650 MG/1
650 SUPPOSITORY RECTAL EVERY 6 HOURS PRN
Status: DISCONTINUED | OUTPATIENT
Start: 2024-03-07 | End: 2024-03-09 | Stop reason: HOSPADM

## 2024-03-07 RX ORDER — FLUCONAZOLE 2 MG/ML
200 INJECTION, SOLUTION INTRAVENOUS EVERY 24 HOURS
Status: DISCONTINUED | OUTPATIENT
Start: 2024-03-08 | End: 2024-03-09 | Stop reason: HOSPADM

## 2024-03-07 RX ORDER — ATORVASTATIN CALCIUM 10 MG/1
10 TABLET, FILM COATED ORAL DAILY
Status: DISCONTINUED | OUTPATIENT
Start: 2024-03-08 | End: 2024-03-09 | Stop reason: HOSPADM

## 2024-03-07 RX ORDER — ANTIARTHRITIC COMBINATION NO.2 900 MG
5000 TABLET ORAL DAILY
Status: DISCONTINUED | OUTPATIENT
Start: 2024-03-07 | End: 2024-03-07 | Stop reason: RX

## 2024-03-07 RX ORDER — GLUCOSAMINE HCL 500 MG
400 TABLET ORAL DAILY
Status: DISCONTINUED | OUTPATIENT
Start: 2024-03-07 | End: 2024-03-07 | Stop reason: RX

## 2024-03-07 RX ORDER — SEMAGLUTIDE 1.34 MG/ML
0.25 INJECTION, SOLUTION SUBCUTANEOUS WEEKLY
COMMUNITY

## 2024-03-07 RX ORDER — ASPIRIN 81 MG/1
81 TABLET ORAL DAILY
Status: DISCONTINUED | OUTPATIENT
Start: 2024-03-08 | End: 2024-03-09 | Stop reason: HOSPADM

## 2024-03-07 RX ORDER — LANOLIN ALCOHOL/MO/W.PET/CERES
400 CREAM (GRAM) TOPICAL DAILY
Status: DISCONTINUED | OUTPATIENT
Start: 2024-03-08 | End: 2024-03-09 | Stop reason: HOSPADM

## 2024-03-07 RX ORDER — POTASSIUM CHLORIDE 7.45 MG/ML
10 INJECTION INTRAVENOUS PRN
Status: DISCONTINUED | OUTPATIENT
Start: 2024-03-07 | End: 2024-03-09 | Stop reason: HOSPADM

## 2024-03-07 RX ORDER — INSULIN LISPRO 100 [IU]/ML
0-4 INJECTION, SOLUTION INTRAVENOUS; SUBCUTANEOUS NIGHTLY
Status: DISCONTINUED | OUTPATIENT
Start: 2024-03-07 | End: 2024-03-09 | Stop reason: HOSPADM

## 2024-03-07 RX ORDER — HYDROCODONE BITARTRATE AND ACETAMINOPHEN 7.5; 325 MG/1; MG/1
1 TABLET ORAL EVERY 6 HOURS PRN
Status: DISCONTINUED | OUTPATIENT
Start: 2024-03-07 | End: 2024-03-09 | Stop reason: HOSPADM

## 2024-03-07 RX ORDER — INSULIN LISPRO 100 [IU]/ML
4 INJECTION, SOLUTION INTRAVENOUS; SUBCUTANEOUS ONCE
Status: COMPLETED | OUTPATIENT
Start: 2024-03-07 | End: 2024-03-07

## 2024-03-07 RX ADMIN — CEFTRIAXONE SODIUM 1000 MG: 1 INJECTION, POWDER, FOR SOLUTION INTRAMUSCULAR; INTRAVENOUS at 17:16

## 2024-03-07 RX ADMIN — SODIUM CHLORIDE 1000 ML: 9 INJECTION, SOLUTION INTRAVENOUS at 17:15

## 2024-03-07 RX ADMIN — APIXABAN 5 MG: 5 TABLET, FILM COATED ORAL at 21:50

## 2024-03-07 RX ADMIN — INSULIN GLARGINE 18 UNITS: 100 INJECTION, SOLUTION SUBCUTANEOUS at 21:52

## 2024-03-07 RX ADMIN — INSULIN LISPRO 4 UNITS: 100 INJECTION, SOLUTION INTRAVENOUS; SUBCUTANEOUS at 21:53

## 2024-03-07 RX ADMIN — FLUCONAZOLE 200 MG: 200 INJECTION, SOLUTION INTRAVENOUS at 18:28

## 2024-03-07 RX ADMIN — INSULIN HUMAN 5 UNITS: 100 INJECTION, SOLUTION PARENTERAL at 18:25

## 2024-03-07 RX ADMIN — INSULIN LISPRO 4 UNITS: 100 INJECTION, SOLUTION INTRAVENOUS; SUBCUTANEOUS at 21:51

## 2024-03-07 ASSESSMENT — PAIN - FUNCTIONAL ASSESSMENT: PAIN_FUNCTIONAL_ASSESSMENT: NONE - DENIES PAIN

## 2024-03-07 NOTE — TELEPHONE ENCOUNTER
Spoke to Pt and relayed message per SILVESTRE. Pt verbalized understanding and would try to get a ride to hospital.

## 2024-03-07 NOTE — PROGRESS NOTES
Pharmacy Home Medication Reconciliation Note    A medication reconciliation has been completed for Isela Conner 1945    Pharmacy: [facility] [address] [place] [address]    Information provided by: Patient    The patient's home medication list is as follows:  No current facility-administered medications on file prior to encounter.     Current Outpatient Medications on File Prior to Encounter   Medication Sig Dispense Refill    Semaglutide,0.25 or 0.5MG/DOS, (OZEMPIC, 0.25 OR 0.5 MG/DOSE,) 2 MG/1.5ML SOPN Inject 0.25 mg into the skin Once a week at 5 PM      amiodarone (CORDARONE) 200 MG tablet Take 1 tablet by mouth daily 90 tablet 1    digoxin (LANOXIN) 125 MCG tablet Take 1 tablet by mouth every other day 90 tablet 3    spironolactone (ALDACTONE) 25 MG tablet TAKE 1 TABLET DAILY 90 tablet 1    apixaban (ELIQUIS) 5 MG TABS tablet TAKE 1 TABLET TWICE A  tablet 3    ciprofloxacin (CIPRO) 500 MG tablet Take 1 tablet by mouth 2 times daily (Patient not taking: Reported on 3/7/2024)      atorvastatin (LIPITOR) 10 MG tablet Take 1 tablet by mouth daily 90 tablet 1    furosemide (LASIX) 40 MG tablet Take 1 tablet by mouth daily 60 tablet 3    dilTIAZem (CARDIZEM CD) 180 MG extended release capsule TAKE 1 CAPSULE DAILY 90 capsule 3    metoprolol succinate (TOPROL XL) 100 MG extended release tablet TAKE 1 TABLET DAILY 135 tablet 3    bosentan (TRACLEER) 125 MG tablet Take 1 tablet by mouth 2 times daily      PROLIA 60 MG/ML SOSY SC injection Inject 1 mL into the skin once for 1 dose 1 Syringe 2    Dulaglutide (TRULICITY) 1.5 MG/0.5ML SOPN 0.5 mLs once a week (Patient not taking: Reported on 3/7/2024)      methocarbamol (ROBAXIN) 500 MG tablet Take 0.75 tablets by mouth 3 times daily as needed       Cranberry 400 MG TABS Take 400 mg by mouth daily      aspirin 81 MG EC tablet Take 1 tablet by mouth daily      HYDROcodone-acetaminophen (NORCO) 7.5-325 MG per tablet Take 1 tablet by mouth every 6 hours as

## 2024-03-07 NOTE — ED PROVIDER NOTES
I personally saw the patient and made/approved the management plan and take responsibility for the patient management.    For further details of Isela Conner's emergency department encounter, please see the advanced practice provider's documentation.    I, Dio Fields MD, am the primary physician provider of record.    CHIEF COMPLAINT  Chief Complaint   Patient presents with    Tachycardia     Patient states that she was having tachycardia (112-120), dizziness, SOB and fatigue. This started Monday morning. She thought it was her AFIB       Briefly, Isela Conner is a 78 y.o. female  who presents to the ED complaining of generalized malaise and fatigue, specifically exertional dyspnea and lightheadedness as well.  No syncope.  She says that her heart rate has been elevated at home sometimes.  She has a history of A-fib and a pacemaker and is on an anticoagulant.  She has not fallen or injured herself.  Symptoms have been persistent since Monday.  No vomiting diarrhea or abdominal pains.  She does have chronic urinary incontinence and is on Keflex prophylaxis for that reason due to recurrent UTIs    FOCUSED PHYSICAL EXAMINATION  /68   Pulse 98   Temp 98.3 °F (36.8 °C) (Oral)   Resp 17   Ht 1.702 m (5' 7\")   Wt 72.1 kg (159 lb)   SpO2 99%   BMI 24.90 kg/m²    Focused physical examination notable for no acute distress, well-appearing, well-nourished, normal speech and mentation without obvious facial droop, no obvious rash.  No obvious cranial nerve deficits on my initial exam.  Abdomen soft nontender nondistended.  Intermittently borderline tachycardic, irregular rhythm, clear to auscultation bilaterally.      EKG performed in ED:  The 12 lead EKG was interpreted by me independent of a cardiologist as follows:  Rate: normal with a rate of 99  Rhythm: normal ventricular pacemaker  Intervals: left bundle branch block which limits further interpretation of EKG changes  Non-specific T wave

## 2024-03-07 NOTE — ED PROVIDER NOTES
[facility] EMERGENCY DEPARTMENT  EMERGENCY DEPARTMENT ENCOUNTER        Pt Name: Isela Conner  MRN: [id number]  Birthdate 1945  Date of evaluation: 3/7/2024  Provider: ZAK Fisher  PCP: Soha Tom APRN - NP  Note Started: 12:56 PM EST 3/7/24       I have seen and evaluated this patient with my supervising physician Dio Fields MD.      CHIEF COMPLAINT       Chief Complaint   Patient presents with    Tachycardia     Patient states that she was having tachycardia (112-120), dizziness, SOB and fatigue. This started Monday morning. She thought it was her AFIB       HISTORY OF PRESENT ILLNESS: 1 or more Elements     History From: Patient  Limitations to history : None    Isela Conner is a 78 y.o. female with past medical history of scleroderma, fibromyalgia, diabetes, pulmonary artery hypertension, atrial fibrillation on Eliquis with no reported skipped or missed dosages, hypertension who presents ED with complaint of elevated heart rate.  She reports since Monday she has had feelings of elevated heart rate with lightheadedness, near syncope, shortness of breath and decreased exercise tolerance.  Denies any chest tightness or chest pain.  She thought she was in A-fib again.  Patient states she sent a transmission to electrophysiology and they reviewed her transmission from her pacemaker and told her she was not in A-fib but that her heart rate was higher than has been in the past in the 90s to 110s.  She states she was sent to the ED for further evaluation and treatment given her elevated heart rate and symptomology.  She denies abdominal pain, nausea/vomiting, urinary symptoms or changes in bowel movements.  She has a decreased oral intake.  Denies any changes in medication recently.  Denies headache, room spinning dizziness, visual changes, speech disturbances or numbness/tingling.  She has not passed out or lost conscious.     Nursing Notes were all reviewed and  mouth. 600mg morning, 600 mg evening, 600mg bedtime    HYDROCODONE-ACETAMINOPHEN (NORCO) 7.5-325 MG PER TABLET    Take 1 tablet by mouth every 6 hours as needed.    HYDROXYCHLOROQUINE (PLAQUENIL) 200 MG TABLET    Take 1 tablet by mouth daily    MAGNESIUM OXIDE PO    Take 500 mg by mouth daily     METHOCARBAMOL (ROBAXIN) 500 MG TABLET    Take 0.75 tablets by mouth 3 times daily as needed     METOPROLOL SUCCINATE (TOPROL XL) 100 MG EXTENDED RELEASE TABLET    TAKE 1 TABLET DAILY    MULTIPLE VITAMIN (THERA) TABS    Take  by mouth.      OXYGEN    Inhale 3 L into the lungs    PROBIOTIC PRODUCT (PROBIOTIC DAILY PO)    Take by mouth daily    PROLIA 60 MG/ML SOSY SC INJECTION    Inject 1 mL into the skin once for 1 dose    SPIRONOLACTONE (ALDACTONE) 25 MG TABLET    TAKE 1 TABLET DAILY    VITAMIN C (ASCORBIC ACID) 500 MG TABLET    Take 1 tablet by mouth daily    ZINC SULFATE (ZINCATE) 220 (50 ZN) MG CAPSULE    Take 220 mg by mouth daily       ALLERGIES     Adhesive tape, Collagen, Other, Bactrim [sulfamethoxazole-trimethoprim], Cortisone, Pcn [penicillins], Scopolamine, and Scopolamine sulfate    FAMILYHISTORY       Family History   Problem Relation Age of Onset    Heart Disease Mother     Cancer Mother     Hypertension Mother     Diabetes Mother     Osteoporosis Mother     Stroke Father     Hypertension Father     Osteoporosis Sister     Diabetes Sister     Cancer Other         leukemia        SOCIAL HISTORY       Social History     Tobacco Use    Smoking status: Former     Current packs/day: 0.00     Average packs/day: 3.0 packs/day for 51.6 years (154.9 ttl pk-yrs)     Types: Cigarettes     Start date: 1960     Quit date: 2011     Years since quittin.5    Smokeless tobacco: Never   Vaping Use    Vaping Use: Never used   Substance Use Topics    Alcohol use: No    Drug use: No       SCREENINGS          Marrero Coma Scale  Eye Opening: Spontaneous  Best Verbal Response: Oriented  Best Motor Response: Obeys

## 2024-03-07 NOTE — H&P
HOSPITALISTS HISTORY AND PHYSICAL    3/7/2024 5:39 PM    Patient Information:  MYKE ALLEN is a 78 y.o. female [id number]  PCP:  Soha Tom APRN - NP (Tel: [phone] )    Chief complaint:    Chief Complaint   Patient presents with    Tachycardia     Patient states that she was having tachycardia (112-120), dizziness, SOB and fatigue. This started Monday morning. She thought it was her AFIB        History of Present Illness:  Myke Allen is a 78 y.o. female who presented with  Presents to ER with complaints of dizziness shortness of fatigue not feeling well ongoing since Monday.  Patient with history of family thought she was going into A-fib patient has complex history of hypertension diabetes pulmonary hypertension patient having some dysuria.  Blood sugars running high.  Overall not feeling well.  Denies any fevers chills.  Nothing that makes it better or worse  REVIEW OF SYSTEMS:   Constitutional: Negative for fever,chills or night sweats  ENT: Negative for rhinorrhea, epistaxis, hoarseness, sore throat.  Respiratory: Negative for shortness of breath,wheezing  Cardiovascular: Negative for chest pain, palpitations   Gastrointestinal: Negative for nausea, vomiting, diarrhea  Genitourinary: Negative for polyuria, dysuria   Hematologic/Lymphatic: Negative for bleeding tendency, easy bruising  Musculoskeletal: Negative for myalgias and arthralgias  Neurologic: Negative for confusion,dysarthria.  Skin: Negative for itching,rash, good capillary refill.   Psychiatric: Negative for depression,anxiety, agitation.  Endocrine: Negative for polydipsia,polyuria,heat /cold intolerance.    Past Medical History:   has a past medical history of Diabetes mellitus (HCC), Fatty liver, Femur fracture, left (HCC), Fibromyalgia, Fracture, hip (HCC), GERD (gastroesophageal  needed.      OXYGEN Inhale 3 L into the lungs      vitamin C (ASCORBIC ACID) 500 MG tablet Take 1 tablet by mouth daily      zinc sulfate (ZINCATE) 220 (50 Zn) MG capsule Take 220 mg by mouth daily      DULoxetine (CYMBALTA) 60 MG extended release capsule Take 1 capsule by mouth daily (Patient taking differently: Take 1 capsule by mouth daily) 30 capsule 0    hydroxychloroquine (PLAQUENIL) 200 MG tablet Take 1 tablet by mouth daily      acetaminophen (TYLENOL) 500 MG tablet Take 1 tablet by mouth every 6 hours as needed for Pain      gabapentin (NEURONTIN) 600 MG tablet Take by mouth. 600mg morning, 600 mg evening, 600mg bedtime      Calcium Polycarbophil (FIBER-CAPS PO) Take by mouth      Probiotic Product (PROBIOTIC DAILY PO) Take by mouth daily      MAGNESIUM OXIDE PO Take 500 mg by mouth daily       Biotin 5000 MCG TABS Take 5,000 mcg by mouth daily      FISH OIL 1,200 mg by Does not apply route daily      Multiple Vitamin (THERA) TABS Take  by mouth.           Allergies:  Allergies   Allergen Reactions    Adhesive Tape     Collagen     Other     Bactrim [Sulfamethoxazole-Trimethoprim] Other (See Comments)     Body shaking and chills. Body pain    Cortisone Other (See Comments)     Rash, confusion, hyperactivity    Pcn [Penicillins]     Scopolamine Anxiety    Scopolamine Sulfate Anxiety        Social History:   reports that she quit smoking about 12 years ago. Her smoking use included cigarettes. She started smoking about 64 years ago. She has a 154.9 pack-year smoking history. She has never used smokeless tobacco. She reports that she does not drink alcohol and does not use drugs.     Family History:  family history includes Cancer in her mother and another family member; Diabetes in her mother and sister; Heart Disease in her mother; Hypertension in her father and mother; Osteoporosis in her mother and sister; Stroke in her father. ,     Physical Exam:  /68   Pulse 98   Temp 98.3 °F (36.8 °C) (Oral)    Resp 17   Ht 1.702 m (5' 7\")   Wt 72.1 kg (159 lb)   SpO2 99%   BMI 24.90 kg/m²     General appearance:  Appears comfortable. Well nourished  Eyes: Sclera clear, pupils equal  ENT: Moist mucus membranes, no thrush. Trachea midline.  Cardiovascular: Regular rhythm, normal S1, S2. No murmur, gallop, rub. No edema in lower extremities  Respiratory: Clear to auscultation bilaterally, no wheeze, good inspiratory effort  Gastrointestinal: Abdomen soft, non-tender, not distended, normal bowel sounds  Musculoskeletal: No cyanosis in digits, neck supple  Neurology: Cranial nerves grossly intact. Alert and oriented in time, place and person. No speech or motor deficits  Psychiatry: Appropriate affect. Not agitated  Skin: Warm, dry, normal turgor, no rash    Labs:  CBC:   Lab Results   Component Value Date/Time    WBC 9.5 03/07/2024 12:33 PM    RBC 4.40 03/07/2024 12:33 PM    HGB 13.5 03/07/2024 12:33 PM    HCT 40.0 03/07/2024 12:33 PM    MCV 90.8 03/07/2024 12:33 PM    MCH 30.6 03/07/2024 12:33 PM    MCHC 33.7 03/07/2024 12:33 PM    RDW 12.7 03/07/2024 12:33 PM     03/07/2024 12:33 PM    MPV 10.4 03/07/2024 12:33 PM     BMP:    Lab Results   Component Value Date/Time     03/07/2024 12:33 PM    K 5.0 03/07/2024 12:33 PM    K 4.3 06/16/2023 01:24 AM    CL 92 03/07/2024 12:33 PM    CO2 32 03/07/2024 12:33 PM    BUN 22 03/07/2024 12:33 PM    CREATININE 1.3 03/07/2024 12:33 PM    CALCIUM 9.2 03/07/2024 12:33 PM    GFRAA >60 09/08/2022 12:18 PM    GFRAA >60 02/26/2013 12:27 PM    LABGLOM 41 03/07/2024 12:33 PM    GLUCOSE 493 03/07/2024 12:33 PM    GLUCOSE 139 06/15/2021 01:28 PM       Chest Xray:   EKG:    I visualized CXR images and EKG strips     Discussed  with      Problem List  Principal Problem:    UTI (urinary tract infection)  Resolved Problems:    * No resolved hospital problems. *        Assessment/Plan:   Urinary tract infection  With concern for possible yeast infection as well.  Patient started on IV  antibiotics IV antifungal.  Monitor closely.  Follow-up on culture.  Despite being on prophylactic patient likely has UTI probably due to poorly controlled diabetes    Acute kidney injury  Mild with hyper kalemia  Hold spironolactone  IV fluid repeat labs in the morning.    Hyperglycemia with poorly controlled diabetes blood sugar in 400  Could be secondary to infectious.  Start on sliding scale Lantus for now check A1c May need to adjust outpatient regimen  History of A-fib  Continue anticoagulation.    Has recent pacemaker.        Charlie Garrett MD    3/7/2024 5:39 PM

## 2024-03-07 NOTE — TELEPHONE ENCOUNTER
No afib. Her symptoms could be related to another cause. Recommend ED evaluation for SOB, and dizziness

## 2024-03-08 LAB
ANION GAP SERPL CALCULATED.3IONS-SCNC: 6 MMOL/L (ref 3–16)
BACTERIA UR CULT: NORMAL
BASOPHILS # BLD: 0.1 K/UL (ref 0–0.2)
BASOPHILS NFR BLD: 1.1 %
BUN SERPL-MCNC: 19 MG/DL (ref 7–20)
CALCIUM SERPL-MCNC: 8 MG/DL (ref 8.3–10.6)
CHLORIDE SERPL-SCNC: 103 MMOL/L (ref 99–110)
CREAT SERPL-MCNC: 1.1 MG/DL (ref 0.6–1.2)
DEPRECATED RDW RBC AUTO: 12.9 % (ref 12.4–15.4)
EKG ATRIAL RATE: 99 BPM
EKG DIAGNOSIS: NORMAL
EKG P-R INTERVAL: 262 MS
EKG Q-T INTERVAL: 380 MS
EKG QRS DURATION: 124 MS
EKG QTC CALCULATION (BAZETT): 487 MS
EKG R AXIS: -47 DEGREES
EKG T AXIS: 134 DEGREES
EKG VENTRICULAR RATE: 99 BPM
EOSINOPHIL # BLD: 0.1 K/UL (ref 0–0.6)
EOSINOPHIL NFR BLD: 1.3 %
GFR SERPLBLD CREATININE-BSD FMLA CKD-EPI: 50 ML/MIN/{1.73_M2}
GLUCOSE BLD-MCNC: 223 MG/DL (ref 70–99)
GLUCOSE BLD-MCNC: 258 MG/DL (ref 70–99)
GLUCOSE BLD-MCNC: 337 MG/DL (ref 70–99)
GLUCOSE BLD-MCNC: 339 MG/DL (ref 70–99)
GLUCOSE BLD-MCNC: 348 MG/DL (ref 70–99)
GLUCOSE BLD-MCNC: 399 MG/DL (ref 70–99)
GLUCOSE SERPL-MCNC: 192 MG/DL (ref 70–99)
HCT VFR BLD AUTO: 36.7 % (ref 36–48)
HGB BLD-MCNC: 12.3 G/DL (ref 12–16)
LYMPHOCYTES # BLD: 1.8 K/UL (ref 1–5.1)
LYMPHOCYTES NFR BLD: 23.7 %
MCH RBC QN AUTO: 30.2 PG (ref 26–34)
MCHC RBC AUTO-ENTMCNC: 33.4 G/DL (ref 31–36)
MCV RBC AUTO: 90.5 FL (ref 80–100)
MONOCYTES # BLD: 0.5 K/UL (ref 0–1.3)
MONOCYTES NFR BLD: 7.1 %
NEUTROPHILS # BLD: 5.1 K/UL (ref 1.7–7.7)
NEUTROPHILS NFR BLD: 66.8 %
PERFORMED ON: ABNORMAL
PLATELET # BLD AUTO: 131 K/UL (ref 135–450)
PMV BLD AUTO: 10.2 FL (ref 5–10.5)
POTASSIUM SERPL-SCNC: 4.4 MMOL/L (ref 3.5–5.1)
RBC # BLD AUTO: 4.06 M/UL (ref 4–5.2)
SODIUM SERPL-SCNC: 139 MMOL/L (ref 136–145)
WBC # BLD AUTO: 7.7 K/UL (ref 4–11)

## 2024-03-08 PROCEDURE — 2580000003 HC RX 258: Performed by: HOSPITALIST

## 2024-03-08 PROCEDURE — 1200000000 HC SEMI PRIVATE

## 2024-03-08 PROCEDURE — 80048 BASIC METABOLIC PNL TOTAL CA: CPT

## 2024-03-08 PROCEDURE — 6370000000 HC RX 637 (ALT 250 FOR IP): Performed by: STUDENT IN AN ORGANIZED HEALTH CARE EDUCATION/TRAINING PROGRAM

## 2024-03-08 PROCEDURE — 85025 COMPLETE CBC W/AUTO DIFF WBC: CPT

## 2024-03-08 PROCEDURE — 93010 ELECTROCARDIOGRAM REPORT: CPT | Performed by: INTERNAL MEDICINE

## 2024-03-08 PROCEDURE — 6360000002 HC RX W HCPCS: Performed by: HOSPITALIST

## 2024-03-08 PROCEDURE — 36415 COLL VENOUS BLD VENIPUNCTURE: CPT

## 2024-03-08 PROCEDURE — 6370000000 HC RX 637 (ALT 250 FOR IP): Performed by: HOSPITALIST

## 2024-03-08 RX ORDER — INSULIN LISPRO 100 [IU]/ML
5 INJECTION, SOLUTION INTRAVENOUS; SUBCUTANEOUS
Status: DISCONTINUED | OUTPATIENT
Start: 2024-03-08 | End: 2024-03-09 | Stop reason: HOSPADM

## 2024-03-08 RX ADMIN — HYDROCODONE BITARTRATE AND ACETAMINOPHEN 1 TABLET: 7.5; 325 TABLET ORAL at 19:13

## 2024-03-08 RX ADMIN — ASPIRIN 81 MG: 81 TABLET, COATED ORAL at 10:26

## 2024-03-08 RX ADMIN — AMIODARONE HYDROCHLORIDE 200 MG: 200 TABLET ORAL at 10:26

## 2024-03-08 RX ADMIN — APIXABAN 5 MG: 5 TABLET, FILM COATED ORAL at 10:26

## 2024-03-08 RX ADMIN — DILTIAZEM HYDROCHLORIDE 180 MG: 180 CAPSULE, COATED, EXTENDED RELEASE ORAL at 10:26

## 2024-03-08 RX ADMIN — FLUCONAZOLE 200 MG: 200 INJECTION, SOLUTION INTRAVENOUS at 21:43

## 2024-03-08 RX ADMIN — Medication 400 MG: at 10:26

## 2024-03-08 RX ADMIN — SODIUM CHLORIDE: 9 INJECTION, SOLUTION INTRAVENOUS at 10:24

## 2024-03-08 RX ADMIN — SODIUM CHLORIDE: 9 INJECTION, SOLUTION INTRAVENOUS at 04:39

## 2024-03-08 RX ADMIN — INSULIN LISPRO 6 UNITS: 100 INJECTION, SOLUTION INTRAVENOUS; SUBCUTANEOUS at 12:24

## 2024-03-08 RX ADMIN — INSULIN GLARGINE 18 UNITS: 100 INJECTION, SOLUTION SUBCUTANEOUS at 20:49

## 2024-03-08 RX ADMIN — CEFTRIAXONE SODIUM 1000 MG: 1 INJECTION, POWDER, FOR SOLUTION INTRAMUSCULAR; INTRAVENOUS at 18:01

## 2024-03-08 RX ADMIN — SODIUM CHLORIDE: 9 INJECTION, SOLUTION INTRAVENOUS at 17:59

## 2024-03-08 RX ADMIN — ATORVASTATIN CALCIUM 10 MG: 10 TABLET, FILM COATED ORAL at 10:26

## 2024-03-08 RX ADMIN — APIXABAN 5 MG: 5 TABLET, FILM COATED ORAL at 20:48

## 2024-03-08 RX ADMIN — SPIRONOLACTONE 25 MG: 25 TABLET ORAL at 10:26

## 2024-03-08 RX ADMIN — INSULIN LISPRO 5 UNITS: 100 INJECTION, SOLUTION INTRAVENOUS; SUBCUTANEOUS at 19:46

## 2024-03-08 RX ADMIN — ACETAMINOPHEN 650 MG: 325 TABLET ORAL at 14:23

## 2024-03-08 RX ADMIN — INSULIN LISPRO 8 UNITS: 100 INJECTION, SOLUTION INTRAVENOUS; SUBCUTANEOUS at 19:46

## 2024-03-08 RX ADMIN — DULOXETINE HYDROCHLORIDE 60 MG: 60 CAPSULE, DELAYED RELEASE ORAL at 10:26

## 2024-03-08 RX ADMIN — METOPROLOL SUCCINATE 100 MG: 50 TABLET, EXTENDED RELEASE ORAL at 10:26

## 2024-03-08 RX ADMIN — INSULIN LISPRO 4 UNITS: 100 INJECTION, SOLUTION INTRAVENOUS; SUBCUTANEOUS at 10:25

## 2024-03-08 ASSESSMENT — PAIN DESCRIPTION - PAIN TYPE: TYPE: ACUTE PAIN

## 2024-03-08 ASSESSMENT — PAIN SCALES - GENERAL
PAINLEVEL_OUTOF10: 7
PAINLEVEL_OUTOF10: 0
PAINLEVEL_OUTOF10: 6

## 2024-03-08 ASSESSMENT — PAIN DESCRIPTION - LOCATION
LOCATION: ABDOMEN;PELVIS
LOCATION: GENERALIZED

## 2024-03-08 ASSESSMENT — PAIN DESCRIPTION - FREQUENCY: FREQUENCY: INTERMITTENT

## 2024-03-08 NOTE — PROGRESS NOTES
V2.0    [facility] Progress Note      Name:  Isela Conner /Age/Sex: 1945  (78 y.o. female)   MRN & CSN:  [id number] & [id number] Encounter Date/Time: 3/8/2024 1:45 PM EST   Location:  [address] [place], [place] [address] PCP: Soha Tom APRN - NP     Attending:Charlie Garrett MD       Hospital Day: 2    Assessment and Recommendations   Isela Conner is a 78 y.o. female who presents with UTI (urinary tract infection)      Plan:   Urinary tract infection  Continue Rocephin.  Diflucan.  Monitor closely.  Acute kidney injury    Acute kidney injury improved markedly.    Continue IV hydration.  Today.    Hyperglycemia poorly controlled diabetes  Continue aggressive sliding scale for patient recently just switched regimen by PCP.  Will continue Lantus for now with goal to resume back home regimen and discussed with PCP    Dispo discharge in next 24 hours      Diet ADULT DIET; Regular; 5 carb choices (75 gm/meal)   DVT Prophylaxis    Code Status Full Code   Disposition Tomorrow pending culture         Personally reviewed Lab Studies and Imaging         Subjective:     Chief Complaint:     Isela Conner is a 78 y.o. female who presents with Still with some weakness feeling tired.      Review of Systems:      Pertinent positives and negatives discussed in HPI    Objective:     Intake/Output Summary (Last 24 hours) at 3/8/2024 1345  Last data filed at 3/8/2024 0545  Gross per 24 hour   Intake --   Output 600 ml   Net -600 ml      Vitals:   Vitals:    24 1802 24 2045 24 0415 24 1010   BP: 92/63 132/74 99/65 116/67   Pulse: 95 74 90 60   Resp: 19 18 18 18   Temp:  98.4 °F (36.9 °C) 98.2 °F (36.8 °C) 98 °F (36.7 °C)   TempSrc:  Oral Oral Oral   SpO2:  98% 95% 99%   Weight:  74.4 kg (164 lb) 75.1 kg (165 lb 8 oz)    Height:             Physical Exam:      General: NAD  Eyes: EOMI  ENT: neck supple  Cardiovascular: Regular rate.  Respiratory: Clear to auscultation  Gastrointestinal: Soft, non  abnormality is identified.     No acute cardiopulmonary abnormality is identified.       CBC:   Recent Labs     03/07/24  1233 03/08/24  0506   WBC 9.5 7.7   HGB 13.5 12.3    131*     BMP:    Recent Labs     03/07/24  1233 03/08/24  0506   * 139   K 5.0 4.4   CL 92* 103   CO2 32 30   BUN 22* 19   CREATININE 1.3* 1.1   GLUCOSE 493* 192*     Hepatic:   Recent Labs     03/07/24  1233   AST 18   ALT 15   BILITOT 0.5   ALKPHOS 83     Lipids: No results found for: \"CHOL\", \"HDL\", \"TRIG\"  Hemoglobin A1C:   Lab Results   Component Value Date/Time    LABA1C 5.6 06/17/2023 04:23 AM     TSH:   Lab Results   Component Value Date/Time    TSH 1.13 11/13/2013 12:14 PM     Troponin: No results found for: \"TROPONINT\"  Lactic Acid: No results for input(s): \"LACTA\" in the last 72 hours.  BNP:   Recent Labs     03/07/24  1233   PROBNP 1,172*     UA:  Lab Results   Component Value Date/Time    NITRU Negative 03/07/2024 12:51 PM    COLORU Yellow 03/07/2024 12:51 PM    PHUR 5.0 03/07/2024 12:51 PM    WBCUA 565 03/07/2024 12:51 PM    WBCUA 21-50 08/30/2019 01:01 PM    RBCUA 222 03/07/2024 12:51 PM    RBCUA NEGATIVE 09/17/2020 05:30 PM    MUCUS PRESENT 09/17/2020 05:30 PM    YEAST Present 03/07/2024 12:51 PM    BACTERIA None Seen 03/07/2024 12:51 PM    CLARITYU TURBID 03/07/2024 12:51 PM    SPECGRAV >=1.030 03/07/2024 12:51 PM    LEUKOCYTESUR MODERATE 03/07/2024 12:51 PM    UROBILINOGEN 0.2 03/07/2024 12:51 PM    BILIRUBINUR Negative 03/07/2024 12:51 PM    BILIRUBINUR NEGATIVE 09/17/2020 05:30 PM    BLOODU LARGE 03/07/2024 12:51 PM    GLUCOSEU >=1000 03/07/2024 12:51 PM    GLUCOSEU Normal 08/30/2019 01:01 PM    KETUA Negative 03/07/2024 12:51 PM     Urine Cultures:   Lab Results   Component Value Date/Time    LABURIN No growth at 18 to 36 hours 03/07/2024 12:51 PM     Blood Cultures: No results found for: \"BC\"  No results found for: \"BLOODCULT2\"  Organism:   Lab Results   Component Value Date/Time    ORG  09/17/2020 05:30 PM        ORGANISM:                       PROTEUS MIRABILIS      [facility]  09/17/2020 05:30 PM       ORGANISM:                       Enterobacter cloacae complex           Electronically signed by Charlie Garrett MD on 3/8/2024 at 1:45 PM

## 2024-03-08 NOTE — PLAN OF CARE
Problem: ABCDS Injury Assessment  Goal: Absence of physical injury  Outcome: Progressing     Problem: Safety - Adult  Goal: Free from fall injury  Outcome: Progressing     Problem: Discharge Planning  Goal: Discharge to home or other facility with appropriate resources  Recent Flowsheet Documentation  Taken 3/7/2024 2045 by Taylor Higgins, RN  Discharge to home or other facility with appropriate resources: Identify barriers to discharge with patient and caregiver     Problem: Skin/Tissue Integrity  Goal: Absence of new skin breakdown  Outcome: Progressing

## 2024-03-08 NOTE — PROGRESS NOTES
4 Eyes Skin Assessment     NAME:  Isela Conner  YOB: 1945  MEDICAL RECORD NUMBER:  [id number]    The patient is being assessed for  Admission    I agree that at least one RN has performed a thorough Head to Toe Skin Assessment on the patient. ALL assessment sites listed below have been assessed.      Areas assessed by both nurses:    Head, Face, Ears, Shoulders, Back, Chest, Arms, Elbows, Hands, Sacrum. Buttock, Coccyx, Ischium, and Legs. Feet and Heels        Does the Patient have a Wound? No noted wound(s)       Chaim Prevention initiated by RN: No  Wound Care Orders initiated by RN: No    Pressure Injury (Stage 3,4, Unstageable, DTI, NWPT, and Complex wounds) if present, place Wound referral order by RN under : No    New Ostomies, if present place, Ostomy referral order under : No     Nurse 1 eSignature: Electronically signed by Taylor Elliott RN on 3/8/24 at 1:41 AM EST    **SHARE this note so that the co-signing nurse can place an eSignature**    Nurse 2 eSignature: Electronically signed by Aaliyah Haney RN on 3/8/24 at 1:42 AM EST

## 2024-03-08 NOTE — FLOWSHEET NOTE
Assessment complete. See electronic charting for further information. Able to verbalize needs. A&O. Call light within reach. . Will continue to monitor.

## 2024-03-08 NOTE — PROGRESS NOTES
Occupational Therapy    [facility] - Inpatient Rehabilitation Department   Phone: [phone]    Occupational Therapy    [] Initial Evaluation            [] Daily Treatment Note         [] Discharge Summary      Patient: Isela Conner   : 1945   MRN: [id number]   Date of Service:  3/8/2024    Admitting Diagnosis:  UTI (urinary tract infection)  Current Admission Summary: Isela Conner is a 78 y.o. female who presented with  Presents to ER with complaints of dizziness shortness of fatigue not feeling well ongoing since Monday.  Patient with history of family thought she was going into A-fib patient has complex history of hypertension diabetes pulmonary hypertension patient having some dysuria.  Blood sugars running high.  Overall not feeling well.  Denies any fevers chills.  Nothing that makes it better or worse   Past Medical History:  has a past medical history of Diabetes mellitus (HCC), Fatty liver, Femur fracture, left (HCC), Fibromyalgia, Fracture, hip (HCC), GERD (gastroesophageal reflux disease), Hypertension, IBS (irritable bowel syndrome), Osteoarthritis, Peripheral neuropathy, Postmenopausal, and Scleroderma (HCC).  Past Surgical History:  has a past surgical history that includes Hysterectomy (); Cholecystectomy (); Lumbar disc surgery (); Throat surgery (); arthroplasty (08/10/2012); Fibula Fracture Surgery (); back surgery; Cystocopy (2018); Partial hip arthroplasty (Right, 2019); and Femur fracture surgery.    Discharge Recommendations: ***    DME Required For Discharge: {FFOTD/C EQUIPMENT:75445}    Precautions/Restrictions: {FFRESTRICTIONS:93966}  Weight Bearing Restrictions: {FFWBRESTRICTIONS:51222}  [] Right Upper Extremity  [] Left Upper Extremity [] Right Lower Extremity  [] Left Lower Extremity     Required Braces/Orthotics: {ffbraces:59180}   [] Right  [] Left  Positional Restrictions:{ffpositionrestrictions:08885}      Pre-Admission  Information     Lives With: {ffliveswith:61355}    Type of Home: {fftypeofhome:93881}  Home Layout: {ffhomelayout:29956}  Home Access: {ffhomeaccess:02067}  Bathroom Layout: {ffbathroomlayout:67250}  Toilet Height: {fftoiletheight:79761}  Bathroom Equipment: {ffbathroomequipment:94382}  Home Equipment: {ffplofequipment:35757}  Transfer Assistance: {FFPLOFAssistance:02862}  Ambulation Assistance:{FFPLOFAssistance:26119}  ADL Assistance: {ffplofadl:36816}  IADL Assistance: {ffplofhomemakin}  Active : [] yes  []no  Current Employment: {ffplofemployment:78437}  Hobbies:   Recent Falls: ***      Examination     Vision:   {ffptvision:99763}  Hearing:   {ffpthearin}  Perception:   {ffotperception:31981}  Observation:   {ffptobservation:80400}  Posture:   ***  Sensation:   {ffptsensation:70167}  Proprioception:    {ffptproprioception:37385}  Tone:   {ffpttone:47276}  Coordination Testing:   {ffptcoordination:26755}    ROM:   {FFOTROM:41804}  Strength:   {ffotstrength:33434}    Decision Making: {ffptdecisionmakin}  Clinical Presentation: {ffptclinicalpresentation:79981}      Subjective    General: ***  Pain: {ffptpain:40142}  Pain Interventions: {ffptpaininterventions:82758}           Activities of Daily Living    Basic Activities of Daily Living  {ffotadl:25213}  Instrumental Activities of Daily Living  {ffotiadl:56784}    Functional Mobility    Bed Mobility  {ffptbedmobility:86877}  Comments:  Transfers  {ffottransfers:09406}  Comments:  Functional Mobility:  {ffotfunctional:77953}    Other Therapeutic Interventions      Functional Outcomes         Cognition  {ffptcognition:88972}  Orientation:    {ffptorientation:30275}  Command Following:   {ffptcommandfollowin}     Education    Barriers To Learning: {ffptlearning barriers:61457}  Patient Education: patient educated on {ffoteducation:78566}  Learning Assessment:  {pan:39242}    Assessment    Activity Tolerance: ***  Impairments  Requiring Therapeutic Intervention: {ffptimpairments:56344}  Prognosis: {ffptprognosis:36971}  Clinical Assessment: ***  Safety Interventions: {ffptsafety:34524}       Plan    Frequency: {ffptfrequency:72434}  Current Treatment Recommendations: {ffpttreatmentrecommendations:87190}    Goals    Patient Goals: ***     Short Term Goals:  Time Frame: ***  {ffotgoals:87222}       Therapy Session Time     Individual Group Co-treatment   Time In        Time Out        Minutes             Timed Code Treatment Minutes:       Total Treatment Minutes:      Electronically Signed By: YOLETTE MAGAÑA, OT

## 2024-03-08 NOTE — ED NOTES
ED TO INPATIENT SBAR HANDOFF    Patient Name: Isela Conner   :  1945  78 y.o.   MRN:  [id number]  Preferred Name  Isela  ED Room #:  ED-0027/27  Family/Caregiver Present no   Restraints no   Sitter no   Sepsis Risk Score Sepsis Risk Score: 7.41    Situation  Code Status: Full Code No additional code details.    Allergies: Adhesive tape, Collagen, Other, Bactrim [sulfamethoxazole-trimethoprim], Cortisone, Pcn [penicillins], Scopolamine, and Scopolamine sulfate  Weight: Patient Vitals for the past 96 hrs (Last 3 readings):   Weight   24 1200 72.1 kg (159 lb)     Arrived from: home  Chief Complaint:   Chief Complaint   Patient presents with    Tachycardia     Patient states that she was having tachycardia (112-120), dizziness, SOB and fatigue. This started Monday morning. She thought it was her AF     Hospital Problem/Diagnosis:  Principal Problem:    UTI (urinary tract infection)  Resolved Problems:    * No resolved hospital problems. *    Imaging:   XR CHEST PORTABLE   Final Result   No acute cardiopulmonary abnormality is identified.           Abnormal labs:   Abnormal Labs Reviewed   COMPREHENSIVE METABOLIC PANEL - Abnormal; Notable for the following components:       Result Value    Sodium 131 (*)     Chloride 92 (*)     Glucose 493 (*)     BUN 22 (*)     Creatinine 1.3 (*)     Est, Glom Filt Rate 41 (*)     All other components within normal limits   MAGNESIUM - Abnormal; Notable for the following components:    Magnesium 2.80 (*)     All other components within normal limits   TSH WITH REFLEX - Abnormal; Notable for the following components:    TSH 5.55 (*)     All other components within normal limits   D-DIMER, QUANTITATIVE - Abnormal; Notable for the following components:    D-Dimer, Quant 0.73 (*)     All other components within normal limits   TROPONIN - Abnormal; Notable for the following components:    Troponin, High Sensitivity 26 (*)     All other components within normal limits  documented pain score (0-10 scale)    Last documented pain medication administered: none.  Mental Status: oriented, alert, coherent, logical, thought processes intact, and able to concentrate and follow conversation  Orientation Level: Orientation Level: Oriented X4  NIH Score:    C-SSRS: Risk of Suicide: No Risk  Bedside swallow:    Carlos Coma Scale (GCS): [place] Coma Scale  Eye Opening: Spontaneous  Best Verbal Response: Oriented  Best Motor Response: Obeys commands  Carlos Coma Scale Score: 15  Active LDA's:   Peripheral IV 03/07/24 Left;Anterior Forearm (Active)     PO Status: Regular  Pertinent or High Risk Medications/Drips: no   If Yes, please provide details: none.  Pending Blood Product Administration: no       You may also review the ED PT Care Timeline found under the Summary Nursing Index tab.    Recommendation    Pending orders none.  Plan for Discharge (if known): home.  Additional Comments: Wheelchair bound, can ambulate short distances, urinary incontinence.   If any further questions, please call Sending RN at [phone].    Electronically signed by: Electronically signed by Avel Garcia RN on 3/7/2024 at 7:21 PM

## 2024-03-09 VITALS
RESPIRATION RATE: 18 BRPM | WEIGHT: 168.3 LBS | HEIGHT: 67 IN | BODY MASS INDEX: 26.42 KG/M2 | OXYGEN SATURATION: 99 % | TEMPERATURE: 97.6 F | HEART RATE: 62 BPM | DIASTOLIC BLOOD PRESSURE: 65 MMHG | SYSTOLIC BLOOD PRESSURE: 159 MMHG

## 2024-03-09 LAB
ANION GAP SERPL CALCULATED.3IONS-SCNC: 5 MMOL/L (ref 3–16)
BASOPHILS # BLD: 0.1 K/UL (ref 0–0.2)
BASOPHILS NFR BLD: 1.2 %
BUN SERPL-MCNC: 16 MG/DL (ref 7–20)
CALCIUM SERPL-MCNC: 7.6 MG/DL (ref 8.3–10.6)
CHLORIDE SERPL-SCNC: 106 MMOL/L (ref 99–110)
CO2 SERPL-SCNC: 25 MMOL/L (ref 21–32)
CREAT SERPL-MCNC: 0.9 MG/DL (ref 0.6–1.2)
DEPRECATED RDW RBC AUTO: 12.9 % (ref 12.4–15.4)
EOSINOPHIL # BLD: 0.1 K/UL (ref 0–0.6)
EOSINOPHIL NFR BLD: 1.6 %
GFR SERPLBLD CREATININE-BSD FMLA CKD-EPI: >60 ML/MIN/{1.73_M2}
GLUCOSE BLD-MCNC: 183 MG/DL (ref 70–99)
GLUCOSE BLD-MCNC: 304 MG/DL (ref 70–99)
GLUCOSE SERPL-MCNC: 170 MG/DL (ref 70–99)
HCT VFR BLD AUTO: 37.4 % (ref 36–48)
HGB BLD-MCNC: 12.3 G/DL (ref 12–16)
LYMPHOCYTES # BLD: 2 K/UL (ref 1–5.1)
LYMPHOCYTES NFR BLD: 25.7 %
MCH RBC QN AUTO: 30.1 PG (ref 26–34)
MCHC RBC AUTO-ENTMCNC: 32.8 G/DL (ref 31–36)
MCV RBC AUTO: 91.6 FL (ref 80–100)
MONOCYTES # BLD: 0.5 K/UL (ref 0–1.3)
MONOCYTES NFR BLD: 6.2 %
NEUTROPHILS # BLD: 5 K/UL (ref 1.7–7.7)
NEUTROPHILS NFR BLD: 65.3 %
PERFORMED ON: ABNORMAL
PERFORMED ON: ABNORMAL
PLATELET # BLD AUTO: 136 K/UL (ref 135–450)
PMV BLD AUTO: 10.2 FL (ref 5–10.5)
POTASSIUM SERPL-SCNC: 4.4 MMOL/L (ref 3.5–5.1)
RBC # BLD AUTO: 4.08 M/UL (ref 4–5.2)
SODIUM SERPL-SCNC: 136 MMOL/L (ref 136–145)
WBC # BLD AUTO: 7.7 K/UL (ref 4–11)

## 2024-03-09 PROCEDURE — 80048 BASIC METABOLIC PNL TOTAL CA: CPT

## 2024-03-09 PROCEDURE — 97166 OT EVAL MOD COMPLEX 45 MIN: CPT

## 2024-03-09 PROCEDURE — 97162 PT EVAL MOD COMPLEX 30 MIN: CPT

## 2024-03-09 PROCEDURE — 36415 COLL VENOUS BLD VENIPUNCTURE: CPT

## 2024-03-09 PROCEDURE — 97530 THERAPEUTIC ACTIVITIES: CPT

## 2024-03-09 PROCEDURE — 2580000003 HC RX 258: Performed by: HOSPITALIST

## 2024-03-09 PROCEDURE — 85025 COMPLETE CBC W/AUTO DIFF WBC: CPT

## 2024-03-09 PROCEDURE — 6370000000 HC RX 637 (ALT 250 FOR IP): Performed by: STUDENT IN AN ORGANIZED HEALTH CARE EDUCATION/TRAINING PROGRAM

## 2024-03-09 PROCEDURE — 6370000000 HC RX 637 (ALT 250 FOR IP): Performed by: HOSPITALIST

## 2024-03-09 RX ORDER — LEVOFLOXACIN 250 MG/1
250 TABLET, FILM COATED ORAL DAILY
Qty: 3 TABLET | Refills: 0 | Status: SHIPPED | OUTPATIENT
Start: 2024-03-09 | End: 2024-03-12

## 2024-03-09 RX ORDER — FLUCONAZOLE 100 MG/1
100 TABLET ORAL DAILY
Qty: 3 TABLET | Refills: 0 | Status: SHIPPED | OUTPATIENT
Start: 2024-03-09 | End: 2024-03-12

## 2024-03-09 RX ADMIN — METOPROLOL SUCCINATE 100 MG: 50 TABLET, EXTENDED RELEASE ORAL at 10:29

## 2024-03-09 RX ADMIN — INSULIN LISPRO 6 UNITS: 100 INJECTION, SOLUTION INTRAVENOUS; SUBCUTANEOUS at 12:40

## 2024-03-09 RX ADMIN — INSULIN LISPRO 5 UNITS: 100 INJECTION, SOLUTION INTRAVENOUS; SUBCUTANEOUS at 12:40

## 2024-03-09 RX ADMIN — DULOXETINE HYDROCHLORIDE 60 MG: 60 CAPSULE, DELAYED RELEASE ORAL at 10:29

## 2024-03-09 RX ADMIN — HYDROCODONE BITARTRATE AND ACETAMINOPHEN 1 TABLET: 7.5; 325 TABLET ORAL at 06:45

## 2024-03-09 RX ADMIN — ASPIRIN 81 MG: 81 TABLET, COATED ORAL at 10:28

## 2024-03-09 RX ADMIN — INSULIN LISPRO 5 UNITS: 100 INJECTION, SOLUTION INTRAVENOUS; SUBCUTANEOUS at 10:36

## 2024-03-09 RX ADMIN — SODIUM CHLORIDE, PRESERVATIVE FREE 10 ML: 5 INJECTION INTRAVENOUS at 10:36

## 2024-03-09 RX ADMIN — ONDANSETRON 4 MG: 4 TABLET, ORALLY DISINTEGRATING ORAL at 10:28

## 2024-03-09 RX ADMIN — AMIODARONE HYDROCHLORIDE 200 MG: 200 TABLET ORAL at 10:29

## 2024-03-09 RX ADMIN — Medication 400 MG: at 10:29

## 2024-03-09 RX ADMIN — SPIRONOLACTONE 25 MG: 25 TABLET ORAL at 10:29

## 2024-03-09 RX ADMIN — HYDROCODONE BITARTRATE AND ACETAMINOPHEN 1 TABLET: 7.5; 325 TABLET ORAL at 00:52

## 2024-03-09 RX ADMIN — DILTIAZEM HYDROCHLORIDE 180 MG: 180 CAPSULE, COATED, EXTENDED RELEASE ORAL at 10:29

## 2024-03-09 RX ADMIN — ATORVASTATIN CALCIUM 10 MG: 10 TABLET, FILM COATED ORAL at 10:29

## 2024-03-09 RX ADMIN — APIXABAN 5 MG: 5 TABLET, FILM COATED ORAL at 10:29

## 2024-03-09 ASSESSMENT — PAIN DESCRIPTION - ORIENTATION: ORIENTATION: LEFT

## 2024-03-09 ASSESSMENT — PAIN DESCRIPTION - LOCATION: LOCATION: LEG

## 2024-03-09 ASSESSMENT — PAIN SCALES - GENERAL: PAINLEVEL_OUTOF10: 8

## 2024-03-09 ASSESSMENT — PAIN DESCRIPTION - DESCRIPTORS: DESCRIPTORS: BURNING;DISCOMFORT

## 2024-03-09 NOTE — CARE COORDINATION
Case Management Assessment  Initial Evaluation    Date/Time of Evaluation: 3/9/2024 10:16 AM  Assessment Completed by: Amna Moss    If patient is discharged prior to next notation, then this note serves as note for discharge by case management.    Patient Name: Isela Conner                   YOB: 1945  Diagnosis: UTI (urinary tract infection) [N39.0]  Hyperglycemia [R73.9]  Yeast UTI [B37.49]  Acute cystitis with hematuria [N30.01]                   Date / Time: 3/7/2024 11:40 AM    Patient Admission Status: Inpatient   Readmission Risk (Low < 19, Mod (19-27), High > 27): Readmission Risk Score: 12.6    Current PCP: Soha Tom APRN - NP  PCP verified by CM? Yes    Chart Reviewed: Yes      History Provided by: Patient  Patient Orientation: Person, Place, Alert and Oriented, Situation    Patient Cognition: Alert    Hospitalization in the last 30 days (Readmission):  No    If yes, Readmission Assessment in CM Navigator will be completed.    Advance Directives:      Code Status: Full Code   Patient's Primary Decision Maker is: Legal Next of Kin      Discharge Planning:    Patient lives with: (P) Alone Type of Home: (P) Assisted living  Primary Care Giver: Self  Patient Support Systems include: Family Members, [employer]   Current Financial resources: (P) Medicare  Current community resources: (P) Assisted Living ([facility])  Current services prior to admission: (P) Emergency Call  System, Durable Medical Equipment, Oxygen Therapy (Dasco - Inogen Portable, concentator)            Current DME: (P) Wheelchair, Oxygen Therapy (Comment), Shower Chair            Type of Home Care services:  (P) None    ADLS  Prior functional level: Mobility, Assistance with the following:, Cooking, Housework, Shopping  Current functional level: Assistance with the following:, Mobility, Cooking, Housework, Shopping    PT AM-PAC: 15 /24  OT AM-PAC: 17 /24    Family can provide assistance at DC:  [x]PT  [x]OT  []HHA  []SW  []  SLP    CM/SW will follow-up on referrals and provide any additional documentation necessary to facilitate placement.       The Plan for Transition of Care is related to the following treatment goals of UTI (urinary tract infection) [N39.0]  Hyperglycemia [R73.9]  Yeast UTI [B37.49]  Acute cystitis with hematuria [N30.01]    IF APPLICABLE: The Patient and/or patient representative Isela and her family were provided with a choice of provider and agrees with the discharge plan. Freedom of choice list with basic dialogue that supports the patient's individualized plan of care/goals and shares the quality data associated with the providers was provided to:     Patient Representative Name:       The Patient and/or Patient Representative Agree with the Discharge Plan?  sebas Moss  Case Management Department  Ph: [phone] Fax: [phone]

## 2024-03-09 NOTE — PROGRESS NOTES
[facility] - Inpatient Rehabilitation Department   Phone: [phone]    Occupational Therapy    [x] Initial Evaluation            [] Daily Treatment Note         [] Discharge Summary      Patient: Isela Conner   : 1945   MRN: [id number]   Date of Service:  3/9/2024    Admitting Diagnosis:  UTI (urinary tract infection)  Current Admission Summary:  Isela Conner is a 78 y.o. female who presented with  Presents to ER with complaints of dizziness shortness of fatigue not feeling well ongoing since Monday.  Patient with history of family thought she was going into A-fib patient has complex history of hypertension diabetes pulmonary hypertension patient having some dysuria.  Blood sugars running high.  Overall not feeling well.  Denies any fevers chills.  Nothing that makes it better or worse   Past Medical History:  has a past medical history of Diabetes mellitus (HCC), Fatty liver, Femur fracture, left (HCC), Fibromyalgia, Fracture, hip (HCC), GERD (gastroesophageal reflux disease), Hypertension, IBS (irritable bowel syndrome), Osteoarthritis, Peripheral neuropathy, Postmenopausal, and Scleroderma (HCC).  Past Surgical History:  has a past surgical history that includes Hysterectomy (); Cholecystectomy (); Lumbar disc surgery (); Throat surgery (); arthroplasty (08/10/2012); Fibula Fracture Surgery (); back surgery; Cystocopy (2018); Partial hip arthroplasty (Right, 2019); and Femur fracture surgery.    Discharge Recommendations: Isela Conner scored a 17/24 on the AM-PAC ADL Inpatient form. Current research shows that an AM-PAC score of 18 or greater is typically associated with a discharge to the patient's home setting. Based on the patient's AM-PAC score, and their current ADL deficits, it is recommended that the patient have 2-3 sessions per week of Occupational Therapy at d/c to increase the patient's independence.  At this time, this patient demonstrates  the endurance and safety to discharge home with HOME HEALTH CARE: LEVEL 3 SAFETY     - Initial home health evaluation to occur within 24-48 hours, in patient home   - Therapy evaluations in home within 24-48 hours of discharge; including DME and home safety   - Frontload therapy 5 days, then 3x a week   - Therapy to evaluate if patient has Home Health Aide needs for personal care   -  evaluation within 24-48 hours, includes evaluation of resources and insurance to determine AL, [place], LTC, and Medicaid options   (home vs OP services) and a follow up treatment frequency of 2-3x/wk.   Please see assessment section for further patient specific details.    If patient discharges prior to next session this note will serve as a discharge summary.  Please see below for the latest assessment towards goals.      Pt has friend who will stay with her 24/7 post discharge      DME Required For Discharge: patient has all required DME for discharge    Precautions/Restrictions: high fall risk  Weight Bearing Restrictions: no restrictions  [] Right Upper Extremity  [] Left Upper Extremity [] Right Lower Extremity  [] Left Lower Extremity     Required Braces/Orthotics: no braces required   [] Right  [] Left  Positional Restrictions:no positional restrictions    Pre-Admission Information   Lives With: alone, 2 cats                     Type of Home: assisted living, [facility]  Home Layout: one level  Home Access: level entry  Bathroom Layout: walk in shower  Bathroom Equipment: grab bars in shower, shower chair  Toilet Height: standard height  Home Equipment: rolling walker, manual wheelchair  Transfer Assistance: modified independent with use of w/c, has used RW in past  Ambulation Assistance: typically non ambulatory but has used RW in past, uses w/c for primary mobility for last 3 years  ADL Assistance: independent with all ADL's  IADL Assistance:  goes to dining room for meals, family and facility  Poor tolerance due to fatigue today, pt reported not sleeping and feeling nausea from pain medication  Impairments Requiring Therapeutic Intervention: decreased functional mobility, decreased ADL status, decreased endurance, decreased sensation, increased pain  Prognosis: good  Clinical Assessment: Pt functioning below baseline, will benefit from OT in skilled setting to increase safety and tolerance for ADLs prior dishcarge.   Safety Interventions: patient left in bed, bed alarm in place, call light within reach, patient at risk for falls, and nurse notified    Plan  Frequency: 3-5 x/per week  Current Treatment Recommendations: functional mobility training, transfer training, endurance training, patient/caregiver education, and ADL/self-care training    Goals  Patient Goals: go home   Short Term Goals:  Time Frame: discharge  Patient will complete upper body ADL at set up assistance   Patient will complete lower body ADL at set up assistance   Patient will complete toileting at stand by assistance   Patient will complete functional transfers at stand by assistance     Above goals reviewed on 3/9/2024.  All goals are ongoing at this time unless indicated above.       Therapy Session Time     Individual Group Co-treatment   Time In    0937   Time Out    1003   Minutes    26        Timed Code Treatment Minutes:   11  Total Treatment Minutes: 26       Electronically Signed By: ZELDA Topete, OTR/L [id number]

## 2024-03-09 NOTE — PLAN OF CARE
Problem: Discharge Planning  Goal: Discharge to home or other facility with appropriate resources  Outcome: Completed     Problem: Skin/Tissue Integrity  Goal: Absence of new skin breakdown  Description: 1.  Monitor for areas of redness and/or skin breakdown  2.  Assess vascular access sites hourly  3.  Every 4-6 hours minimum:  Change oxygen saturation probe site  4.  Every 4-6 hours:  If on nasal continuous positive airway pressure, respiratory therapy assess nares and determine need for appliance change or resting period.  Outcome: Completed     Problem: ABCDS Injury Assessment  Goal: Absence of physical injury  Outcome: Completed     Problem: Safety - Adult  Goal: Free from fall injury  Outcome: Completed     Problem: Pain  Goal: Verbalizes/displays adequate comfort level or baseline comfort level  Outcome: Completed

## 2024-03-09 NOTE — CARE COORDINATION
Case Management -  Discharge Note      Patient Name: Isela Conner                   YOB: 1945            Readmission Risk (Low < 19, Mod (19-27), High > 27): Readmission Risk Score: 12.6    Current PCP: Soha Tom APRN - NP    ([facility]) Important Message from Medicare:    Date: 3/9/2024    PT AM-PAC: 15 /24  OT AM-PAC: 17 /24    Patient/patient representative has been educated on the benefits of HHC as well as the possible risks of declining recommended services. Patient/patient representative has acknowledged the information provided and decided on the following discharge plan. Patient/ patient representative has been provided freedom of choice regarding service provider, supported by basic dialogue that supports the patient's individualized plan of care/goals.    Home Care Information:   Is patient resuming current home health care services: Yes -      Home Care Agency:   [facility]  [address] [place] [address]  Phone: [phone]   Fax: [phone]             Services: RN PT/OT   Home Health Order Obtained: Yes       Home health agency notified of discharge.  [facility] reports accepted Pt active in past , can start care Monday     Financial    Payor: MEDICARE / Plan: MEDICARE PART A AND B / Product Type: *No Product type* /     Pharmacy:  Potential assistance Purchasing Medications: No  Meds-to-Beds request:        [facility] - [address] -  [phone] - F [phone]  [address] [facility] [address]  Phone: [phone] Fax: [phone]    [id number] - [place], [place] - [address] [facility] [phone] - F [phone]  [address] [facility] [address]  Phone: [phone] Fax: [phone]    [facility] - [place], [place] - [address] [facility] P [phone] - F [phone]  [address] [facility] [address]  Phone: [phone] Fax:  [phone]      Notes:    Additional Case Management Notes: n/a

## 2024-03-09 NOTE — PROGRESS NOTES
Pt discharged back to [facility] her facility.; Iv removed with some noted bleeding.  pt verbalizes understanding of d/c instructions including medication orders for home and possible side effects associated with them. Pt instructed to call the doctor listed if they should have any complications or worsening of symptoms.  Pt wheeled to front lobby w/ all personal belongings, with daughter.

## 2024-03-09 NOTE — DISCHARGE INSTR - COC
Continuity of Care Form    Patient Name: Isela Conner   :  1945  MRN:  [id number]    Admit date:  3/7/2024  Discharge date:  3/9/24    Code Status Order: Full Code   Advance Directives:     Admitting Physician:  Charlie Garrett MD  PCP: Soha Tom APRN - NP    Discharging Nurse: Katja patel  Discharging Hospital Unit/Room#: 3AN-3321/3321-01  Discharging Unit Phone Number: [phone]    Emergency Contact:   Extended Emergency Contact Information  Primary Emergency Contact: [place] Fernandes  Mobile Phone: [phone]  Relation: Other Relative    Past Surgical History:  Past Surgical History:   Procedure Laterality Date    ARTHROPLASTY  08/10/2012    FIFTH TOE LEFT FOOT    BACK SURGERY      CHOLECYSTECTOMY      CYSTOSCOPY  2018    with botox injection     FEMUR FRACTURE SURGERY      FIBULA FRACTURE SURGERY      3 separate surgeries for a fractured left fibula tibial a closed done by Dr. Desai    HYSTERECTOMY (CERVIX STATUS UNKNOWN)      LUMBAR DISC SURGERY      PARTIAL HIP ARTHROPLASTY Right 2019    [facility]    THROAT SURGERY      vocal cord polyp       Immunization History:   Immunization History   Administered Date(s) Administered    COVID-19, MODERNA, ( formula), (age 12y+), IM, 50mcg/0.5mL 10/10/2023    COVID-19, PFIZER Bivalent, DO NOT Dilute, (age 12y+), IM, 30 mcg/0.3 mL 2022    COVID-19, PFIZER PURPLE top, DILUTE for use, (age 12 y+), 30mcg/0.3mL 2021, 2021, 2021    Influenza 10/15/2013    Influenza A ([id number]) Vaccine PF IM 2010    Influenza Virus Vaccine 10/19/2011, 10/15/2013, 10/07/2014    Influenza, Triv, inactivated, subunit, adjuvanted, IM (Fluad 65 yrs and older) 2018, 10/11/2019    Pneumococcal Conjugate 7-valent (Prevnar7) 2010    Pneumococcal, PPSV23, PNEUMOVAX 23, (age 2y+), SC/IM, 0.5mL 2010, 2018, 2020    Zoster Recombinant (Shingrix) 2019, 2020       Active  Assisted  Feeding  Assisted  Med Admin  Assisted  Med Delivery   whole    Wound Care Documentation and Therapy:        Elimination:  Continence:   Bowel: Yes  Bladder: Yes  Urinary Catheter: None   Colostomy/Ileostomy/Ileal Conduit: No       Date of Last BM:     Intake/Output Summary (Last 24 hours) at 3/9/2024 1121  Last data filed at 3/9/2024 1042  Gross per 24 hour   Intake 150 ml   Output 1000 ml   Net -850 ml     I/O last 3 completed shifts:  In: 150 [P.O.:150]  Out: 800 [Urine:800]    Safety Concerns:     At Risk for Falls    Impairments/Disabilities:      None    Nutrition Therapy:  Current Nutrition Therapy:   - Oral Diet:  General and Carb Control 4 carbs/meal (1800kcals/day)    Routes of Feeding: Oral  Liquids: Thin Liquids  Daily Fluid Restriction: no  Last Modified Barium Swallow with Video (Video Swallowing Test): not done    Treatments at the Time of Hospital Discharge:   Respiratory Treatments:   Oxygen Therapy:  is on oxygen at 2 L/min per nasal cannula.  Ventilator:    - No ventilator support    Rehab Therapies:   Weight Bearing Status/Restrictions: No weight bearing restrictions  Other Medical Equipment (for information only, NOT a DME order):  walker  Other Treatments:     Patient's personal belongings (please select all that are sent with patient):  None    RN SIGNATURE:  Electronically signed by Katja Doss RN on 3/9/24 at 1:54 PM EST    CASE MANAGEMENT/SOCIAL WORK SECTION    Inpatient Status Date: 3/7/2024    Readmission Risk Assessment Score: 13%  Readmission Risk              Risk of Unplanned Readmission:  24           Discharging to Facility/ Agency     [facility]  [address] [place] [address]  Phone: [phone]   Fax: [phone]     / signature: Electronically signed by Amna Moss on 3/9/24 at 11:21 AM EST    PHYSICIAN SECTION    Prognosis: Good    Condition at Discharge: Stable    Rehab Potential (if  transferring to Rehab): Good    Recommended Labs or Other Treatments After Discharge:     Physician Certification: I certify the above information and transfer of Isela Conner  is necessary for the continuing treatment of the diagnosis listed and that she requires Home Care for greater 30 days.     Update Admission H&P: No change in H&P    PHYSICIAN SIGNATURE:  Electronically signed by Charlie Garrett MD on 3/9/24 at 11:31 AM EST

## 2024-03-09 NOTE — PROGRESS NOTES
Pt A&OX4, denies pain, VSS, assessment done, medication given per MAR, pt is awake, in bed, safety measures in place, call light within reach, RN monitoring during shift.

## 2024-03-09 NOTE — PROGRESS NOTES
[facility] - Inpatient Rehabilitation Department   Phone: [phone]    Physical Therapy    [x] Initial Evaluation            [] Daily Treatment Note         [] Discharge Summary      Patient: Isela Conner   : 1945   MRN: [id number]   Date of Service:  3/9/2024  Admitting Diagnosis: UTI (urinary tract infection)  Current Admission Summary: Isela Conner is a 78 y.o. female with past medical history of scleroderma, fibromyalgia, diabetes, pulmonary artery hypertension, atrial fibrillation on Eliquis with no reported skipped or missed dosages, hypertension who presents ED with complaint of elevated heart rate.  She reports since Monday she has had feelings of elevated heart rate with lightheadedness, near syncope, shortness of breath and decreased exercise tolerance.  Denies any chest tightness or chest pain.  She thought she was in A-fib again.  Patient states she sent a transmission to electrophysiology and they reviewed her transmission from her pacemaker and told her she was not in A-fib but that her heart rate was higher than has been in the past in the 90s to 110s.  She states she was sent to the ED for further evaluation and treatment given her elevated heart rate and symptomology.  She denies abdominal pain, nausea/vomiting, urinary symptoms or changes in bowel movements.  She has a decreased oral intake.  Denies any changes in medication recently.  Denies headache, room spinning dizziness, visual changes, speech disturbances or numbness/tingling.  She has not passed out or lost conscious.    Past Medical History:  has a past medical history of Diabetes mellitus ([facility]), Fatty liver, Femur fracture, left ([facility]), Fibromyalgia, Fracture, hip ([facility]), GERD (gastroesophageal reflux disease), Hypertension, IBS (irritable bowel syndrome), Osteoarthritis, Peripheral neuropathy, Postmenopausal, and Scleroderma ([facility]).  Past Surgical History:  has a past surgical history that includes Hysterectomy  to have 24 hr care for several days upon DC.   Safety Interventions: patient left in bed, bed alarm in place, patient at risk for falls, and nurse notified    Plan  Frequency: 3-5 x/per week  Current Treatment Recommendations: strengthening, ROM, balance training, functional mobility training, transfer training, endurance training, wheelchair mobility training, patient/caregiver education, pain management, home exercise program, and safety education    Goals  Patient Goals: To DC home   Short Term Goals:  Time Frame: To be met by DC.  Patient will complete bed mobility at modified independent   Patient will complete transfers at stand by assistance   Patient will complete manual w/c propulsion 50 ft at stand by assistance    Above goals reviewed on 3/9/2024.  All goals are ongoing at this time unless indicated above.      Therapy Session Time      Individual Group Co-treatment   Time In     0937   Time Out     1003   Minutes     26     Timed Code Treatment Minutes:  11 Minutes  Total Treatment Minutes:  26 minutes       Electronically Signed By: GIA ALVAREZ, [id number]

## 2024-03-11 NOTE — DISCHARGE SUMMARY
[facility] DISCHARGE SUMMARY    Patient Demographics    Patient. Isela Conner  Date of Birth. 1945  MRN. [id number]     Primary care provider. Soha Tom APRN - NP  (Tel: [phone])    Admit date: 3/7/2024    Discharge date (blank if same as Note Date): 3/9/2024  Note Date: 3/11/2024     Reason for Hospitalization.   Chief Complaint   Patient presents with    Tachycardia     Patient states that she was having tachycardia (112-120), dizziness, SOB and fatigue. This started Monday morning. She thought it was her AFIB           Problem-based Hospital Course.  In a tract infection  Treated with IV antibiotics.  Patient transition to p.o. antibiotics on discharge for UTI outpatient follow-up PCP      Acute kidney injury likely prerenal due to severe hyperglycemia resolved    Hyperglycemia in patient with diabetes poorly controlled due to recent changes improvement in symptoms with Lantus patient will continue outpatient titration    Consults.  IP CONSULT TO HOME CARE NEEDS    Physical examination on discharge day.   BP (!) 159/65   Pulse 62   Temp 97.6 °F (36.4 °C) (Oral)   Resp 18   Ht 1.702 m (5' 7\")   Wt 76.3 kg (168 lb 4.8 oz)   SpO2 99%   BMI 26.36 kg/m²   General appearance.  Alert. Looks comfortable.  HEENT. Sclera clear. Moist mucus membranes.  Cardiovascular. Regular rate and rhythm, normal S1, S2. No murmur.   Respiratory. Not using accessory muscles.Clear to auscultation bilaterally, no wheeze.  Gastrointestinal. Abdomen soft, non-tender, not distended, normal bowel sounds  Neurology. Facial symmetry. No speech deficits. Moving all extremities equally.  Extremities. No edema in lower extremities.  Skin. Warm, dry, normal turgor    Condition at time of discharge stable     Medication instructions provided to patient at discharge.     Medication  List        START taking these medications      fluconazole 100 MG tablet  Commonly known as: Diflucan  Take 1 tablet by mouth daily for 3 days     levoFLOXacin 250 MG tablet  Commonly known as: Levaquin  Take 1 tablet by mouth daily for 3 days            CONTINUE taking these medications      acetaminophen 500 MG tablet  Commonly known as: TYLENOL     amiodarone 200 MG tablet  Commonly known as: CORDARONE  Take 1 tablet by mouth daily     apixaban 5 MG Tabs tablet  Commonly known as: Eliquis  TAKE 1 TABLET TWICE A DAY     aspirin 81 MG EC tablet     atorvastatin 10 MG tablet  Commonly known as: LIPITOR  Take 1 tablet by mouth daily     Biotin 5000 MCG Tabs     bosentan 125 MG tablet  Commonly known as: TRACLEER     Cranberry 400 MG Tabs     digoxin 125 MCG tablet  Commonly known as: LANOXIN  Take 1 tablet by mouth every other day     dilTIAZem 180 MG extended release capsule  Commonly known as: CARDIZEM CD  TAKE 1 CAPSULE DAILY     DULoxetine 60 MG extended release capsule  Commonly known as: Cymbalta  Take 1 capsule by mouth daily     FIBER-CAPS PO     FISH OIL     furosemide 40 MG tablet  Commonly known as: LASIX  Take 1 tablet by mouth daily     gabapentin 600 MG tablet  Commonly known as: NEURONTIN     HYDROcodone-acetaminophen 7.5-325 MG per tablet  Commonly known as: NORCO     hydroxychloroquine 200 MG tablet  Commonly known as: PLAQUENIL     MAGNESIUM OXIDE PO     methocarbamol 500 MG tablet  Commonly known as: ROBAXIN     metoprolol succinate 100 MG extended release tablet  Commonly known as: TOPROL XL  TAKE 1 TABLET DAILY     OXYGEN     Ozempic (0.25 or 0.5 MG/DOSE) 2 MG/1.5ML Sopn  Generic drug: Semaglutide(0.25 or 0.5MG/DOS)     PROBIOTIC DAILY PO     Prolia 60 MG/ML Sosy SC injection  Generic drug: denosumab  Inject 1 mL into the skin once for 1 dose     spironolactone 25 MG tablet  Commonly known as: ALDACTONE  TAKE 1 TABLET DAILY     vitamin C 500 MG tablet  Commonly known as: ASCORBIC ACID     zinc

## 2024-03-16 NOTE — PROGRESS NOTES
Physician Progress Note      PATIENT:               MYKE ALLEN  [facility] #:                  [id number]  :                       1945  ADMIT DATE:       3/7/2024 11:40 AM  DISCH DATE:        3/9/2024 4:32 PM  RESPONDING  PROVIDER #:        Charlie Garrett MD          QUERY TEXT:    Patient admitted with RADHA noted to have atrial fibrillation and is maintained   on Eliquis. If possible, please document in progress notes and discharge   summary if you are evaluating and/or treating any of the following:    The medical record reflects the following:  Risk Factors: 79yo female with DM  Clinical Indicators: HP, \"History of A-fib. Continue anticoagulation.\"  Treatment: Eliquis  Options provided:  -- Secondary hypercoagulable state in a patient with atrial fibrillation  -- Other - I will add my own diagnosis  -- Disagree - Not applicable / Not valid  -- Disagree - Clinically unable to determine / Unknown  -- Refer to Clinical Documentation Reviewer    PROVIDER RESPONSE TEXT:    This patient has secondary hypercoagulable state in a patient with atrial   fibrillation.    Query created by: Margie Monroe on 3/13/2024 2:45 PM      Electronically signed by:  Charlie Garrett MD 3/16/2024 10:20 AM

## 2024-03-21 NOTE — PROGRESS NOTES
Physician Progress Note      PATIENT:               MYKE ALLEN  [facility] #:                  [id number]  :                       1945  ADMIT DATE:       3/7/2024 11:40 AM  DISCH DATE:        3/9/2024 4:32 PM  RESPONDING  PROVIDER #:        Charlie Garrett MD          QUERY TEXT:    Patient admitted with RADHA. Noted documentation of UTI in IM notes. In order to   support the diagnosis of UTI, please include additional clinical indicators   in your documentation.  Or please document if the diagnosis of UTI has been   ruled out after further study.    The medical record reflects the following:  Risk Factors: 77 yo with recurrent UTIs on Keflex    Clinical Indicators: UA- with yeast present, moderate leuks, 565 WBC. Ucx-   NGTD  ED,\"She denies abdominal pain, nausea/vomiting, urinary symptoms or changes in   bowel movements.\"  HP, \" Urinary tract infection With concern for possible yeast infection as   well. Patient started on IV antibiotics IV antifungal...Despite being on   prophylactic patient likely has UTI probably due to poorly controlled   diabetes\"    Treatment: Fluconazole, Levaquin  Options provided:  -- Yeast UTI present despite being asymptomatic  -- UTI despite being asymptomatic and a negative culture, as evidence by,   please provider indicators.  -- Other - I will add my own diagnosis  -- Disagree - Not applicable / Not valid  -- Disagree - Clinically unable to determine / Unknown  -- Refer to Clinical Documentation Reviewer    PROVIDER RESPONSE TEXT:    Yeast UTI is present being asymptomatic.    Query created by: Margie Monroe on 3/21/2024 6:54 AM      Electronically signed by:  Charlie Garrett MD 3/21/2024 7:31 AM

## 2024-03-29 ENCOUNTER — APPOINTMENT (OUTPATIENT)
Dept: CT IMAGING | Age: 79
DRG: 699 | End: 2024-03-29
Payer: MEDICARE

## 2024-03-29 ENCOUNTER — HOSPITAL ENCOUNTER (INPATIENT)
Age: 79
LOS: 2 days | Discharge: HOME | DRG: 699 | End: 2024-03-31
Attending: INTERNAL MEDICINE | Admitting: INTERNAL MEDICINE
Payer: MEDICARE

## 2024-03-29 DIAGNOSIS — M79.7 FIBROMYALGIA: ICD-10-CM

## 2024-03-29 DIAGNOSIS — Z79.01 ANTICOAGULATED BY ANTICOAGULATION TREATMENT: ICD-10-CM

## 2024-03-29 DIAGNOSIS — R33.9 URINARY RETENTION: Primary | ICD-10-CM

## 2024-03-29 DIAGNOSIS — M48.061 SPINAL STENOSIS OF LUMBAR REGION WITHOUT NEUROGENIC CLAUDICATION: ICD-10-CM

## 2024-03-29 DIAGNOSIS — D50.0 BLOOD LOSS ANEMIA: ICD-10-CM

## 2024-03-29 DIAGNOSIS — R31.0 GROSS HEMATURIA: ICD-10-CM

## 2024-03-29 PROBLEM — R31.9 HEMATURIA: Status: ACTIVE | Noted: 2024-03-29

## 2024-03-29 LAB
ABO + RH BLD: NORMAL
ALBUMIN SERPL-MCNC: 4.1 G/DL (ref 3.4–5)
ALBUMIN/GLOB SERPL: 1.5 {RATIO} (ref 1.1–2.2)
ALP SERPL-CCNC: 70 U/L (ref 40–129)
ALT SERPL-CCNC: 12 U/L (ref 10–40)
ANION GAP SERPL CALCULATED.3IONS-SCNC: 8 MMOL/L (ref 3–16)
APTT BLD: 33.3 SEC (ref 22.7–35.9)
AST SERPL-CCNC: 16 U/L (ref 15–37)
BASOPHILS # BLD: 0.1 K/UL (ref 0–0.2)
BASOPHILS NFR BLD: 1.3 %
BILIRUB SERPL-MCNC: 0.4 MG/DL (ref 0–1)
BILIRUB UR QL STRIP.AUTO: ABNORMAL
BLD GP AB SCN SERPL QL: NORMAL
BUN SERPL-MCNC: 19 MG/DL (ref 7–20)
CALCIUM SERPL-MCNC: 9.8 MG/DL (ref 8.3–10.6)
CHARACTER UR: ABNORMAL
CHLORIDE SERPL-SCNC: 96 MMOL/L (ref 99–110)
CLARITY UR: ABNORMAL
CO2 SERPL-SCNC: 32 MMOL/L (ref 21–32)
COLOR UR: ABNORMAL
CREAT SERPL-MCNC: 1.2 MG/DL (ref 0.6–1.2)
DEPRECATED RDW RBC AUTO: 13.5 % (ref 12.4–15.4)
EOSINOPHIL # BLD: 0.2 K/UL (ref 0–0.6)
EOSINOPHIL NFR BLD: 1.8 %
GFR SERPLBLD CREATININE-BSD FMLA CKD-EPI: 45 ML/MIN/{1.73_M2}
GLUCOSE BLD-MCNC: 357 MG/DL (ref 70–99)
GLUCOSE SERPL-MCNC: 386 MG/DL (ref 70–99)
GLUCOSE UR STRIP.AUTO-MCNC: 500 MG/DL
HCT VFR BLD AUTO: 25.1 % (ref 36–48)
HCT VFR BLD AUTO: 30.3 % (ref 36–48)
HGB BLD-MCNC: 8.5 G/DL (ref 12–16)
HGB BLD-MCNC: 9.7 G/DL (ref 12–16)
HGB UR QL STRIP.AUTO: ABNORMAL
INR PPP: 1.39 (ref 0.84–1.16)
KETONES UR STRIP.AUTO-MCNC: 15 MG/DL
LEUKOCYTE ESTERASE UR QL STRIP.AUTO: ABNORMAL
LYMPHOCYTES # BLD: 1.1 K/UL (ref 1–5.1)
LYMPHOCYTES NFR BLD: 12.4 %
MCH RBC QN AUTO: 29.1 PG (ref 26–34)
MCHC RBC AUTO-ENTMCNC: 32.1 G/DL (ref 31–36)
MCV RBC AUTO: 90.4 FL (ref 80–100)
MONOCYTES # BLD: 0.5 K/UL (ref 0–1.3)
MONOCYTES NFR BLD: 5.6 %
NEUTROPHILS # BLD: 6.8 K/UL (ref 1.7–7.7)
NEUTROPHILS NFR BLD: 78.9 %
NITRITE UR QL STRIP.AUTO: NEGATIVE
PERFORMED ON: ABNORMAL
PH UR STRIP.AUTO: 7 [PH] (ref 5–8)
PLATELET # BLD AUTO: 159 K/UL (ref 135–450)
PMV BLD AUTO: 10.5 FL (ref 5–10.5)
POTASSIUM SERPL-SCNC: 5 MMOL/L (ref 3.5–5.1)
PROT SERPL-MCNC: 6.9 G/DL (ref 6.4–8.2)
PROT UR STRIP.AUTO-MCNC: >=300 MG/DL
PROTHROMBIN TIME: 17 SEC (ref 11.5–14.8)
RBC # BLD AUTO: 3.35 M/UL (ref 4–5.2)
RBC #/AREA URNS HPF: >100 /HPF (ref 0–4)
SODIUM SERPL-SCNC: 136 MMOL/L (ref 136–145)
SP GR UR STRIP.AUTO: 1.02 (ref 1–1.03)
UA COMPLETE W REFLEX CULTURE PNL UR: ABNORMAL
UA DIPSTICK W REFLEX MICRO PNL UR: YES
URN SPEC COLLECT METH UR: ABNORMAL
UROBILINOGEN UR STRIP-ACNC: 1 E.U./DL
WBC # BLD AUTO: 8.6 K/UL (ref 4–11)
WBC #/AREA URNS HPF: ABNORMAL /HPF (ref 0–5)

## 2024-03-29 PROCEDURE — 83550 IRON BINDING TEST: CPT

## 2024-03-29 PROCEDURE — 86901 BLOOD TYPING SEROLOGIC RH(D): CPT

## 2024-03-29 PROCEDURE — 85018 HEMOGLOBIN: CPT

## 2024-03-29 PROCEDURE — 6370000000 HC RX 637 (ALT 250 FOR IP): Performed by: INTERNAL MEDICINE

## 2024-03-29 PROCEDURE — 85730 THROMBOPLASTIN TIME PARTIAL: CPT

## 2024-03-29 PROCEDURE — 51702 INSERT TEMP BLADDER CATH: CPT

## 2024-03-29 PROCEDURE — 2580000003 HC RX 258: Performed by: INTERNAL MEDICINE

## 2024-03-29 PROCEDURE — 6360000004 HC RX CONTRAST MEDICATION: Performed by: PHYSICIAN ASSISTANT

## 2024-03-29 PROCEDURE — 83540 ASSAY OF IRON: CPT

## 2024-03-29 PROCEDURE — 99285 EMERGENCY DEPT VISIT HI MDM: CPT

## 2024-03-29 PROCEDURE — 86850 RBC ANTIBODY SCREEN: CPT

## 2024-03-29 PROCEDURE — 1200000000 HC SEMI PRIVATE

## 2024-03-29 PROCEDURE — 80053 COMPREHEN METABOLIC PANEL: CPT

## 2024-03-29 PROCEDURE — 85025 COMPLETE CBC W/AUTO DIFF WBC: CPT

## 2024-03-29 PROCEDURE — 85610 PROTHROMBIN TIME: CPT

## 2024-03-29 PROCEDURE — 74177 CT ABD & PELVIS W/CONTRAST: CPT

## 2024-03-29 PROCEDURE — 82607 VITAMIN B-12: CPT

## 2024-03-29 PROCEDURE — 81001 URINALYSIS AUTO W/SCOPE: CPT

## 2024-03-29 PROCEDURE — 0TCB7ZZ EXTIRPATION OF MATTER FROM BLADDER, VIA NATURAL OR ARTIFICIAL OPENING: ICD-10-PCS | Performed by: UROLOGY

## 2024-03-29 PROCEDURE — 83036 HEMOGLOBIN GLYCOSYLATED A1C: CPT

## 2024-03-29 PROCEDURE — 86900 BLOOD TYPING SEROLOGIC ABO: CPT

## 2024-03-29 PROCEDURE — 82746 ASSAY OF FOLIC ACID SERUM: CPT

## 2024-03-29 PROCEDURE — 85014 HEMATOCRIT: CPT

## 2024-03-29 RX ORDER — ATORVASTATIN CALCIUM 10 MG/1
10 TABLET, FILM COATED ORAL DAILY
Status: DISCONTINUED | OUTPATIENT
Start: 2024-03-30 | End: 2024-03-31 | Stop reason: HOSPADM

## 2024-03-29 RX ORDER — DILTIAZEM HYDROCHLORIDE 180 MG/1
180 CAPSULE, COATED, EXTENDED RELEASE ORAL DAILY
Status: DISCONTINUED | OUTPATIENT
Start: 2024-03-30 | End: 2024-03-31 | Stop reason: HOSPADM

## 2024-03-29 RX ORDER — SODIUM CHLORIDE 0.9 % (FLUSH) 0.9 %
5-40 SYRINGE (ML) INJECTION EVERY 12 HOURS SCHEDULED
Status: DISCONTINUED | OUTPATIENT
Start: 2024-03-29 | End: 2024-03-31 | Stop reason: HOSPADM

## 2024-03-29 RX ORDER — INSULIN LISPRO 100 [IU]/ML
0-16 INJECTION, SOLUTION INTRAVENOUS; SUBCUTANEOUS
Status: DISCONTINUED | OUTPATIENT
Start: 2024-03-29 | End: 2024-03-31 | Stop reason: HOSPADM

## 2024-03-29 RX ORDER — SPIRONOLACTONE 25 MG/1
25 TABLET ORAL DAILY
Status: DISCONTINUED | OUTPATIENT
Start: 2024-03-30 | End: 2024-03-31 | Stop reason: HOSPADM

## 2024-03-29 RX ORDER — SODIUM CHLORIDE 0.9 % (FLUSH) 0.9 %
5-40 SYRINGE (ML) INJECTION PRN
Status: DISCONTINUED | OUTPATIENT
Start: 2024-03-29 | End: 2024-03-31 | Stop reason: HOSPADM

## 2024-03-29 RX ORDER — DULOXETIN HYDROCHLORIDE 30 MG/1
60 CAPSULE, DELAYED RELEASE ORAL DAILY
Status: DISCONTINUED | OUTPATIENT
Start: 2024-03-30 | End: 2024-03-31 | Stop reason: HOSPADM

## 2024-03-29 RX ORDER — DIGOXIN 125 MCG
125 TABLET ORAL EVERY OTHER DAY
Status: DISCONTINUED | OUTPATIENT
Start: 2024-03-30 | End: 2024-03-31 | Stop reason: HOSPADM

## 2024-03-29 RX ORDER — INSULIN GLARGINE 100 [IU]/ML
10 INJECTION, SOLUTION SUBCUTANEOUS NIGHTLY
Status: DISCONTINUED | OUTPATIENT
Start: 2024-03-29 | End: 2024-03-31 | Stop reason: HOSPADM

## 2024-03-29 RX ORDER — BUSPIRONE HYDROCHLORIDE 5 MG/1
5-10 TABLET ORAL SEE ADMIN INSTRUCTIONS
COMMUNITY

## 2024-03-29 RX ORDER — ACETAMINOPHEN 650 MG/1
650 SUPPOSITORY RECTAL EVERY 6 HOURS PRN
Status: DISCONTINUED | OUTPATIENT
Start: 2024-03-29 | End: 2024-03-31 | Stop reason: HOSPADM

## 2024-03-29 RX ORDER — INSULIN LISPRO 100 [IU]/ML
0-4 INJECTION, SOLUTION INTRAVENOUS; SUBCUTANEOUS NIGHTLY
Status: DISCONTINUED | OUTPATIENT
Start: 2024-03-29 | End: 2024-03-31 | Stop reason: HOSPADM

## 2024-03-29 RX ORDER — HYDROXYCHLOROQUINE SULFATE 200 MG/1
200 TABLET, FILM COATED ORAL DAILY
Status: DISCONTINUED | OUTPATIENT
Start: 2024-03-30 | End: 2024-03-31 | Stop reason: HOSPADM

## 2024-03-29 RX ORDER — SODIUM CHLORIDE 9 MG/ML
INJECTION, SOLUTION INTRAVENOUS PRN
Status: DISCONTINUED | OUTPATIENT
Start: 2024-03-29 | End: 2024-03-31 | Stop reason: HOSPADM

## 2024-03-29 RX ORDER — AMIODARONE HYDROCHLORIDE 200 MG/1
200 TABLET ORAL DAILY
Status: DISCONTINUED | OUTPATIENT
Start: 2024-03-30 | End: 2024-03-31 | Stop reason: HOSPADM

## 2024-03-29 RX ORDER — DEXTROSE MONOHYDRATE 100 MG/ML
INJECTION, SOLUTION INTRAVENOUS CONTINUOUS PRN
Status: DISCONTINUED | OUTPATIENT
Start: 2024-03-29 | End: 2024-03-31 | Stop reason: HOSPADM

## 2024-03-29 RX ORDER — GABAPENTIN 300 MG/1
300 CAPSULE ORAL 3 TIMES DAILY
Status: DISCONTINUED | OUTPATIENT
Start: 2024-03-29 | End: 2024-03-31 | Stop reason: HOSPADM

## 2024-03-29 RX ORDER — METOPROLOL SUCCINATE 50 MG/1
100 TABLET, EXTENDED RELEASE ORAL DAILY
Status: DISCONTINUED | OUTPATIENT
Start: 2024-03-30 | End: 2024-03-31 | Stop reason: HOSPADM

## 2024-03-29 RX ORDER — FUROSEMIDE 40 MG/1
40 TABLET ORAL DAILY
Status: DISCONTINUED | OUTPATIENT
Start: 2024-03-30 | End: 2024-03-31 | Stop reason: HOSPADM

## 2024-03-29 RX ORDER — HYDROCODONE BITARTRATE AND ACETAMINOPHEN 7.5; 325 MG/1; MG/1
1 TABLET ORAL EVERY 6 HOURS PRN
Status: DISCONTINUED | OUTPATIENT
Start: 2024-03-29 | End: 2024-03-31 | Stop reason: HOSPADM

## 2024-03-29 RX ORDER — POLYETHYLENE GLYCOL 3350 17 G/17G
17 POWDER, FOR SOLUTION ORAL DAILY PRN
Status: DISCONTINUED | OUTPATIENT
Start: 2024-03-29 | End: 2024-03-31 | Stop reason: HOSPADM

## 2024-03-29 RX ORDER — ACETAMINOPHEN 325 MG/1
650 TABLET ORAL EVERY 6 HOURS PRN
Status: DISCONTINUED | OUTPATIENT
Start: 2024-03-29 | End: 2024-03-31 | Stop reason: HOSPADM

## 2024-03-29 RX ADMIN — GABAPENTIN 300 MG: 300 CAPSULE ORAL at 21:38

## 2024-03-29 RX ADMIN — SODIUM CHLORIDE, PRESERVATIVE FREE 10 ML: 5 INJECTION INTRAVENOUS at 21:39

## 2024-03-29 RX ADMIN — HYDROCODONE BITARTRATE AND ACETAMINOPHEN 1 TABLET: 7.5; 325 TABLET ORAL at 23:17

## 2024-03-29 RX ADMIN — INSULIN GLARGINE 10 UNITS: 100 INJECTION, SOLUTION SUBCUTANEOUS at 21:39

## 2024-03-29 RX ADMIN — INSULIN LISPRO 4 UNITS: 100 INJECTION, SOLUTION INTRAVENOUS; SUBCUTANEOUS at 21:38

## 2024-03-29 RX ADMIN — IOPAMIDOL 75 ML: 755 INJECTION, SOLUTION INTRAVENOUS at 13:03

## 2024-03-29 ASSESSMENT — PAIN SCALES - GENERAL
PAINLEVEL_OUTOF10: 0
PAINLEVEL_OUTOF10: 8
PAINLEVEL_OUTOF10: 8

## 2024-03-29 ASSESSMENT — PAIN - FUNCTIONAL ASSESSMENT: PAIN_FUNCTIONAL_ASSESSMENT: 0-10

## 2024-03-29 ASSESSMENT — PAIN DESCRIPTION - DESCRIPTORS: DESCRIPTORS: BURNING

## 2024-03-29 ASSESSMENT — PAIN DESCRIPTION - ORIENTATION: ORIENTATION: LEFT

## 2024-03-29 ASSESSMENT — PAIN DESCRIPTION - LOCATION: LOCATION: FOOT

## 2024-03-29 NOTE — ACP (ADVANCE CARE PLANNING)
Advanced Care Planning Note.    Purpose of Encounter: Advanced care planning in light of hospitalization  Parties In Attendance: Patient,    Decisional Capacity: Yes  Subjective: Patient  understand that this conversation is to address long term care goal  Objective: Patient to the hospital with hematuria  Goals of Care Determination: Patient would pursue CPR and Intubation if required. No tracheostomy or long term ventilation if required.   Consider blood transfusion if required  Code Status: full code  Time spent on Advanced care Plannin minutes  Advanced Care Planning Documents: documented patient's wishes, would like Anastasiia Bartholomew to make medical decisions if unable to make decisions    Sharri Zarate MD  3/29/2024 4:22 PM

## 2024-03-29 NOTE — CONSULTS
[facility] Consult Note  Inpatient Setting - [facility]    Provider: Julio Sheriff MD MD Patient ID:  Admission Date: 3/29/2024 Name: Isela Conner  OR Date: n/a MRN: [id number]   Patient Location: ED- : 1945  Attending: Sharri Zarate MD Date of Service: 3/29/2024  PCP: Soha Tom APRN - NP     Diagnoses:  1. Urinary retention    2. Gross hematuria    3. Anticoagulated by anticoagulation treatment    4. Blood loss anemia        Assessment/Plan:  78 yo F with recent cystoscopy with botox with Dr. Monroe uneventful procedure. Developed GH with clot retention admitted through ER. Sepulveda placed with significant relief of symptoms. CT with some clot in the bladder. Took eliquis this AM. Hgb 8.5 from 12.3 last month.    - sepulveda irrigated with ~250 cc old clot remove now light pink  - continue sepulveda, nursing to irrigate PRN if not draining  - NPO at midnight, doubt will need cystoscopy clot evac  - trend H/H  - likely VT prior to dc suspect retention more due to GH and clots vs botox but will need a PVR prior    All the patients questions were answered in detail. She understands the plan as listed above.    Review/order of labs  Review/order of radiology studies  discussion with referring MD  Independent review and interpretation of test or study   Total time spent face-to-face with the patient 30 min, including greater than 50% of this time in discussion with the patient/family concerning the following:  Recommended tests  management options  risks/benefits of management options  importance of compliance  Prognosis  Risk factor reduction  Patient/family education                                                                                                                                                      CC:   Chief Complaint   Patient presents with    Hematuria     Pt arrived via [facility] w/ cc of blood in urine. Pt had botox injections into the bladder on Tuesday

## 2024-03-29 NOTE — H&P
injection on Eliquis  -Monitor hemoglobin every 12 hours transfuse to keep greater than 7 Eliquis on hold Issa with irrigation n.p.o. after midnight urology consulted  - iron b12 folate    Proximal l A-fib Home meds hold Eliquis for now    Chronic hypoxic respiratory failure secondary to chronic COPD baseline 3 L continue home med    Diabetes with hyperglycemia does not look like on a good regimen at home will start Lantus and lispro check A1c    DVT prophylaxis scds  Code status full code        Admit as inpatient I anticipate hospitalization spanning more than two midnights for investigation and treatment of the above medically necessary diagnoses.    Please note that some part of this chart was generated using Dragon dictation software. Although every effort was made to ensure the accuracy of this automated transcription, some errors in transcription may have occurred inadvertently. If you may need any clarification, please do not hesitate to contact me through EPIC.       Sharri Zarate MD    3/29/2024 4:19 PM

## 2024-03-29 NOTE — ED NOTES
ED TO INPATIENT SBAR HANDOFF    Patient Name: Isela Conner   :  1945  79 y.o.   MRN:  [id number]  Preferred Name    ED Room #:  ED-0022/22  Family/Caregiver Present yes   Restraints no   Sitter no   Sepsis Risk Score Sepsis Risk Score: 1.47    Situation  Code Status: Prior No additional code details.    Allergies: Adhesive tape, Collagen, Other, Bactrim [sulfamethoxazole-trimethoprim], Cortisone, Pcn [penicillins], Scopolamine, and Scopolamine sulfate  Weight: Patient Vitals for the past 96 hrs (Last 3 readings):   Weight   24 1149 74.8 kg (165 lb)     Arrived from: home  Chief Complaint:   Chief Complaint   Patient presents with    Hematuria     Pt arrived via [facility] w/ cc of blood in urine. Pt had botox injections into the bladder on Tuesday and began experiencing profuse bleeding in her urine on Wednesday morning and states she has passed numerous large clots. Pt feeling very weak. Pt on blood thinners, last dose this morning. Pt states she has soaked through three pads today.       Hospital Problem/Diagnosis:  Principal Problem:    Hematuria  Resolved Problems:    * No resolved hospital problems. *    Imaging:   CT ABDOMEN PELVIS W IV CONTRAST Additional Contrast? None   Final Result   1. High density fluid within the dependent lumen of the bladder representing   hematuria correlating to provided history.  A tiny amount of air is likely   iatrogenic in the lumen.   2. Emphysema with scattered bands of atelectasis in the visualized lungs and   a trace left pleural effusion.           Abnormal labs:   Abnormal Labs Reviewed   CBC WITH AUTO DIFFERENTIAL - Abnormal; Notable for the following components:       Result Value    RBC 3.35 (*)     Hemoglobin 9.7 (*)     Hematocrit 30.3 (*)     All other components within normal limits   COMPREHENSIVE METABOLIC PANEL W/ REFLEX TO MG FOR LOW K - Abnormal; Notable for the following components:    Chloride 96 (*)     Glucose 386 (*)     Est, Glom Filt

## 2024-03-29 NOTE — PROCEDURES
[facility] Procedure Note  [facility]    Provider: Julio Sheriff MD MD Patient ID:  Admission Date: 3/29/2024 Name: Isela Conner  OR Date: n/a MRN: [id number]   Patient Location: [id number] : 1945  Attending: Sharri Zarate MD Date of Service: 3/29/2024  PCP: Soha Tom APRN - NP     Diagnoses:  GH  Clot retention    Procedure:   1. Irrigation of clots from bladder    Findings:   250 cc old clot in bladder     Indication for Procedure:  Nursing had placed a catheter in the ER with dark red urine and ~700 cc. We had a discussion of the procedure. She had a chance to ask questions which were answered prior to the catheterization.     Procedure Details:  The urethral sepulveda was irrigated with 1L of NS getting out ~250 cc of old clot out of the bladder. A stat lock was applied to the thigh. She tolerated the procedure well.    Plan:  See Progress Notes for urology plan regarding Sepulveda    Julio Sheriff MD  3/29/2024

## 2024-03-29 NOTE — CONSENT
Informed Consent for Blood Component Transfusion Note    I have discussed with the patient the rationale for blood component transfusion; its benefits in treating or preventing fatigue, organ damage, or death; and its risk which includes mild transfusion reactions, rare risk of blood borne infection, or more serious but rare reactions. I have discussed the alternatives to transfusion, including the risk and consequences of not receiving transfusion. The patient had an opportunity to ask questions and had agreed to proceed with transfusion of blood components.    Electronically signed by Sharri Zarate MD on 3/29/24 at 4:22 PM EDT

## 2024-03-29 NOTE — ED PROVIDER NOTES
[facility] EMERGENCY DEPARTMENT  EMERGENCY DEPARTMENT ENCOUNTER        Pt Name: Isela Conner  MRN: [id number]  Birthdate 1945  Date of evaluation: 3/29/2024  Provider: Rakesh Emerson PA-C  PCP: Soha Tom APRN - NP  Note Started: 12:15 PM EDT 3/29/24      ANDREA. I have evaluated this patient.        CHIEF COMPLAINT       Chief Complaint   Patient presents with    Hematuria     Pt arrived via [facility] w/ cc of blood in urine. Pt had botox injections into the bladder on Tuesday and began experiencing profuse bleeding in her urine on Wednesday morning and states she has passed numerous large clots. Pt feeling very weak. Pt on blood thinners, last dose this morning. Pt states she has soaked through three pads today.         HISTORY OF PRESENT ILLNESS: 1 or more Elements     History From: patient  Limitations to history : None    Isela Conner is a 79 y.o. female who presents to the emergency department with a chief complaint of hematuria and passing clots with some lower abdominal discomfort, dysuria and urgency to urinate that began 2 days ago.  3 days ago patient had bladder injections with Botox due to urinary incontinence.  She is anticoagulated on Eliquis.  States she did not take her Eliquis yesterday as she was concerned about the bleeding.  She is anticoagulant with history of atrial fibrillation.  Took her Eliquis this morning.  States she has been feeling weak at home.  Denies chest pain, shortness of breath, diarrhea, bloody stool or any bleeding anywhere else.  Has history of hysterectomy.  Chronically on 3 L of nasal cannula oxygen with history of pulmonary hypertension.    Nursing Notes were all reviewed and agreed with or any disagreements were addressed in the HPI.    REVIEW OF SYSTEMS :      Review of Systems    Positives and Pertinent negatives as per HPI.     SURGICAL HISTORY     Past Surgical History:   Procedure Laterality Date    ARTHROPLASTY  08/10/2012

## 2024-03-30 LAB
ANION GAP SERPL CALCULATED.3IONS-SCNC: 7 MMOL/L (ref 3–16)
BASOPHILS # BLD: 0.1 K/UL (ref 0–0.2)
BASOPHILS NFR BLD: 1.3 %
BUN SERPL-MCNC: 22 MG/DL (ref 7–20)
CALCIUM SERPL-MCNC: 9.3 MG/DL (ref 8.3–10.6)
CHLORIDE SERPL-SCNC: 98 MMOL/L (ref 99–110)
CO2 SERPL-SCNC: 32 MMOL/L (ref 21–32)
CREAT SERPL-MCNC: 1.3 MG/DL (ref 0.6–1.2)
DEPRECATED RDW RBC AUTO: 13.5 % (ref 12.4–15.4)
EOSINOPHIL # BLD: 0.2 K/UL (ref 0–0.6)
EOSINOPHIL NFR BLD: 2.3 %
EST. AVERAGE GLUCOSE BLD GHB EST-MCNC: 246 MG/DL
FOLATE SERPL-MCNC: 18.12 NG/ML (ref 4.78–24.2)
GFR SERPLBLD CREATININE-BSD FMLA CKD-EPI: 41 ML/MIN/{1.73_M2}
GLUCOSE BLD-MCNC: 196 MG/DL (ref 70–99)
GLUCOSE BLD-MCNC: 252 MG/DL (ref 70–99)
GLUCOSE BLD-MCNC: 263 MG/DL (ref 70–99)
GLUCOSE BLD-MCNC: 285 MG/DL (ref 70–99)
GLUCOSE SERPL-MCNC: 277 MG/DL (ref 70–99)
HBA1C MFR BLD: 10.2 %
HCT VFR BLD AUTO: 24.4 % (ref 36–48)
HGB BLD-MCNC: 8.1 G/DL (ref 12–16)
IRON SATN MFR SERPL: 15 % (ref 15–50)
IRON SERPL-MCNC: 44 UG/DL (ref 37–145)
LYMPHOCYTES # BLD: 2 K/UL (ref 1–5.1)
LYMPHOCYTES NFR BLD: 26.3 %
MCH RBC QN AUTO: 30 PG (ref 26–34)
MCHC RBC AUTO-ENTMCNC: 33.4 G/DL (ref 31–36)
MCV RBC AUTO: 89.9 FL (ref 80–100)
MONOCYTES # BLD: 0.7 K/UL (ref 0–1.3)
MONOCYTES NFR BLD: 8.7 %
NEUTROPHILS # BLD: 4.8 K/UL (ref 1.7–7.7)
NEUTROPHILS NFR BLD: 61.4 %
PERFORMED ON: ABNORMAL
PLATELET # BLD AUTO: 142 K/UL (ref 135–450)
PMV BLD AUTO: 10.1 FL (ref 5–10.5)
POTASSIUM SERPL-SCNC: 4.7 MMOL/L (ref 3.5–5.1)
RBC # BLD AUTO: 2.71 M/UL (ref 4–5.2)
SODIUM SERPL-SCNC: 137 MMOL/L (ref 136–145)
TIBC SERPL-MCNC: 303 UG/DL (ref 260–445)
VIT B12 SERPL-MCNC: 643 PG/ML (ref 211–911)
WBC # BLD AUTO: 7.8 K/UL (ref 4–11)

## 2024-03-30 PROCEDURE — 97530 THERAPEUTIC ACTIVITIES: CPT

## 2024-03-30 PROCEDURE — 6370000000 HC RX 637 (ALT 250 FOR IP): Performed by: INTERNAL MEDICINE

## 2024-03-30 PROCEDURE — 1200000000 HC SEMI PRIVATE

## 2024-03-30 PROCEDURE — 97535 SELF CARE MNGMENT TRAINING: CPT

## 2024-03-30 PROCEDURE — 2700000000 HC OXYGEN THERAPY PER DAY

## 2024-03-30 PROCEDURE — 97116 GAIT TRAINING THERAPY: CPT

## 2024-03-30 PROCEDURE — 80048 BASIC METABOLIC PNL TOTAL CA: CPT

## 2024-03-30 PROCEDURE — 36415 COLL VENOUS BLD VENIPUNCTURE: CPT

## 2024-03-30 PROCEDURE — 97161 PT EVAL LOW COMPLEX 20 MIN: CPT

## 2024-03-30 PROCEDURE — 51702 INSERT TEMP BLADDER CATH: CPT

## 2024-03-30 PROCEDURE — 85025 COMPLETE CBC W/AUTO DIFF WBC: CPT

## 2024-03-30 PROCEDURE — 2580000003 HC RX 258: Performed by: INTERNAL MEDICINE

## 2024-03-30 PROCEDURE — 97165 OT EVAL LOW COMPLEX 30 MIN: CPT

## 2024-03-30 RX ORDER — INSULIN LISPRO 100 [IU]/ML
3 INJECTION, SOLUTION INTRAVENOUS; SUBCUTANEOUS
Status: DISCONTINUED | OUTPATIENT
Start: 2024-03-30 | End: 2024-03-31 | Stop reason: HOSPADM

## 2024-03-30 RX ADMIN — INSULIN LISPRO 10 UNITS: 100 INJECTION, SOLUTION INTRAVENOUS; SUBCUTANEOUS at 16:33

## 2024-03-30 RX ADMIN — INSULIN LISPRO 8 UNITS: 100 INJECTION, SOLUTION INTRAVENOUS; SUBCUTANEOUS at 12:38

## 2024-03-30 RX ADMIN — AMIODARONE HYDROCHLORIDE 200 MG: 200 TABLET ORAL at 10:07

## 2024-03-30 RX ADMIN — ATORVASTATIN CALCIUM 10 MG: 10 TABLET, FILM COATED ORAL at 10:04

## 2024-03-30 RX ADMIN — INSULIN LISPRO 8 UNITS: 100 INJECTION, SOLUTION INTRAVENOUS; SUBCUTANEOUS at 10:07

## 2024-03-30 RX ADMIN — HYDROXYCHLOROQUINE SULFATE 200 MG: 200 TABLET ORAL at 10:03

## 2024-03-30 RX ADMIN — DULOXETINE HYDROCHLORIDE 60 MG: 30 CAPSULE, DELAYED RELEASE ORAL at 10:03

## 2024-03-30 RX ADMIN — GABAPENTIN 300 MG: 300 CAPSULE ORAL at 16:36

## 2024-03-30 RX ADMIN — GABAPENTIN 300 MG: 300 CAPSULE ORAL at 22:21

## 2024-03-30 RX ADMIN — DIGOXIN 125 MCG: 125 TABLET ORAL at 10:05

## 2024-03-30 RX ADMIN — INSULIN GLARGINE 10 UNITS: 100 INJECTION, SOLUTION SUBCUTANEOUS at 22:21

## 2024-03-30 RX ADMIN — SODIUM CHLORIDE, PRESERVATIVE FREE 10 ML: 5 INJECTION INTRAVENOUS at 12:38

## 2024-03-30 RX ADMIN — GABAPENTIN 300 MG: 300 CAPSULE ORAL at 10:04

## 2024-03-30 RX ADMIN — SPIRONOLACTONE 25 MG: 25 TABLET ORAL at 10:05

## 2024-03-30 RX ADMIN — FUROSEMIDE 40 MG: 40 TABLET ORAL at 10:06

## 2024-03-30 ASSESSMENT — PAIN SCALES - GENERAL
PAINLEVEL_OUTOF10: 4
PAINLEVEL_OUTOF10: 3

## 2024-03-30 NOTE — CARE COORDINATION
Readmission Assessment       03/30/24 0930   Readmission Assessment   Number of Days since last admission? 8-30 days   Previous Disposition Home Alone   Who is being Interviewed Patient   What was the patient's/caregiver's perception as to why they think they needed to return back to the hospital? Other (Comment)  (Post OP problem from an Outpatient surgery)   Did you visit your Primary Care Physician after you left the hospital, before you returned this time? Yes   Did you see a specialist, such as Cardiac, Pulmonary, Orthopedic Physician, etc. after you left the hospital? Yes   Who advised the patient to return to the hospital? Self-referral   Does the patient report anything that got in the way of taking their medications? No   In our efforts to provide the best possible care to you and others like you, can you think of anything that we could have done to help you after you left the hospital the first time, so that you might not have needed to return so soon? Other (Comment)  (Patient stated nothing)     FAISAL Del Castillo RN    [facility]  Phone: [phone]    
No  Plans to Return to Present Housing: (P) Yes  Other Identified Issues/Barriers to RETURNING to current housing:   Potential Assistance needed at discharge: (P) Other (Comment) (TBD)            Potential DME:    Patient expects to discharge to: (P) Independent living facility ([facility])  Plan for transportation at discharge: (P) Family    Financial    Payor: MEDICARE / Plan: MEDICARE PART A AND B / Product Type: *No Product type* /     Does insurance require precert for SNF: No    Potential assistance Purchasing Medications: (P) No  Meds-to-Beds request:        [facility] - [place], [place] - [address] [place] [address] [phone] - F [phone]  [address] [facility] [address]  Phone: [phone] Fax: [phone]    [facility] [address] [place] [address] [address] [place] [address] [phone] - F [phone]  [address] [facility] [address]  Phone: [phone] Fax: [phone]    [address] [phone] - F [phone]  [address] [id number]  Phone: [phone] Fax: [phone]      Notes:    Factors facilitating achievement of predicted outcomes: Family support    Barriers to discharge: Pain    Additional Case Management Notes:     - no needs identified at this time    The Plan for Transition of Care is related to the following treatment goals of Urinary retention [R33.9]  Gross hematuria [R31.0]  Hematuria [R31.9]  Blood loss anemia [D50.0]  Anticoagulated by anticoagulation treatment [Z79.01]    IF APPLICABLE: The Patient and/or patient representative Isela and her family were provided with a choice of provider and agrees with the discharge plan. Freedom of choice list with basic dialogue that supports the patient's individualized plan of care/goals and shares the quality data associated with the providers was provided to:     Patient Representative Name:       The Patient and/or Patient Representative Agree

## 2024-03-30 NOTE — PLAN OF CARE
Problem: Discharge Planning  Goal: Discharge to home or other facility with appropriate resources  3/30/2024 1207 by Kajal Rahman, RN  Outcome: Progressing     Problem: Pain  Goal: Verbalizes/displays adequate comfort level or baseline comfort level  3/30/2024 1207 by Kajal Rahman, RN  Outcome: Progressing     Problem: ABCDS Injury Assessment  Goal: Absence of physical injury  3/30/2024 1207 by Kajal Rahman, RN  Outcome: Progressing     Problem: Safety - Adult  Goal: Free from fall injury  3/30/2024 1207 by Kajal Rahman, RN  Outcome: Progressing

## 2024-03-31 VITALS
WEIGHT: 153.8 LBS | BODY MASS INDEX: 24.14 KG/M2 | RESPIRATION RATE: 17 BRPM | OXYGEN SATURATION: 100 % | TEMPERATURE: 97.8 F | DIASTOLIC BLOOD PRESSURE: 65 MMHG | SYSTOLIC BLOOD PRESSURE: 104 MMHG | HEART RATE: 61 BPM | HEIGHT: 67 IN

## 2024-03-31 LAB
ANION GAP SERPL CALCULATED.3IONS-SCNC: 6 MMOL/L (ref 3–16)
BASOPHILS # BLD: 0.1 K/UL (ref 0–0.2)
BASOPHILS NFR BLD: 1.1 %
BUN SERPL-MCNC: 25 MG/DL (ref 7–20)
CALCIUM SERPL-MCNC: 8.2 MG/DL (ref 8.3–10.6)
CHLORIDE SERPL-SCNC: 100 MMOL/L (ref 99–110)
CO2 SERPL-SCNC: 32 MMOL/L (ref 21–32)
CREAT SERPL-MCNC: 1.3 MG/DL (ref 0.6–1.2)
DEPRECATED RDW RBC AUTO: 13.3 % (ref 12.4–15.4)
EOSINOPHIL # BLD: 0.1 K/UL (ref 0–0.6)
EOSINOPHIL NFR BLD: 1.7 %
GFR SERPLBLD CREATININE-BSD FMLA CKD-EPI: 41 ML/MIN/{1.73_M2}
GLUCOSE BLD-MCNC: 222 MG/DL (ref 70–99)
GLUCOSE BLD-MCNC: 254 MG/DL (ref 70–99)
GLUCOSE SERPL-MCNC: 185 MG/DL (ref 70–99)
HCT VFR BLD AUTO: 23.5 % (ref 36–48)
HGB BLD-MCNC: 8 G/DL (ref 12–16)
LYMPHOCYTES # BLD: 2.1 K/UL (ref 1–5.1)
LYMPHOCYTES NFR BLD: 28.7 %
MCH RBC QN AUTO: 30.7 PG (ref 26–34)
MCHC RBC AUTO-ENTMCNC: 34 G/DL (ref 31–36)
MCV RBC AUTO: 90.3 FL (ref 80–100)
MONOCYTES # BLD: 0.6 K/UL (ref 0–1.3)
MONOCYTES NFR BLD: 7.8 %
NEUTROPHILS # BLD: 4.3 K/UL (ref 1.7–7.7)
NEUTROPHILS NFR BLD: 60.7 %
PERFORMED ON: ABNORMAL
PERFORMED ON: ABNORMAL
PLATELET # BLD AUTO: 133 K/UL (ref 135–450)
PMV BLD AUTO: 9.9 FL (ref 5–10.5)
POTASSIUM SERPL-SCNC: 4.4 MMOL/L (ref 3.5–5.1)
RBC # BLD AUTO: 2.6 M/UL (ref 4–5.2)
SODIUM SERPL-SCNC: 138 MMOL/L (ref 136–145)
WBC # BLD AUTO: 7.1 K/UL (ref 4–11)

## 2024-03-31 PROCEDURE — 36415 COLL VENOUS BLD VENIPUNCTURE: CPT

## 2024-03-31 PROCEDURE — 6370000000 HC RX 637 (ALT 250 FOR IP): Performed by: NURSE PRACTITIONER

## 2024-03-31 PROCEDURE — 80048 BASIC METABOLIC PNL TOTAL CA: CPT

## 2024-03-31 PROCEDURE — 2580000003 HC RX 258: Performed by: INTERNAL MEDICINE

## 2024-03-31 PROCEDURE — 6370000000 HC RX 637 (ALT 250 FOR IP): Performed by: INTERNAL MEDICINE

## 2024-03-31 PROCEDURE — 51798 US URINE CAPACITY MEASURE: CPT

## 2024-03-31 PROCEDURE — 85025 COMPLETE CBC W/AUTO DIFF WBC: CPT

## 2024-03-31 RX ORDER — ASPIRIN 81 MG/1
81 TABLET ORAL DAILY
Qty: 30 TABLET | Refills: 3 | COMMUNITY
Start: 2024-04-02

## 2024-03-31 RX ORDER — DULOXETIN HYDROCHLORIDE 60 MG/1
60 CAPSULE, DELAYED RELEASE ORAL DAILY
COMMUNITY
Start: 2024-03-31

## 2024-03-31 RX ADMIN — DILTIAZEM HYDROCHLORIDE 180 MG: 180 CAPSULE, EXTENDED RELEASE ORAL at 08:35

## 2024-03-31 RX ADMIN — INSULIN LISPRO 3 UNITS: 100 INJECTION, SOLUTION INTRAVENOUS; SUBCUTANEOUS at 11:31

## 2024-03-31 RX ADMIN — SODIUM CHLORIDE, PRESERVATIVE FREE 10 ML: 5 INJECTION INTRAVENOUS at 08:36

## 2024-03-31 RX ADMIN — GABAPENTIN 300 MG: 300 CAPSULE ORAL at 08:35

## 2024-03-31 RX ADMIN — DULOXETINE HYDROCHLORIDE 60 MG: 30 CAPSULE, DELAYED RELEASE ORAL at 08:34

## 2024-03-31 RX ADMIN — HYDROCODONE BITARTRATE AND ACETAMINOPHEN 1 TABLET: 7.5; 325 TABLET ORAL at 00:02

## 2024-03-31 RX ADMIN — FUROSEMIDE 40 MG: 40 TABLET ORAL at 08:33

## 2024-03-31 RX ADMIN — SPIRONOLACTONE 25 MG: 25 TABLET ORAL at 08:34

## 2024-03-31 RX ADMIN — AMIODARONE HYDROCHLORIDE 200 MG: 200 TABLET ORAL at 08:33

## 2024-03-31 RX ADMIN — INSULIN LISPRO 3 UNITS: 100 INJECTION, SOLUTION INTRAVENOUS; SUBCUTANEOUS at 08:36

## 2024-03-31 RX ADMIN — HYDROXYCHLOROQUINE SULFATE 200 MG: 200 TABLET ORAL at 08:35

## 2024-03-31 RX ADMIN — ATORVASTATIN CALCIUM 10 MG: 10 TABLET, FILM COATED ORAL at 08:33

## 2024-03-31 RX ADMIN — METOPROLOL SUCCINATE 100 MG: 50 TABLET, EXTENDED RELEASE ORAL at 08:34

## 2024-03-31 RX ADMIN — INSULIN LISPRO 4 UNITS: 100 INJECTION, SOLUTION INTRAVENOUS; SUBCUTANEOUS at 11:31

## 2024-03-31 RX ADMIN — INSULIN LISPRO 16 UNITS: 100 INJECTION, SOLUTION INTRAVENOUS; SUBCUTANEOUS at 08:35

## 2024-03-31 RX ADMIN — GABAPENTIN 300 MG: 300 CAPSULE ORAL at 13:57

## 2024-03-31 RX ADMIN — HYDROCODONE BITARTRATE AND ACETAMINOPHEN 1 TABLET: 7.5; 325 TABLET ORAL at 08:33

## 2024-03-31 ASSESSMENT — PAIN DESCRIPTION - FREQUENCY: FREQUENCY: INTERMITTENT

## 2024-03-31 ASSESSMENT — PAIN SCALES - GENERAL
PAINLEVEL_OUTOF10: 0
PAINLEVEL_OUTOF10: 6
PAINLEVEL_OUTOF10: 0
PAINLEVEL_OUTOF10: 4
PAINLEVEL_OUTOF10: 5
PAINLEVEL_OUTOF10: 0

## 2024-03-31 ASSESSMENT — PAIN SCALES - WONG BAKER
WONGBAKER_NUMERICALRESPONSE: NO HURT
WONGBAKER_NUMERICALRESPONSE: NO HURT

## 2024-03-31 ASSESSMENT — PAIN DESCRIPTION - LOCATION: LOCATION: FOOT

## 2024-03-31 ASSESSMENT — PAIN DESCRIPTION - ORIENTATION: ORIENTATION: LEFT

## 2024-03-31 ASSESSMENT — PAIN DESCRIPTION - PAIN TYPE: TYPE: ACUTE PAIN

## 2024-03-31 ASSESSMENT — PAIN DESCRIPTION - DESCRIPTORS: DESCRIPTORS: BURNING

## 2024-03-31 NOTE — DISCHARGE SUMMARY
known as: ROBAXIN     metoprolol succinate 100 MG extended release tablet  Commonly known as: TOPROL XL  TAKE 1 TABLET DAILY     OXYGEN     Ozempic (0.25 or 0.5 MG/DOSE) 2 MG/1.5ML Sopn  Generic drug: Semaglutide(0.25 or 0.5MG/DOS)     PROBIOTIC DAILY PO     spironolactone 25 MG tablet  Commonly known as: ALDACTONE  TAKE 1 TABLET DAILY     vitamin C 500 MG tablet  Commonly known as: ASCORBIC ACID     zinc sulfate 220 (50 Zn)  mg capsule - elemental zinc  Commonly known as: ZINCATE            STOP taking these medications      Trulicity 1.5 MG/0.5ML SC injection  Generic drug: dulaglutide              Discharge recommendations given to patient.  Follow Up. Urology in 1 week   Disposition.  home  Activity. activity as tolerated  Diet: ADULT DIET; Regular; 3 carb choices (45 gm/meal)      Spent 40 minutes in discharge process.    Signed:  TOM Leiva - CNP     3/31/2024 11:47 AM

## 2024-04-06 PROBLEM — N39.0 UTI (URINARY TRACT INFECTION): Status: RESOLVED | Noted: 2024-03-07 | Resolved: 2024-04-06

## 2024-04-08 NOTE — TELEPHONE ENCOUNTER
Received refill request for furosemide (LASIX) 40 MG tablet  from [facility].     Last OV: 12/12/2023    Next OV: 05/06/2024 NPKV    Last Labs: 03/31/2023 BMP    Last Filled: 08/28/2023 NPKV

## 2024-04-10 RX ORDER — FUROSEMIDE 40 MG/1
40 TABLET ORAL DAILY
Qty: 60 TABLET | Refills: 5 | Status: SHIPPED | OUTPATIENT
Start: 2024-04-10

## 2024-05-07 ENCOUNTER — TELEPHONE (OUTPATIENT)
Dept: CARDIOLOGY CLINIC | Age: 79
End: 2024-05-07

## 2024-05-07 NOTE — TELEPHONE ENCOUNTER
[place] states pt nicked her artery giving herself Ozempic injection and she is currently admitted into [facility]. They are wanting pt to hold eliquis and pt is wanting MXA to know and to see if it is ok. Please call Couyeni Bartholomew to advise.

## 2024-05-07 NOTE — TELEPHONE ENCOUNTER
She needs to hold eliquis per recommendations of the trauma providers at [facility]

## 2024-05-08 RX ORDER — ATORVASTATIN CALCIUM 10 MG/1
10 TABLET, FILM COATED ORAL DAILY
Qty: 90 TABLET | Refills: 3 | Status: SHIPPED | OUTPATIENT
Start: 2024-05-08

## 2024-05-30 ASSESSMENT — ENCOUNTER SYMPTOMS
GASTROINTESTINAL NEGATIVE: 1
SHORTNESS OF BREATH: 1

## 2024-05-30 NOTE — PROGRESS NOTES
[facility]   Congestive Heart Failure    Primary Care Doctor:  Soha Tom APRN - NP  Primary Cardiologist: Karen         Chief Complaint:  SOB    History of Present Illness:  Isela Conner is a 79 y.o. female with PMH PAF, SSS s/p PPM , HTN, restrictive lung disease, severe MR, PAH, scleroderma, DM and HFpEF who presents today for chf f/u. Since her last OV, she has been in the hospital a few times, the last at  for surgery for pseudoaneurysm.     Today:  she reports fatigue and some weakness, increased SOB - has been anemic and is deconditioned after multiple hosp. Her wt is stable and she denies chest pain, palpitations, orthopnea, PND, exertional chest pressure/discomfort, fatigue, early saiety, syncope.     Today's home wt:        Baseline Weight: losing wt  Wt Readings from Last 3 Encounters:   05/31/24 73 kg (161 lb)   03/31/24 69.8 kg (153 lb 12.8 oz)   03/13/24 74.8 kg (165 lb)        EF: 60%  Cardiac Imaging: Echo 6/16/23:    Summary   Normal left ventricle size, wall thickness, and systolic function with an   estimated ejection fraction of 60-65%.   Grade III diastolic dysfunction with elevated LV filling pressures.   Normal right ventricular size and function.   Mild left atrial enlargement.   Severe mitral annular calcification without evidence of significant stenosis   and mild regurgitation.   The PASP appears severely elevated, estimated at 65 mmHg assuming a RAP of 8   mmHg    Echo: 11/16/2022   Summary   Normal left ventricle size, wall thickness and systolic function with an   estimated ejection fraction of 60%.   No obvious regional wall motion abnormalities are seen.   Grade III diastolic dysfunction with elevated LV filling pressures. E/e' =   22.2.   Aortic valve appears sclerotic but opens adequately. Trivial aortic   regurgitation.   Thick mitral valve leaflets. Moderate mitral annular calcification. No   evidence of mitral stenosis or regurgitation.   Mild tricuspid

## 2024-05-31 ENCOUNTER — OFFICE VISIT (OUTPATIENT)
Dept: CARDIOLOGY CLINIC | Age: 79
End: 2024-05-31
Payer: MEDICARE

## 2024-05-31 VITALS
HEIGHT: 67 IN | OXYGEN SATURATION: 99 % | BODY MASS INDEX: 25.27 KG/M2 | WEIGHT: 161 LBS | HEART RATE: 62 BPM | SYSTOLIC BLOOD PRESSURE: 130 MMHG | DIASTOLIC BLOOD PRESSURE: 60 MMHG

## 2024-05-31 DIAGNOSIS — I73.9 PAD (PERIPHERAL ARTERY DISEASE) (HCC): ICD-10-CM

## 2024-05-31 DIAGNOSIS — I48.0 PAF (PAROXYSMAL ATRIAL FIBRILLATION) (HCC): ICD-10-CM

## 2024-05-31 DIAGNOSIS — I27.20 PULMONARY HTN (HCC): ICD-10-CM

## 2024-05-31 DIAGNOSIS — I50.32 CHRONIC DIASTOLIC CONGESTIVE HEART FAILURE (HCC): Primary | ICD-10-CM

## 2024-05-31 PROCEDURE — 3075F SYST BP GE 130 - 139MM HG: CPT | Performed by: NURSE PRACTITIONER

## 2024-05-31 PROCEDURE — 1123F ACP DISCUSS/DSCN MKR DOCD: CPT | Performed by: NURSE PRACTITIONER

## 2024-05-31 PROCEDURE — 3078F DIAST BP <80 MM HG: CPT | Performed by: NURSE PRACTITIONER

## 2024-05-31 PROCEDURE — G8417 CALC BMI ABV UP PARAM F/U: HCPCS | Performed by: NURSE PRACTITIONER

## 2024-05-31 PROCEDURE — 1036F TOBACCO NON-USER: CPT | Performed by: NURSE PRACTITIONER

## 2024-05-31 PROCEDURE — G8427 DOCREV CUR MEDS BY ELIG CLIN: HCPCS | Performed by: NURSE PRACTITIONER

## 2024-05-31 PROCEDURE — 1090F PRES/ABSN URINE INCON ASSESS: CPT | Performed by: NURSE PRACTITIONER

## 2024-05-31 PROCEDURE — 99214 OFFICE O/P EST MOD 30 MIN: CPT | Performed by: NURSE PRACTITIONER

## 2024-05-31 PROCEDURE — G8399 PT W/DXA RESULTS DOCUMENT: HCPCS | Performed by: NURSE PRACTITIONER

## 2024-05-31 RX ORDER — ESTRADIOL 0.1 MG/G
2 CREAM VAGINAL SEE ADMIN INSTRUCTIONS
COMMUNITY

## 2024-05-31 NOTE — PATIENT INSTRUCTIONS
Instructions:   Medications: continue current meds  Labs: at next office visit  Lifestyle Recommendations: Weigh yourself every day in the morning after urination, call Jessi if wt increases 2-3lb in one day or 5lb in one week, Limit sodium to 3000mg/day and fluids to 2L or 64oz/day.   Follow up: 4-6 months with Jessi [facility] CHF Resource Line: [phone]

## 2024-06-03 RX ORDER — METOPROLOL SUCCINATE 100 MG/1
TABLET, EXTENDED RELEASE ORAL
Qty: 135 TABLET | Refills: 3 | Status: SHIPPED | OUTPATIENT
Start: 2024-06-03

## 2024-06-03 RX ORDER — DILTIAZEM HYDROCHLORIDE 180 MG/1
CAPSULE, COATED, EXTENDED RELEASE ORAL
Qty: 90 CAPSULE | Refills: 3 | Status: SHIPPED | OUTPATIENT
Start: 2024-06-03

## 2024-06-03 NOTE — TELEPHONE ENCOUNTER
Received refill request for  diltiazem and metoprolol  from  [facility].    Last ov: 02/08/2024 NPSR    Next appointment: 08/09/2024 NPSR

## 2024-06-18 RX ORDER — SPIRONOLACTONE 25 MG/1
25 TABLET ORAL DAILY
Qty: 90 TABLET | Refills: 2 | Status: SHIPPED | OUTPATIENT
Start: 2024-06-18

## 2024-06-24 RX ORDER — DIGOXIN 125 MCG
125 TABLET ORAL DAILY
Qty: 90 TABLET | Refills: 3 | Status: SHIPPED | OUTPATIENT
Start: 2024-06-24

## 2024-06-24 NOTE — TELEPHONE ENCOUNTER
Requested Prescriptions     Pending Prescriptions Disp Refills    digoxin (LANOXIN) 125 MCG tablet [Pharmacy Med Name: DIGOXIN TABS 0.125MG] 90 tablet 3     Sig: TAKE 1 TABLET DAILY      [facility]     Last OV:  2024 NPKV    Next OV: 2024 NPSR    Last EK2024     Last Filled: 2024 MXA

## 2024-07-11 PROBLEM — R31.9 HEMATURIA: Status: RESOLVED | Noted: 2024-03-29 | Resolved: 2024-07-11

## 2024-07-11 NOTE — PROGRESS NOTES
since March. No significant AF. Reduce amio to 100 mg daily given her pulmonary issues. Continue to monitor burden on device. Pt had pseudoaneurysm in May, as well as hematuria event. Consider Watchman given her multiple bleeding events and fall risk. Will reach out to her pulmonologist to see if she would be a candidate as general anesthesia is used for the procedure and she has significant pulmonary HTN requiring oxygen. Briefly discussed procedure with pt. If her pulmonologist deems her a candidate, will arrange closer follow up with Dr. Elkins    2. Pauses,Sick Sinus Syndrome  - S/p Dual chamber Medtronic PPM (5/19/20, )  - I reviewed device interrogation today. Device functioning normally.   ~ A-paced 75.4% V-paced 13.3%  ~ 8.4 years left on battery life expectancy  - Discussed with patient  - Follow up with device clinic as scheduled    4. Chronic diastolic heart failure (NYHA Class III)  - Appears compensated   ~ EF 60-65% per echo (7/24)  - Continue with BB,ARB, lasix  - Aggressive medical therapy with risk factor modification  - Discussed with patient importance of monitoring weight, low sodium diet and fluid restriction  - Followed by CHF team    5. HTN  - Controlled: Goal <130/80  - Continue current medications  - Encouraged to monitor and log BP readings at home, then bring log to next visit  - Discussed importance of low sodium diet, weight control and exercise    6. PAH   - Stable, on 2L of oxygen   - On medical therapy   - Followed by Dr. Jones    Plan:  1. Reduce amiodarone to 100 mg daily  2. Will discuss if ok to have Watchman with Dr. Hensley    F/U: Follow-up with Dr. Elkins in 6 months, sooner if ok to proceed with Watchman  -Call [facility] [place] at [phone] with any questions    Diet & Exercise:  The patient is counseled to follow a low salt diet to assure blood pressure remains controlled for cardiovascular risk factor modification  The patient is counseled to avoid

## 2024-08-06 RX ORDER — ATORVASTATIN CALCIUM 10 MG/1
10 TABLET, FILM COATED ORAL DAILY
Qty: 90 TABLET | Refills: 3 | Status: SHIPPED | OUTPATIENT
Start: 2024-08-06

## 2024-08-09 ENCOUNTER — NURSE ONLY (OUTPATIENT)
Dept: CARDIOLOGY CLINIC | Age: 79
End: 2024-08-09
Payer: MEDICARE

## 2024-08-09 ENCOUNTER — OFFICE VISIT (OUTPATIENT)
Dept: CARDIOLOGY CLINIC | Age: 79
End: 2024-08-09

## 2024-08-09 VITALS
SYSTOLIC BLOOD PRESSURE: 124 MMHG | HEART RATE: 70 BPM | DIASTOLIC BLOOD PRESSURE: 70 MMHG | HEIGHT: 67 IN | BODY MASS INDEX: 24.64 KG/M2 | OXYGEN SATURATION: 95 % | WEIGHT: 157 LBS

## 2024-08-09 DIAGNOSIS — Z95.0 PACEMAKER: ICD-10-CM

## 2024-08-09 DIAGNOSIS — I48.3 TYPICAL ATRIAL FLUTTER (HCC): ICD-10-CM

## 2024-08-09 DIAGNOSIS — I48.0 PAROXYSMAL ATRIAL FIBRILLATION (HCC): ICD-10-CM

## 2024-08-09 DIAGNOSIS — I10 BENIGN ESSENTIAL HTN: ICD-10-CM

## 2024-08-09 DIAGNOSIS — I48.0 PAF (PAROXYSMAL ATRIAL FIBRILLATION) (HCC): Primary | ICD-10-CM

## 2024-08-09 DIAGNOSIS — I48.92 ATRIAL FLUTTER, UNSPECIFIED TYPE (HCC): ICD-10-CM

## 2024-08-09 DIAGNOSIS — M34.9 SCLERODERMA (HCC): Chronic | ICD-10-CM

## 2024-08-09 DIAGNOSIS — Z95.0 PACEMAKER: Primary | ICD-10-CM

## 2024-08-09 DIAGNOSIS — I49.5 SSS (SICK SINUS SYNDROME) (HCC): ICD-10-CM

## 2024-08-09 DIAGNOSIS — I50.32 CHRONIC DIASTOLIC CONGESTIVE HEART FAILURE (HCC): ICD-10-CM

## 2024-08-09 PROCEDURE — 93280 PM DEVICE PROGR EVAL DUAL: CPT | Performed by: INTERNAL MEDICINE

## 2024-08-09 RX ORDER — AMIODARONE HYDROCHLORIDE 200 MG/1
100 TABLET ORAL DAILY
Qty: 90 TABLET | Refills: 1 | Status: SHIPPED
Start: 2024-08-09

## 2024-08-09 RX ORDER — DIGOXIN 125 MCG
125 TABLET ORAL EVERY OTHER DAY
Qty: 90 TABLET | Refills: 3 | Status: SHIPPED
Start: 2024-08-09

## 2024-08-15 ENCOUNTER — APPOINTMENT (OUTPATIENT)
Age: 79
DRG: 291 | End: 2024-08-15
Attending: STUDENT IN AN ORGANIZED HEALTH CARE EDUCATION/TRAINING PROGRAM
Payer: MEDICARE

## 2024-08-15 ENCOUNTER — APPOINTMENT (OUTPATIENT)
Dept: GENERAL RADIOLOGY | Age: 79
DRG: 291 | End: 2024-08-15
Payer: MEDICARE

## 2024-08-15 ENCOUNTER — HOSPITAL ENCOUNTER (INPATIENT)
Age: 79
LOS: 4 days | Discharge: HOME HEALTH | DRG: 291 | End: 2024-08-19
Attending: STUDENT IN AN ORGANIZED HEALTH CARE EDUCATION/TRAINING PROGRAM | Admitting: INTERNAL MEDICINE
Payer: MEDICARE

## 2024-08-15 DIAGNOSIS — I70.222 ATHEROSCLEROSIS OF NATIVE ARTERIES OF EXTREMITIES WITH REST PAIN, LEFT LEG (HCC): ICD-10-CM

## 2024-08-15 DIAGNOSIS — I50.33 ACUTE ON CHRONIC DIASTOLIC CONGESTIVE HEART FAILURE (HCC): ICD-10-CM

## 2024-08-15 DIAGNOSIS — I50.32 CHRONIC DIASTOLIC CONGESTIVE HEART FAILURE (HCC): ICD-10-CM

## 2024-08-15 DIAGNOSIS — I27.20 PULMONARY HYPERTENSION (HCC): ICD-10-CM

## 2024-08-15 DIAGNOSIS — R06.00 DYSPNEA, UNSPECIFIED TYPE: Primary | ICD-10-CM

## 2024-08-15 PROBLEM — R06.02 SHORTNESS OF BREATH: Status: ACTIVE | Noted: 2024-08-15

## 2024-08-15 LAB
ALBUMIN SERPL-MCNC: 3.9 G/DL (ref 3.4–5)
ALBUMIN/GLOB SERPL: 1.3 {RATIO} (ref 1.1–2.2)
ALP SERPL-CCNC: 76 U/L (ref 40–129)
ALT SERPL-CCNC: 11 U/L (ref 10–40)
ANION GAP SERPL CALCULATED.3IONS-SCNC: 8 MMOL/L (ref 3–16)
AST SERPL-CCNC: 15 U/L (ref 15–37)
BACTERIA URNS QL MICRO: NORMAL /HPF
BASE EXCESS BLDV CALC-SCNC: 4.8 MMOL/L (ref -3–3)
BASOPHILS # BLD: 0.1 K/UL (ref 0–0.2)
BASOPHILS NFR BLD: 0.9 %
BILIRUB SERPL-MCNC: 0.5 MG/DL (ref 0–1)
BILIRUB UR QL STRIP.AUTO: NEGATIVE
BUN SERPL-MCNC: 26 MG/DL (ref 7–20)
CALCIUM SERPL-MCNC: 9.4 MG/DL (ref 8.3–10.6)
CHLORIDE SERPL-SCNC: 99 MMOL/L (ref 99–110)
CLARITY UR: CLEAR
CO2 BLDV-SCNC: 78 MMOL/L
CO2 SERPL-SCNC: 30 MMOL/L (ref 21–32)
COHGB MFR BLDV: 2.8 % (ref 0–1.5)
COLOR UR: YELLOW
CREAT SERPL-MCNC: 1.3 MG/DL (ref 0.6–1.2)
DEPRECATED RDW RBC AUTO: 14.2 % (ref 12.4–15.4)
DIGOXIN SERPL-MCNC: 0.6 NG/ML (ref 0.8–2)
DO-HGB MFR BLDV: 31 %
ECHO AO ROOT DIAM: 3.2 CM
ECHO AO ROOT INDEX: 1.75 CM/M2
ECHO BSA: 1.84 M2
ECHO LA AREA 4C: 22.4 CM2
ECHO LA DIAMETER INDEX: 2.19 CM/M2
ECHO LA DIAMETER: 4 CM
ECHO LA MAJOR AXIS: 5.6 CM
ECHO LA TO AORTIC ROOT RATIO: 1.25
ECHO LA VOL MOD A4C: 62 ML (ref 22–52)
ECHO LA VOLUME INDEX MOD A4C: 34 ML/M2 (ref 16–34)
ECHO LV EDV A2C: 62 ML
ECHO LV EDV A4C: 44 ML
ECHO LV EDV INDEX A4C: 24 ML/M2
ECHO LV EDV NDEX A2C: 34 ML/M2
ECHO LV EJECTION FRACTION A2C: 79 %
ECHO LV EJECTION FRACTION A4C: 61 %
ECHO LV EJECTION FRACTION BIPLANE: 72 % (ref 55–100)
ECHO LV ESV A2C: 13 ML
ECHO LV ESV A4C: 17 ML
ECHO LV ESV INDEX A2C: 7 ML/M2
ECHO LV ESV INDEX A4C: 9 ML/M2
ECHO LV FRACTIONAL SHORTENING: 28 % (ref 28–44)
ECHO LV INTERNAL DIMENSION DIASTOLE INDEX: 1.97 CM/M2
ECHO LV INTERNAL DIMENSION DIASTOLIC: 3.6 CM (ref 3.9–5.3)
ECHO LV INTERNAL DIMENSION SYSTOLIC INDEX: 1.42 CM/M2
ECHO LV INTERNAL DIMENSION SYSTOLIC: 2.6 CM
ECHO LV IVSD: 1 CM (ref 0.6–0.9)
ECHO LV MASS 2D: 100.2 G (ref 67–162)
ECHO LV MASS INDEX 2D: 54.8 G/M2 (ref 43–95)
ECHO LV POSTERIOR WALL DIASTOLIC: 0.9 CM (ref 0.6–0.9)
ECHO LV RELATIVE WALL THICKNESS RATIO: 0.5
ECHO LVOT AREA: 3.1 CM2
ECHO LVOT DIAM: 2 CM
ECHO RA AREA 4C: 13.6 CM2
ECHO RA END SYSTOLIC VOLUME APICAL 4 CHAMBER INDEX BSA: 14 ML/M2
ECHO RA VOLUME: 25 ML
ECHO TV REGURGITANT MAX VELOCITY: 3.65 M/S
ECHO TV REGURGITANT PEAK GRADIENT: 53 MMHG
EKG ATRIAL RATE: 74 BPM
EKG DIAGNOSIS: NORMAL
EKG P-R INTERVAL: 414 MS
EKG Q-T INTERVAL: 452 MS
EKG QRS DURATION: 108 MS
EKG QTC CALCULATION (BAZETT): 439 MS
EKG R AXIS: -45 DEGREES
EKG T AXIS: 108 DEGREES
EKG VENTRICULAR RATE: 57 BPM
EOSINOPHIL # BLD: 0.1 K/UL (ref 0–0.6)
EOSINOPHIL NFR BLD: 0.8 %
EPI CELLS #/AREA URNS AUTO: 0 /HPF (ref 0–5)
EST. AVERAGE GLUCOSE BLD GHB EST-MCNC: 188.6 MG/DL
FLUAV RNA RESP QL NAA+PROBE: NOT DETECTED
FLUBV RNA RESP QL NAA+PROBE: NOT DETECTED
GFR SERPLBLD CREATININE-BSD FMLA CKD-EPI: 41 ML/MIN/{1.73_M2}
GLUCOSE BLD-MCNC: 217 MG/DL (ref 70–99)
GLUCOSE BLD-MCNC: 222 MG/DL (ref 70–99)
GLUCOSE BLD-MCNC: 276 MG/DL (ref 70–99)
GLUCOSE BLD-MCNC: 279 MG/DL (ref 70–99)
GLUCOSE SERPL-MCNC: 257 MG/DL (ref 70–99)
GLUCOSE UR STRIP.AUTO-MCNC: NEGATIVE MG/DL
HBA1C MFR BLD: 8.2 %
HCO3 BLDV-SCNC: 32.9 MMOL/L (ref 23–29)
HCT VFR BLD AUTO: 33.4 % (ref 36–48)
HGB BLD-MCNC: 11 G/DL (ref 12–16)
HGB UR QL STRIP.AUTO: NEGATIVE
HYALINE CASTS #/AREA URNS AUTO: 0 /LPF (ref 0–8)
KETONES UR STRIP.AUTO-MCNC: NEGATIVE MG/DL
LEFT VENTRICULAR EJECTION FRACTION MODE: NORMAL
LEUKOCYTE ESTERASE UR QL STRIP.AUTO: ABNORMAL
LV EF: 63 %
LYMPHOCYTES # BLD: 1.2 K/UL (ref 1–5.1)
LYMPHOCYTES NFR BLD: 11.4 %
MAGNESIUM SERPL-MCNC: 2.6 MG/DL (ref 1.8–2.4)
MCH RBC QN AUTO: 30 PG (ref 26–34)
MCHC RBC AUTO-ENTMCNC: 33.1 G/DL (ref 31–36)
MCV RBC AUTO: 90.6 FL (ref 80–100)
METHGB MFR BLDV: 0.7 %
MONOCYTES # BLD: 0.9 K/UL (ref 0–1.3)
MONOCYTES NFR BLD: 8.6 %
NEUTROPHILS # BLD: 8 K/UL (ref 1.7–7.7)
NEUTROPHILS NFR BLD: 78.3 %
NITRITE UR QL STRIP.AUTO: NEGATIVE
NT-PROBNP SERPL-MCNC: 738 PG/ML (ref 0–449)
O2 CT VFR BLDV CALC: 10 VOL %
O2 THERAPY: ABNORMAL
PCO2 BLDV: 66.6 MMHG (ref 40–50)
PERFORMED ON: ABNORMAL
PH BLDV: 7.3 [PH] (ref 7.35–7.45)
PH UR STRIP.AUTO: 7 [PH] (ref 5–8)
PLATELET # BLD AUTO: 153 K/UL (ref 135–450)
PMV BLD AUTO: 9.4 FL (ref 5–10.5)
PO2 BLDV: 40 MMHG (ref 25–40)
POTASSIUM SERPL-SCNC: 5.2 MMOL/L (ref 3.5–5.1)
PROT SERPL-MCNC: 6.9 G/DL (ref 6.4–8.2)
PROT UR STRIP.AUTO-MCNC: NEGATIVE MG/DL
RBC # BLD AUTO: 3.69 M/UL (ref 4–5.2)
RBC CLUMPS #/AREA URNS AUTO: 0 /HPF (ref 0–4)
SAO2 % BLDV: 68 %
SARS-COV-2 RNA RESP QL NAA+PROBE: NOT DETECTED
SODIUM SERPL-SCNC: 137 MMOL/L (ref 136–145)
SP GR UR STRIP.AUTO: <=1.005 (ref 1–1.03)
TROPONIN, HIGH SENSITIVITY: 19 NG/L (ref 0–14)
TROPONIN, HIGH SENSITIVITY: 23 NG/L (ref 0–14)
UA DIPSTICK W REFLEX MICRO PNL UR: YES
URN SPEC COLLECT METH UR: ABNORMAL
UROBILINOGEN UR STRIP-ACNC: 0.2 E.U./DL
WBC # BLD AUTO: 10.2 K/UL (ref 4–11)
WBC #/AREA URNS AUTO: 1 /HPF (ref 0–5)

## 2024-08-15 PROCEDURE — 6360000002 HC RX W HCPCS: Performed by: STUDENT IN AN ORGANIZED HEALTH CARE EDUCATION/TRAINING PROGRAM

## 2024-08-15 PROCEDURE — 94760 N-INVAS EAR/PLS OXIMETRY 1: CPT

## 2024-08-15 PROCEDURE — 71046 X-RAY EXAM CHEST 2 VIEWS: CPT

## 2024-08-15 PROCEDURE — 2700000000 HC OXYGEN THERAPY PER DAY

## 2024-08-15 PROCEDURE — 93308 TTE F-UP OR LMTD: CPT

## 2024-08-15 PROCEDURE — 6370000000 HC RX 637 (ALT 250 FOR IP): Performed by: INTERNAL MEDICINE

## 2024-08-15 PROCEDURE — 82803 BLOOD GASES ANY COMBINATION: CPT

## 2024-08-15 PROCEDURE — APPSS60 APP SPLIT SHARED TIME 46-60 MINUTES: Performed by: NURSE PRACTITIONER

## 2024-08-15 PROCEDURE — 93005 ELECTROCARDIOGRAM TRACING: CPT | Performed by: STUDENT IN AN ORGANIZED HEALTH CARE EDUCATION/TRAINING PROGRAM

## 2024-08-15 PROCEDURE — 99222 1ST HOSP IP/OBS MODERATE 55: CPT | Performed by: SURGERY

## 2024-08-15 PROCEDURE — 84484 ASSAY OF TROPONIN QUANT: CPT

## 2024-08-15 PROCEDURE — 99223 1ST HOSP IP/OBS HIGH 75: CPT | Performed by: INTERNAL MEDICINE

## 2024-08-15 PROCEDURE — 83880 ASSAY OF NATRIURETIC PEPTIDE: CPT

## 2024-08-15 PROCEDURE — 80053 COMPREHEN METABOLIC PANEL: CPT

## 2024-08-15 PROCEDURE — 85025 COMPLETE CBC W/AUTO DIFF WBC: CPT

## 2024-08-15 PROCEDURE — 36415 COLL VENOUS BLD VENIPUNCTURE: CPT

## 2024-08-15 PROCEDURE — 83036 HEMOGLOBIN GLYCOSYLATED A1C: CPT

## 2024-08-15 PROCEDURE — 2580000003 HC RX 258: Performed by: STUDENT IN AN ORGANIZED HEALTH CARE EDUCATION/TRAINING PROGRAM

## 2024-08-15 PROCEDURE — 81001 URINALYSIS AUTO W/SCOPE: CPT

## 2024-08-15 PROCEDURE — 94640 AIRWAY INHALATION TREATMENT: CPT

## 2024-08-15 PROCEDURE — 93321 DOPPLER ECHO F-UP/LMTD STD: CPT | Performed by: STUDENT IN AN ORGANIZED HEALTH CARE EDUCATION/TRAINING PROGRAM

## 2024-08-15 PROCEDURE — 93010 ELECTROCARDIOGRAM REPORT: CPT | Performed by: INTERNAL MEDICINE

## 2024-08-15 PROCEDURE — 1200000000 HC SEMI PRIVATE

## 2024-08-15 PROCEDURE — 96374 THER/PROPH/DIAG INJ IV PUSH: CPT

## 2024-08-15 PROCEDURE — 93308 TTE F-UP OR LMTD: CPT | Performed by: STUDENT IN AN ORGANIZED HEALTH CARE EDUCATION/TRAINING PROGRAM

## 2024-08-15 PROCEDURE — APPNB30 APP NON BILLABLE TIME 0-30 MINS: Performed by: NURSE PRACTITIONER

## 2024-08-15 PROCEDURE — 99285 EMERGENCY DEPT VISIT HI MDM: CPT

## 2024-08-15 PROCEDURE — 83735 ASSAY OF MAGNESIUM: CPT

## 2024-08-15 PROCEDURE — 80162 ASSAY OF DIGOXIN TOTAL: CPT

## 2024-08-15 PROCEDURE — 6370000000 HC RX 637 (ALT 250 FOR IP): Performed by: STUDENT IN AN ORGANIZED HEALTH CARE EDUCATION/TRAINING PROGRAM

## 2024-08-15 PROCEDURE — 94761 N-INVAS EAR/PLS OXIMETRY MLT: CPT

## 2024-08-15 PROCEDURE — 93325 DOPPLER ECHO COLOR FLOW MAPG: CPT | Performed by: STUDENT IN AN ORGANIZED HEALTH CARE EDUCATION/TRAINING PROGRAM

## 2024-08-15 PROCEDURE — 87636 SARSCOV2 & INF A&B AMP PRB: CPT

## 2024-08-15 RX ORDER — AMIODARONE HYDROCHLORIDE 200 MG/1
100 TABLET ORAL DAILY
Status: DISCONTINUED | OUTPATIENT
Start: 2024-08-15 | End: 2024-08-19 | Stop reason: HOSPADM

## 2024-08-15 RX ORDER — FUROSEMIDE 10 MG/ML
40 INJECTION INTRAMUSCULAR; INTRAVENOUS 2 TIMES DAILY
Status: DISPENSED | OUTPATIENT
Start: 2024-08-15 | End: 2024-08-16

## 2024-08-15 RX ORDER — ESTRADIOL 0.1 MG/G
2 CREAM VAGINAL SEE ADMIN INSTRUCTIONS
Status: DISCONTINUED | OUTPATIENT
Start: 2024-08-15 | End: 2024-08-15

## 2024-08-15 RX ORDER — SODIUM CHLORIDE 0.9 % (FLUSH) 0.9 %
5-40 SYRINGE (ML) INJECTION PRN
Status: DISCONTINUED | OUTPATIENT
Start: 2024-08-15 | End: 2024-08-19 | Stop reason: HOSPADM

## 2024-08-15 RX ORDER — POTASSIUM CHLORIDE 1500 MG/1
40 TABLET, EXTENDED RELEASE ORAL PRN
Status: DISCONTINUED | OUTPATIENT
Start: 2024-08-15 | End: 2024-08-15

## 2024-08-15 RX ORDER — DILTIAZEM HYDROCHLORIDE 180 MG/1
180 CAPSULE, COATED, EXTENDED RELEASE ORAL DAILY
Status: DISCONTINUED | OUTPATIENT
Start: 2024-08-15 | End: 2024-08-19 | Stop reason: HOSPADM

## 2024-08-15 RX ORDER — BOSENTAN 125 MG/1
125 TABLET, FILM COATED ORAL 2 TIMES DAILY
Status: DISCONTINUED | OUTPATIENT
Start: 2024-08-15 | End: 2024-08-15

## 2024-08-15 RX ORDER — BOSENTAN 125 MG/1
125 TABLET, FILM COATED ORAL 2 TIMES DAILY
Status: DISCONTINUED | OUTPATIENT
Start: 2024-08-15 | End: 2024-08-19 | Stop reason: HOSPADM

## 2024-08-15 RX ORDER — GLUCAGON 1 MG/ML
1 KIT INJECTION PRN
Status: DISCONTINUED | OUTPATIENT
Start: 2024-08-15 | End: 2024-08-19 | Stop reason: HOSPADM

## 2024-08-15 RX ORDER — DIGOXIN 125 MCG
125 TABLET ORAL EVERY OTHER DAY
Status: DISCONTINUED | OUTPATIENT
Start: 2024-08-15 | End: 2024-08-19 | Stop reason: HOSPADM

## 2024-08-15 RX ORDER — METHOCARBAMOL 750 MG/1
375 TABLET, FILM COATED ORAL 3 TIMES DAILY PRN
Status: DISCONTINUED | OUTPATIENT
Start: 2024-08-15 | End: 2024-08-19 | Stop reason: HOSPADM

## 2024-08-15 RX ORDER — FUROSEMIDE 10 MG/ML
20 INJECTION INTRAMUSCULAR; INTRAVENOUS 2 TIMES DAILY
Status: DISPENSED | OUTPATIENT
Start: 2024-08-16 | End: 2024-08-18

## 2024-08-15 RX ORDER — IPRATROPIUM BROMIDE AND ALBUTEROL SULFATE 2.5; .5 MG/3ML; MG/3ML
1 SOLUTION RESPIRATORY (INHALATION) EVERY 4 HOURS PRN
Status: DISCONTINUED | OUTPATIENT
Start: 2024-08-15 | End: 2024-08-19 | Stop reason: HOSPADM

## 2024-08-15 RX ORDER — INSULIN LISPRO 100 [IU]/ML
0-8 INJECTION, SOLUTION INTRAVENOUS; SUBCUTANEOUS
Status: DISCONTINUED | OUTPATIENT
Start: 2024-08-15 | End: 2024-08-19 | Stop reason: HOSPADM

## 2024-08-15 RX ORDER — BUSPIRONE HYDROCHLORIDE 5 MG/1
5 TABLET ORAL NIGHTLY
Status: DISCONTINUED | OUTPATIENT
Start: 2024-08-15 | End: 2024-08-19 | Stop reason: HOSPADM

## 2024-08-15 RX ORDER — ACETAMINOPHEN 650 MG/1
650 SUPPOSITORY RECTAL EVERY 6 HOURS PRN
Status: DISCONTINUED | OUTPATIENT
Start: 2024-08-15 | End: 2024-08-19 | Stop reason: HOSPADM

## 2024-08-15 RX ORDER — SODIUM CHLORIDE 9 MG/ML
INJECTION, SOLUTION INTRAVENOUS PRN
Status: DISCONTINUED | OUTPATIENT
Start: 2024-08-15 | End: 2024-08-19 | Stop reason: HOSPADM

## 2024-08-15 RX ORDER — ACETAMINOPHEN 325 MG/1
650 TABLET ORAL EVERY 6 HOURS PRN
Status: DISCONTINUED | OUTPATIENT
Start: 2024-08-15 | End: 2024-08-16 | Stop reason: SDUPTHER

## 2024-08-15 RX ORDER — FUROSEMIDE 10 MG/ML
20 INJECTION INTRAMUSCULAR; INTRAVENOUS 2 TIMES DAILY
Status: DISCONTINUED | OUTPATIENT
Start: 2024-08-16 | End: 2024-08-15

## 2024-08-15 RX ORDER — POTASSIUM CHLORIDE 7.45 MG/ML
10 INJECTION INTRAVENOUS PRN
Status: DISCONTINUED | OUTPATIENT
Start: 2024-08-15 | End: 2024-08-15

## 2024-08-15 RX ORDER — MAGNESIUM SULFATE IN WATER 40 MG/ML
2000 INJECTION, SOLUTION INTRAVENOUS PRN
Status: DISCONTINUED | OUTPATIENT
Start: 2024-08-15 | End: 2024-08-19 | Stop reason: HOSPADM

## 2024-08-15 RX ORDER — HYDROXYCHLOROQUINE SULFATE 200 MG/1
200 TABLET, FILM COATED ORAL DAILY
Status: DISCONTINUED | OUTPATIENT
Start: 2024-08-15 | End: 2024-08-19 | Stop reason: HOSPADM

## 2024-08-15 RX ORDER — GABAPENTIN 300 MG/1
600 CAPSULE ORAL 3 TIMES DAILY
Status: DISCONTINUED | OUTPATIENT
Start: 2024-08-15 | End: 2024-08-15

## 2024-08-15 RX ORDER — DULOXETIN HYDROCHLORIDE 60 MG/1
60 CAPSULE, DELAYED RELEASE ORAL DAILY
Status: DISCONTINUED | OUTPATIENT
Start: 2024-08-15 | End: 2024-08-19 | Stop reason: HOSPADM

## 2024-08-15 RX ORDER — ATORVASTATIN CALCIUM 10 MG/1
10 TABLET, FILM COATED ORAL DAILY
Status: DISCONTINUED | OUTPATIENT
Start: 2024-08-15 | End: 2024-08-19 | Stop reason: HOSPADM

## 2024-08-15 RX ORDER — GABAPENTIN 300 MG/1
300 CAPSULE ORAL 3 TIMES DAILY
Status: DISCONTINUED | OUTPATIENT
Start: 2024-08-15 | End: 2024-08-19 | Stop reason: HOSPADM

## 2024-08-15 RX ORDER — METOPROLOL SUCCINATE 50 MG/1
100 TABLET, EXTENDED RELEASE ORAL DAILY
Status: DISCONTINUED | OUTPATIENT
Start: 2024-08-15 | End: 2024-08-19 | Stop reason: HOSPADM

## 2024-08-15 RX ORDER — ASPIRIN 81 MG/1
81 TABLET ORAL DAILY
Status: DISCONTINUED | OUTPATIENT
Start: 2024-08-15 | End: 2024-08-19 | Stop reason: HOSPADM

## 2024-08-15 RX ORDER — ALBUTEROL SULFATE 0.83 MG/ML
5 SOLUTION RESPIRATORY (INHALATION) ONCE
Status: COMPLETED | OUTPATIENT
Start: 2024-08-15 | End: 2024-08-15

## 2024-08-15 RX ORDER — FUROSEMIDE 10 MG/ML
40 INJECTION INTRAMUSCULAR; INTRAVENOUS 2 TIMES DAILY
Status: DISCONTINUED | OUTPATIENT
Start: 2024-08-15 | End: 2024-08-15

## 2024-08-15 RX ORDER — SPIRONOLACTONE 25 MG/1
25 TABLET ORAL DAILY
Status: DISCONTINUED | OUTPATIENT
Start: 2024-08-15 | End: 2024-08-19 | Stop reason: HOSPADM

## 2024-08-15 RX ORDER — SODIUM CHLORIDE 0.9 % (FLUSH) 0.9 %
5-40 SYRINGE (ML) INJECTION EVERY 12 HOURS SCHEDULED
Status: DISCONTINUED | OUTPATIENT
Start: 2024-08-15 | End: 2024-08-19 | Stop reason: HOSPADM

## 2024-08-15 RX ORDER — ALBUTEROL SULFATE 5 MG/ML
2.5 SOLUTION RESPIRATORY (INHALATION) EVERY 6 HOURS PRN
Status: DISCONTINUED | OUTPATIENT
Start: 2024-08-15 | End: 2024-08-19 | Stop reason: HOSPADM

## 2024-08-15 RX ORDER — FUROSEMIDE 10 MG/ML
40 INJECTION INTRAMUSCULAR; INTRAVENOUS ONCE
Status: COMPLETED | OUTPATIENT
Start: 2024-08-15 | End: 2024-08-15

## 2024-08-15 RX ORDER — HYDROCODONE BITARTRATE AND ACETAMINOPHEN 7.5; 325 MG/1; MG/1
1 TABLET ORAL EVERY 6 HOURS PRN
Status: DISCONTINUED | OUTPATIENT
Start: 2024-08-15 | End: 2024-08-19 | Stop reason: HOSPADM

## 2024-08-15 RX ORDER — ONDANSETRON 4 MG/1
4 TABLET, ORALLY DISINTEGRATING ORAL EVERY 8 HOURS PRN
Status: DISCONTINUED | OUTPATIENT
Start: 2024-08-15 | End: 2024-08-19 | Stop reason: HOSPADM

## 2024-08-15 RX ORDER — DEXTROSE MONOHYDRATE 100 MG/ML
INJECTION, SOLUTION INTRAVENOUS CONTINUOUS PRN
Status: DISCONTINUED | OUTPATIENT
Start: 2024-08-15 | End: 2024-08-19 | Stop reason: HOSPADM

## 2024-08-15 RX ORDER — POLYETHYLENE GLYCOL 3350 17 G/17G
17 POWDER, FOR SOLUTION ORAL DAILY PRN
Status: DISCONTINUED | OUTPATIENT
Start: 2024-08-15 | End: 2024-08-19 | Stop reason: HOSPADM

## 2024-08-15 RX ORDER — INSULIN LISPRO 100 [IU]/ML
0-4 INJECTION, SOLUTION INTRAVENOUS; SUBCUTANEOUS NIGHTLY
Status: DISCONTINUED | OUTPATIENT
Start: 2024-08-15 | End: 2024-08-19 | Stop reason: HOSPADM

## 2024-08-15 RX ORDER — ONDANSETRON 2 MG/ML
4 INJECTION INTRAMUSCULAR; INTRAVENOUS EVERY 6 HOURS PRN
Status: DISCONTINUED | OUTPATIENT
Start: 2024-08-15 | End: 2024-08-19 | Stop reason: HOSPADM

## 2024-08-15 RX ORDER — BUSPIRONE HYDROCHLORIDE 5 MG/1
10 TABLET ORAL
Status: DISCONTINUED | OUTPATIENT
Start: 2024-08-15 | End: 2024-08-19 | Stop reason: HOSPADM

## 2024-08-15 RX ADMIN — FUROSEMIDE 40 MG: 10 INJECTION, SOLUTION INTRAMUSCULAR; INTRAVENOUS at 17:45

## 2024-08-15 RX ADMIN — BUSPIRONE HYDROCHLORIDE 5 MG: 5 TABLET ORAL at 21:42

## 2024-08-15 RX ADMIN — GABAPENTIN 600 MG: 300 CAPSULE ORAL at 14:30

## 2024-08-15 RX ADMIN — INSULIN LISPRO 4 UNITS: 100 INJECTION, SOLUTION INTRAVENOUS; SUBCUTANEOUS at 09:39

## 2024-08-15 RX ADMIN — BOSENTAN 125 MG: 125 TABLET, FILM COATED ORAL at 21:45

## 2024-08-15 RX ADMIN — SODIUM CHLORIDE, PRESERVATIVE FREE 5 ML: 5 INJECTION INTRAVENOUS at 17:46

## 2024-08-15 RX ADMIN — DIGOXIN 125 MCG: 0.12 TABLET ORAL at 09:43

## 2024-08-15 RX ADMIN — ALBUTEROL SULFATE 5 MG: 2.5 SOLUTION RESPIRATORY (INHALATION) at 05:08

## 2024-08-15 RX ADMIN — ATORVASTATIN CALCIUM 10 MG: 10 TABLET, FILM COATED ORAL at 09:43

## 2024-08-15 RX ADMIN — SODIUM CHLORIDE, PRESERVATIVE FREE 10 ML: 5 INJECTION INTRAVENOUS at 21:42

## 2024-08-15 RX ADMIN — METOPROLOL SUCCINATE 100 MG: 50 TABLET, EXTENDED RELEASE ORAL at 09:43

## 2024-08-15 RX ADMIN — FUROSEMIDE 40 MG: 10 INJECTION, SOLUTION INTRAMUSCULAR; INTRAVENOUS at 05:36

## 2024-08-15 RX ADMIN — HYDROXYCHLOROQUINE SULFATE 200 MG: 200 TABLET, FILM COATED ORAL at 09:43

## 2024-08-15 RX ADMIN — SODIUM CHLORIDE, PRESERVATIVE FREE 10 ML: 5 INJECTION INTRAVENOUS at 09:50

## 2024-08-15 RX ADMIN — INSULIN LISPRO 2 UNITS: 100 INJECTION, SOLUTION INTRAVENOUS; SUBCUTANEOUS at 13:03

## 2024-08-15 RX ADMIN — DILTIAZEM HYDROCHLORIDE 180 MG: 180 CAPSULE, COATED, EXTENDED RELEASE ORAL at 09:43

## 2024-08-15 RX ADMIN — DULOXETINE HYDROCHLORIDE 60 MG: 60 CAPSULE, DELAYED RELEASE ORAL at 09:44

## 2024-08-15 RX ADMIN — INSULIN LISPRO 2 UNITS: 100 INJECTION, SOLUTION INTRAVENOUS; SUBCUTANEOUS at 18:25

## 2024-08-15 RX ADMIN — GABAPENTIN 600 MG: 300 CAPSULE ORAL at 09:43

## 2024-08-15 RX ADMIN — AMIODARONE HYDROCHLORIDE 100 MG: 200 TABLET ORAL at 09:43

## 2024-08-15 RX ADMIN — SPIRONOLACTONE 25 MG: 25 TABLET ORAL at 09:50

## 2024-08-15 RX ADMIN — GABAPENTIN 300 MG: 300 CAPSULE ORAL at 21:42

## 2024-08-15 ASSESSMENT — LIFESTYLE VARIABLES
HOW MANY STANDARD DRINKS CONTAINING ALCOHOL DO YOU HAVE ON A TYPICAL DAY: PATIENT DOES NOT DRINK
HOW OFTEN DO YOU HAVE A DRINK CONTAINING ALCOHOL: NEVER
HOW MANY STANDARD DRINKS CONTAINING ALCOHOL DO YOU HAVE ON A TYPICAL DAY: PATIENT DOES NOT DRINK
HOW OFTEN DO YOU HAVE A DRINK CONTAINING ALCOHOL: NEVER

## 2024-08-15 ASSESSMENT — PAIN - FUNCTIONAL ASSESSMENT: PAIN_FUNCTIONAL_ASSESSMENT: NONE - DENIES PAIN

## 2024-08-15 NOTE — PLAN OF CARE
Patient admission received in room 5581 through ED. Patient is alert, oriented, awake, able to follow commands, verbalize needs appropriately.    Vitals checked and recorded. Morning medications provided. Patient able to swallow pills without issues.  Admission assessment performed and recorded.  Respirations are easy and unlabored. Patient on oxygen at 2L baseline via nasal cannula.  Patient does not appear to be in distress.  Patient oriented to room and call light. Plan of care discussed with patient, patient agreeable. Call light within reach.  Bed alarm turned on, bed locked and secured in lowest position.       Problem: Chronic Conditions and Co-morbidities  Goal: Patient's chronic conditions and co-morbidity symptoms are monitored and maintained or improved  Outcome: Progressing  Flowsheets (Taken 8/15/2024 0822)  Care Plan - Patient's Chronic Conditions and Co-Morbidity Symptoms are Monitored and Maintained or Improved:   Monitor and assess patient's chronic conditions and comorbid symptoms for stability, deterioration, or improvement   Collaborate with multidisciplinary team to address chronic and comorbid conditions and prevent exacerbation or deterioration   Update acute care plan with appropriate goals if chronic or comorbid symptoms are exacerbated and prevent overall improvement and discharge     Problem: Safety - Adult  Goal: Free from fall injury  Outcome: Progressing     Problem: ABCDS Injury Assessment  Goal: Absence of physical injury  Outcome: Progressing     Problem: Skin/Tissue Integrity  Goal: Absence of new skin breakdown  Description: 1.  Monitor for areas of redness and/or skin breakdown  2.  Assess vascular access sites hourly  3.  Every 4-6 hours minimum:  Change oxygen saturation probe site  4.  Every 4-6 hours:  If on nasal continuous positive airway pressure, respiratory therapy assess nares and determine need for appliance change or resting period.  Outcome: Progressing

## 2024-08-15 NOTE — CONSULTS
[facility]  Consultation Note    Reason for Consult: CKD 3a, hyperkalemia  Requesting Physician:  Dr. ESTHER Jackson    CHIEF COMPLAINT: Shortness of breath    History obtained from records and patient.    HISTORY OF PRESENT ILLNESS:                Isela Conner  is 79 y.o. y.o. female with significant past medical history of RADHA, with no full recovery, CKD 3a, new baseline creatinine probably 1.3, diabetes mellitus type 2 for more than 10 years, neuropathy, fatty liver, fibromyalgia, hypertension, IBS, neuropathy, osteoarthritis, scleroderma, atrial fibrillation on anticoagulation, who presents with shortness of breath.  She has 3 L baseline oxygen requirement with a history of pulmonary hypertension.  She is admitted for possible CHF exacerbation.  I am asked to see the patient due to the patient's underlying CKD and hyperkalemia.  Potassium of 5.2 and creatinine of 1.3 with BUN of 26.  Systolic blood pressure ranging from 100s to 140s range.  Denies any vomiting or diarrhea.  She is on Lasix 40 mg daily and spironolactone 25 mg daily as an outpatient.  She is on losartan.  She is on statin.  Denies any change in urine output.    Past Medical History:     has a past medical history of Diabetes mellitus (HCC), Fatty liver, Femur fracture, left (HCC), Fibromyalgia, Fracture, hip (HCC), GERD (gastroesophageal reflux disease), Hypertension, IBS (irritable bowel syndrome), Osteoarthritis, Peripheral neuropathy, Postmenopausal, and Scleroderma (HCC).   Past Surgical History:     has a past surgical history that includes Hysterectomy (1987); Cholecystectomy (1989); Lumbar disc surgery (2007); Throat surgery (2012); arthroplasty (08/10/2012); Fibula Fracture Surgery (2017); back surgery; Cystocopy (08/01/2018); Partial hip arthroplasty (Right, 09/2019); and Femur fracture surgery.   Current Medications:    Current Facility-Administered Medications: amiodarone (CORDARONE) tablet 100 mg, 100

## 2024-08-15 NOTE — PROGRESS NOTES
Tablet Bosentan received from patient. Medication verified with pharmacy. Medication kept in patient specific drawer.

## 2024-08-15 NOTE — CARE COORDINATION
Per notes and unit IDR pt having left leg angiogram tomorrow afternoon.    Will follow for plan and discharge needs.    Nilam Cheema RN, BSN  [phone]

## 2024-08-15 NOTE — PROGRESS NOTES
4 Eyes Skin Assessment     NAME:  Isela Conner  YOB: 1945  MEDICAL RECORD NUMBER:  [id number]    The patient is being assessed for  Admission    I agree that at least one RN has performed a thorough Head to Toe Skin Assessment on the patient. ALL assessment sites listed below have been assessed.      Areas assessed by both nurses:    Head, Face, Ears, Shoulders, Back, Chest, Arms, Elbows, Hands, Sacrum. Buttock, Coccyx, Ischium, and Legs. Feet and Heels        Does the Patient have a Wound? Yes. Patient has a wound on left ankle. Dry dressing done.        Chaim Prevention initiated by RN: Yes  Wound Care Orders initiated by RN: Yes    Pressure Injury (Stage 3,4, Unstageable, DTI, NWPT, and Complex wounds) if present, place Wound referral order by RN under : No    New Ostomies, if present place, Ostomy referral order under : No     Nurse 1 eSignature: Electronically signed by Juli Kate RN on 8/15/24 at 10:17 AM EDT    **SHARE this note so that the co-signing nurse can place an eSignature**    Nurse 2 eSignature: Electronically signed by Gabriela Mcdonough RN on 8/15/24 at 10:19 AM EDT

## 2024-08-15 NOTE — CONSULTS
[facility]  Advanced CHF/Pulmonary Hypertension   Cardiac Evaluation      Isela Conner  YOB: 1945    Requesting PHysician:  Dr. Jackson      Chief Complaint   Patient presents with    Shortness of Breath     Pt to ED with c/o shortness of breath that started around 2200. Pt wears 2-3L O2 at baseline.         History of Present Illness:  Isela Conner is an 80 yo female who presented yesterday to the ED with shortness of breath that started around 10 pm.  She noticed being very short of breath lying flat.  No chest pain.  No peripheral edema.  She also noticed a cough.  No fever or chills.  She is on oxygen 3 liters all the time.  She has know pulmonary artery hypertension and takes bosentan bid with good compliance.  She also has a history of afib anticoagulated on Eliquis.      She also has SSS s/p PPM.  Has hypertension, scleroderma, fibromyalgia, and CKD stage III.  Denies fever, chills, chest pain, palpitations, recent travel, sick contacts, nausea, vomiting, abdominal pain.      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  It required coil embolization by IR at .    Labs showed BNP level of 738 that is lower than her last BNP of 1100.  Her EKG was negative for acute ischemic changes.  Chest xray showed mild pulmonary vascular congestion.    She sees Dr. Hensley of pulmonary.  She was deemed stable at that time.  Echo today shows normal LVEF, normal wall thickness.  RV normal, couldn't assess RVSP.  She was recently evaluated for a nonhealing foot ulcer and saw a vascular doctor.  She has known underlying PAD.  She is being seen here by Dr. Manning for her PAD.     We are consulted for her chronic diastolic HF and PAH.  Today she feels better after diuresis.        Labs:  Sodium 137  K 5.2  BUN/Cre 26/1.3  Magnesium 2.6  Bnp 738 (was 1172 on 3/7/24)  Troponin 23, 19  H/H 11.0/33.4  Wbc 10.2    CXR:  IMPRESSION:  Findings  diltiazem, amiodarone, metoprolol, digoxin  Continue spironolactone for her CHF.  She might benefit from being on Jardiance or Entresto for her diastolic HF  She will have angiogram tomorrow by Dr. Manning.  CHF education reinforced.  ~salt restriction  ~fluid restriction  ~medication compliance  ~daily weights and notify of any significant weight gain/loss  ~establish with CHF nurse  ~outpatient follow-up with our CHF team    The patient was seen for > 75 minutes. I reviewed interval history, physical exam, review of data including labs, imaging, development and implementation of treatment plan and coordination of complex care. More than 50% of the time was devoted to counseling the patient on their diagnoses/treatments, as well as coordination of care with the other care teams      I appreciate the opportunity of cooperating in the care of this patient.    Tashia Jones M.D., [facility]

## 2024-08-15 NOTE — CONSULTS
[facility]  Consultation Note    Chief Complaint / Reason for Consultation  Nonhealing left foot wound    History of Present Illness  Patient is a 79 y.o. female with past medical history of DM, peripheral neuropathy, PAF on Eliquis, CHF, scleroderma, CKD and HTN who presented to the ED with complaints of SOB.  Patient reports felt like fluid overload and difficulty breathing.  She live in an independent living facility and is mostly wheel chair bound.  She is on chronic home oxygen.  She reports about 5 weeks ago she developed a sore to her left ankle and it has been slow to heal.  She follows with wound clinic in [facility].  She has known underlying PAD and was seen in November 2023 but at that time with mild disease and no wounds and plan was for monitoring.  Now she has a new ulcer and does complain of pain in her left leg and foot.  We have been consulted for further evaluation and recommendations.     Review of Systems   + left ankle wound, + SOB.  Denies fevers, chills, chest pain, nausea, vomiting, hematemesis, diarrhea, constipation, melena, hematochezia, wt changes, vision problems, blindness, hearing problems, facial droop, slurred speech, extremity weakness, extremity numbness, dysuria.    Past Medical History:   Diagnosis Date    Diabetes mellitus (HCC)     Fatty liver     Severe    Femur fracture, left (HCC) 2021    spontaneous Left femur fracture    Fibromyalgia     Fracture, hip (HCC)     GERD (gastroesophageal reflux disease)     Hypertension     IBS (irritable bowel syndrome)     Osteoarthritis     Peripheral neuropathy     Postmenopausal     Scleroderma (HCC)     cre[place]       Past Surgical History:   Procedure Laterality Date    ARTHROPLASTY  08/10/2012    FIFTH TOE LEFT FOOT    BACK SURGERY      CHOLECYSTECTOMY  1989    CYSTOSCOPY  08/01/2018    with botox injection     FEMUR FRACTURE SURGERY      FIBULA FRACTURE SURGERY  2017    3 separate surgeries for a  dry      Labs  Lab Results   Component Value Date/Time    WBC 10.2 08/15/2024 03:43 AM    HGB 11.0 08/15/2024 03:43 AM    HCT 33.4 08/15/2024 03:43 AM    MCV 90.6 08/15/2024 03:43 AM     08/15/2024 03:43 AM     Lab Results   Component Value Date/Time     08/15/2024 03:43 AM    K 5.2 08/15/2024 03:43 AM    CL 99 08/15/2024 03:43 AM    CO2 30 08/15/2024 03:43 AM    PHOS 3.3 06/21/2023 04:40 AM    BUN 26 08/15/2024 03:43 AM    CREATININE 1.3 08/15/2024 03:43 AM      No results found for: \"GLU\"    Scheduled Meds:    amiodarone  100 mg Oral Daily    apixaban  5 mg Oral BID    aspirin  81 mg Oral Daily    atorvastatin  10 mg Oral Daily    digoxin  125 mcg Oral Every Other Day    dilTIAZem  180 mg Oral Daily    DULoxetine  60 mg Oral Daily    gabapentin  600 mg Oral TID    hydroxychloroquine  200 mg Oral Daily    metoprolol succinate  100 mg Oral Daily    spironolactone  25 mg Oral Daily    busPIRone  10 mg Oral QAM AC    busPIRone  5 mg Oral Nightly    sodium chloride flush  5-40 mL IntraVENous 2 times per day    furosemide  40 mg IntraVENous BID    Followed by    [START ON 8/16/2024] furosemide  20 mg IntraVENous BID    insulin lispro  0-8 Units SubCUTAneous TID WC    insulin lispro  0-4 Units SubCUTAneous Nightly    bosentan  125 mg Oral BID     Continuous Infusions:    sodium chloride      dextrose         Imaging:     CXR 8/15/24:  IMPRESSION:  Findings consistent with mild congestive heart failure.    BLE arterial duplex 11/22/23:  Conclusions      Summary      The right NABIL is .97   The majority of the waveforms are biphasic throughout the right lower   extremity.   Elevated velocity of the mid superficial femoral artery suggests a greater   than 50% stenosis.      The left NABIL is .85.   The majority of the waveforms are biphasic throughout the left lower   extremity.   Elevated velocity of the common femoral artery suggest a greater than 75%   stenosis.   Elevated velocity of the proximal superficial  Systems - Negative except as noted above  PE:  AF  Cta b  Irreg  Abdomen soft nontender  Neuro grossly intact.  Mild coolness in bilateral feet.  On the left no DP signal.  Monophasic left PT.  Right DP and PT signals are present.  Punctate ulceration overlying the left lateral malleolus.    I: Left ankle ulceration with underlying vascular disease  Chronic kidney disease  CHF  Diabetes  Peripheral neuropathy  Hypertension  Paroxysmal A-fib on anticoagulation  Scleroderma  P: Based on new ulceration and noninvasive testing from November would recommend moving forward with peripheral angiography and possible left leg intervention.  Will tentatively plan for tomorrow assuming stability of kidney function.  Discussed with nephrology.  Continue to hold anticoagulation.  N.p.o. after midnight.  Plan discussed with patient and all questions answered.  Sincerely appreciate the consultation      Chang Manning M.D., FACS.  8/15/2024  12:51 PM

## 2024-08-15 NOTE — PROGRESS NOTES
Clinical Pharmacy Note    Pharmacy can not supply bosentan.   I have notified the nurse and requested the medication be brought from home.     The patient/nurse is unsure if the medication will be able to be brought in.      Please follow to ensure that medication therapy needs are met.    Thanks,  Nat Morejon, PharmD, [facility]  [phone]

## 2024-08-15 NOTE — PROGRESS NOTES
08/15/24 1218   RT Protocol   History Pulmonary Disease 1   Respiratory pattern 0   Breath sounds 2   Cough 0   Indications for Bronchodilator Therapy Decreased or absent breath sounds   Bronchodilator Assessment Score 3

## 2024-08-15 NOTE — ACP (ADVANCE CARE PLANNING)
Advance Care Planning Note       Purpose of Encounter: Advanced care planning in light of hospitalization for shortness of breath  Parties in attendance: :Isela Conner, ZACK HIGGINS MD  Decisional Capacity:Yes  Objective: This 79 y.o. female  with PMHx of A-fib, PAH, HFpEF SSS; s/p PPM, hypertension, scleroderma, fibromyalgia and CKD stage III who presented with SOB.   Goals of Care Determinations: Patient wants full support measures including CPR, intubation, trach, PEG tube, dialysis   Code Status: Full  Time Spent on Advanced Planning Documents: 17 mins.  Advanced Care Planning Documents:   Completed advances directives, advanced directives in chart.      Electronically signed by ZACK HIGGINS MD on 8/15/2024 at 1:49 PM

## 2024-08-15 NOTE — RT PROTOCOL NOTE
RT Nebulizer Bronchodilator Protocol Note    There is a bronchodilator order in the chart from a provider indicating to follow the RT Bronchodilator Protocol and there is an “Initiate RT Bronchodilator Protocol” order as well (see protocol at bottom of note).    CXR Findings:  No results found.    The findings from the last RT Protocol Assessment were as follows:  Smoking: Smoker 15 pack years or more  Respiratory Pattern: Regular pattern and RR 12-20 bpm  Breath Sounds: Slightly diminished and/or crackles  Cough: Strong, spontaneous, non-productive  Indication for Bronchodilator Therapy: Decreased or absent breath sounds  Bronchodilator Assessment Score: 3    Aerosolized bronchodilator medication orders have been revised according to the RT Nebulizer Bronchodilator Protocol below.    Respiratory Therapist to perform RT Therapy Protocol Assessment initially then follow the protocol.  Repeat RT Therapy Protocol Assessment PRN for score 0-3 or on second treatment, BID, and PRN for scores above 3.    No Indications - adjust the frequency to every 6 hours PRN wheezing or bronchospasm, if no treatments needed after 48 hours then discontinue using Per Protocol order mode.     If indication present, adjust the RT bronchodilator orders based on the Bronchodilator Assessment Score as indicated below.  If a patient is on this medication at home then do not decrease Frequency below that used at home.    0-3 - enter or revise RT bronchodilator order(s) to equivalent RT Bronchodilator order with Frequency of every 4 hours PRN for wheezing or increased work of breathing using Per Protocol order mode.       4-6 - enter or revise RT Bronchodilator order(s) to two equivalent RT bronchodilator orders with one order with BID Frequency and one order with Frequency of every 4 hours PRN wheezing or increased work of breathing using Per Protocol order mode.         7-10 - enter or revise RT Bronchodilator order(s) to two equivalent RT

## 2024-08-15 NOTE — CARE COORDINATION
Chart reviewed. From home. Wears 2-3 L O2 baseline. Therapy, vascular, nephrology and cardiology consults pending.    Patient admitted as Observation/OPIB with an anticipated short hospitalization length of stay. Chart reviewed and it appears that patient has minimal needs for discharge at this time. Discussed with patient’s nurse and requested that case management be notified if discharge needs are identified.     *Case management will continue to follow progress and update discharge plan as needed.    Nilam Cheema RN, BSN  [phone]

## 2024-08-15 NOTE — PROGRESS NOTES
[facility]  Diabetes Education   Progress Note       NAME:  Isela Conner  MEDICAL RECORD NUMBER:  [id number]  AGE: 79 y.o.   GENDER: female  : 1945  TODAY'S DATE:  8/15/2024    Subjective   Reason for Diabetes Education Evaluation and Assessment: DM    Visit Type: evaluation      Isela Conner is a 79 y.o. female referred by:  [x] Physician  [] Nursing  [] Chart Review   [] Other:    Pt seen for DM education. Most recent A1C was 6.8 in May 2024. Pt states BG has been running higher than usual. Restarted Ozempic 0.5 mg weekly about 3 weeks ago.     PAST MEDICAL HISTORY        Diagnosis Date    Diabetes mellitus (HCC)     Fatty liver     Severe    Femur fracture, left (HCC)     spontaneous Left femur fracture    Fibromyalgia     Fracture, hip (HCC)     GERD (gastroesophageal reflux disease)     Hypertension     IBS (irritable bowel syndrome)     Osteoarthritis     Peripheral neuropathy     Postmenopausal     Scleroderma (HCC)     [facility]st       PAST SURGICAL HISTORY    Past Surgical History:   Procedure Laterality Date    ARTHROPLASTY  08/10/2012    FIFTH TOE LEFT FOOT    BACK SURGERY      CHOLECYSTECTOMY      CYSTOSCOPY  2018    with botox injection     FEMUR FRACTURE SURGERY      FIBULA FRACTURE SURGERY  2017    3 separate surgeries for a fractured left fibula tibial a closed done by Dr. Desia    HYSTERECTOMY (CERVIX STATUS UNKNOWN)      LUMBAR DISC SURGERY      PARTIAL HIP ARTHROPLASTY Right 2019    [facility]    THROAT SURGERY  2012    vocal cord polyp       FAMILY HISTORY    Family History   Problem Relation Age of Onset    Heart Disease Mother     Cancer Mother     Hypertension Mother     Diabetes Mother     Osteoporosis Mother     Stroke Father     Hypertension Father     Osteoporosis Sister     Diabetes Sister     Cancer Other         leukemia       SOCIAL HISTORY    Social History     Tobacco Use    Smoking status: Former     Current packs/day:

## 2024-08-15 NOTE — H&P
HOSPITALISTS HISTORY AND PHYSICAL    8/15/2024 12:56 PM    Patient Information:  MYKE ALLEN is a 79 y.o. female [id number]  PCP:  Soha Tom APRN - NP (Tel: [phone] )    Chief complaint:    Chief Complaint   Patient presents with    Shortness of Breath     Pt to ED with c/o shortness of breath that started around 2200. Pt wears 2-3L O2 at baseline.         History of Present Illness:  Myke Allen is a 79 y.o. female  with PMHx of A-fib, PAH, HFpEF SSS; s/p PPM, hypertension, scleroderma, fibromyalgia and CKD stage III who presented with SOB.  Per patient's report, SOB started last night around 10 PM when she tried to lay flat on bed. SOB initially improved by sitting upright for couple of minutes but afterwards persisted all night, so she came to the ED for further evaluation.  Patient denied any fever, chills, chest pain, palpitations, recent travel, sick contacts, nausea, vomiting, abdominal pain, diarrhea, urinary symptoms.  Initial diagnostic lab work showed creatinine: 1.3, B.  proBNP: 1738, HST 23-> 19, EKG negative for any acute ischemic changes.  CXR with mild pulmonary vascular congestion.        REVIEW OF SYSTEMS:   Detailed 12 point ROS obtained which were negative except what mentioned above in HPI    Past Medical History:   has a past medical history of Diabetes mellitus (HCC), Fatty liver, Femur fracture, left (HCC), Fibromyalgia, Fracture, hip (HCC), GERD (gastroesophageal reflux disease), Hypertension, IBS (irritable bowel syndrome), Osteoarthritis, Peripheral neuropathy, Postmenopausal, and Scleroderma (HCC).     Past Surgical History:   has a past surgical history that includes Hysterectomy (); Cholecystectomy (); Lumbar disc surgery (); Throat surgery (); arthroplasty (08/10/2012); Fibula Fracture Surgery (); back surgery; Cystocopy

## 2024-08-15 NOTE — PROGRESS NOTES
Blood thinner medications held this morning per Vascular NP.   Patient informed to call family to bring in Bosentan tablet from home.

## 2024-08-15 NOTE — ED NOTES
Report called to Soha SANFORD who is receiving patient in room 5581. All questions answered at this time.

## 2024-08-15 NOTE — ED PROVIDER NOTES
[facility] EMERGENCY DEPARTMENT  EMERGENCY DEPARTMENT ENCOUNTER      Pt Name: Isela Conner  MRN: [id number]  Birthdate 1945  Date of evaluation: 8/15/2024  Provider: Steve Miles MD    CHIEF COMPLAINT       Chief Complaint   Patient presents with    Shortness of Breath     Pt to ED with c/o shortness of breath that started around 2200. Pt wears 2-3L O2 at baseline.          HISTORY OF PRESENT ILLNESS   (Location/Symptom, Timing/Onset, Context/Setting, Quality, Duration, Modifying Factors, Severity)  Note limiting factors.   Isela Conner is a 79 y.o. female who presents to the emergency department for shortness of breath that started around 10 PM tonight.  Patient states that she was very short of breath with laying flat.  It somewhat improved when she sat upright but she is still short of breath.  No chest pain with this.  No peripheral edema.  She had a cough today.  No fevers or chills.  Was feeling well yesterday.  She has baseline 3 L oxygen requirement with history of pulmonary hypertension.  She takes bosentan twice daily with good compliance, last dose was at 10 PM.  She also has a history of A-fib anticoagulated on Eliquis.  She does take digoxin and amiodarone for A-fib as well.      Chart reviewed: Pulmonology visit 7/26/2024 reviewed with Dr. Hensley-patient is on bosentan twice daily with 3 L O2 requirement for pulmonary hypertension.  Echocardiogram from the same month reviewed, patient has diastolic dysfunction grade 3, normal systolic function, PA peak pressure of 27 mmHg  Nursing Notes were reviewed.    REVIEW OF SYSTEMS    (2-9 systems for level 4, 10 or more for level 5)     Review of Systems    Except as noted above the remainder of the review of systems was reviewed and negative.       PAST MEDICAL HISTORY     Past Medical History:   Diagnosis Date    Diabetes mellitus (HCC)     Fatty liver     Severe    Femur fracture, left (HCC) 2021    spontaneous Left femur  Currently without any chest pain.  Satting appropriately on baseline 3 L nasal cannula without any respiratory distress.  Lungs sound clear throughout.  She does not appear clinically overloaded.  Recent echocardiogram reviewed showing grade 3 diastolic dysfunction as well as pulmonary arterial hypertension for which she is on twice daily bosentan.  She is anticoagulated with good compliance of eliquis.  I have low suspicion for PE.  Chest x-ray showing some mild fluid overload for which I given her 40 of IV Lasix.  VBG is also showing some hypercapnia for which we will add albuterol.  She does have some questional history of COPD.  No findings of infection.  High-sensitivity troponin is mildly elevated near her baseline on arrival with negative delta and nonischemic EKG.  With lack of chest pain I have low suspicion for ACS.  Will admit for gentle diuresis, heart failure consultation with history of diastolic heart failure and pulmonary arterial hypertension.  She is agreeable to plan.  Disposition Considerations (Tests not ordered but considered, Shared Decision Making, Pt Expectation of Test or Tx.):  MRI not deemed clinically necessary in the ED setting  Social Determinants : None            PROCEDURES:  Unless otherwise noted below, none     Procedures      FINAL IMPRESSION      1. Dyspnea, unspecified type    2. Acute on chronic diastolic congestive heart failure (HCC)    3. Pulmonary hypertension (HCC)          DISPOSITION/PLAN   DISPOSITION Decision To Admit 08/15/2024 04:41:09 AM      PATIENT REFERRED TO:  No follow-up provider specified.    DISCHARGE MEDICATIONS:      New Prescriptions    No medications on file       Controlled Substances Monitoring:    If the patient was prescribed a controlled substance today, the PDMP was reviewed as documented below.    RX Monitoring Attestation Periodic Controlled Substance Monitoring   3/7/2019   2:13 PM The Prescription Monitoring Report for this patient was

## 2024-08-16 LAB
ALBUMIN SERPL-MCNC: 3.7 G/DL (ref 3.4–5)
ALP SERPL-CCNC: 74 U/L (ref 40–129)
ALT SERPL-CCNC: 9 U/L (ref 10–40)
ANION GAP SERPL CALCULATED.3IONS-SCNC: 9 MMOL/L (ref 3–16)
AST SERPL-CCNC: 14 U/L (ref 15–37)
BASOPHILS # BLD: 0.1 K/UL (ref 0–0.2)
BASOPHILS NFR BLD: 1 %
BILIRUB DIRECT SERPL-MCNC: 0.2 MG/DL (ref 0–0.3)
BILIRUB INDIRECT SERPL-MCNC: 0.3 MG/DL (ref 0–1)
BILIRUB SERPL-MCNC: 0.5 MG/DL (ref 0–1)
BUN SERPL-MCNC: 23 MG/DL (ref 7–20)
CALCIUM SERPL-MCNC: 9.2 MG/DL (ref 8.3–10.6)
CHLORIDE SERPL-SCNC: 102 MMOL/L (ref 99–110)
CO2 SERPL-SCNC: 27 MMOL/L (ref 21–32)
CREAT SERPL-MCNC: 1.3 MG/DL (ref 0.6–1.2)
DEPRECATED RDW RBC AUTO: 14.5 % (ref 12.4–15.4)
ECHO BSA: 1.84 M2
EOSINOPHIL # BLD: 0 K/UL (ref 0–0.6)
EOSINOPHIL NFR BLD: 0.5 %
FERRITIN SERPL IA-MCNC: 131 NG/ML (ref 15–150)
GFR SERPLBLD CREATININE-BSD FMLA CKD-EPI: 41 ML/MIN/{1.73_M2}
GLUCOSE BLD-MCNC: 187 MG/DL (ref 70–99)
GLUCOSE BLD-MCNC: 188 MG/DL (ref 70–99)
GLUCOSE BLD-MCNC: 215 MG/DL (ref 70–99)
GLUCOSE BLD-MCNC: 221 MG/DL (ref 70–99)
GLUCOSE SERPL-MCNC: 183 MG/DL (ref 70–99)
HCT VFR BLD AUTO: 34.9 % (ref 36–48)
HGB BLD-MCNC: 11.4 G/DL (ref 12–16)
IRON SATN MFR SERPL: 12 % (ref 15–50)
IRON SERPL-MCNC: 31 UG/DL (ref 37–145)
LACTATE BLDV-SCNC: 0.8 MMOL/L (ref 0.4–2)
LYMPHOCYTES # BLD: 1.2 K/UL (ref 1–5.1)
LYMPHOCYTES NFR BLD: 17.7 %
MCH RBC QN AUTO: 29.5 PG (ref 26–34)
MCHC RBC AUTO-ENTMCNC: 32.6 G/DL (ref 31–36)
MCV RBC AUTO: 90.4 FL (ref 80–100)
MONOCYTES # BLD: 0.7 K/UL (ref 0–1.3)
MONOCYTES NFR BLD: 10.6 %
NEUTROPHILS # BLD: 4.9 K/UL (ref 1.7–7.7)
NEUTROPHILS NFR BLD: 70.2 %
PERFORMED ON: ABNORMAL
PHOSPHATE SERPL-MCNC: 3.8 MG/DL (ref 2.5–4.9)
PLATELET # BLD AUTO: 141 K/UL (ref 135–450)
PMV BLD AUTO: 9.6 FL (ref 5–10.5)
POTASSIUM SERPL-SCNC: 4.6 MMOL/L (ref 3.5–5.1)
PROT SERPL-MCNC: 6.9 G/DL (ref 6.4–8.2)
RBC # BLD AUTO: 3.86 M/UL (ref 4–5.2)
SODIUM SERPL-SCNC: 138 MMOL/L (ref 136–145)
TIBC SERPL-MCNC: 261 UG/DL (ref 260–445)
WBC # BLD AUTO: 7 K/UL (ref 4–11)

## 2024-08-16 PROCEDURE — 2500000003 HC RX 250 WO HCPCS: Performed by: SURGERY

## 2024-08-16 PROCEDURE — 6360000002 HC RX W HCPCS: Performed by: SURGERY

## 2024-08-16 PROCEDURE — 83550 IRON BINDING TEST: CPT

## 2024-08-16 PROCEDURE — 6370000000 HC RX 637 (ALT 250 FOR IP): Performed by: SURGERY

## 2024-08-16 PROCEDURE — 75710 ARTERY X-RAYS ARM/LEG: CPT | Performed by: SURGERY

## 2024-08-16 PROCEDURE — 2580000003 HC RX 258: Performed by: NURSE PRACTITIONER

## 2024-08-16 PROCEDURE — 7100000010 HC PHASE II RECOVERY - FIRST 15 MIN: Performed by: SURGERY

## 2024-08-16 PROCEDURE — 6370000000 HC RX 637 (ALT 250 FOR IP): Performed by: STUDENT IN AN ORGANIZED HEALTH CARE EDUCATION/TRAINING PROGRAM

## 2024-08-16 PROCEDURE — 75625 CONTRAST EXAM ABDOMINL AORTA: CPT | Performed by: SURGERY

## 2024-08-16 PROCEDURE — 1200000000 HC SEMI PRIVATE

## 2024-08-16 PROCEDURE — 6360000004 HC RX CONTRAST MEDICATION: Performed by: SURGERY

## 2024-08-16 PROCEDURE — APPSS30 APP SPLIT SHARED TIME 16-30 MINUTES: Performed by: NURSE PRACTITIONER

## 2024-08-16 PROCEDURE — 99152 MOD SED SAME PHYS/QHP 5/>YRS: CPT | Performed by: SURGERY

## 2024-08-16 PROCEDURE — 2580000003 HC RX 258: Performed by: STUDENT IN AN ORGANIZED HEALTH CARE EDUCATION/TRAINING PROGRAM

## 2024-08-16 PROCEDURE — 83605 ASSAY OF LACTIC ACID: CPT

## 2024-08-16 PROCEDURE — 84100 ASSAY OF PHOSPHORUS: CPT

## 2024-08-16 PROCEDURE — 2580000003 HC RX 258: Performed by: SURGERY

## 2024-08-16 PROCEDURE — 97162 PT EVAL MOD COMPLEX 30 MIN: CPT

## 2024-08-16 PROCEDURE — 36415 COLL VENOUS BLD VENIPUNCTURE: CPT

## 2024-08-16 PROCEDURE — C1887 CATHETER, GUIDING: HCPCS | Performed by: SURGERY

## 2024-08-16 PROCEDURE — 36246 INS CATH ABD/L-EXT ART 2ND: CPT | Performed by: SURGERY

## 2024-08-16 PROCEDURE — B400YZZ PLAIN RADIOGRAPHY OF ABDOMINAL AORTA USING OTHER CONTRAST: ICD-10-PCS | Performed by: INTERNAL MEDICINE

## 2024-08-16 PROCEDURE — 80076 HEPATIC FUNCTION PANEL: CPT

## 2024-08-16 PROCEDURE — APPNB30 APP NON BILLABLE TIME 0-30 MINS: Performed by: NURSE PRACTITIONER

## 2024-08-16 PROCEDURE — C1769 GUIDE WIRE: HCPCS | Performed by: SURGERY

## 2024-08-16 PROCEDURE — 85025 COMPLETE CBC W/AUTO DIFF WBC: CPT

## 2024-08-16 PROCEDURE — 97116 GAIT TRAINING THERAPY: CPT

## 2024-08-16 PROCEDURE — C1894 INTRO/SHEATH, NON-LASER: HCPCS | Performed by: SURGERY

## 2024-08-16 PROCEDURE — 99232 SBSQ HOSP IP/OBS MODERATE 35: CPT | Performed by: NURSE PRACTITIONER

## 2024-08-16 PROCEDURE — 94760 N-INVAS EAR/PLS OXIMETRY 1: CPT

## 2024-08-16 PROCEDURE — 97530 THERAPEUTIC ACTIVITIES: CPT

## 2024-08-16 PROCEDURE — 2709999900 HC NON-CHARGEABLE SUPPLY: Performed by: SURGERY

## 2024-08-16 PROCEDURE — 6370000000 HC RX 637 (ALT 250 FOR IP): Performed by: INTERNAL MEDICINE

## 2024-08-16 PROCEDURE — 2700000000 HC OXYGEN THERAPY PER DAY

## 2024-08-16 PROCEDURE — 82728 ASSAY OF FERRITIN: CPT

## 2024-08-16 PROCEDURE — 7100000011 HC PHASE II RECOVERY - ADDTL 15 MIN: Performed by: SURGERY

## 2024-08-16 PROCEDURE — 80048 BASIC METABOLIC PNL TOTAL CA: CPT

## 2024-08-16 PROCEDURE — 83540 ASSAY OF IRON: CPT

## 2024-08-16 RX ORDER — SODIUM CHLORIDE 9 MG/ML
INJECTION, SOLUTION INTRAVENOUS CONTINUOUS
Status: ACTIVE | OUTPATIENT
Start: 2024-08-16 | End: 2024-08-16

## 2024-08-16 RX ORDER — SODIUM CHLORIDE 0.9 % (FLUSH) 0.9 %
5-40 SYRINGE (ML) INJECTION PRN
Status: DISCONTINUED | OUTPATIENT
Start: 2024-08-16 | End: 2024-08-19 | Stop reason: HOSPADM

## 2024-08-16 RX ORDER — IOPAMIDOL 755 MG/ML
INJECTION, SOLUTION INTRAVASCULAR PRN
Status: DISCONTINUED | OUTPATIENT
Start: 2024-08-16 | End: 2024-08-16 | Stop reason: HOSPADM

## 2024-08-16 RX ORDER — MIDAZOLAM HYDROCHLORIDE 1 MG/ML
INJECTION INTRAMUSCULAR; INTRAVENOUS PRN
Status: DISCONTINUED | OUTPATIENT
Start: 2024-08-16 | End: 2024-08-16 | Stop reason: HOSPADM

## 2024-08-16 RX ORDER — FENTANYL CITRATE 50 UG/ML
INJECTION, SOLUTION INTRAMUSCULAR; INTRAVENOUS PRN
Status: DISCONTINUED | OUTPATIENT
Start: 2024-08-16 | End: 2024-08-16 | Stop reason: HOSPADM

## 2024-08-16 RX ORDER — SODIUM CHLORIDE 0.9 % (FLUSH) 0.9 %
5-40 SYRINGE (ML) INJECTION EVERY 12 HOURS SCHEDULED
Status: DISCONTINUED | OUTPATIENT
Start: 2024-08-16 | End: 2024-08-19 | Stop reason: HOSPADM

## 2024-08-16 RX ORDER — ASCORBIC ACID 500 MG
500 TABLET ORAL 3 TIMES DAILY
Status: DISCONTINUED | OUTPATIENT
Start: 2024-08-16 | End: 2024-08-19 | Stop reason: HOSPADM

## 2024-08-16 RX ORDER — SODIUM CHLORIDE 9 MG/ML
INJECTION, SOLUTION INTRAVENOUS CONTINUOUS
Status: DISCONTINUED | OUTPATIENT
Start: 2024-08-16 | End: 2024-08-16

## 2024-08-16 RX ORDER — ACETAMINOPHEN 325 MG/1
650 TABLET ORAL EVERY 4 HOURS PRN
Status: DISCONTINUED | OUTPATIENT
Start: 2024-08-16 | End: 2024-08-19 | Stop reason: HOSPADM

## 2024-08-16 RX ORDER — SODIUM CHLORIDE 9 MG/ML
INJECTION, SOLUTION INTRAVENOUS PRN
Status: DISCONTINUED | OUTPATIENT
Start: 2024-08-16 | End: 2024-08-19 | Stop reason: HOSPADM

## 2024-08-16 RX ADMIN — GABAPENTIN 300 MG: 300 CAPSULE ORAL at 20:50

## 2024-08-16 RX ADMIN — BUSPIRONE HYDROCHLORIDE 5 MG: 5 TABLET ORAL at 20:50

## 2024-08-16 RX ADMIN — ATORVASTATIN CALCIUM 10 MG: 10 TABLET, FILM COATED ORAL at 09:48

## 2024-08-16 RX ADMIN — OXYCODONE HYDROCHLORIDE AND ACETAMINOPHEN 500 MG: 500 TABLET ORAL at 14:46

## 2024-08-16 RX ADMIN — OXYCODONE HYDROCHLORIDE AND ACETAMINOPHEN 500 MG: 500 TABLET ORAL at 10:04

## 2024-08-16 RX ADMIN — SODIUM CHLORIDE, PRESERVATIVE FREE 10 ML: 5 INJECTION INTRAVENOUS at 21:04

## 2024-08-16 RX ADMIN — METHOCARBAMOL 375 MG: 750 TABLET ORAL at 14:53

## 2024-08-16 RX ADMIN — ASPIRIN 81 MG: 81 TABLET, COATED ORAL at 14:45

## 2024-08-16 RX ADMIN — BOSENTAN 125 MG: 125 TABLET, FILM COATED ORAL at 21:04

## 2024-08-16 RX ADMIN — SODIUM CHLORIDE, PRESERVATIVE FREE 10 ML: 5 INJECTION INTRAVENOUS at 20:50

## 2024-08-16 RX ADMIN — OXYCODONE HYDROCHLORIDE AND ACETAMINOPHEN 500 MG: 500 TABLET ORAL at 20:50

## 2024-08-16 RX ADMIN — HYDROXYCHLOROQUINE SULFATE 200 MG: 200 TABLET, FILM COATED ORAL at 14:45

## 2024-08-16 RX ADMIN — DILTIAZEM HYDROCHLORIDE 180 MG: 180 CAPSULE, COATED, EXTENDED RELEASE ORAL at 09:50

## 2024-08-16 RX ADMIN — BUSPIRONE HYDROCHLORIDE 10 MG: 5 TABLET ORAL at 05:33

## 2024-08-16 RX ADMIN — METOPROLOL SUCCINATE 100 MG: 50 TABLET, EXTENDED RELEASE ORAL at 09:51

## 2024-08-16 RX ADMIN — SODIUM CHLORIDE: 9 INJECTION, SOLUTION INTRAVENOUS at 10:00

## 2024-08-16 RX ADMIN — SPIRONOLACTONE 25 MG: 25 TABLET ORAL at 09:50

## 2024-08-16 RX ADMIN — GABAPENTIN 300 MG: 300 CAPSULE ORAL at 14:45

## 2024-08-16 RX ADMIN — DULOXETINE HYDROCHLORIDE 60 MG: 60 CAPSULE, DELAYED RELEASE ORAL at 09:50

## 2024-08-16 RX ADMIN — SODIUM CHLORIDE, PRESERVATIVE FREE 10 ML: 5 INJECTION INTRAVENOUS at 09:53

## 2024-08-16 RX ADMIN — GABAPENTIN 300 MG: 300 CAPSULE ORAL at 09:48

## 2024-08-16 RX ADMIN — INSULIN LISPRO 2 UNITS: 100 INJECTION, SOLUTION INTRAVENOUS; SUBCUTANEOUS at 18:03

## 2024-08-16 RX ADMIN — BOSENTAN 125 MG: 125 TABLET, FILM COATED ORAL at 09:52

## 2024-08-16 RX ADMIN — HYDROCODONE BITARTRATE AND ACETAMINOPHEN 1 TABLET: 7.5; 325 TABLET ORAL at 20:49

## 2024-08-16 RX ADMIN — AMIODARONE HYDROCHLORIDE 100 MG: 200 TABLET ORAL at 09:51

## 2024-08-16 ASSESSMENT — PAIN DESCRIPTION - DESCRIPTORS: DESCRIPTORS: SHARP

## 2024-08-16 ASSESSMENT — PAIN DESCRIPTION - ORIENTATION: ORIENTATION: LEFT

## 2024-08-16 ASSESSMENT — PAIN SCALES - GENERAL
PAINLEVEL_OUTOF10: 0
PAINLEVEL_OUTOF10: 6
PAINLEVEL_OUTOF10: 0

## 2024-08-16 ASSESSMENT — PAIN - FUNCTIONAL ASSESSMENT: PAIN_FUNCTIONAL_ASSESSMENT: ACTIVITIES ARE NOT PREVENTED

## 2024-08-16 ASSESSMENT — PAIN DESCRIPTION - LOCATION: LOCATION: NECK

## 2024-08-16 NOTE — PROGRESS NOTES
[facility] Note    Patient Active Problem List   Diagnosis    Diabetes type 2, controlled (HCC)    Peripheral neuropathy    GERD (gastroesophageal reflux disease)    Scleroderma (HCC)    PAH (pulmonary artery hypertension) (HCC)    Chronic narcotic dependence (HCC)    Spinal stenosis of lumbar region without neurogenic claudication    PAF (paroxysmal atrial fibrillation) ([facility])    Diffusion capacity of lung (dl), decreased    Lumbosacral spondylosis without myelopathy    Restrictive ventilatory defect    Vocal cord polyp    Benign essential HTN    Near syncope    SSS (sick sinus syndrome) ([facility])    Pacemaker    Chronic obstructive pulmonary disease (HCC)    Degeneration of intervertebral disc of lumbosacral region    Herniation of lumbar intervertebral disc with radiculopathy    Atrial flutter (HCC)    Osteoporosis, postmenopausal    Osteoporotic fracture of right hip (HCC)    Closed displaced transverse fracture of shaft of femur with routine healing    Chronic congestive heart failure (HCC)    Pulmonary hypertension (HCC)    PAD (peripheral artery disease) ([facility])    Dyspnea    Atherosclerosis of native arteries of extremities with rest pain, left leg ([facility])    Acute on chronic diastolic congestive heart failure ([facility])       Past Medical History:   has a past medical history of Diabetes mellitus ([facility]), Fatty liver, Femur fracture, left (HCC), Fibromyalgia, Fracture, hip ([facility]), GERD (gastroesophageal reflux disease), Hypertension, IBS (irritable bowel syndrome), Osteoarthritis, Peripheral neuropathy, Postmenopausal, and Scleroderma ([facility]).    Past Social History:   reports that she quit smoking about 13 years ago. Her smoking use included cigarettes. She started smoking about 64 years ago. She has a 154.9 pack-year smoking history. She has never used smokeless tobacco. She reports that she does not drink alcohol and does not use drugs.    Subjective:    Breathing

## 2024-08-16 NOTE — PROGRESS NOTES
fair (-): maintains balance at CGA with use of UE support  Comments: Sits at the EOB with independence.  Stands to the RW with CGA/Supervision    Other Therapeutic Interventions  Set up for comfort on the EOB with alarm on and notified the RN  Functional Outcomes  AM-PAC Inpatient Mobility Raw Score : 19              Cognition  WFL  Orientation:    alert and oriented x 4  Command Following:   WFL    Education  Barriers To Learning: none  Patient Education: patient educated on goals, PT role and benefits, plan of care, precautions, general safety, functional mobility training, proper use of assistive device/equipment, transfer training, discharge recommendations  Learning Assessment:  patient verbalizes and demonstrates understanding    Assessment  Activity Tolerance: Chronic low endurance but tolerated appropriately  Impairments Requiring Therapeutic Intervention: decreased functional mobility, decreased strength, decreased safety awareness, decreased cognition  Prognosis: good  Clinical Assessment: Pt. Presents with CHF and chronic L LE pain and vascular impairment with wound on the L ankle x 5 weeks, seeing wound clinic.  Pt. Appears to be close to baseline status of functional mobility.  Pt. Primarily uses w/c at home and ambulates short distances furniture walking.  Lives in Independent Living has family assist and an Aide 2x/week for bathing.  Pt. Will benefit from skilled PT to improve functional mobility and optimize independence.  Anticipate return home with Prior services and HHPT.  Safety Interventions: patient left in bed, bed alarm in place, call light within reach, gait belt, nurse notified, and Left sitting on the EOB d/t appropriate for this Pt.    Plan  Frequency: 3-5 x/per week  Current Treatment Recommendations: strengthening, ROM, balance training, functional mobility training, transfer training, gait training, endurance training, neuromuscular re-education, patient/caregiver education, safety  education, and equipment evaluation/education    Goals  Patient Goals: Return home    Short Term Goals:  Time Frame: By d/c  Patient will complete bed mobility at modified independent   Patient will complete transfers at modified independent   Patient will ambulate 50 ft with use of rolling walker at modified independent    Above goals reviewed on 8/16/2024.  All goals are ongoing at this time unless indicated above.      Therapy Session Time      Individual Group Co-treatment   Time In 0815       Time Out 0857       Minutes 42         Timed Code Treatment Minutes:  27 Minutes  Total Treatment Minutes:  42       Electronically Signed By: GENET STROUD, PT, [id number]

## 2024-08-16 NOTE — PLAN OF CARE
Problem: Chronic Conditions and Co-morbidities  Goal: Patient's chronic conditions and co-morbidity symptoms are monitored and maintained or improved  8/16/2024 1122 by Emelia Stevenson RN  Outcome: Progressing  8/16/2024 0206 by Belén Nugent RN  Outcome: Progressing     Problem: Safety - Adult  Goal: Free from fall injury  8/16/2024 1122 by Emelia Stevenson RN  Outcome: Progressing  8/16/2024 0206 by Belén Nugent RN  Outcome: Progressing     Problem: ABCDS Injury Assessment  Goal: Absence of physical injury  8/16/2024 1122 by Emelia Stevenson RN  Outcome: Progressing  8/16/2024 0206 by Belén Nugent RN  Outcome: Progressing     Problem: Skin/Tissue Integrity  Goal: Absence of new skin breakdown  Description: 1.  Monitor for areas of redness and/or skin breakdown  2.  Assess vascular access sites hourly  3.  Every 4-6 hours minimum:  Change oxygen saturation probe site  4.  Every 4-6 hours:  If on nasal continuous positive airway pressure, respiratory therapy assess nares and determine need for appliance change or resting period.  8/16/2024 1122 by Emelia Stevenson RN  Outcome: Progressing  8/16/2024 0206 by Belén Nugent RN  Outcome: Progressing

## 2024-08-16 NOTE — PROGRESS NOTES
Pt admitted to room 5915 from cath lab. VSS. Pt right groin site dry and intact. No complications noted. Pt restriction up at 1400 per cath lab. Pt oriented to room. Purewick placed per request. Pt being monitored on telemetry. Family at bedside. No further needs verbalized at this time.

## 2024-08-16 NOTE — DISCHARGE INSTR - COC
Continuity of Care Form  HF Pathway      _x_ Daily Visits x 3     _x_Cardiovascular Assessment. Titrate O2 to keep SaO2 greater than 90%    _x_ Daily Weights- Baseline Wt:145lbs   Call MD if:   3 pound weight gain or loss in one day OR 5 pound weight gain in one week    _x_No added salt diet (3-4 G sodium) and 64 oz fluid restriction     _x_ Labs:   BMP, Pro-BNP     Please start on    Frequency: Weekly x 4              Fax results to: CHF Clinic: [phone]    *Please send to [facility]        _x_ Med List attached:   Hold Coreg/Metoprolol if HR less than 45 or patient symptomatic*   Hold ACE/ARB if SBP less than 85 or patient symptomatic*   Do not hold Spironolactone (aldactone) for hypotension/bradycardia   Call MD for questions: CHF Clinic: [phone]    _x_Follow up appointment with cardiology: date/time:  with Jessi Peralta NP        Patient Name: Isela Conner   :  1945  MRN:  [id number]    Admit date:  8/15/2024  Discharge date:      Code Status Order: Full Code   Advance Directives:   Advance Care Flowsheet Documentation             Admitting Physician:  Odalys Jackson MD  PCP: Soha Tom APRN - NP    Discharging Nurse: JUVENAL Palacios  Discharging Hospital Unit/Room#: 5TN-5581/5581-01  Discharging Unit Phone Number: [phone]    Emergency Contact:   Extended Emergency Contact Information  Primary Emergency Contact: [place] Fernandes  Mobile Phone: [phone]  Relation: Other Relative    Past Surgical History:  Past Surgical History:   Procedure Laterality Date    ARTHROPLASTY  08/10/2012    FIFTH TOE LEFT FOOT    BACK SURGERY      CHOLECYSTECTOMY  1989    CYSTOSCOPY  2018    with botox injection     FEMUR FRACTURE SURGERY      FIBULA FRACTURE SURGERY  2017    3 separate surgeries for a fractured left fibula tibial a closed done by Dr. Desai    HYSTERECTOMY (CERVIX STATUS UNKNOWN)  1987    LUMBAR DISC SURGERY  2007    PARTIAL HIP ARTHROPLASTY Right 2019    Jeffry Bird  healing S72.323D    Chronic congestive heart failure ([facility]) I50.9    Pulmonary hypertension ([facility]) I27.20    PAD (peripheral artery disease) ([facility]) I73.9    Dyspnea R06.00    Atherosclerosis of native arteries of extremities with rest pain, left leg ([facility]) I70.222    Acute on chronic diastolic congestive heart failure ([facility]) I50.33       Isolation/Infection:   Isolation            No Isolation          Patient Infection Status       None to display                     Nurse Assessment:  Last Vital Signs: /72   Pulse 67   Temp 98.2 °F (36.8 °C) (Oral)   Resp 16   Ht 1.702 m (5' 7\")   Wt 71.7 kg (158 lb 1.1 oz)   SpO2 96%   BMI 24.76 kg/m²     Last documented pain score (0-10 scale):    Last Weight:   Wt Readings from Last 1 Encounters:   08/16/24 71.7 kg (158 lb 1.1 oz)     Mental Status:  oriented and alert    IV Access:  - None    Nursing Mobility/ADLs:  Walking   Assisted  Transfer  Assisted  Bathing  Assisted  Dressing  Assisted  Toileting  Assisted  Feeding  Independent  Med Admin  Independent  Med Delivery   whole    Wound Care Documentation and Therapy:  Wound 03/31/24 Heel Right placed a foam dressing (Active)   Number of days: 137       Wound 08/15/24 Heel Left non healing wound (Active)   Wound Image   08/15/24 1225   Dressing Status New dressing applied 08/15/24 1018   Wound Cleansed Not Cleansed 08/15/24 1018   Dressing/Treatment Dry dressing 08/15/24 1018   Wound Assessment Dry 08/15/24 1018   Drainage Amount None (dry) 08/15/24 1018   Odor None 08/15/24 1018   Maureen-wound Assessment Warm 08/15/24 1018   Number of days: 1        Elimination:  Continence:   Bowel: Yes  Bladder: Yes  Urinary Catheter: None   Colostomy/Ileostomy/Ileal Conduit: No       Date of Last BM: 8/15    Intake/Output Summary (Last 24 hours) at 8/16/2024 1042  Last data filed at 8/16/2024 0532  Gross per 24 hour   Intake 580 ml   Output 2270 ml   Net -1690 ml     I/O last 3 completed shifts:  In: 760 [P.O.:760]  Out: 2270  Care for less 30 days.     Update Admission H&P: No change in H&P    PHYSICIAN SIGNATURE:  Electronically signed by ZACK HIGGINS MD on 8/18/24 at 10:07 AM EDT

## 2024-08-16 NOTE — NURSE NAVIGATOR
[facility]  HEART FAILURE PROGRAM      Isela Conner 1945    History:  Past Medical History:   Diagnosis Date    Diabetes mellitus (HCC)     Fatty liver     Severe    Femur fracture, left (HCC) 2021    spontaneous Left femur fracture    Fibromyalgia     Fracture, hip (HCC)     GERD (gastroesophageal reflux disease)     Hypertension     IBS (irritable bowel syndrome)     Osteoarthritis     Peripheral neuropathy     Postmenopausal     Scleroderma (HCC)     crest       ECHO:  8/15/24  Left Ventricle Normal left ventricular systolic function with a visually estimated EF of 60 - 65%. EF by 2D Simpsons Biplane is 72%. Left ventricle size is normal. Normal wall thickness. Normal wall motion. Indeterminate diastolic function.   Left Atrium Left atrial volume index is mildly increased (35-41 mL/m2).   Right Ventricle Not assessed due to poor image quality.   Right Atrium Right atrium size is normal.   Aortic Valve Calcified cusps. Trace regurgitation. No stenosis.   Mitral Valve Moderately calcified leaflets. Mild regurgitation. No stenosis noted.   Tricuspid Valve Valve structure is normal. Moderate regurgitation. No stenosis noted. Elevated RVSP (52 mm Hg + Right atrial pressure)   Pulmonic Valve The pulmonic valve was not well visualized. Trace regurgitation. No stenosis noted.   Aorta Normal sized aortic root.   IVC/Hepatic Veins IVC was not assessed.   Pericardium The pericardium is normal. No pericardial effusion.       ACE/ARB/ARNi:   BB: toprol xl 100 mg daily  Aldosterone Antagonist: aldactone 25 mg daily  SGLT2:     PH Meds: tracleer 125 mg bid-- on home meds--she is getting here    History of sleep apnea: No    [place] Screen ordered: Yes    DM History: Yes  Consult for DM educator: No      Last Hospital Admission: 5/9/24 at  with pseudoaneurysm   Code Status: full   Discharge plans: from home--discharging with home care    Family Present: no    Isela Conner was admitted to the hospital  Emphasize daily weights, diet, and knowing when and who to call  5. Provided patient with CHF Resource Line for questions and concerns.         ABHAY JACKSON RN 8/16/2024 10:59 AM

## 2024-08-16 NOTE — PROGRESS NOTES
[facility]  HEART FAILURE  Progress Note      Admit Date 8/15/2024     Reason for Consult:      Reason for Consultation/Chief Complaint: SOB    HPI:    Isela Conner is a 79 y.o. female with PMH PAH on Tracleer, AF, SSS, s/p PPM, HTN, scleroderma, fibromyalgia, CKD3 admitted with SOB.       Subjective:  Patient is being seen for CHF. There were no acute overnight cardiac events.   Today Ms. Conner is feeling less SOB and orthopnea, she denies chest pain, palpitations, or dizziness  I spent 10 minutes educating the patient on heart failure, medications, lifestyle modifications and dietary guidelines.       Baseline Weight: 155-160   Wt Readings from Last 3 Encounters:   08/16/24 71.7 kg (158 lb 1.1 oz)   08/09/24 71.2 kg (157 lb)   05/31/24 73 kg (161 lb)         Cardiac Testing:   Echo:  8/15/24:    Left Ventricle: Normal left ventricular systolic function with a visually estimated EF of 60 - 65%. EF by 2D Simpsons Biplane is 72%. Left ventricle size is normal. Normal wall thickness. Normal wall motion. Normal diastolic function.    Right Ventricle: Not assessed due to poor image quality. Right ventricle size is normal.    Mitral Valve: Moderately calcified leaflets. Mild to moderate paravalvular regurgitation.    Tricuspid Valve: Mild paravalvular regurgitation with a centrally directed jet.    Image quality is adequate.    NYHA Class III    Objective:   /63   Pulse 68   Temp 97.9 °F (36.6 °C) (Oral)   Resp 16   Ht 1.702 m (5' 7\")   Wt 71.7 kg (158 lb 1.1 oz)   SpO2 95%   BMI 24.76 kg/m²     Intake/Output Summary (Last 24 hours) at 8/16/2024 0915  Last data filed at 8/16/2024 0532  Gross per 24 hour   Intake 760 ml   Output 2270 ml   Net -1510 ml      In: 760 [P.O.:760]  Out: 2270       Physical Exam:  General Appearance:  Well Nourished  Respiratory:  Resp Auscultation: Normal breath sounds without dullness  Cardiovascular:  Auscultation: Regular rate and rhythm, normal S1S2,  +murmur  Palpation: Normal    Pedal Pulses: 2+ and equal   Abdomen:  Soft, NT, ND, + bs  Extremities:  No Cyanosis or Clubbing  Extremities: negative  Neurological/Psychiatric:  Oriented to time, place, and person  Non-anxious    Pertinent labs, diagnostic, device, and imaging results reviewed as a part of this visit    MEDICATIONS:   Scheduled Meds:   Scheduled Meds:   amiodarone  100 mg Oral Daily    [Held by provider] apixaban  5 mg Oral BID    aspirin  81 mg Oral Daily    atorvastatin  10 mg Oral Daily    digoxin  125 mcg Oral Every Other Day    dilTIAZem  180 mg Oral Daily    DULoxetine  60 mg Oral Daily    hydroxychloroquine  200 mg Oral Daily    metoprolol succinate  100 mg Oral Daily    spironolactone  25 mg Oral Daily    busPIRone  10 mg Oral QAM AC    busPIRone  5 mg Oral Nightly    sodium chloride flush  5-40 mL IntraVENous 2 times per day    furosemide  40 mg IntraVENous BID    Followed by    furosemide  20 mg IntraVENous BID    insulin lispro  0-8 Units SubCUTAneous TID WC    insulin lispro  0-4 Units SubCUTAneous Nightly    bosentan  125 mg Oral BID    gabapentin  300 mg Oral TID     Continuous Infusions:   sodium chloride      sodium chloride      dextrose       PRN Meds:.HYDROcodone-acetaminophen, methocarbamol, sodium chloride flush, sodium chloride, magnesium sulfate, ondansetron **OR** ondansetron, polyethylene glycol, acetaminophen **OR** acetaminophen, perflutren lipid microspheres, ipratropium 0.5 mg-albuterol 2.5 mg, albuterol, dextrose bolus **OR** dextrose bolus, glucagon (rDNA), dextrose  Continuous Infusions:   sodium chloride      sodium chloride      dextrose         Intake/Output Summary (Last 24 hours) at 8/16/2024 0915  Last data filed at 8/16/2024 0532  Gross per 24 hour   Intake 760 ml   Output 2270 ml   Net -1510 ml       Lab Data:  CBC:   Lab Results   Component Value Date/Time    WBC 7.0 08/16/2024 05:46 AM    HGB 11.4 08/16/2024 05:46 AM     08/16/2024 05:46 AM

## 2024-08-16 NOTE — PROGRESS NOTES
VASCULAR    S/p left leg angiogram - POD # 0 - showed 99% left common femoral artery stenosis    Plan:   Will need left femoral endarterectomy.     Will plan to coordinate in the near future as an outpatient.    Okay to resume anticoagulation tomorrow.    Okay to discharge from vascular surgery perspective.    Our office will contact to schedule.    Electronically signed by TOM Crespo CNP on 8/16/2024 at 12:41 PM

## 2024-08-16 NOTE — DISCHARGE INSTRUCTIONS
Follow up with your PCP within 7 days of discharge.  Follow up with vascular surgery; recommended left femoral endarterectomy outpatient.   Follow up with cardiology as instructed   Follow up with nephrology as instructed   Take all your medications as prescribed.      PERIPHERAL ANGIOGRAM  Care of your puncture site:  Remove bandage 24 hours after the procedure.  May shower in 24 hours but do not sit in a bathtub/pool of water for 5 days or until the wound is healed.  Inspect the site daily and gently clean using soap and water while standing in the shower.  Dry thoroughly and apply a Band-Aid that covers the entire site. Do not apply powder or lotion.    Normal Observations:  Soreness or tenderness which may last one week.  Mild oozing from the incision site.  Possible bruising that could last 2 weeks.    Activity:  You may resume driving 24 hours following the procedure.  You may resume normal activity in 5 days or after the wound heals.  Avoid lifting more than 10 pounds for 5 days or until the wound heals.  Avoid strenuous exercise or activity for 1 week.    Nutrition:  Regular diet   Drink at least 8 to 10 glasses of decaffeinated, non-alcoholic fluid for the next 24 hours to flush the x-ray dye used for your angiogram out of your body.    Call your doctor immediately if your condition worsens, for any other concerns, for a follow-up appointment or if you experience any of the following:  Significant bleeding that does not stop after 10 minutes of applying firm pressure on the puncture site.  Increased swelling on the groin or leg.  Unusual pain, numbness, or tingling of the groin or down the leg.  Any signs of infection such as: redness, yellow drainage at the site, swelling or pain

## 2024-08-16 NOTE — DISCHARGE SUMMARY
Kael Camp, BILLIE  Respiratory Therapist  Specialty: Respiratory Therapy     Progress Notes     Signed     Date of Service: 8/16/2024  4:32 PM     Signed                                                                                    The [place] Sleepiness Scale        The [place] Sleepiness Scale is widely used in the field of sleep medicine as a subjective measure of a patient's sleepiness. The test is a list of eight situations in which you rate your tendency to become sleepy on a scale of 0, no chance to 3, high chance of dozing. Your score is based on a scale of 0 to 24. The scale estimates whether you are experiencing excessive sleepiness that possibly requires medical attention.      How Sleepy Are You?  How sleepy are you to doze off or fall asleep in the following situations? You should rate your chances of dozing off, not just feeling tired. Even if you have not done some of these things recently try to determine how they would have affected you. For each situation, decide whether or not you would have:      0 = No chance of dozing         1 = Slight chance of dozing      2 = Moderate chance of  dozing         3 = High change of dozing                  Situation                                                                                                                                                                                                                                                       Chance of Dozing    Sitting and reading                                                                                                                            0 =  []  1 =    [] 2 =    [x] 3 =    []     Watching TV                                                                                                                                     0 =  []  1 =    [] 2 =    [x] 3 =    []                                                                                                                                                               Sitting inactive in public place (e.g., a theater or a meeting)                                                            0 =  [x]  1 =    [] 2 =    [] 3 =    []     As a passenger in a car for an hour without a break                                                                        0  =  []  1 =    [x] 2 =    [] 3 =    []     Lying down to rest in the afternoon when circumstances permit                                                      0 =  []  1 =    [] 2 =    [] 3 =    [x]     Sitting and talking to someone                                                                                                          0 =  [x]  1 =    [] 2 =    [] 3 =    []                            Sitting quietly after a lunch without alcohol                                                                                       0 =  []  1 =    [x] 2 =    [] 3 =    []     In a car, while stopped for a few minutes in traffic                                                                            0 =  [x]  1 =    [] 2 =    [] 3 =    []     Total Score = 9     If your total score is 10 or greater, you are experiencing excessive sleepiness and should consider seeking a medical follow-up. Take a copy of this screening test to your primary care physician on your next office visit.       Interpretation:       0 -   7: It is unlikely that you are abnormally sleepy.    8 -   9: You have an average amount of daytime sleepiness.  10 - 15: You may be excessively sleepy depending on the situation. You may want to consider seeking medical attention.   16 - 24: You are excessively sleepy and should consider seeking medical attention.       Electronically signed by Kael Camp RCP on 8/16/2024 at 4:32 PM

## 2024-08-16 NOTE — PROGRESS NOTES
function closely     Hx of SSS stable s/p permanent pacemaker on 5/19/20     Hypertension; continue current regimen and monitor BP closely     Diabetes mellitus; uncontrolled.  Last HgbA1c 10.2 on 3/29/2024.  Repeat Hgb A1c ordered  Continue SSI monitor blood glucose closely.  Diabetes educator consulted     Hyperlipidemia; continue statins     Peripheral neuropathy; continue gabapentin; dose decreased to 600 mg twice daily     Hx of scleroderma; on Plaquenil     Hx of fibromyalgia    DVT PPX: Eliquis currently on hold  Code:Full Code    Disposition: Once acute medical issues have resolved    Current Medications  0.9 % sodium chloride infusion, Continuous  amiodarone (CORDARONE) tablet 100 mg, Daily  [Held by provider] apixaban (ELIQUIS) tablet 5 mg, BID  aspirin EC tablet 81 mg, Daily  atorvastatin (LIPITOR) tablet 10 mg, Daily  digoxin (LANOXIN) tablet 125 mcg, Every Other Day  dilTIAZem (CARDIZEM CD) extended release capsule 180 mg, Daily  DULoxetine (CYMBALTA) extended release capsule 60 mg, Daily  HYDROcodone-acetaminophen (NORCO) 7.5-325 MG per tablet 1 tablet, Q6H PRN  hydroxychloroquine (PLAQUENIL) tablet 200 mg, Daily  methocarbamol (ROBAXIN) tablet 375 mg, TID PRN  metoprolol succinate (TOPROL XL) extended release tablet 100 mg, Daily  spironolactone (ALDACTONE) tablet 25 mg, Daily  busPIRone (BUSPAR) tablet 10 mg, QAM AC  busPIRone (BUSPAR) tablet 5 mg, Nightly  sodium chloride flush 0.9 % injection 5-40 mL, 2 times per day  sodium chloride flush 0.9 % injection 5-40 mL, PRN  0.9 % sodium chloride infusion, PRN  magnesium sulfate 2000 mg in 50 mL IVPB premix, PRN  ondansetron (ZOFRAN-ODT) disintegrating tablet 4 mg, Q8H PRN   Or  ondansetron (ZOFRAN) injection 4 mg, Q6H PRN  polyethylene glycol (GLYCOLAX) packet 17 g, Daily PRN  acetaminophen (TYLENOL) tablet 650 mg, Q6H PRN   Or  acetaminophen (TYLENOL) suppository 650 mg, Q6H PRN  furosemide (LASIX) injection 40 mg, BID   Followed by  furosemide (LASIX)  injection 20 mg, BID  perflutren lipid microspheres (DEFINITY) injection 1.5 mL, ONCE PRN  ipratropium 0.5 mg-albuterol 2.5 mg (DUONEB) nebulizer solution 1 Dose, Q4H PRN  albuterol (PROVENTIL) nebulizer solution 2.5 mg, Q6H PRN  dextrose bolus 10% 125 mL, PRN   Or  dextrose bolus 10% 250 mL, PRN  glucagon injection 1 mg, PRN  dextrose 10 % infusion, Continuous PRN  insulin lispro (HUMALOG,ADMELOG) injection vial 0-8 Units, TID WC  insulin lispro (HUMALOG,ADMELOG) injection vial 0-4 Units, Nightly  bosentan (TRACLEER) tablet 125 mg (Patient Supplied), BID  gabapentin (NEURONTIN) capsule 300 mg, TID        Objective:  /63   Pulse 68   Temp 97.9 °F (36.6 °C) (Oral)   Resp 16   Ht 1.702 m (5' 7\")   Wt 71.7 kg (158 lb 1.1 oz)   SpO2 95%   BMI 24.76 kg/m²     Intake/Output Summary (Last 24 hours) at 8/16/2024 0909  Last data filed at 8/16/2024 0532  Gross per 24 hour   Intake 760 ml   Output 2270 ml   Net -1510 ml      Wt Readings from Last 3 Encounters:   08/16/24 71.7 kg (158 lb 1.1 oz)   08/09/24 71.2 kg (157 lb)   05/31/24 73 kg (161 lb)       Physical Examination:   General appearance:  No apparent distress, appears stated age and cooperative.  Cardiovascular: Regular rate and rhythm, normal S1, S2. No murmur.   Respiratory:Clear to auscultation bilaterally, no wheeze or crackles.   GI: Abdomen soft, no tenderness, not distended, normal bowel sounds  Musculoskeletal: Chronic left ankle wound; dressing in place   Neurology: CN 2-12 grossly intact. No speech or motor deficits  Psych: Not agitated, appropriate affect  Skin: Warm, dry, normal turgor    Labs and Tests:  CBC:   Recent Labs     08/15/24  0343 08/16/24  0546   WBC 10.2 7.0   HGB 11.0* 11.4*    141     BMP:    Recent Labs     08/15/24  0343 08/16/24  0546    138   K 5.2* 4.6   CL 99 102   CO2 30 27   BUN 26* 23*   CREATININE 1.3* 1.3*   GLUCOSE 257* 183*     Hepatic:   Recent Labs     08/15/24  0343 08/16/24  0546   AST 15 14*   ALT  11 9*   BILITOT 0.5 0.5   ALKPHOS 76 74     XR CHEST (2 VW)   Final Result   Findings consistent with mild congestive heart failure.             Problem List  Principal Problem:    Dyspnea  Active Problems:    Pulmonary hypertension (HCC)    Atherosclerosis of native arteries of extremities with rest pain, left leg (HCC)    Acute on chronic diastolic congestive heart failure (HCC)  Resolved Problems:    * No resolved hospital problems. *       ZACK HIGGINS MD   8/16/2024 9:09 AM      Please note that some part of this chart was generated using Dragon dictation software. Although every effort was made to ensure the accuracy of this automated transcription, some errors in transcription may have occurred inadvertently. If you may need any clarification, please do not hesitate to contact me through [employer].

## 2024-08-16 NOTE — PROGRESS NOTES
The [place] Sleepiness Scale       The [place] Sleepiness Scale is widely used in the field of sleep medicine as a subjective measure of a patient's sleepiness. The test is a list of eight situations in which you rate your tendency to become sleepy on a scale of 0, no chance to 3, high chance of dozing. Your score is based on a scale of 0 to 24. The scale estimates whether you are experiencing excessive sleepiness that possibly requires medical attention.     How Sleepy Are You?  How sleepy are you to doze off or fall asleep in the following situations? You should rate your chances of dozing off, not just feeling tired. Even if you have not done some of these things recently try to determine how they would have affected you. For each situation, decide whether or not you would have:     0 = No chance of dozing 1 = Slight chance of dozing   2 = Moderate chance of  dozing 3 = High change of dozing       Situation                                                                                     Chance of Dozing    Sitting and reading  0 =  []  1 =    [] 2 =    [x] 3 =    []    Watching TV  0 =  []  1 =    [] 2 =    [x] 3 =    []      Sitting inactive in public place (e.g., a theater or a meeting)  0 =  [x]  1 =    [] 2 =    [] 3 =    []    As a passenger in a car for an hour without a break          0  =  []  1 =    [x] 2 =    [] 3 =    []    Lying down to rest in the afternoon when circumstances permit    0 =  []  1 =    [] 2 =    [] 3 =    [x]    Sitting and talking to someone  0 =  [x]  1 =    [] 2 =    [] 3 =    []      Sitting quietly after a lunch without alcohol  0 =  []  1 =    [x] 2 =    [] 3 =    []    In a car, while stopped for a few minutes in traffic                                                                      0 =  [x]  1 =    [] 2 =    [] 3 =    []    Total Score = 9    If your total score is 10 or greater, you are experiencing excessive sleepiness and should consider seeking a medical  follow-up. Take a copy of this screening test to your primary care physician on your next office visit.      Interpretation:      0 -   7: It is unlikely that you are abnormally sleepy.    8 -   9: You have an average amount of daytime sleepiness.  10 - 15: You may be excessively sleepy depending on the situation. You may want to consider seeking medical attention.   16 - 24: You are excessively sleepy and should consider seeking medical attention.      Electronically signed by Kael Camp RCP on 8/16/2024 at 4:32 PM

## 2024-08-16 NOTE — PLAN OF CARE
Problem: Chronic Conditions and Co-morbidities  Goal: Patient's chronic conditions and co-morbidity symptoms are monitored and maintained or improved  Outcome: Progressing     Problem: Safety - Adult  Goal: Free from fall injury  Outcome: Progressing     Problem: ABCDS Injury Assessment  Goal: Absence of physical injury  Outcome: Progressing     Problem: Skin/Tissue Integrity  Goal: Absence of new skin breakdown  Description: 1.  Monitor for areas of redness and/or skin breakdown  2.  Assess vascular access sites hourly  3.  Every 4-6 hours minimum:  Change oxygen saturation probe site  4.  Every 4-6 hours:  If on nasal continuous positive airway pressure, respiratory therapy assess nares and determine need for appliance change or resting period.  Outcome: Progressing

## 2024-08-16 NOTE — PROGRESS NOTES
Vascular Progress Note    8/16/2024 8:12 AM    Chief complaint / Reason for visit : non healing left foot wound    Subjective:  Patient resting in bed.  She reports she is doing okay.  Plan for left leg angiogram today.  VSS, afebrile.     Vital Signs: /63   Pulse 68   Temp 97.9 °F (36.6 °C) (Oral)   Resp 16   Ht 1.702 m (5' 7\")   Wt 71.7 kg (158 lb 1.1 oz)   SpO2 95%   BMI 24.76 kg/m²  O2 Flow Rate (L/min): 3 L/min   I/O:    Intake/Output Summary (Last 24 hours) at 8/16/2024 0812  Last data filed at 8/16/2024 0532  Gross per 24 hour   Intake 760 ml   Output 2270 ml   Net -1510 ml       Physical Exam:   General: no apparent distress, appears stated age  Chest/Lungs: no accessory muscle use  Vascular:  non palpable pedal pulses  Extremities: no significant edema, bilateral upper and lower extremity motorsensory intact, left ankle wound with dressing intact    Labs:   Lab Results   Component Value Date/Time     08/16/2024 05:46 AM    K 4.6 08/16/2024 05:46 AM     08/16/2024 05:46 AM    CO2 27 08/16/2024 05:46 AM    BUN 23 08/16/2024 05:46 AM    CREATININE 1.3 08/16/2024 05:46 AM    GFRAA >60 09/08/2022 12:18 PM    GFRAA >60 02/26/2013 12:27 PM    LABGLOM 41 08/16/2024 05:46 AM    LABGLOM 41 03/31/2024 05:46 AM    GLUCOSE 183 08/16/2024 05:46 AM    GLUCOSE 139 06/15/2021 01:28 PM    PHOS 3.8 08/16/2024 05:46 AM    MG 2.60 08/15/2024 03:43 AM    CALCIUM 9.2 08/16/2024 05:46 AM     Lab Results   Component Value Date/Time    WBC 7.0 08/16/2024 05:46 AM    RBC 3.86 08/16/2024 05:46 AM    HGB 11.4 08/16/2024 05:46 AM    HCT 34.9 08/16/2024 05:46 AM    MCV 90.4 08/16/2024 05:46 AM    RDW 14.5 08/16/2024 05:46 AM     08/16/2024 05:46 AM     Lab Results   Component Value Date    INR 1.39 (H) 03/29/2024    PROTIME 17.0 (H) 03/29/2024        Imaging:    CXR 8/15/24:  IMPRESSION:  Findings consistent with mild congestive heart failure.    ECHO at [place] 7/12/24:  CONCLUSIONS     SUMMARY:     1.  velocity of the proximal superficial femoral artery suggest a   greater than 50% stenosis    Scheduled Meds:    amiodarone  100 mg Oral Daily    [Held by provider] apixaban  5 mg Oral BID    aspirin  81 mg Oral Daily    atorvastatin  10 mg Oral Daily    digoxin  125 mcg Oral Every Other Day    dilTIAZem  180 mg Oral Daily    DULoxetine  60 mg Oral Daily    hydroxychloroquine  200 mg Oral Daily    metoprolol succinate  100 mg Oral Daily    spironolactone  25 mg Oral Daily    busPIRone  10 mg Oral QAM AC    busPIRone  5 mg Oral Nightly    sodium chloride flush  5-40 mL IntraVENous 2 times per day    furosemide  40 mg IntraVENous BID    Followed by    furosemide  20 mg IntraVENous BID    insulin lispro  0-8 Units SubCUTAneous TID WC    insulin lispro  0-4 Units SubCUTAneous Nightly    bosentan  125 mg Oral BID    gabapentin  300 mg Oral TID     Continuous Infusions:    sodium chloride      sodium chloride      dextrose           Assessment:   Left ankle wound with known underlying arterial disease  PAD  CKD - creat 1.3, stable  Chronic hypoxic respiratory failure - on home 3 lpm supplemental oxygen  CHF  DM - last A1c 10.2 (3/29/24)  Peripheral neuropathy   HTN  PAF on Eliquis   Scleroderma     Plan:  Will plan for left leg angiogram with possible intervention today with Dr. Manning around 12 pm.  NPO since midnight.  Eliquis on hold.  Will start gentle hydration with IVFs.  Nephrology and cardiology following.  Okay to continue ASA and statin.   Discussed with patient and agreeable to above plan.     Patient educated on plan of care and disease process.  All questions answered.        Electronically signed by TOM Crespo CNP on 8/16/2024 at 8:12 AM

## 2024-08-16 NOTE — CARE COORDINATION
PT recommending home care.     VM was received from [facility] with [email or web address] [phone] stating pt is active with their branch [facility] for therapy.    PT recommending home care.     Pt having angiogram today.     Pt will need HC orders and pau on d/c. Mike sent message to MD notifying.    Nilam Cheema RN, BSN  [phone]

## 2024-08-17 LAB
ANION GAP SERPL CALCULATED.3IONS-SCNC: 6 MMOL/L (ref 3–16)
BASOPHILS # BLD: 0.1 K/UL (ref 0–0.2)
BASOPHILS NFR BLD: 0.9 %
BUN SERPL-MCNC: 25 MG/DL (ref 7–20)
CALCIUM SERPL-MCNC: 8.3 MG/DL (ref 8.3–10.6)
CHLORIDE SERPL-SCNC: 104 MMOL/L (ref 99–110)
CO2 SERPL-SCNC: 29 MMOL/L (ref 21–32)
CREAT SERPL-MCNC: 1.2 MG/DL (ref 0.6–1.2)
DEPRECATED RDW RBC AUTO: 14.2 % (ref 12.4–15.4)
EOSINOPHIL # BLD: 0 K/UL (ref 0–0.6)
EOSINOPHIL NFR BLD: 0.6 %
GFR SERPLBLD CREATININE-BSD FMLA CKD-EPI: 45 ML/MIN/{1.73_M2}
GLUCOSE BLD-MCNC: 194 MG/DL (ref 70–99)
GLUCOSE BLD-MCNC: 196 MG/DL (ref 70–99)
GLUCOSE BLD-MCNC: 224 MG/DL (ref 70–99)
GLUCOSE BLD-MCNC: 286 MG/DL (ref 70–99)
GLUCOSE SERPL-MCNC: 177 MG/DL (ref 70–99)
HCT VFR BLD AUTO: 34.7 % (ref 36–48)
HGB BLD-MCNC: 11.7 G/DL (ref 12–16)
LYMPHOCYTES # BLD: 1.3 K/UL (ref 1–5.1)
LYMPHOCYTES NFR BLD: 18.2 %
MCH RBC QN AUTO: 30.1 PG (ref 26–34)
MCHC RBC AUTO-ENTMCNC: 33.6 G/DL (ref 31–36)
MCV RBC AUTO: 89.7 FL (ref 80–100)
MONOCYTES # BLD: 0.8 K/UL (ref 0–1.3)
MONOCYTES NFR BLD: 10.4 %
NEUTROPHILS # BLD: 5.1 K/UL (ref 1.7–7.7)
NEUTROPHILS NFR BLD: 69.9 %
PERFORMED ON: ABNORMAL
PLATELET # BLD AUTO: 137 K/UL (ref 135–450)
PMV BLD AUTO: 8.9 FL (ref 5–10.5)
POTASSIUM SERPL-SCNC: 4.5 MMOL/L (ref 3.5–5.1)
RBC # BLD AUTO: 3.87 M/UL (ref 4–5.2)
SODIUM SERPL-SCNC: 139 MMOL/L (ref 136–145)
WBC # BLD AUTO: 7.4 K/UL (ref 4–11)

## 2024-08-17 PROCEDURE — 1200000000 HC SEMI PRIVATE

## 2024-08-17 PROCEDURE — 97530 THERAPEUTIC ACTIVITIES: CPT

## 2024-08-17 PROCEDURE — 2580000003 HC RX 258: Performed by: SURGERY

## 2024-08-17 PROCEDURE — 80048 BASIC METABOLIC PNL TOTAL CA: CPT

## 2024-08-17 PROCEDURE — 36415 COLL VENOUS BLD VENIPUNCTURE: CPT

## 2024-08-17 PROCEDURE — 6360000002 HC RX W HCPCS: Performed by: SURGERY

## 2024-08-17 PROCEDURE — 97165 OT EVAL LOW COMPLEX 30 MIN: CPT

## 2024-08-17 PROCEDURE — 6370000000 HC RX 637 (ALT 250 FOR IP): Performed by: SURGERY

## 2024-08-17 PROCEDURE — 85025 COMPLETE CBC W/AUTO DIFF WBC: CPT

## 2024-08-17 PROCEDURE — 97535 SELF CARE MNGMENT TRAINING: CPT

## 2024-08-17 PROCEDURE — 99232 SBSQ HOSP IP/OBS MODERATE 35: CPT | Performed by: INTERNAL MEDICINE

## 2024-08-17 PROCEDURE — 6370000000 HC RX 637 (ALT 250 FOR IP): Performed by: INTERNAL MEDICINE

## 2024-08-17 RX ADMIN — SODIUM CHLORIDE, PRESERVATIVE FREE 10 ML: 5 INJECTION INTRAVENOUS at 20:11

## 2024-08-17 RX ADMIN — DILTIAZEM HYDROCHLORIDE 180 MG: 180 CAPSULE, COATED, EXTENDED RELEASE ORAL at 08:04

## 2024-08-17 RX ADMIN — METHOCARBAMOL 375 MG: 750 TABLET ORAL at 05:14

## 2024-08-17 RX ADMIN — FUROSEMIDE 20 MG: 10 INJECTION, SOLUTION INTRAMUSCULAR; INTRAVENOUS at 08:05

## 2024-08-17 RX ADMIN — GABAPENTIN 300 MG: 300 CAPSULE ORAL at 20:10

## 2024-08-17 RX ADMIN — DIGOXIN 125 MCG: 0.12 TABLET ORAL at 08:03

## 2024-08-17 RX ADMIN — METHOCARBAMOL 375 MG: 750 TABLET ORAL at 23:52

## 2024-08-17 RX ADMIN — AMIODARONE HYDROCHLORIDE 100 MG: 200 TABLET ORAL at 08:03

## 2024-08-17 RX ADMIN — BUSPIRONE HYDROCHLORIDE 5 MG: 5 TABLET ORAL at 20:11

## 2024-08-17 RX ADMIN — METOPROLOL SUCCINATE 100 MG: 50 TABLET, EXTENDED RELEASE ORAL at 08:04

## 2024-08-17 RX ADMIN — METHOCARBAMOL 375 MG: 750 TABLET ORAL at 08:08

## 2024-08-17 RX ADMIN — BUSPIRONE HYDROCHLORIDE 10 MG: 5 TABLET ORAL at 05:15

## 2024-08-17 RX ADMIN — OXYCODONE HYDROCHLORIDE AND ACETAMINOPHEN 500 MG: 500 TABLET ORAL at 20:10

## 2024-08-17 RX ADMIN — ATORVASTATIN CALCIUM 10 MG: 10 TABLET, FILM COATED ORAL at 08:04

## 2024-08-17 RX ADMIN — BOSENTAN 125 MG: 125 TABLET, FILM COATED ORAL at 08:03

## 2024-08-17 RX ADMIN — BOSENTAN 125 MG: 125 TABLET, FILM COATED ORAL at 20:10

## 2024-08-17 RX ADMIN — OXYCODONE HYDROCHLORIDE AND ACETAMINOPHEN 500 MG: 500 TABLET ORAL at 08:04

## 2024-08-17 RX ADMIN — INSULIN LISPRO 4 UNITS: 100 INJECTION, SOLUTION INTRAVENOUS; SUBCUTANEOUS at 11:38

## 2024-08-17 RX ADMIN — ASPIRIN 81 MG: 81 TABLET, COATED ORAL at 08:04

## 2024-08-17 RX ADMIN — APIXABAN 5 MG: 5 TABLET, FILM COATED ORAL at 20:10

## 2024-08-17 RX ADMIN — HYDROCODONE BITARTRATE AND ACETAMINOPHEN 1 TABLET: 7.5; 325 TABLET ORAL at 14:12

## 2024-08-17 RX ADMIN — SPIRONOLACTONE 25 MG: 25 TABLET ORAL at 08:04

## 2024-08-17 RX ADMIN — METHOCARBAMOL 375 MG: 750 TABLET ORAL at 16:02

## 2024-08-17 RX ADMIN — GABAPENTIN 300 MG: 300 CAPSULE ORAL at 14:12

## 2024-08-17 RX ADMIN — HYDROCODONE BITARTRATE AND ACETAMINOPHEN 1 TABLET: 7.5; 325 TABLET ORAL at 20:10

## 2024-08-17 RX ADMIN — DULOXETINE HYDROCHLORIDE 60 MG: 60 CAPSULE, DELAYED RELEASE ORAL at 08:04

## 2024-08-17 RX ADMIN — FUROSEMIDE 20 MG: 10 INJECTION, SOLUTION INTRAMUSCULAR; INTRAVENOUS at 16:02

## 2024-08-17 RX ADMIN — GABAPENTIN 300 MG: 300 CAPSULE ORAL at 08:03

## 2024-08-17 RX ADMIN — SODIUM CHLORIDE, PRESERVATIVE FREE 10 ML: 5 INJECTION INTRAVENOUS at 08:05

## 2024-08-17 RX ADMIN — HYDROCODONE BITARTRATE AND ACETAMINOPHEN 1 TABLET: 7.5; 325 TABLET ORAL at 08:07

## 2024-08-17 RX ADMIN — OXYCODONE HYDROCHLORIDE AND ACETAMINOPHEN 500 MG: 500 TABLET ORAL at 14:12

## 2024-08-17 RX ADMIN — HYDROXYCHLOROQUINE SULFATE 200 MG: 200 TABLET, FILM COATED ORAL at 08:04

## 2024-08-17 ASSESSMENT — PAIN DESCRIPTION - INTENSITY
RATING_2: 3
RATING_2: 4
RATING_2: 4
RATING_2: 3

## 2024-08-17 ASSESSMENT — PAIN DESCRIPTION - ORIENTATION
ORIENTATION: LEFT
ORIENTATION_2: LEFT
ORIENTATION: LEFT
ORIENTATION_2: LEFT
ORIENTATION: LEFT
ORIENTATION_2: LEFT
ORIENTATION_2: LEFT
ORIENTATION: LEFT
ORIENTATION: LEFT

## 2024-08-17 ASSESSMENT — PAIN DESCRIPTION - ONSET
ONSET: ON-GOING

## 2024-08-17 ASSESSMENT — PAIN DESCRIPTION - DESCRIPTORS
DESCRIPTORS_2: SHARP
DESCRIPTORS: ACHING
DESCRIPTORS: SHARP
DESCRIPTORS_2: SHARP
DESCRIPTORS_2: SHARP
DESCRIPTORS: SHARP
DESCRIPTORS: ACHING
DESCRIPTORS: ACHING
DESCRIPTORS_2: SHARP
DESCRIPTORS: SHARP
DESCRIPTORS: ACHING

## 2024-08-17 ASSESSMENT — PAIN - FUNCTIONAL ASSESSMENT
PAIN_FUNCTIONAL_ASSESSMENT_SITE2: PREVENTS OR INTERFERES WITH MANY ACTIVE NOT PASSIVE ACTIVITIES
PAIN_FUNCTIONAL_ASSESSMENT: ACTIVITIES ARE NOT PREVENTED
PAIN_FUNCTIONAL_ASSESSMENT: PREVENTS OR INTERFERES SOME ACTIVE ACTIVITIES AND ADLS
PAIN_FUNCTIONAL_ASSESSMENT_SITE2: PREVENTS OR INTERFERES SOME ACTIVE ACTIVITIES AND ADLS
PAIN_FUNCTIONAL_ASSESSMENT: PREVENTS OR INTERFERES WITH MANY ACTIVE NOT PASSIVE ACTIVITIES
PAIN_FUNCTIONAL_ASSESSMENT_SITE2: PREVENTS OR INTERFERES SOME ACTIVE ACTIVITIES AND ADLS
PAIN_FUNCTIONAL_ASSESSMENT_SITE2: PREVENTS OR INTERFERES SOME ACTIVE ACTIVITIES AND ADLS

## 2024-08-17 ASSESSMENT — PAIN DESCRIPTION - LOCATION
LOCATION: NECK;SHOULDER
LOCATION_2: FOOT
LOCATION: NECK;SHOULDER
LOCATION: NECK
LOCATION: NECK;SHOULDER
LOCATION: NECK;SHOULDER
LOCATION: NECK
LOCATION: NECK;SHOULDER
LOCATION_2: FOOT
LOCATION: NECK

## 2024-08-17 ASSESSMENT — PAIN SCALES - GENERAL
PAINLEVEL_OUTOF10: 2
PAINLEVEL_OUTOF10: 0
PAINLEVEL_OUTOF10: 5
PAINLEVEL_OUTOF10: 7
PAINLEVEL_OUTOF10: 5
PAINLEVEL_OUTOF10: 6
PAINLEVEL_OUTOF10: 5
PAINLEVEL_OUTOF10: 2
PAINLEVEL_OUTOF10: 5

## 2024-08-17 ASSESSMENT — PAIN DESCRIPTION - PAIN TYPE
TYPE: CHRONIC PAIN

## 2024-08-17 ASSESSMENT — PAIN DESCRIPTION - FREQUENCY
FREQUENCY: CONTINUOUS

## 2024-08-17 NOTE — PROGRESS NOTES
[facility]  Cardiology Note  [phone]      Chief Complaint   Patient presents with    Shortness of Breath     Pt to ED with c/o shortness of breath that started around 2200. Pt wears 2-3L O2 at baseline.       History of Present Illness:  Isela Conner is a 79 y.o. patient PMHx PAH, AF, SSS s/p PPM, scleroderma, CKD-3 who presented to the hospital with complaints of GANDHI    No acute events overnight. Requires femoral endarterectomy with vascular based on angio results.    Patient's breathing is improved from yesterday on 2L    Past Medical History:   has a past medical history of Diabetes mellitus (HCC), Fatty liver, Femur fracture, left (HCC), Fibromyalgia, Fracture, hip (HCC), GERD (gastroesophageal reflux disease), Hypertension, IBS (irritable bowel syndrome), Osteoarthritis, Peripheral neuropathy, Postmenopausal, and Scleroderma (HCC).    Surgical History:   has a past surgical history that includes Hysterectomy (1987); Cholecystectomy (1989); Lumbar disc surgery (2007); Throat surgery (2012); arthroplasty (08/10/2012); Fibula Fracture Surgery (2017); back surgery; Cystocopy (08/01/2018); Partial hip arthroplasty (Right, 09/2019); and Femur fracture surgery.     Social History:   reports that she quit smoking about 13 years ago. Her smoking use included cigarettes. She started smoking about 64 years ago. She has a 154.9 pack-year smoking history. She has never used smokeless tobacco. She reports that she does not drink alcohol and does not use drugs.     Family History:  Family History   Problem Relation Age of Onset    Heart Disease Mother     Cancer Mother     Hypertension Mother     Diabetes Mother     Osteoporosis Mother     Stroke Father     Hypertension Father     Osteoporosis Sister     Diabetes Sister     Cancer Other         leukemia       Home Medications:  Were reviewed and are listed in nursing record. and/or listed below  Prior to Admission medications    Medication Sig Start Date  at 08/17/24 0805    sodium chloride flush 0.9 % injection 5-40 mL  5-40 mL IntraVENous PRN Chagn Manning II, MD        0.9 % sodium chloride infusion   IntraVENous PRN Chang Manning II, MD        acetaminophen (TYLENOL) tablet 650 mg  650 mg Oral Q4H PRN Chang Manning II, MD        amiodarone (CORDARONE) tablet 100 mg  100 mg Oral Daily Chang Manning II, MD   100 mg at 08/17/24 0803    [Held by provider] apixaban (ELIQUIS) tablet 5 mg  5 mg Oral BID David Ricardo DO        aspirin EC tablet 81 mg  81 mg Oral Daily Chang Manning II, MD   81 mg at 08/17/24 0804    atorvastatin (LIPITOR) tablet 10 mg  10 mg Oral Daily Chang Manning II, MD   10 mg at 08/17/24 0804    digoxin (LANOXIN) tablet 125 mcg  125 mcg Oral Every Other Day Chang Manning II, MD   125 mcg at 08/17/24 0803    dilTIAZem (CARDIZEM CD) extended release capsule 180 mg  180 mg Oral Daily Chang Manning II, MD   180 mg at 08/17/24 0804    DULoxetine (CYMBALTA) extended release capsule 60 mg  60 mg Oral Daily Chang Manning II, MD   60 mg at 08/17/24 0804    HYDROcodone-acetaminophen (NORCO) 7.5-325 MG per tablet 1 tablet  1 tablet Oral Q6H PRN Chang Manning II, MD   1 tablet at 08/17/24 0807    hydroxychloroquine (PLAQUENIL) tablet 200 mg  200 mg Oral Daily Chang Manning II, MD   200 mg at 08/17/24 0804    methocarbamol (ROBAXIN) tablet 375 mg  375 mg Oral TID PRN Chang Manning II, MD   375 mg at 08/17/24 0808    metoprolol succinate (TOPROL XL) extended release tablet 100 mg  100 mg Oral Daily Chang Manning II, MD   100 mg at 08/17/24 0804    spironolactone (ALDACTONE) tablet 25 mg  25 mg Oral Daily Chang Manning II, MD   25 mg at 08/17/24 0804    busPIRone (BUSPAR) tablet 10 mg  10 mg Oral QAM AC Chang Manning II, MD   10 mg at 08/17/24 0515    busPIRone (BUSPAR) tablet 5 mg  5 mg Oral Nightly Chang Manning II, MD   5 mg at 08/16/24 2050    sodium chloride flush 0.9 % injection 5-40 mL  5-40 mL IntraVENous

## 2024-08-17 NOTE — PROGRESS NOTES
[facility] - Inpatient Rehabilitation Department   Phone: [phone]    Occupational Therapy    [x] Initial Evaluation            [] Daily Treatment Note         [] Discharge Summary      Patient: Isela Conner   : 1945   MRN: [id number]   Date of Service:  2024    Admitting Diagnosis:  Dyspnea  Current Admission Summary: Per H&P \"79 y.o. female  with PMHx of A-fib, PAH, HFpEF SSS; s/p PPM, hypertension, scleroderma, fibromyalgia and CKD stage III who presented with SOB.   Dx with CHF exacerbation              -Vascular consult: She reports about 5 weeks ago she developed a sore to her left ankle (from running her leg into elevator while on scooter) and it has been slow to heal. She follows with wound clinic in [facility]. She has known underlying PAD and was seen in 2023 but at that time with mild disease and no wounds and plan was for monitoring. Now she has a new ulcer and does complain of pain in her left leg and foot     S/p left leg angiogram - POD # 0 - showed 99% left common femoral artery stenosis.Will need left femoral endarterectomy.     Will plan to coordinate in the near future as an outpatient. \"  Past Medical History:  has a past medical history of Diabetes mellitus (HCC), Fatty liver, Femur fracture, left (HCC), Fibromyalgia, Fracture, hip (HCC), GERD (gastroesophageal reflux disease), Hypertension, IBS (irritable bowel syndrome), Osteoarthritis, Peripheral neuropathy, Postmenopausal, and Scleroderma (HCC).  Past Surgical History:  has a past surgical history that includes Hysterectomy (); Cholecystectomy (); Lumbar disc surgery (); Throat surgery (); arthroplasty (08/10/2012); Fibula Fracture Surgery (); back surgery; Cystocopy (2018); Partial hip arthroplasty (Right, 2019); and Femur fracture surgery.    Discharge Recommendations: Isela Conner scored a  on the AM-PAC ADL Inpatient form. Current research shows that an  completed on this date.    Functional Mobility  Bed Mobility:  Supine to Sit: stand by assistance  Scooting: stand by assistance  Comments: HOB elevated  Transfers:  Sit to stand transfer:contact guard assistance  Stand to sit transfer: contact guard assistance  Bed / Chair transfer: contact guard assistance.    Bed / Chair equipment: rolling walker  Bed / Chair comments: stand step transfer from EOB>recliner  Comments:  Functional Mobility  Functional Mobility Activity: 5' + 5'  Device Use: rolling walker  Required Assistance: contact guard assistance  Comment: forward flexed posture, decreased step length and speed.    Balance:  Static Sitting Balance: good: independent with functional balance in unsupported position  Dynamic Sitting Balance: fair (+): maintains balance at SBA/supervision without use of UE support  Static Standing Balance: fair (-): maintains balance at CGA with use of UE support  Dynamic Standing Balance: fair (-): maintains balance at CGA with use of UE support  Comments:    Other Therapeutic Interventions    Functional Outcomes  AM-PAC Inpatient Daily Activity Raw Score: 19            Cognition  WFL  Orientation:    alert and oriented x 4  Command Following:   WFL     Education  Barriers To Learning: none  Patient Education: patient educated on goals, OT role and benefits, plan of care, transfer training, discharge recommendations  Learning Assessment:  patient verbalizes and demonstrates understanding    Assessment  Activity Tolerance: chronic low endurance  Impairments Requiring Therapeutic Intervention: decreased functional mobility, decreased ADL status, decreased strength, decreased endurance, decreased balance  Prognosis: good  Clinical Assessment: Pt is currently functioning below occupational baseline and demo the deficits listed above. Pt is typically IND for short distances but mainly uses RW to get around [place]. Pt has a home health aide that comes x2 a week to assist with

## 2024-08-17 NOTE — PROGRESS NOTES
[facility] Note    Patient Active Problem List   Diagnosis    Diabetes type 2, controlled (HCC)    Peripheral neuropathy    GERD (gastroesophageal reflux disease)    Scleroderma (HCC)    PAH (pulmonary artery hypertension) (HCC)    Chronic narcotic dependence (HCC)    Spinal stenosis of lumbar region without neurogenic claudication    PAF (paroxysmal atrial fibrillation) ([facility])    Diffusion capacity of lung (dl), decreased    Lumbosacral spondylosis without myelopathy    Restrictive ventilatory defect    Vocal cord polyp    Benign essential HTN    Near syncope    SSS (sick sinus syndrome) ([facility])    Pacemaker    Chronic obstructive pulmonary disease (HCC)    Degeneration of intervertebral disc of lumbosacral region    Herniation of lumbar intervertebral disc with radiculopathy    Atrial flutter (HCC)    Osteoporosis, postmenopausal    Osteoporotic fracture of right hip (HCC)    Closed displaced transverse fracture of shaft of femur with routine healing    Chronic congestive heart failure (HCC)    Pulmonary hypertension (HCC)    PAD (peripheral artery disease) ([facility])    Dyspnea    Atherosclerosis of native arteries of extremities with rest pain, left leg ([facility])    Acute on chronic diastolic congestive heart failure ([facility])       Past Medical History:   has a past medical history of Diabetes mellitus ([facility]), Fatty liver, Femur fracture, left (HCC), Fibromyalgia, Fracture, hip ([facility]), GERD (gastroesophageal reflux disease), Hypertension, IBS (irritable bowel syndrome), Osteoarthritis, Peripheral neuropathy, Postmenopausal, and Scleroderma ([facility]).    Past Social History:   reports that she quit smoking about 13 years ago. Her smoking use included cigarettes. She started smoking about 64 years ago. She has a 154.9 pack-year smoking history. She has never used smokeless tobacco. She reports that she does not drink alcohol and does not use drugs.    Subjective:    Breathing  Medicine.  4.  CHF exacerbation-symptomatically better.  BP controlled.  5.  Diabetes mellitus type 2-insulin requiring  6.  History of pulmonary hypertension-on bosentan   7.  History of scleroderma  8.  Peripheral vascular disease-left angiogram amos Drew MD

## 2024-08-17 NOTE — DISCHARGE INSTR - DIET
Good nutrition is important when healing from an illness, injury, or surgery.  Follow any nutrition recommendations given to you during your hospital stay.   If you were given an oral nutrition supplement while in the hospital, continue to take this supplement at home.  You can take it with meals, in-between meals, and/or before bedtime. These supplements can be purchased at most local grocery stores, pharmacies, and chain super-stores.   If you have any questions about your diet or nutrition, call the hospital and ask for the dietitian.    For nutrition questions after discharge please call the Registered Dietitian at [phone].    Heart Failure Nutrition Therapy  This diet will help you feel better and support your heart by reducing symptoms of fluid retention, shortness of breath and swelling.   You should focus on:  Limiting sodium in your diet by reading labels and limiting foods high in sodium.  Limit your daily sodium intake to 2,000 to 3,000 mg per day.  Select foods with 140 mg of sodium or less per serving.  Foods with more than 300 mg of sodium per serving may not fit into a reduced-sodium meal plan.  Check serving sizes. If you eat more than 1 serving, you will get more sodium than the amount listed.   Limiting fluid in your diet.  Ask your doctor how much fluid you can have per day. In general, a fluid restriction of no more than 64 fluid ounces per day is recommended. This is equivalent to 8 cups of 8 ounces each or about 2 liters daily.   Remember foods that are liquid at room temperature such as popsicles, soup, ice cream and Jell-O are fluids.   Checking your weight to make sure you're not retaining too much fluid.  Weigh yourself every morning. If you gain 3 or more pounds in one day or 5 pounds within 1 week, call your doctor.  Foods to choose and avoid:  Avoid processed foods. Eat more fresh foods.  Fresh and frozen fruits and vegetables are good choices.  Choose fresh meats. Avoid processed  meats such as aguila, sausage and hot dogs.  Do not add salt to your food while cooking or at the table.  Try dry or fresh herbs, pepper, lemon juice, or a sodium-free seasoning blend such as Mrs. Dash to add flavor to food.   Do not use a salt substitute.  Use caution at restaurants  Restaurant foods are high in sodium. Ask for your food to be cooked without salt and request sauces and dressing to come “on the side.”

## 2024-08-17 NOTE — PLAN OF CARE
Problem: Chronic Conditions and Co-morbidities  Goal: Patient's chronic conditions and co-morbidity symptoms are monitored and maintained or improved  8/17/2024 0046 by Brain Mcdonald RN  Outcome: Progressing  8/16/2024 1122 by Emelia Stevenson RN  Outcome: Progressing     Problem: Safety - Adult  Goal: Free from fall injury  8/17/2024 0046 by Brain Mcdonald RN  Outcome: Progressing  8/16/2024 1122 by Emelia Stevenson RN  Outcome: Progressing     Problem: ABCDS Injury Assessment  Goal: Absence of physical injury  8/17/2024 0046 by Brain Mcdonald RN  Outcome: Progressing  8/16/2024 1122 by Emelia Stevenson RN  Outcome: Progressing     Problem: Skin/Tissue Integrity  Goal: Absence of new skin breakdown  Description: 1.  Monitor for areas of redness and/or skin breakdown  2.  Assess vascular access sites hourly  3.  Every 4-6 hours minimum:  Change oxygen saturation probe site  4.  Every 4-6 hours:  If on nasal continuous positive airway pressure, respiratory therapy assess nares and determine need for appliance change or resting period.  8/17/2024 0046 by Brain Mcdonald RN  Outcome: Progressing  8/16/2024 1122 by Emelia Stevenson RN  Outcome: Progressing     Problem: Discharge Planning  Goal: Discharge to home or other facility with appropriate resources  Outcome: Progressing     Problem: Pain  Goal: Verbalizes/displays adequate comfort level or baseline comfort level  Outcome: Progressing

## 2024-08-17 NOTE — PLAN OF CARE
Problem: Chronic Conditions and Co-morbidities  Goal: Patient's chronic conditions and co-morbidity symptoms are monitored and maintained or improved  8/17/2024 1914 by Brain Mcdonald RN  Outcome: Progressing  8/17/2024 0924 by Dilcia Braxton RN  Outcome: Progressing     Problem: Safety - Adult  Goal: Free from fall injury  8/17/2024 1914 by Brain Mcdonald RN  Outcome: Progressing  8/17/2024 0924 by Dilcia Braxton RN  Outcome: Progressing     Problem: ABCDS Injury Assessment  Goal: Absence of physical injury  8/17/2024 1914 by Brain Mcdonald RN  Outcome: Progressing  8/17/2024 0924 by Dilcia Braxton RN  Outcome: Progressing     Problem: Skin/Tissue Integrity  Goal: Absence of new skin breakdown  Description: 1.  Monitor for areas of redness and/or skin breakdown  2.  Assess vascular access sites hourly  3.  Every 4-6 hours minimum:  Change oxygen saturation probe site  4.  Every 4-6 hours:  If on nasal continuous positive airway pressure, respiratory therapy assess nares and determine need for appliance change or resting period.  8/17/2024 1914 by Brain Mcdonald RN  Outcome: Progressing  8/17/2024 0924 by Dilcia Braxton RN  Outcome: Progressing     Problem: Discharge Planning  Goal: Discharge to home or other facility with appropriate resources  8/17/2024 1914 by Brain Mcdonald RN  Outcome: Progressing  8/17/2024 0924 by Dilcia Braxton RN  Outcome: Progressing     Problem: Pain  Goal: Verbalizes/displays adequate comfort level or baseline comfort level  8/17/2024 1914 by Brain Mcdonald RN  Outcome: Progressing  8/17/2024 0924 by Dilcia Braxton RN  Outcome: Progressing

## 2024-08-17 NOTE — PROGRESS NOTES
HOSPITALISTS PROGRESS NOTE    8/17/2024 3:19 PM        Name: Isela Conner .              Admitted: 8/15/2024  Primary Care Provider: Soha Tom APRN - NP (Tel: [phone])      Brief Course: This 79 y.o. female  with PMHx of A-fib, PAH, HFpEF SSS; s/p PPM, hypertension, scleroderma, fibromyalgia and CKD stage III who presented with SOB.     Interval history:   Pt seen and examined today.  Overnight events noted, interval ancillary notes and labs reviewed.   Status post IV diuresis; switch to p.o. diuretics.  Creatinine down to 1.2  Up in chair; reported that her shortness of breath has improved but continues to have generalized weakness.         Assessment & Plan:     Acute on chronic diastolic heart failure: Appears compensated  P/w shortness of breath.  proBNP 738 on admission; CXR with pulmonary vascular congestion  HST 23-> 19.  EKG -ve for acute ischemic changes.  Echo on 8/16/24 showed EF of 60 to 65%. Moderate TR  Cardiology consulted; s/p IV diuresis.  Switch to p.o. home diuretics.  Monitor electrolyte closely while on IV Lasix and replace as needed.  Maintain fluid and salt restriction.  Daily weights     Hx of PAD/left ankle wound  Vascular surgery consulted:s/p left leg angiogram on 8/16/24 which showed 99% left common femoral artery stenosis. Plan for left femoral endarterectomy outpatient.      Pulmonary hypertension;On bosentan     Hx of chronic hypoxic respiratory failure on 2 L at baseline     Hx of A-fib; Follows with [facility]ology on amiodarone, digoxin, diltiazem and Eliquis.   Holding Eliquis for procedure tomorrow.  Monitor on telemetry     CKD stage III; creatinine 1.3 on admission(baseline 0.9-1.2)  Nephrology consulted; avoid nephrotoxin, strict and output, daily weights and monitor renal function closely     Hx of SSS stable s/p permanent pacemaker on 5/19/20     Hypertension; continue current regimen and  pacemaker on 5/19/20     Hypertension; continue current regimen and monitor BP closely     Diabetes mellitus; uncontrolled.  Last HgbA1c 10.2 on 3/29/2024.  Repeat Hgb A1c ordered  Continue SSI monitor blood glucose closely.  Diabetes educator consulted     Hyperlipidemia; continue statins     Peripheral neuropathy; continue gabapentin; dose decreased to 600 mg twice daily     Hx of scleroderma; on Plaquenil     Hx of fibromyalgia    DVT PPX: Eliquis   Code:Full Code    Disposition: Once acute medical issues have resolved    Current Medications  ascorbic acid (VITAMIN C) tablet 500 mg, TID  sodium chloride flush 0.9 % injection 5-40 mL, 2 times per day  sodium chloride flush 0.9 % injection 5-40 mL, PRN  0.9 % sodium chloride infusion, PRN  acetaminophen (TYLENOL) tablet 650 mg, Q4H PRN  amiodarone (CORDARONE) tablet 100 mg, Daily  apixaban (ELIQUIS) tablet 5 mg, BID  aspirin EC tablet 81 mg, Daily  atorvastatin (LIPITOR) tablet 10 mg, Daily  digoxin (LANOXIN) tablet 125 mcg, Every Other Day  dilTIAZem (CARDIZEM CD) extended release capsule 180 mg, Daily  DULoxetine (CYMBALTA) extended release capsule 60 mg, Daily  HYDROcodone-acetaminophen (NORCO) 7.5-325 MG per tablet 1 tablet, Q6H PRN  hydroxychloroquine (PLAQUENIL) tablet 200 mg, Daily  methocarbamol (ROBAXIN) tablet 375 mg, TID PRN  metoprolol succinate (TOPROL XL) extended release tablet 100 mg, Daily  spironolactone (ALDACTONE) tablet 25 mg, Daily  busPIRone (BUSPAR) tablet 10 mg, QAM AC  busPIRone (BUSPAR) tablet 5 mg, Nightly  sodium chloride flush 0.9 % injection 5-40 mL, 2 times per day  sodium chloride flush 0.9 % injection 5-40 mL, PRN  0.9 % sodium chloride infusion, PRN  magnesium sulfate 2000 mg in 50 mL IVPB premix, PRN  ondansetron (ZOFRAN-ODT) disintegrating tablet 4 mg, Q8H PRN   Or  ondansetron (ZOFRAN) injection 4 mg, Q6H PRN  polyethylene glycol (GLYCOLAX) packet 17 g, Daily PRN  acetaminophen (TYLENOL) suppository 650 mg, Q6H PRN  furosemide  257* 183* 177*     Hepatic:   Recent Labs     08/15/24  0343 08/16/24  0546   AST 15 14*   ALT 11 9*   BILITOT 0.5 0.5   ALKPHOS 76 74     XR CHEST (2 VW)   Final Result   Findings consistent with mild congestive heart failure.             Problem List  Principal Problem:    Dyspnea  Active Problems:    Pulmonary hypertension (HCC)    Atherosclerosis of native arteries of extremities with rest pain, left leg (HCC)    Acute on chronic diastolic congestive heart failure (HCC)  Resolved Problems:    * No resolved hospital problems. *       ZACK HIGGINS MD   8/17/2024 3:19 PM      Please note that some part of this chart was generated using Dragon dictation software. Although every effort was made to ensure the accuracy of this automated transcription, some errors in transcription may have occurred inadvertently. If you may need any clarification, please do not hesitate to contact me through [employer].

## 2024-08-17 NOTE — PLAN OF CARE
Problem: Chronic Conditions and Co-morbidities  Goal: Patient's chronic conditions and co-morbidity symptoms are monitored and maintained or improved  8/17/2024 0924 by Dilcia Braxton RN  Outcome: Progressing     Problem: Safety - Adult  Goal: Free from fall injury  8/17/2024 0924 by Dilcia Braxton RN  Outcome: Progressing     Problem: ABCDS Injury Assessment  Goal: Absence of physical injury  8/17/2024 0924 by Dilcia Braxton RN  Outcome: Progressing     Problem: Skin/Tissue Integrity  Goal: Absence of new skin breakdown  Description: 1.  Monitor for areas of redness and/or skin breakdown  2.  Assess vascular access sites hourly  3.  Every 4-6 hours minimum:  Change oxygen saturation probe site  4.  Every 4-6 hours:  If on nasal continuous positive airway pressure, respiratory therapy assess nares and determine need for appliance change or resting period.  8/17/2024 0924 by Dilcia Braxton RN  Outcome: Progressing     Problem: Discharge Planning  Goal: Discharge to home or other facility with appropriate resources  8/17/2024 0924 by Dilcia Braxton RN  Outcome: Progressing     Problem: Pain  Goal: Verbalizes/displays adequate comfort level or baseline comfort level  8/17/2024 0924 by Dilcia Braxton RN  Outcome: Progressing

## 2024-08-18 LAB
ANION GAP SERPL CALCULATED.3IONS-SCNC: 11 MMOL/L (ref 3–16)
BASOPHILS # BLD: 0.1 K/UL (ref 0–0.2)
BASOPHILS NFR BLD: 1.2 %
BUN SERPL-MCNC: 27 MG/DL (ref 7–20)
CALCIUM SERPL-MCNC: 7.9 MG/DL (ref 8.3–10.6)
CHLORIDE SERPL-SCNC: 99 MMOL/L (ref 99–110)
CO2 SERPL-SCNC: 26 MMOL/L (ref 21–32)
CREAT SERPL-MCNC: 1.5 MG/DL (ref 0.6–1.2)
DEPRECATED RDW RBC AUTO: 13.8 % (ref 12.4–15.4)
EOSINOPHIL # BLD: 0 K/UL (ref 0–0.6)
EOSINOPHIL NFR BLD: 0.4 %
GFR SERPLBLD CREATININE-BSD FMLA CKD-EPI: 35 ML/MIN/{1.73_M2}
GLUCOSE BLD-MCNC: 169 MG/DL (ref 70–99)
GLUCOSE BLD-MCNC: 220 MG/DL (ref 70–99)
GLUCOSE BLD-MCNC: 246 MG/DL (ref 70–99)
GLUCOSE BLD-MCNC: 254 MG/DL (ref 70–99)
GLUCOSE SERPL-MCNC: 215 MG/DL (ref 70–99)
HCT VFR BLD AUTO: 34.8 % (ref 36–48)
HGB BLD-MCNC: 11.4 G/DL (ref 12–16)
LYMPHOCYTES # BLD: 1.5 K/UL (ref 1–5.1)
LYMPHOCYTES NFR BLD: 18.8 %
MCH RBC QN AUTO: 29.6 PG (ref 26–34)
MCHC RBC AUTO-ENTMCNC: 32.9 G/DL (ref 31–36)
MCV RBC AUTO: 90 FL (ref 80–100)
MONOCYTES # BLD: 0.9 K/UL (ref 0–1.3)
MONOCYTES NFR BLD: 11.2 %
NEUTROPHILS # BLD: 5.4 K/UL (ref 1.7–7.7)
NEUTROPHILS NFR BLD: 68.4 %
NT-PROBNP SERPL-MCNC: 251 PG/ML (ref 0–449)
PERFORMED ON: ABNORMAL
PLATELET # BLD AUTO: 149 K/UL (ref 135–450)
PMV BLD AUTO: 9.5 FL (ref 5–10.5)
POTASSIUM SERPL-SCNC: 4.2 MMOL/L (ref 3.5–5.1)
RBC # BLD AUTO: 3.86 M/UL (ref 4–5.2)
SODIUM SERPL-SCNC: 136 MMOL/L (ref 136–145)
WBC # BLD AUTO: 8 K/UL (ref 4–11)

## 2024-08-18 PROCEDURE — 6360000002 HC RX W HCPCS: Performed by: SURGERY

## 2024-08-18 PROCEDURE — 2580000003 HC RX 258: Performed by: SURGERY

## 2024-08-18 PROCEDURE — 36415 COLL VENOUS BLD VENIPUNCTURE: CPT

## 2024-08-18 PROCEDURE — 1200000000 HC SEMI PRIVATE

## 2024-08-18 PROCEDURE — 80048 BASIC METABOLIC PNL TOTAL CA: CPT

## 2024-08-18 PROCEDURE — 6370000000 HC RX 637 (ALT 250 FOR IP): Performed by: SURGERY

## 2024-08-18 PROCEDURE — 85025 COMPLETE CBC W/AUTO DIFF WBC: CPT

## 2024-08-18 PROCEDURE — 6370000000 HC RX 637 (ALT 250 FOR IP): Performed by: INTERNAL MEDICINE

## 2024-08-18 PROCEDURE — 83880 ASSAY OF NATRIURETIC PEPTIDE: CPT

## 2024-08-18 RX ORDER — INSULIN GLARGINE 100 [IU]/ML
7 INJECTION, SOLUTION SUBCUTANEOUS NIGHTLY
Status: DISCONTINUED | OUTPATIENT
Start: 2024-08-18 | End: 2024-08-19 | Stop reason: HOSPADM

## 2024-08-18 RX ADMIN — ATORVASTATIN CALCIUM 10 MG: 10 TABLET, FILM COATED ORAL at 07:50

## 2024-08-18 RX ADMIN — DULOXETINE HYDROCHLORIDE 60 MG: 60 CAPSULE, DELAYED RELEASE ORAL at 07:49

## 2024-08-18 RX ADMIN — APIXABAN 5 MG: 5 TABLET, FILM COATED ORAL at 20:51

## 2024-08-18 RX ADMIN — INSULIN LISPRO 2 UNITS: 100 INJECTION, SOLUTION INTRAVENOUS; SUBCUTANEOUS at 07:58

## 2024-08-18 RX ADMIN — SODIUM CHLORIDE, PRESERVATIVE FREE 10 ML: 5 INJECTION INTRAVENOUS at 07:48

## 2024-08-18 RX ADMIN — SODIUM CHLORIDE, PRESERVATIVE FREE 10 ML: 5 INJECTION INTRAVENOUS at 20:51

## 2024-08-18 RX ADMIN — ASPIRIN 81 MG: 81 TABLET, COATED ORAL at 07:49

## 2024-08-18 RX ADMIN — OXYCODONE HYDROCHLORIDE AND ACETAMINOPHEN 500 MG: 500 TABLET ORAL at 07:49

## 2024-08-18 RX ADMIN — METHOCARBAMOL 375 MG: 750 TABLET ORAL at 16:21

## 2024-08-18 RX ADMIN — GABAPENTIN 300 MG: 300 CAPSULE ORAL at 20:51

## 2024-08-18 RX ADMIN — HYDROCODONE BITARTRATE AND ACETAMINOPHEN 1 TABLET: 7.5; 325 TABLET ORAL at 05:14

## 2024-08-18 RX ADMIN — INSULIN GLARGINE 7 UNITS: 100 INJECTION, SOLUTION SUBCUTANEOUS at 20:47

## 2024-08-18 RX ADMIN — BUSPIRONE HYDROCHLORIDE 5 MG: 5 TABLET ORAL at 20:51

## 2024-08-18 RX ADMIN — GABAPENTIN 300 MG: 300 CAPSULE ORAL at 07:49

## 2024-08-18 RX ADMIN — BOSENTAN 125 MG: 125 TABLET, FILM COATED ORAL at 07:48

## 2024-08-18 RX ADMIN — INSULIN LISPRO 4 UNITS: 100 INJECTION, SOLUTION INTRAVENOUS; SUBCUTANEOUS at 12:52

## 2024-08-18 RX ADMIN — HYDROCODONE BITARTRATE AND ACETAMINOPHEN 1 TABLET: 7.5; 325 TABLET ORAL at 12:52

## 2024-08-18 RX ADMIN — HYDROXYCHLOROQUINE SULFATE 200 MG: 200 TABLET, FILM COATED ORAL at 07:49

## 2024-08-18 RX ADMIN — APIXABAN 5 MG: 5 TABLET, FILM COATED ORAL at 07:50

## 2024-08-18 RX ADMIN — AMIODARONE HYDROCHLORIDE 100 MG: 200 TABLET ORAL at 07:49

## 2024-08-18 RX ADMIN — GABAPENTIN 300 MG: 300 CAPSULE ORAL at 14:06

## 2024-08-18 RX ADMIN — OXYCODONE HYDROCHLORIDE AND ACETAMINOPHEN 500 MG: 500 TABLET ORAL at 14:06

## 2024-08-18 RX ADMIN — METHOCARBAMOL 375 MG: 750 TABLET ORAL at 07:50

## 2024-08-18 RX ADMIN — METOPROLOL SUCCINATE 100 MG: 50 TABLET, EXTENDED RELEASE ORAL at 07:49

## 2024-08-18 RX ADMIN — ACETAMINOPHEN 650 MG: 325 TABLET ORAL at 20:47

## 2024-08-18 RX ADMIN — FUROSEMIDE 20 MG: 10 INJECTION, SOLUTION INTRAMUSCULAR; INTRAVENOUS at 07:48

## 2024-08-18 RX ADMIN — SPIRONOLACTONE 25 MG: 25 TABLET ORAL at 07:49

## 2024-08-18 RX ADMIN — OXYCODONE HYDROCHLORIDE AND ACETAMINOPHEN 500 MG: 500 TABLET ORAL at 20:48

## 2024-08-18 RX ADMIN — BOSENTAN 125 MG: 125 TABLET, FILM COATED ORAL at 20:53

## 2024-08-18 RX ADMIN — BUSPIRONE HYDROCHLORIDE 10 MG: 5 TABLET ORAL at 05:14

## 2024-08-18 RX ADMIN — DILTIAZEM HYDROCHLORIDE 180 MG: 180 CAPSULE, COATED, EXTENDED RELEASE ORAL at 07:49

## 2024-08-18 ASSESSMENT — PAIN DESCRIPTION - ORIENTATION
ORIENTATION_2: LEFT
ORIENTATION: LEFT
ORIENTATION_2: LEFT
ORIENTATION: LEFT
ORIENTATION: LEFT
ORIENTATION_2: LEFT
ORIENTATION: LEFT
ORIENTATION_2: LEFT

## 2024-08-18 ASSESSMENT — PAIN - FUNCTIONAL ASSESSMENT
PAIN_FUNCTIONAL_ASSESSMENT_SITE2: PREVENTS OR INTERFERES SOME ACTIVE ACTIVITIES AND ADLS
PAIN_FUNCTIONAL_ASSESSMENT: ACTIVITIES ARE NOT PREVENTED
PAIN_FUNCTIONAL_ASSESSMENT: ACTIVITIES ARE NOT PREVENTED
PAIN_FUNCTIONAL_ASSESSMENT: PREVENTS OR INTERFERES SOME ACTIVE ACTIVITIES AND ADLS
PAIN_FUNCTIONAL_ASSESSMENT_SITE2: PREVENTS OR INTERFERES WITH MANY ACTIVE NOT PASSIVE ACTIVITIES
PAIN_FUNCTIONAL_ASSESSMENT: PREVENTS OR INTERFERES SOME ACTIVE ACTIVITIES AND ADLS
PAIN_FUNCTIONAL_ASSESSMENT: PREVENTS OR INTERFERES SOME ACTIVE ACTIVITIES AND ADLS
PAIN_FUNCTIONAL_ASSESSMENT_SITE2: PREVENTS OR INTERFERES WITH MANY ACTIVE NOT PASSIVE ACTIVITIES
PAIN_FUNCTIONAL_ASSESSMENT: ACTIVITIES ARE NOT PREVENTED
PAIN_FUNCTIONAL_ASSESSMENT: PREVENTS OR INTERFERES SOME ACTIVE ACTIVITIES AND ADLS
PAIN_FUNCTIONAL_ASSESSMENT: PREVENTS OR INTERFERES WITH MANY ACTIVE NOT PASSIVE ACTIVITIES
PAIN_FUNCTIONAL_ASSESSMENT: PREVENTS OR INTERFERES SOME ACTIVE ACTIVITIES AND ADLS
PAIN_FUNCTIONAL_ASSESSMENT: PREVENTS OR INTERFERES SOME ACTIVE ACTIVITIES AND ADLS
PAIN_FUNCTIONAL_ASSESSMENT_SITE2: INTOLERABLE, UNABLE TO DO ANY ACTIVE OR PASSIVE ACTIVITIES

## 2024-08-18 ASSESSMENT — PAIN DESCRIPTION - DESCRIPTORS
DESCRIPTORS: PINS AND NEEDLES
DESCRIPTORS_2: ACHING
DESCRIPTORS: SORE
DESCRIPTORS_2: SHARP
DESCRIPTORS_2: ACHING
DESCRIPTORS: ACHING
DESCRIPTORS: SQUEEZING
DESCRIPTORS_2: SHARP
DESCRIPTORS: ACHING
DESCRIPTORS: SQUEEZING
DESCRIPTORS: SHARP
DESCRIPTORS: ACHING
DESCRIPTORS: SORE
DESCRIPTORS: PINS AND NEEDLES;SHARP

## 2024-08-18 ASSESSMENT — PAIN DESCRIPTION - INTENSITY
RATING_2: 4
RATING_2: 5
RATING_2: 10
RATING_2: 4

## 2024-08-18 ASSESSMENT — PAIN DESCRIPTION - LOCATION
LOCATION: NECK;SHOULDER
LOCATION_2: FOOT
LOCATION: NECK;SHOULDER
LOCATION: NECK
LOCATION: NECK;SHOULDER
LOCATION: FOOT;NECK;SHOULDER
LOCATION: FOOT
LOCATION_2: FOOT
LOCATION_2: SHOULDER
LOCATION: NECK;FOOT
LOCATION_2: FOOT
LOCATION: FOOT
LOCATION: NECK
LOCATION: FOOT

## 2024-08-18 ASSESSMENT — PAIN SCALES - GENERAL
PAINLEVEL_OUTOF10: 7
PAINLEVEL_OUTOF10: 3
PAINLEVEL_OUTOF10: 4
PAINLEVEL_OUTOF10: 6
PAINLEVEL_OUTOF10: 5
PAINLEVEL_OUTOF10: 4
PAINLEVEL_OUTOF10: 1
PAINLEVEL_OUTOF10: 5
PAINLEVEL_OUTOF10: 5

## 2024-08-18 ASSESSMENT — PAIN DESCRIPTION - FREQUENCY
FREQUENCY: INTERMITTENT
FREQUENCY: CONTINUOUS

## 2024-08-18 ASSESSMENT — PAIN DESCRIPTION - ONSET
ONSET: ON-GOING

## 2024-08-18 ASSESSMENT — PAIN DESCRIPTION - PAIN TYPE
TYPE: CHRONIC PAIN
TYPE: ACUTE PAIN
TYPE: CHRONIC PAIN

## 2024-08-18 ASSESSMENT — PAIN SCALES - WONG BAKER
WONGBAKER_NUMERICALRESPONSE: HURTS A LITTLE BIT

## 2024-08-18 NOTE — PROGRESS NOTES
HOSPITALISTS PROGRESS NOTE    8/18/2024 10:04 AM        Name: Isela Conner .              Admitted: 8/15/2024  Primary Care Provider: Soha Tom APRN - NP (Tel: [phone])      Brief Course: This 79 y.o. female  with PMHx of A-fib, PAH, HFpEF SSS; s/p PPM, hypertension, scleroderma, fibromyalgia and CKD stage III who presented with SOB.     Interval history:   Pt seen and examined today.  Overnight events noted, interval ancillary notes and labs reviewed.   On 3 L satting around 99%; wean as tolerated keep sat above 92%  Afebrile overnight, WBCs WNL.  Creatinine trended up to 1.5.  Diuretic held  Resting in bed; reported severe neuropathic pain but denied any fever, chills, SOB, chest pain and palpitations   Plan for the patient be discharged home tomorrow if medically stable      Assessment & Plan:     Acute on chronic diastolic heart failure: Appears compensated  P/w shortness of breath.  proBNP 738 on admission; CXR with pulmonary vascular congestion  HST 23-> 19.  EKG -ve for acute ischemic changes.  Echo on 8/16/24 showed EF of 60 to 65%. Moderate TR  Cardiology consulted; s/p IV diuresis.  Switch to p.o. home diuretics.  Monitor electrolyte closely while on IV Lasix and replace as needed.  Maintain fluid and salt restriction.  Daily weights     Hx of PAD/left ankle wound  Vascular surgery consulted:s/p left leg angiogram on 8/16/24 which showed 99% left common femoral artery stenosis. Plan for left femoral endarterectomy outpatient.      Pulmonary hypertension;On bosentan     Hx of chronic hypoxic respiratory failure on 2 L at baseline     Hx of A-fib; Follows with [facility] cardiology on amiodarone, digoxin, diltiazem and Eliquis.   Holding Eliquis for procedure tomorrow.  Monitor on telemetry     CKD stage III; creatinine 1.3 on admission(baseline 0.9-1.2)  Nephrology consulted; avoid nephrotoxin, strict and output, daily weights and

## 2024-08-18 NOTE — PROGRESS NOTES
08/18/24 1500   RT Protocol   History Pulmonary Disease 1   Respiratory pattern 0   Breath sounds 0   Cough 0   Bronchodilator Assessment Score 1

## 2024-08-18 NOTE — PROGRESS NOTES
[facility] Note    Patient Active Problem List   Diagnosis    Diabetes type 2, controlled (HCC)    Peripheral neuropathy    GERD (gastroesophageal reflux disease)    Scleroderma (HCC)    PAH (pulmonary artery hypertension) (HCC)    Chronic narcotic dependence (HCC)    Spinal stenosis of lumbar region without neurogenic claudication    PAF (paroxysmal atrial fibrillation) ([facility])    Diffusion capacity of lung (dl), decreased    Lumbosacral spondylosis without myelopathy    Restrictive ventilatory defect    Vocal cord polyp    Benign essential HTN    Near syncope    SSS (sick sinus syndrome) ([facility])    Pacemaker    Chronic obstructive pulmonary disease (HCC)    Degeneration of intervertebral disc of lumbosacral region    Herniation of lumbar intervertebral disc with radiculopathy    Atrial flutter (HCC)    Osteoporosis, postmenopausal    Osteoporotic fracture of right hip (HCC)    Closed displaced transverse fracture of shaft of femur with routine healing    Chronic congestive heart failure (HCC)    Pulmonary hypertension (HCC)    PAD (peripheral artery disease) ([facility])    Dyspnea    Atherosclerosis of native arteries of extremities with rest pain, left leg ([facility])    Acute on chronic diastolic congestive heart failure ([facility])       Past Medical History:   has a past medical history of Diabetes mellitus ([facility]), Fatty liver, Femur fracture, left (HCC), Fibromyalgia, Fracture, hip ([facility]), GERD (gastroesophageal reflux disease), Hypertension, IBS (irritable bowel syndrome), Osteoarthritis, Peripheral neuropathy, Postmenopausal, and Scleroderma ([facility]).    Past Social History:   reports that she quit smoking about 13 years ago. Her smoking use included cigarettes. She started smoking about 64 years ago. She has a 154.9 pack-year smoking history. She has never used smokeless tobacco. She reports that she does not drink alcohol and does not use drugs.    Subjective:    Breathing

## 2024-08-18 NOTE — PLAN OF CARE
Problem: Chronic Conditions and Co-morbidities  Goal: Patient's chronic conditions and co-morbidity symptoms are monitored and maintained or improved  Outcome: Progressing     Problem: Safety - Adult  Goal: Free from fall injury  Outcome: Progressing     Problem: ABCDS Injury Assessment  Goal: Absence of physical injury  Outcome: Progressing     Problem: Skin/Tissue Integrity  Goal: Absence of new skin breakdown  Description: 1.  Monitor for areas of redness and/or skin breakdown  2.  Assess vascular access sites hourly  3.  Every 4-6 hours minimum:  Change oxygen saturation probe site  4.  Every 4-6 hours:  If on nasal continuous positive airway pressure, respiratory therapy assess nares and determine need for appliance change or resting period.  Outcome: Progressing     Problem: Discharge Planning  Goal: Discharge to home or other facility with appropriate resources  Outcome: Progressing     Problem: Pain  Goal: Verbalizes/displays adequate comfort level or baseline comfort level  Outcome: Progressing

## 2024-08-19 VITALS
SYSTOLIC BLOOD PRESSURE: 140 MMHG | DIASTOLIC BLOOD PRESSURE: 56 MMHG | HEART RATE: 64 BPM | OXYGEN SATURATION: 97 % | WEIGHT: 145.3 LBS | HEIGHT: 67 IN | RESPIRATION RATE: 15 BRPM | BODY MASS INDEX: 22.81 KG/M2 | TEMPERATURE: 97.2 F

## 2024-08-19 PROBLEM — I48.20 ATRIAL FIBRILLATION, CHRONIC (HCC): Status: ACTIVE | Noted: 2024-08-19

## 2024-08-19 LAB
ANION GAP SERPL CALCULATED.3IONS-SCNC: 10 MMOL/L (ref 3–16)
BASOPHILS # BLD: 0.1 K/UL (ref 0–0.2)
BASOPHILS NFR BLD: 1 %
BUN SERPL-MCNC: 29 MG/DL (ref 7–20)
CALCIUM SERPL-MCNC: 8 MG/DL (ref 8.3–10.6)
CHLORIDE SERPL-SCNC: 102 MMOL/L (ref 99–110)
CO2 SERPL-SCNC: 25 MMOL/L (ref 21–32)
CREAT SERPL-MCNC: 1.2 MG/DL (ref 0.6–1.2)
DEPRECATED RDW RBC AUTO: 14 % (ref 12.4–15.4)
EOSINOPHIL # BLD: 0 K/UL (ref 0–0.6)
EOSINOPHIL NFR BLD: 0.2 %
GFR SERPLBLD CREATININE-BSD FMLA CKD-EPI: 45 ML/MIN/{1.73_M2}
GLUCOSE BLD-MCNC: 180 MG/DL (ref 70–99)
GLUCOSE BLD-MCNC: 192 MG/DL (ref 70–99)
GLUCOSE BLD-MCNC: 247 MG/DL (ref 70–99)
GLUCOSE SERPL-MCNC: 205 MG/DL (ref 70–99)
HCT VFR BLD AUTO: 34.2 % (ref 36–48)
HGB BLD-MCNC: 11.6 G/DL (ref 12–16)
LYMPHOCYTES # BLD: 1.4 K/UL (ref 1–5.1)
LYMPHOCYTES NFR BLD: 17.6 %
MCH RBC QN AUTO: 30.4 PG (ref 26–34)
MCHC RBC AUTO-ENTMCNC: 34 G/DL (ref 31–36)
MCV RBC AUTO: 89.5 FL (ref 80–100)
MONOCYTES # BLD: 0.9 K/UL (ref 0–1.3)
MONOCYTES NFR BLD: 11.9 %
NEUTROPHILS # BLD: 5.5 K/UL (ref 1.7–7.7)
NEUTROPHILS NFR BLD: 69.3 %
PERFORMED ON: ABNORMAL
PLATELET # BLD AUTO: 150 K/UL (ref 135–450)
PMV BLD AUTO: 9.4 FL (ref 5–10.5)
POTASSIUM SERPL-SCNC: 4.4 MMOL/L (ref 3.5–5.1)
RBC # BLD AUTO: 3.82 M/UL (ref 4–5.2)
SODIUM SERPL-SCNC: 137 MMOL/L (ref 136–145)
WBC # BLD AUTO: 7.9 K/UL (ref 4–11)

## 2024-08-19 PROCEDURE — 6370000000 HC RX 637 (ALT 250 FOR IP): Performed by: SURGERY

## 2024-08-19 PROCEDURE — 97116 GAIT TRAINING THERAPY: CPT

## 2024-08-19 PROCEDURE — 6370000000 HC RX 637 (ALT 250 FOR IP): Performed by: INTERNAL MEDICINE

## 2024-08-19 PROCEDURE — 97530 THERAPEUTIC ACTIVITIES: CPT

## 2024-08-19 PROCEDURE — 2580000003 HC RX 258: Performed by: SURGERY

## 2024-08-19 PROCEDURE — 6370000000 HC RX 637 (ALT 250 FOR IP): Performed by: CLINICAL NURSE SPECIALIST

## 2024-08-19 PROCEDURE — 36415 COLL VENOUS BLD VENIPUNCTURE: CPT

## 2024-08-19 PROCEDURE — 99232 SBSQ HOSP IP/OBS MODERATE 35: CPT | Performed by: CLINICAL NURSE SPECIALIST

## 2024-08-19 PROCEDURE — 85025 COMPLETE CBC W/AUTO DIFF WBC: CPT

## 2024-08-19 PROCEDURE — 80048 BASIC METABOLIC PNL TOTAL CA: CPT

## 2024-08-19 RX ORDER — FUROSEMIDE 40 MG
20 TABLET ORAL DAILY
Qty: 60 TABLET | Refills: 2 | Status: SHIPPED | OUTPATIENT
Start: 2024-08-19

## 2024-08-19 RX ORDER — FUROSEMIDE 40 MG
40 TABLET ORAL DAILY
Status: DISCONTINUED | OUTPATIENT
Start: 2024-08-19 | End: 2024-08-19 | Stop reason: HOSPADM

## 2024-08-19 RX ORDER — POLYETHYLENE GLYCOL 3350 17 G/17G
17 POWDER, FOR SOLUTION ORAL DAILY PRN
Qty: 30 PACKET | Refills: 1 | Status: SHIPPED | OUTPATIENT
Start: 2024-08-19 | End: 2024-08-19 | Stop reason: HOSPADM

## 2024-08-19 RX ADMIN — BUSPIRONE HYDROCHLORIDE 10 MG: 5 TABLET ORAL at 05:10

## 2024-08-19 RX ADMIN — ATORVASTATIN CALCIUM 10 MG: 10 TABLET, FILM COATED ORAL at 08:13

## 2024-08-19 RX ADMIN — INSULIN LISPRO 2 UNITS: 100 INJECTION, SOLUTION INTRAVENOUS; SUBCUTANEOUS at 12:43

## 2024-08-19 RX ADMIN — SODIUM CHLORIDE, PRESERVATIVE FREE 10 ML: 5 INJECTION INTRAVENOUS at 08:13

## 2024-08-19 RX ADMIN — BOSENTAN 125 MG: 125 TABLET, FILM COATED ORAL at 08:19

## 2024-08-19 RX ADMIN — AMIODARONE HYDROCHLORIDE 100 MG: 200 TABLET ORAL at 08:12

## 2024-08-19 RX ADMIN — DILTIAZEM HYDROCHLORIDE 180 MG: 180 CAPSULE, COATED, EXTENDED RELEASE ORAL at 08:12

## 2024-08-19 RX ADMIN — OXYCODONE HYDROCHLORIDE AND ACETAMINOPHEN 500 MG: 500 TABLET ORAL at 08:12

## 2024-08-19 RX ADMIN — METOPROLOL SUCCINATE 100 MG: 50 TABLET, EXTENDED RELEASE ORAL at 08:40

## 2024-08-19 RX ADMIN — HYDROCODONE BITARTRATE AND ACETAMINOPHEN 1 TABLET: 7.5; 325 TABLET ORAL at 01:25

## 2024-08-19 RX ADMIN — DULOXETINE HYDROCHLORIDE 60 MG: 60 CAPSULE, DELAYED RELEASE ORAL at 08:13

## 2024-08-19 RX ADMIN — GABAPENTIN 300 MG: 300 CAPSULE ORAL at 08:12

## 2024-08-19 RX ADMIN — OXYCODONE HYDROCHLORIDE AND ACETAMINOPHEN 500 MG: 500 TABLET ORAL at 12:44

## 2024-08-19 RX ADMIN — ASPIRIN 81 MG: 81 TABLET, COATED ORAL at 08:12

## 2024-08-19 RX ADMIN — DIGOXIN 125 MCG: 0.12 TABLET ORAL at 08:12

## 2024-08-19 RX ADMIN — FUROSEMIDE 40 MG: 40 TABLET ORAL at 13:22

## 2024-08-19 RX ADMIN — APIXABAN 5 MG: 5 TABLET, FILM COATED ORAL at 08:12

## 2024-08-19 RX ADMIN — HYDROXYCHLOROQUINE SULFATE 200 MG: 200 TABLET, FILM COATED ORAL at 08:19

## 2024-08-19 ASSESSMENT — PAIN DESCRIPTION - ORIENTATION: ORIENTATION: LEFT

## 2024-08-19 ASSESSMENT — PAIN - FUNCTIONAL ASSESSMENT: PAIN_FUNCTIONAL_ASSESSMENT: ACTIVITIES ARE NOT PREVENTED

## 2024-08-19 ASSESSMENT — PAIN SCALES - GENERAL: PAINLEVEL_OUTOF10: 6

## 2024-08-19 ASSESSMENT — PAIN DESCRIPTION - DESCRIPTORS: DESCRIPTORS: SHARP

## 2024-08-19 ASSESSMENT — PAIN DESCRIPTION - LOCATION: LOCATION: FOOT

## 2024-08-19 NOTE — CARE COORDINATION
08/19/24 1226   IMM Letter   IMM Letter given to Patient/Family/Significant other/Guardian/POA/by: Second IMM letter given to patient by CM   IMM Letter date given: 08/19/24   IMM Letter time given: 1120  (Patient in agreement with not having the four hours notice.)

## 2024-08-19 NOTE — PROGRESS NOTES
Physician Progress Note      PATIENT:               MYKE ALLEN  [facility] #:                  [id number]  :                       1945  ADMIT DATE:       8/15/2024 2:21 AM  DISCH DATE:  RESPONDING  PROVIDER #:        Odalys Jackson MD          QUERY TEXT:    Patient admitted with SOB. Noted documentation of Acute on chronic diastolic   heart failure in 8/15 HP and Acute on chronic diastolic heart failure: Appears   compensated on  PN.  If possible, please document in progress notes and discharge summary if you   are evaluating and /or treating any of the following:    The medical record reflects the following:  Risk Factors: SOB, elevated BNP, pulm edema on CXR    Clinical Indicators: Per 8/15 HP- Acute on chronic diastolic heart failure  P/w shortness of breath.  proBNP 738 on admission; CXR with pulmonary vascular   congestion. Last echo on  showed showed EF of 60 to 55%. Lungs: Clear to   auscultation, bilaterally without Rales/Wheezes/Rhonchi. Extremities: Trace   BLE edema.   PN- Acute on chronic diastolic heart failure: Appears compensated.  Echo on 24 showed EF of 60 to 65%. Moderate TR    Treatment: Labs, imaging, Maintain fluid and salt restriction.  Daily weights.   IV Lasix, Cardio consult, Echo  Options provided:  -- Acute on chronic diastolic heart failure confirmed  -- Chronic diastolic heart failure confirmed, no acute component  -- Other - I will add my own diagnosis  -- Disagree - Not applicable / Not valid  -- Disagree - Clinically unable to determine / Unknown  -- Refer to Clinical Documentation Reviewer    PROVIDER RESPONSE TEXT:    Acute on chronic diastolic heart failure confirmed    Query created by: Clau Wade on 2024 7:21 AM      Electronically signed by:  Odalys Jackson MD 2024 9:54 AM

## 2024-08-19 NOTE — PROGRESS NOTES
[facility] Note    Patient Active Problem List   Diagnosis    Diabetes type 2, controlled (HCC)    Peripheral neuropathy    GERD (gastroesophageal reflux disease)    Scleroderma (HCC)    Pulmonary arterial hypertension (HCC)    Chronic narcotic dependence (HCC)    Spinal stenosis of lumbar region without neurogenic claudication    PAF (paroxysmal atrial fibrillation) (HCC)    Diffusion capacity of lung (dl), decreased    Lumbosacral spondylosis without myelopathy    Restrictive ventilatory defect    Vocal cord polyp    Benign essential HTN    Near syncope    SSS (sick sinus syndrome) (HCC)    Pacemaker    Chronic obstructive pulmonary disease (HCC)    Degeneration of intervertebral disc of lumbosacral region    Herniation of lumbar intervertebral disc with radiculopathy    Atrial flutter (HCC)    Osteoporosis, postmenopausal    Osteoporotic fracture of right hip (HCC)    Closed displaced transverse fracture of shaft of femur with routine healing    Chronic congestive heart failure (HCC)    Pulmonary hypertension (HCC)    PAD (peripheral artery disease) ([facility])    Dyspnea    Atherosclerosis of native arteries of extremities with rest pain, left leg (HCC)    Acute on chronic diastolic congestive heart failure (HCC)    Atrial fibrillation, chronic (HCC)       Past Medical History:   has a past medical history of CHF (congestive heart failure) (HCC), CKD stage 3a, GFR 45-59 ml/min (HCC), Diabetes mellitus (HCC), Fatty liver, Femur fracture, left (HCC), Fibromyalgia, Fracture, hip (HCC), GERD (gastroesophageal reflux disease), Hypertension, IBS (irritable bowel syndrome), Osteoarthritis, Peripheral neuropathy, Postmenopausal, and Scleroderma (HCC).    Past Social History:   reports that she quit smoking about 13 years ago. Her smoking use included cigarettes. She started smoking about 64 years ago. She has a 154.9 pack-year smoking history. She has never used  smokeless tobacco. She reports that she does not drink alcohol and does not use drugs.    Subjective:    Breathing better today  On 3L NC, similar at home    Objective:      BP (!) 118/54   Pulse 64   Temp 97 °F (36.1 °C) (Temporal)   Resp 17   Ht 1.702 m (5' 7\")   Wt 65.9 kg (145 lb 4.8 oz)   SpO2 100%   BMI 22.76 kg/m²     Wt Readings from Last 3 Encounters:   08/19/24 65.9 kg (145 lb 4.8 oz)   08/09/24 71.2 kg (157 lb)   05/31/24 73 kg (161 lb)       BP Readings from Last 3 Encounters:   08/19/24 (!) 118/54   08/09/24 124/70   05/31/24 130/60     Chest-rhonchi bilaterally  Heart-regular  Abd-soft  Ext-1+ edema    Labs  Hemoglobin   Date Value Ref Range Status   08/19/2024 11.6 (L) 12.0 - 16.0 g/dL Final     Hematocrit   Date Value Ref Range Status   08/19/2024 34.2 (L) 36.0 - 48.0 % Final     WBC   Date Value Ref Range Status   08/19/2024 7.9 4.0 - 11.0 K/uL Final     Platelets   Date Value Ref Range Status   08/19/2024 150 135 - 450 K/uL Final     Lab Results   Component Value Date    CREATININE 1.2 08/19/2024    BUN 29 (H) 08/19/2024     08/19/2024    K 4.4 08/19/2024     08/19/2024    CO2 25 08/19/2024     Echo shows an EF of 60 to 65%    Assessment/Plan:  1.  CKD 3a- patient currently at her baseline renal function.   Crea stable s/p angiogram.   2.  Hyperkalemia-low potassium diet.   Better.   3.  Respiratory acidosis-management as per Medicine.  4.  CHF exacerbation-symptomatically better.  BP controlled.  5.  Diabetes mellitus type 2-insulin requiring  6.  History of pulmonary hypertension-on bosentan   7.  History of scleroderma  8.  Peripheral vascular disease-s/p angiogram.  9.  Dispo-okay for discharge from renal perspective.  Can resume SPLT and Lasix at 20mg daily.    Follow in office in 3 weeks.  Discussed with Medicine.     Kori Bunn MD

## 2024-08-19 NOTE — PROGRESS NOTES
Discharge instructions reviewed with patient and [place] at bedside. All questions answered. All patient belongings sent with patient upon discharge. Patient supplied medication sent with patient as well. Family brought [employer] system at bedside for transport to living area.

## 2024-08-19 NOTE — DISCHARGE SUMMARY
Hospital Medicine Discharge Summary    Patient ID: Isela Conner      Patient's PCP: Soha Tom APRN - NP    Admit Date: 8/15/2024     Discharge Date:      Admitting Physician: Odalys Higgins MD     Discharge Physician: ODALYS HIGGINS MD     Hospital Course: This 79 y.o. female with PMHx of A-fib, PAH, HFpEF SSS; s/p PPM, hypertension, scleroderma, fibromyalgia and CKD stage III who presented with SOB.       Acute on chronic diastolic heart failure: Appears compensated  HST 23-> 19.  EKG -ve for acute ischemic changes.  Echo on 8/16/24 showed EF of 60 to 65%. Moderate TR  Cardiology consulted; initially diuresed with IV Lasix later switched to p.o. diuretics     Hx of PAD/left ankle wound  Vascular surgery consulted:s/p left leg angiogram on 8/16/24 which showed 99% left common femoral artery stenosis. Plan for left femoral endarterectomy outpatient.      Pulmonary hypertension; On bosentan     Hx of chronic hypoxic respiratory failure on 2 L at baseline     Hx of A-fib; Follows with EP cardiology; on amiodarone, digoxin, diltiazem and Eliquis.      CKD stage III; creatinine 1.3 on admission (baseline 0.9-1.2); trended down to 1.2. Nephrology input appreciated     Hx of SSS stable s/p permanent pacemaker on 5/19/20     Hypertension; continue current regimen and monitor BP closely     Diabetes mellitus; HgbA1c 8.2 on 8/15/2024.  On Ozempic per her PCP     Hyperlipidemia; continue statins     Peripheral neuropathy; continue gabapentin    Hx of scleroderma; on Plaquenil     Hx of fibromyalgia       Physical Exam Performed:     BP (!) 140/56   Pulse 64   Temp 97.2 °F (36.2 °C) (Temporal)   Resp 15   Ht 1.702 m (5' 7\")   Wt 65.9 kg (145 lb 4.8 oz)   SpO2 97%   BMI 22.76 kg/m²     General appearance: No apparent distress, appears stated age and cooperative.  Eyes: Sclera clear. Pupils equal.  ENT: Moist oral mucosa. Trachea midline, no adenopathy.  Cardiovascular: Regular rhythm, normal S1, S2. No  than 30 minutes was spent in preparing discharge papers, discussing discharge with patient, medication review, etc.     Signed:    ZACK HIGGINS MD   8/19/2024      Thank you Soha Tom APRN - NP for the opportunity to be involved in this patient's care. If you have any questions or concerns please feel free to contact me at [phone].    Please note that some part of this chart was generated using Dragon dictation software. Although every effort was made to ensure the accuracy of this automated transcription, some errors in transcription may have occurred inadvertently. If you may need any clarification, please do not hesitate to contact me through [employer].

## 2024-08-19 NOTE — CARE COORDINATION
Case Management Assessment  Initial Evaluation    Date/Time of Evaluation: 8/19/2024 12:20 PM  Assessment Completed by: FIDELIA IVORY RN    If patient is discharged prior to next notation, then this note serves as note for discharge by case management.    Patient Name: Isela Conner                   YOB: 1945  Diagnosis: Shortness of breath [R06.02]  Pulmonary hypertension (HCC) [I27.20]  Chronic diastolic congestive heart failure (HCC) [I50.32]  Acute on chronic diastolic congestive heart failure (HCC) [I50.33]  Dyspnea, unspecified type [R06.00]                   Date / Time: 8/15/2024  2:21 AM    Patient Admission Status: Inpatient   Readmission Risk (Low < 19, Mod (19-27), High > 27): Readmission Risk Score: 16    Current PCP: Soha Tom APRN - NP  PCP verified by CM?      Chart Reviewed: Yes      History Provided by: Patient, Medical Record  Patient Orientation: Alert and Oriented, Person, Place, Situation    Patient Cognition: Alert    Hospitalization in the last 30 days (Readmission):  No    If yes, Readmission Assessment in CM Navigator will be completed.    Advance Directives:      Code Status: Full Code   Patient's Primary Decision Maker is: Legal Next of Kin      Discharge Planning:    Patient lives with: Alone Type of Home: Independent Living ([place])  Primary Care Giver: Self (Patient lives at [facility])  Patient Support Systems include: Family Members, Other (Comment), Home Care Staff ([facility])   Current Financial resources: Medicare  Current community resources: ECF/Home Care, Other (Comment) (Independent living)  Current services prior to admission: Home Care, Durable Medical Equipment, Oxygen Therapy            Current DME: Wheelchair, Walker, Oxygen Therapy (Comment), Shower Chair            Type of Home Care services:  PT, OT, Aide Services, Nursing Services    ADLS  Prior functional level: Assistance with the following:, Cooking,  Housework, Shopping, Mobility  Current functional level: Assistance with the following:, Cooking, Housework, Shopping, Mobility    PT AM-PAC: 19 /24  OT AM-PAC: 19 /24    Family can provide assistance at DC: Yes  Would you like Case Management to discuss the discharge plan with any other family members/significant others, and if so, who? No  Plans to Return to Present Housing: Yes  Other Identified Issues/Barriers to RETURNING to current housing: No  Potential Assistance needed at discharge: Home Care            Potential DME:    Patient expects to discharge to: Independent living facility  Plan for transportation at discharge:      Financial    Payor: MEDICARE / Plan: MEDICARE PART A AND B / Product Type: *No Product type* /     Does insurance require precert for SNF: No    Potential assistance Purchasing Medications: No  Meds-to-Beds request:        [facility] - [address] [phone] - F [phone]  [address] [facility] [address]  Phone: [phone] Fax: [phone]    [facility] [address] [place] [address] [address] [place] [address] [phone] - F [phone]  [facility] [address]  Phone: [phone] Fax: [phone]    [address] [phone] - F [phone]  [address] [id number]  Phone: [phone] Fax: [phone]      Notes:    Factors facilitating achievement of predicted outcomes: Family support, Caregiver support, Has needed Durable Medical Equipment at home, and Has homemaker services    Barriers to discharge: Limited family support, Decreased endurance, Lower extremity weakness, and Long standing deficits    Additional Case Management Notes:   CM met with patient at bedside to complete assessment,  Pt reports she lives at [facility] Independent living [facility].  She uses a wheel chair most of the time.  She is active with [facility] services via;  [facility]  [address]  [address]  [place] [address]  Phone: [phone]   Fax: [phone]   For Skilled Nursing, PT , OT and home Aids.  She has home oxygen via;  [facility]  [address]  [address]  [place] [address]  Phone: [phone]   She is 3 ltrs continuous.      The Plan for Transition of Care is related to the following treatment goals of Shortness of breath [R06.02]  Pulmonary hypertension (HCC) [I27.20]  Chronic diastolic congestive heart failure (HCC) [I50.32]  Acute on chronic diastolic congestive heart failure (HCC) [I50.33]  Dyspnea, unspecified type [R06.00]    IF APPLICABLE: The Patient and/or patient representative Isela and her family were provided with a choice of provider and agrees with the discharge plan. Freedom of choice list with basic dialogue that supports the patient's individualized plan of care/goals and shares the quality data associated with the providers was provided to: Patient   Patient Representative Name:       The Patient and/or Patient Representative Agree with the Discharge Plan? Yes    FIDELIA IVORY, JUVENAL/BSN/CCM  Case Management Department

## 2024-08-19 NOTE — PROGRESS NOTES
[facility]   Daily Progress Note      Admit Date:  8/15/2024    HPI:    Ms. Conner is an 79 year old female with history of PAH, AF, pacemaker, scleroderma, chronic kidney disease and on chronic oxygen at home 2-3 L    She presented to the hospital with dyspnea on exertion.  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  Bnp 738 which is better then her 1100.  Cxr with pulmonary vascular congestion.  She follows with Dr Hensley (pulmonary)      Subjective:  Patient is being seen for chronic diastolic heart failure and PAH. There were no acute overnight cardiac events. She is sitting up in the chair on 3 L of oxygen.  She is hoping to go home today.  Surgery will be done as an outpatient.  No increased shortness of breath, palpitations or lightheadedness.    Weight 158-145 and -2.4 l out creat 1.5-1.2 probnp 738-251    Objective:   BP (!) 118/54   Pulse 64   Temp 97 °F (36.1 °C) (Temporal)   Resp 17   Ht 1.702 m (5' 7\")   Wt 65.9 kg (145 lb 4.8 oz)   SpO2 100%   BMI 22.76 kg/m²     Intake/Output Summary (Last 24 hours) at 8/19/2024 1111  Last data filed at 8/19/2024 0535  Gross per 24 hour   Intake 480 ml   Output 1200 ml   Net -720 ml          Physical Exam:  General:  Awake, alert, oriented in NAD  Skin:  Warm and dry.  No unusual bruising or rash  Neck:  Supple.  No JVD or carotid bruit appreciated  Chest:  Normal effort.  Clear to auscultation, no wheezes/rhonchi/rales  Cardiovascular:  RRR, S1/S2, no murmur/gallop/rub  Abdomen:  Soft, nontender, +bowel sounds  Extremities:  No edema  Neurological: No focal deficits  Psychological: Normal mood and affect      Medications:    insulin glargine  7 Units SubCUTAneous Nightly    ascorbic acid  500 mg Oral TID    sodium chloride flush  5-40 mL IntraVENous 2 times per day    amiodarone  100 mg Oral Daily    apixaban  5 mg Oral BID    aspirin  81 mg Oral Daily     atorvastatin  10 mg Oral Daily    digoxin  125 mcg Oral Every Other Day    dilTIAZem  180 mg Oral Daily    DULoxetine  60 mg Oral Daily    hydroxychloroquine  200 mg Oral Daily    metoprolol succinate  100 mg Oral Daily    [Held by provider] spironolactone  25 mg Oral Daily    busPIRone  10 mg Oral QAM AC    busPIRone  5 mg Oral Nightly    sodium chloride flush  5-40 mL IntraVENous 2 times per day    insulin lispro  0-8 Units SubCUTAneous TID WC    insulin lispro  0-4 Units SubCUTAneous Nightly    bosentan  125 mg Oral BID    gabapentin  300 mg Oral TID      sodium chloride      sodium chloride      dextrose         Lab Data:  CBC:   Recent Labs     08/17/24  0529 08/18/24  0434 08/19/24  0454   WBC 7.4 8.0 7.9   HGB 11.7* 11.4* 11.6*    149 150     BMP:    Recent Labs     08/17/24  0529 08/18/24 0434 08/19/24  0454    136 137   K 4.5 4.2 4.4   CO2 29 26 25   BUN 25* 27* 29*   CREATININE 1.2 1.5* 1.2     INR:  No results for input(s): \"INR\" in the last 72 hours.  BNP:    Recent Labs     08/18/24 0434   PROBNP 251         Diagnostics:   echo 8/15/2024    Left Ventricle: Normal left ventricular systolic function with a visually estimated EF of 60 - 65%. EF by 2D Simpsons Biplane is 72%. Left ventricle size is normal. Normal wall thickness. Normal wall motion.    Mitral Valve: Moderately calcified leaflets. Mild regurgitation.    Tricuspid Valve: Moderate regurgitation. Elevated RVSP (52 mm Hg + Right atrial pressure)    Left Atrium: Left atrial volume index is mildly increased (35-41 mL/m2).    Image quality is adequate.    Assessment:    1.  Acute on chronic diastolic heart failure  2.  Pulmonary arterial hypertension on bosentan  3.  Chronic atrial fib on amiodarone and eliquis  4.  Atherosclerosis plans for outpatient surgery (left femoral endartectomy)      Plan:    Continue bosentan 125 mg po twice a day   CHF nurse following for diet education, fluid restriction and daily weights  Continue digoxin  125 mcg daily  Continue toprol 100 mg daily  Will continue to hold aldactone and resume as an outpatient  Resume lasix 40 mg po daily     Ok for discharge from cardiology standpoint  Follow up is arranged  Could consider sglt 2 in follow up but would wait until after her surgery    NYHA III    Discussed with patient who is agreeable with plan of care.     Thank you for allowing me to participate in the care of your patient.    SHANICE MILLIGAN, APRN - CNS, CNS

## 2024-08-19 NOTE — PROGRESS NOTES
[facility] - Inpatient Rehabilitation Department   Phone: [phone]    Physical Therapy    [] Initial Evaluation            [x] Daily Treatment Note         [] Discharge Summary      Patient: Isela Conner   : 1945   MRN: [id number]   Date of Service:  2024  Admitting Diagnosis: Dyspnea  Current Admission Summary: This 79 y.o. female  with PMHx of A-fib, PAH, HFpEF SSS; s/p PPM, hypertension, scleroderma, fibromyalgia and CKD stage III who presented with SOB.   Dx with CHF exacerbation   -Vascular consult: She reports about 5 weeks ago she developed a sore to her left ankle (from running her leg into elevator while on scooter) and it has been slow to heal. She follows with wound clinic in [facility]. She has known underlying PAD and was seen in 2023 but at that time with mild disease and no wounds and plan was for monitoring. Now she has a new ulcer and does complain of pain in her left leg and foot      - S/p left leg angiogram - POD # 0 - showed 99% left common femoral artery stenosis.Will need left femoral endarterectomy.     Will plan to coordinate in the near future as an outpatient.    Past Medical History:  has a past medical history of CHF (congestive heart failure) (HCC), CKD stage 3a, GFR 45-59 ml/min (HCC), Diabetes mellitus (HCC), Fatty liver, Femur fracture, left (HCC), Fibromyalgia, Fracture, hip (HCC), GERD (gastroesophageal reflux disease), Hypertension, IBS (irritable bowel syndrome), Osteoarthritis, Peripheral neuropathy, Postmenopausal, and Scleroderma (HCC).  Past Surgical History: This 79 y.o. female  with PMHx of A-fib, PAH, HFpEF SSS; s/p PPM, hypertension, scleroderma, fibromyalgia and CKD stage III who presented with SOB.     Discharge Recommendations: Isela Conner scored a 19/24 on the AM-PAC short mobility form. Current research shows that an AM-PAC score of 18 or greater is typically associated with a discharge to the patient's home  goals, PT role and benefits, plan of care, precautions, general safety, functional mobility training, proper use of assistive device/equipment, transfer training, discharge recommendations  Learning Assessment:  patient verbalizes and demonstrates understanding    Assessment  Activity Tolerance: Pt fatigued with light bouts of activity and required seated rest breaks frequently. SpO2 >90% on 2L with all mobility.   Impairments Requiring Therapeutic Intervention: decreased functional mobility, decreased strength, decreased safety awareness, decreased cognition, decreased endurance, decreased balance  Prognosis: good  Clinical Assessment: Pt. Presents with CHF and chronic L LE pain and vascular impairment with wound on the L ankle x 5 weeks, seeing wound clinic.  The patient usually furniture walks in her room per discussion today due to difficulty with managing her O2 line and the walker at the same time. The patient also reports she has previously primarily used the Rollator in the apartment. When asked to ambulate without an AD today the patient did not feel she could do this and she was noted to have moderate UE support on the RW for all ambulation. The patient is functioning below her baseline. She was encourage to have family stay with her tonight following discharge. The patient would benefit from continued skilled PT and from therapy in the home to progress tolerance to activity, independence with functional mobility, and overall balance in order to prevent falls.   Safety Interventions: patient left in chair, chair alarm in place, call light within reach, gait belt, and nurse notified    Plan  Frequency: 3-5 x/per week  Current Treatment Recommendations: strengthening, ROM, balance training, functional mobility training, transfer training, gait training, endurance training, neuromuscular re-education, patient/caregiver education, home exercise program, safety education, and equipment

## 2024-08-19 NOTE — CARE COORDINATION
Case Management -  Discharge Note      Patient Name: Isela Conner                   YOB: 1945            Readmission Risk (Low < 19, Mod (19-27), High > 27): Readmission Risk Score: 16    Current PCP: Soha Tom APRN - NP      (IMM) Important Message from Medicare:    Has pt received appropriate IMM before discharge if required: yes  Date: 08/19/2024    PT AM-PAC: 19 /24  OT AM-PAC: 19 /24    Patient/patient representative has been educated on the benefits of HHC as well as the possible risks of declining recommended services. Patient/patient representative has acknowledged the information provided and decided on the following discharge plan. Patient/ patient representative has been provided freedom of choice regarding service provider, supported by basic dialogue that supports the patient's individualized plan of care/goals.    Home Care Information:   Is patient resuming current home health care services: Yes -     Home Care Agency:   [facility]  [address]  [address]  [place] [address]  Phone: [phone]   Fax: [phone]             Services: Skilled Nursing, PT. OT, HHA  Home Health Order Obtained: Yes    Home health agency notified of discharge.  Evelyn in Intake.    Home Oxygen and Respiratory Equipment:     Has HOME OXYGEN prior to admission: Yes    If applicable: Informed of need to bring portable home O2 tank on day of DISCHARGE for nursing to connect prior to leaving: Yes  Verbalized agreement/Understanding: Yes    Current Liter Flow/min:  3 LPM    Oxygen Provider:   [facility]  [address] [facility]  [address]  [place] [address]  Phone: [phone]       Financial    Payor: MEDICARE / Plan: MEDICARE PART A AND B / Product Type: *No Product type* /     Pharmacy:  Potential assistance Purchasing Medications: No  Meds-to-Beds request:        [facility] - [place], [place] - [address] [place] [address]  [phone] - F [phone]  [address] [address]  Phone: [phone] Fax: [phone]    [id number] - [place], [place] - [address] [phone] - F [phone]  [address] [address]  Phone: [phone] Fax: [phone]    [facility] - [place], [place] - [address] [phone] - F [phone]  [address] [id number]  Phone: [phone] Fax: [phone]      Notes:    Additional Case Management Notes:   Family member will transport patient back to the [facility].    Electronically signed by FIDELIA IVORY RN /GEORGEN/CCM on 8/19/2024

## 2024-08-19 NOTE — PLAN OF CARE
Problem: Chronic Conditions and Co-morbidities  Goal: Patient's chronic conditions and co-morbidity symptoms are monitored and maintained or improved  8/18/2024 2238 by Brain Mcdonald RN  Outcome: Progressing  8/18/2024 1023 by Dilcia Braxton RN  Outcome: Progressing     Problem: Safety - Adult  Goal: Free from fall injury  8/18/2024 2238 by Brain Mcdonald RN  Outcome: Progressing  8/18/2024 1023 by Dilcia Braxton RN  Outcome: Progressing     Problem: ABCDS Injury Assessment  Goal: Absence of physical injury  8/18/2024 2238 by Brain Mcdonald RN  Outcome: Progressing  8/18/2024 1023 by Dilcia Braxton RN  Outcome: Progressing     Problem: Skin/Tissue Integrity  Goal: Absence of new skin breakdown  Description: 1.  Monitor for areas of redness and/or skin breakdown  2.  Assess vascular access sites hourly  3.  Every 4-6 hours minimum:  Change oxygen saturation probe site  4.  Every 4-6 hours:  If on nasal continuous positive airway pressure, respiratory therapy assess nares and determine need for appliance change or resting period.  8/18/2024 2238 by Brain Mcdonald RN  Outcome: Progressing  8/18/2024 1023 by Dilcia Braxton RN  Outcome: Progressing     Problem: Discharge Planning  Goal: Discharge to home or other facility with appropriate resources  8/18/2024 2238 by Brain Mcdonald RN  Outcome: Progressing  8/18/2024 1023 by Dilcia Braxton RN  Outcome: Progressing     Problem: Pain  Goal: Verbalizes/displays adequate comfort level or baseline comfort level  8/18/2024 2238 by Brain Mcdonald RN  Outcome: Progressing  8/18/2024 1023 by Dilcia Braxton RN  Outcome: Progressing

## 2024-08-20 ENCOUNTER — TELEPHONE (OUTPATIENT)
Dept: OTHER | Age: 79
End: 2024-08-20

## 2024-08-20 NOTE — TELEPHONE ENCOUNTER
[facility]  HEART FAILURE PROGRAM  TELEPHONE ENCOUNTER FORM    Isela Conner 1945    ASSESSMENT:   Baseline weight: 152 lbs  Current weight: 152 lbs  Patient weighing daily: Yes  What are your symptoms of heart failure: dyspnea, edema  Are you having any symptoms:  No  Patient knows who to call with symptoms: Yes  Diet history:      Patient states sodium limitation is : 3000 mg       Patient states fluid limitation is 64 oz  Patient following diet as instructed: Yes  Medication history: taking medications as instructed Yes; medication side effects noted No  Patient is being active at home: Yes  Patient knows date and time of follow up appointment: Yes   Patient has transportation to appointments: Yes    RECOMMENDATIONS:   Medication: taking as prescribed  Diet: no added salt diet   MD/ Clinic appointment: was scheduled for 8/22 however states could not make that appointment-- was changed to 8/28 with HF NP  Other: Patient doing well. Diuretic dose is lower. Strongly encouraged patient to call with any changes in weight or symptoms. Home care coming out to see patient today.

## 2024-08-21 ENCOUNTER — PREP FOR PROCEDURE (OUTPATIENT)
Dept: VASCULAR SURGERY | Age: 79
End: 2024-08-21

## 2024-08-21 ENCOUNTER — TELEPHONE (OUTPATIENT)
Dept: VASCULAR SURGERY | Age: 79
End: 2024-08-21

## 2024-08-21 DIAGNOSIS — I70.223 ATHEROSCLEROSIS OF NATIVE ARTERIES OF EXTREMITIES WITH REST PAIN, BILATERAL LEGS (HCC): Primary | ICD-10-CM

## 2024-08-21 RX ORDER — SODIUM CHLORIDE 9 MG/ML
INJECTION, SOLUTION INTRAVENOUS PRN
Status: CANCELLED | OUTPATIENT
Start: 2024-08-21

## 2024-08-21 RX ORDER — SODIUM CHLORIDE 9 MG/ML
INJECTION, SOLUTION INTRAVENOUS CONTINUOUS
Status: CANCELLED | OUTPATIENT
Start: 2024-08-21

## 2024-08-21 RX ORDER — SODIUM CHLORIDE 0.9 % (FLUSH) 0.9 %
5-40 SYRINGE (ML) INJECTION EVERY 12 HOURS SCHEDULED
Status: CANCELLED | OUTPATIENT
Start: 2024-08-21

## 2024-08-21 RX ORDER — SODIUM CHLORIDE 0.9 % (FLUSH) 0.9 %
5-40 SYRINGE (ML) INJECTION PRN
Status: CANCELLED | OUTPATIENT
Start: 2024-08-21

## 2024-08-21 NOTE — TELEPHONE ENCOUNTER
Discussed surgery: Thurs 8/29/24 11:40am arrive 10am. NPO after midnight, hold Eliquis 48 hours. Advised she will get a call from [facility] [place] to go over other meds.

## 2024-08-22 NOTE — PROGRESS NOTES
DATE 8/29/2024___      PLACE _x__ [address]                          TIME 1145___                                             ___ [address]                           Arrival 0945___     Surgeons office to give time ___      Surgery dates,times and arrival times are subject to change.Please check with your surgeon.  Bring a photo I.D.,insurance card and form of payment if you have a co pay.  Plan for someone 18 years or older to stay on site while you are her and to take you home after surgery.You are not permitted to drive yourself home. You cannot leave via Uber, Lyft, Taxi or Medical Transport by yourself. You must have someone with you. It is strongly suggested that someone stay with you the first 24 hours after anesthesia. Your surgery will be cancelled if you do not have a ride home. A parent or legal guardian guardian must stay with a child until the child is discharged. Please do not bring other children.  A History/Physical is required to be completed and dated within 30 days of your surgery. To be done by PCP __ Surgeon _x__or Hospitalist ___  You may be required to get labs __ EKG __ or a chest Xray ___ prior to surgery. To be done by ____  Nothing to eat or drink after midnight the evening prior to surgery.  If your surgeon requires a bowel prep, follow their instructions.  You may brush your teeth.  Bring a complete list of your medications including vitamins and supplement with the name,dose and frequency.  Take the following medications with a sip of water the AM of surgery _amiodarone, digoxin,cymbalta, diltiazem, gabapentin,metoprolol, norco,bosentran___________________________________   [facility] patients do not take any medications the AM of surgery.  If you can not be reached by phone to give specific medication instructions,you may use your inhalers,take your heart, blood pressure,seizure and thyroid medications with a sip of water the AM of surgery. Bring your rescue inhaler with you  if you use one.  DO NOT take blood pressure medications ending in \"pril\"or \"sartan\" the evening prior or morning of surgery (such as Lisinopril or Losartan).  For blood thinners such as Plavix, Eliquis, Effient, Pradaxa, Xarelto or antiplatelets such as Pletal get instructions on if you need to stop prior to surgery and for how long.  Has instructions __x_  to get instructions from surgeon and prescribing doctor and surgeon ___.  Aspirin,Ibuprofen,Advil,Aleve and any anti inflammatories along with vitamins should be stopped 5-7 days before surgery or as directed by surgeon. Tylenol is okay to take until the evening prior to surgery.  If you take a long-acting insulin at night,cut the dose in half the evening prior to surgery.  If you take a GLP-1 receptor (such as Trulicity,Mounjaro, Ozempic weekly),do not take 7 days prior to surgery. If you take a daily dose such as Rybelsus you must stop at least 24 hours prior to surgery.  If your blood sugar is low and you are symptomatic the AM of surgery you may take sips of a sugary clear liquid.  If you take any medication for weight loss(such as Adipex Naltrexone,Phentermine) you should not take for a minium of 72 hours prior to surgery.  Follow your surgeons instructions on showering prior to surgery.If you were not given specific instructions ,shower with an antibacterial soap such as dial the morning of surgery.  Wear clean loose fitting clothing.  Remove all piercings,rings and jewelry and leave at home.  Glasses,hearing aides and contacts will be removed prior to surgery- bring appropriate cases.  Leave your hair down-no buns,ponytails or metal hair accessories.  NO nail polish -if you have artificial or gel nails check with your surgeon.   NO makeup especially eye makeup.  Do not smoke,drink alcohol or use recreational drugs 24 hours prior to surgery.  Bring a copy of your Living Will or Durable Power of  the day of surgery.  If you are staying overnight

## 2024-08-22 NOTE — PROGRESS NOTES
[facility]   Congestive Heart Failure    Primary Care Doctor:  Soha Tom APRN - NP  Primary Cardiologist: Karen         Chief Complaint:  SOB    History of Present Illness:  Isela Conner is a 79 y.o. female with PMH PAF, SSS s/p PPM , HTN, restrictive lung disease, severe MR, PAH, scleroderma, DM, CKD and HFpEF who presents today for hospital f/u.She was admitted 8/15/24 and discharged 8/19/24.   Hospital Course: Acute on chronic diastolic heart failure: Appears compensated  HST 23-> 19.  EKG -ve for acute ischemic changes.  Echo on 8/16/24 showed EF of 60 to 65%. Moderate TR  Cardiology consulted; initially diuresed with IV Lasix later switched to p.o. diuretics     Hx of PAD/left ankle wound  Vascular surgery consulted:s/p left leg angiogram on 8/16/24 which showed 99% left common femoral artery stenosis. Plan for left femoral endarterectomy outpatient.      Pulmonary hypertension; On bosentan     Hx of chronic hypoxic respiratory failure on 2 L at baseline     Hx of A-fib; Follows with EP cardiology; on amiodarone, digoxin, diltiazem and Eliquis.      CKD stage III; creatinine 1.3 on admission (baseline 0.9-1.2); trended down to 1.2. Nephrology input appreciated     Hx of SSS stable s/p permanent pacemaker on 5/19/20     Hypertension; continue current regimen and monitor BP closely     Diabetes mellitus; HgbA1c 8.2 on 8/15/2024.  On Ozempic per her PCP     Hyperlipidemia; continue statins     Peripheral neuropathy; continue gabapentin     Hx of scleroderma; on Plaquenil     Hx of fibromyalgia     Hosp f/u phone call: 8/20/24    Today:  she is to have left femoral endarterectomy tomorrow with Dr Manning. She tells me lasix dose was cut in half at d/c and today her wt is up 5lb and she c/o orthopnea last night and increased SOB today. She denies chest pain, palpitations, orthopnea, PND, exertional chest pressure/discomfort, fatigue, early saiety, syncope.     Today's home wt:    08/19/2024 04:54 AM    CL 99 08/18/2024 04:34 AM     08/17/2024 05:29 AM    CO2 25 08/19/2024 04:54 AM    CO2 26 08/18/2024 04:34 AM    CO2 29 08/17/2024 05:29 AM    PHOS 3.8 08/16/2024 05:46 AM    PHOS 3.3 06/21/2023 04:40 AM    PHOS 4.0 06/15/2021 01:28 PM    BUN 29 08/19/2024 04:54 AM    BUN 27 08/18/2024 04:34 AM    BUN 25 08/17/2024 05:29 AM    CREATININE 1.2 08/19/2024 04:54 AM    CREATININE 1.5 08/18/2024 04:34 AM    CREATININE 1.2 08/17/2024 05:29 AM     BNP:   Lab Results   Component Value Date/Time    PROBNP 251 08/18/2024 04:34 AM    PROBNP 738 08/15/2024 03:43 AM    PROBNP 1,172 03/07/2024 12:33 PM     Iron Studies:    Lab Results   Component Value Date/Time    TIBC 261 08/16/2024 05:46 AM    TIBC 303 03/29/2024 12:23 PM    TIBC 275 06/20/2023 04:37 AM    FERRITIN 131.0 08/16/2024 05:46 AM    FERRITIN 169.6 06/20/2023 04:37 AM    FERRITIN 96.8 09/08/2022 12:18 PM     Lab Results   Component Value Date    IRON 31 (L) 08/16/2024    TIBC 261 08/16/2024    FERRITIN 131.0 08/16/2024        Assessment/Plan:    1. chronic diastolic congestive heart failure (HCC) - volume up, extra dose lasix today and as needed after surgery to keep wt 152-154, labs at next OV   2. SOB (shortness of breath) - increased, diurese   3. Benign essential HTN - controlled   4. PAH (pulmonary artery hypertension) (HCC) - stable, continue tracleer             Instructions:   Medications: today take extra 20mg lasix and will try to keep wt closer to 152lb baseline, if you are still over baseline after surgery then continue 40mg until it is back down  Labs: at next office visit  Lifestyle Recommendations: Weigh yourself every day in the morning after urination, call Jessi if wt increases 2-3lb in one day or 5lb in one week, Limit sodium to 3000mg/day and fluids to 2L or 64oz/day.   Follow up:as scheduled        [facility] Resource Line: [phone]      I appreciate the opportunity of cooperating in the care of this

## 2024-08-26 NOTE — PROGRESS NOTES
[facility]   Vascular Surgery Followup    Referring Provider:  Soha Tom APRN - NP     No chief complaint on file.       History of Present Illness:  79-year-old female presents today for follow-up with a nonhealing left foot wound and known peripheral vascular disease.  Patient underwent peripheral angiography August 16, 2024 showing a 99% left common femoral artery stenosis/occlusion with recommendation for left femoral endarterectomy for ongoing symptom management.  She was noted to have mild diffuse atherosclerosis in bilateral iliac arteries but were widely patent.  She presents today for an additional questions.    Past Medical History:   has a past medical history of CHF (congestive heart failure) ([facility]), CKD stage 3a, GFR 45-59 ml/min ([facility]), Diabetes mellitus ([facility]), Fatty liver, Femur fracture, left ([facility]), Fibromyalgia, Fracture, hip ([facility]), GERD (gastroesophageal reflux disease), Hypertension, IBS (irritable bowel syndrome), Osteoarthritis, Peripheral neuropathy, Postmenopausal, Pulmonary hypertension ([facility]), and Scleroderma ([facility]).    Surgical History:   has a past surgical history that includes Hysterectomy (1987); Cholecystectomy (1989); Lumbar disc surgery (2007); Throat surgery (2012); arthroplasty (08/10/2012); Fibula Fracture Surgery (2017); back surgery; Cystocopy (08/01/2018); Partial hip arthroplasty (Right, 09/2019); Femur fracture surgery; invasive vascular (Left, 8/16/2024); and invasive vascular (Left, 8/16/2024).     Social History:   reports that she quit smoking about 13 years ago. Her smoking use included cigarettes. She started smoking about 64 years ago. She has a 154.9 pack-year smoking history. She has never used smokeless tobacco. She reports that she does not drink alcohol and does not use drugs.     Family History:  family history includes Cancer in her mother and another family member; Diabetes in her mother and sister; Heart Disease in her mother; Hypertension in her father

## 2024-08-27 ENCOUNTER — OFFICE VISIT (OUTPATIENT)
Dept: VASCULAR SURGERY | Age: 79
End: 2024-08-27
Payer: MEDICARE

## 2024-08-27 VITALS — WEIGHT: 153 LBS | HEIGHT: 67 IN | BODY MASS INDEX: 24.01 KG/M2

## 2024-08-27 DIAGNOSIS — I70.223 ATHEROSCLEROSIS OF NATIVE ARTERIES OF EXTREMITIES WITH REST PAIN, BILATERAL LEGS (HCC): Primary | ICD-10-CM

## 2024-08-27 PROCEDURE — 1036F TOBACCO NON-USER: CPT | Performed by: SURGERY

## 2024-08-27 PROCEDURE — G8427 DOCREV CUR MEDS BY ELIG CLIN: HCPCS | Performed by: SURGERY

## 2024-08-27 PROCEDURE — G8420 CALC BMI NORM PARAMETERS: HCPCS | Performed by: SURGERY

## 2024-08-27 PROCEDURE — G8399 PT W/DXA RESULTS DOCUMENT: HCPCS | Performed by: SURGERY

## 2024-08-27 PROCEDURE — 1123F ACP DISCUSS/DSCN MKR DOCD: CPT | Performed by: SURGERY

## 2024-08-27 PROCEDURE — 99212 OFFICE O/P EST SF 10 MIN: CPT | Performed by: SURGERY

## 2024-08-27 PROCEDURE — 1111F DSCHRG MED/CURRENT MED MERGE: CPT | Performed by: SURGERY

## 2024-08-27 PROCEDURE — 1090F PRES/ABSN URINE INCON ASSESS: CPT | Performed by: SURGERY

## 2024-08-28 ENCOUNTER — OFFICE VISIT (OUTPATIENT)
Dept: CARDIOLOGY CLINIC | Age: 79
End: 2024-08-28

## 2024-08-28 VITALS
HEIGHT: 67 IN | BODY MASS INDEX: 25.11 KG/M2 | HEART RATE: 66 BPM | DIASTOLIC BLOOD PRESSURE: 60 MMHG | SYSTOLIC BLOOD PRESSURE: 124 MMHG | OXYGEN SATURATION: 99 % | WEIGHT: 160 LBS

## 2024-08-28 DIAGNOSIS — I10 BENIGN ESSENTIAL HTN: ICD-10-CM

## 2024-08-28 DIAGNOSIS — I50.32 CHRONIC DIASTOLIC CONGESTIVE HEART FAILURE (HCC): Primary | ICD-10-CM

## 2024-08-28 DIAGNOSIS — R06.09 DYSPNEA ON EXERTION: ICD-10-CM

## 2024-08-28 DIAGNOSIS — Z09 HOSPITAL DISCHARGE FOLLOW-UP: ICD-10-CM

## 2024-08-28 DIAGNOSIS — I27.20 PULMONARY HYPERTENSION (HCC): ICD-10-CM

## 2024-08-28 DIAGNOSIS — I48.20 ATRIAL FIBRILLATION, CHRONIC (HCC): ICD-10-CM

## 2024-08-28 NOTE — PATIENT INSTRUCTIONS
Instructions:   Medications: today take extra 20mg lasix and will try to keep wt closer to 152lb baseline, if you are still over baseline after surgery then continue 40mg until it is back down  Labs: at next office visit  Lifestyle Recommendations: Weigh yourself every day in the morning after urination, call Jessi if wt increases 2-3lb in one day or 5lb in one week, Limit sodium to 3000mg/day and fluids to 2L or 64oz/day.   Follow up:as scheduled        [facility] Resource Line: [phone]

## 2024-08-29 ENCOUNTER — HOSPITAL ENCOUNTER (INPATIENT)
Age: 79
LOS: 1 days | Discharge: HOME HEALTH | End: 2024-08-30
Attending: SURGERY | Admitting: SURGERY
Payer: MEDICARE

## 2024-08-29 ENCOUNTER — ANESTHESIA EVENT (OUTPATIENT)
Dept: OPERATING ROOM | Age: 79
End: 2024-08-29
Payer: MEDICARE

## 2024-08-29 ENCOUNTER — ANESTHESIA (OUTPATIENT)
Dept: OPERATING ROOM | Age: 79
End: 2024-08-29
Payer: MEDICARE

## 2024-08-29 PROBLEM — I70.248 ATHEROSCLEROSIS OF NATIVE ARTERIES OF LEFT LEG WITH ULCERATION OF OTHER PART OF LOWER LEG (HCC): Status: ACTIVE | Noted: 2024-08-15

## 2024-08-29 PROBLEM — I70.245 ATHEROSCLEROSIS OF NATIVE ARTERIES OF LEFT LEG WITH ULCERATION OF OTHER PART OF FOOT (HCC): Status: ACTIVE | Noted: 2024-08-29

## 2024-08-29 LAB
ABO + RH BLD: NORMAL
ANION GAP SERPL CALCULATED.3IONS-SCNC: 10 MMOL/L (ref 3–16)
APTT BLD: 28.9 SEC (ref 22.1–36.4)
BLD GP AB SCN SERPL QL: NORMAL
BUN SERPL-MCNC: 23 MG/DL (ref 7–20)
CALCIUM SERPL-MCNC: 9.1 MG/DL (ref 8.3–10.6)
CHLORIDE SERPL-SCNC: 98 MMOL/L (ref 99–110)
CO2 SERPL-SCNC: 29 MMOL/L (ref 21–32)
CREAT SERPL-MCNC: 1.5 MG/DL (ref 0.6–1.2)
DEPRECATED RDW RBC AUTO: 14.1 % (ref 12.4–15.4)
GFR SERPLBLD CREATININE-BSD FMLA CKD-EPI: 35 ML/MIN/{1.73_M2}
GLUCOSE BLD-MCNC: 207 MG/DL (ref 70–99)
GLUCOSE SERPL-MCNC: 200 MG/DL (ref 70–99)
HCT VFR BLD AUTO: 35.7 % (ref 36–48)
HGB BLD-MCNC: 11.4 G/DL (ref 12–16)
INR PPP: 1.29 (ref 0.85–1.15)
MCH RBC QN AUTO: 29.3 PG (ref 26–34)
MCHC RBC AUTO-ENTMCNC: 32 G/DL (ref 31–36)
MCV RBC AUTO: 91.6 FL (ref 80–100)
PERFORMED ON: ABNORMAL
PLATELET # BLD AUTO: 180 K/UL (ref 135–450)
PMV BLD AUTO: 9.8 FL (ref 5–10.5)
POTASSIUM SERPL-SCNC: 4.8 MMOL/L (ref 3.5–5.1)
PROTHROMBIN TIME: 16.3 SEC (ref 11.9–14.9)
RBC # BLD AUTO: 3.9 M/UL (ref 4–5.2)
REASON FOR REJECTION: NORMAL
REJECTED TEST: NORMAL
SODIUM SERPL-SCNC: 137 MMOL/L (ref 136–145)
WBC # BLD AUTO: 9 K/UL (ref 4–11)

## 2024-08-29 PROCEDURE — 35371 RECHANNELING OF ARTERY: CPT | Performed by: SURGERY

## 2024-08-29 PROCEDURE — APPNB30 APP NON BILLABLE TIME 0-30 MINS: Performed by: NURSE PRACTITIONER

## 2024-08-29 PROCEDURE — 2580000003 HC RX 258: Performed by: SURGERY

## 2024-08-29 PROCEDURE — 86900 BLOOD TYPING SEROLOGIC ABO: CPT

## 2024-08-29 PROCEDURE — 3600000014 HC SURGERY LEVEL 4 ADDTL 15MIN: Performed by: SURGERY

## 2024-08-29 PROCEDURE — 3700000001 HC ADD 15 MINUTES (ANESTHESIA): Performed by: SURGERY

## 2024-08-29 PROCEDURE — A4217 STERILE WATER/SALINE, 500 ML: HCPCS | Performed by: SURGERY

## 2024-08-29 PROCEDURE — 3700000000 HC ANESTHESIA ATTENDED CARE: Performed by: SURGERY

## 2024-08-29 PROCEDURE — 36415 COLL VENOUS BLD VENIPUNCTURE: CPT

## 2024-08-29 PROCEDURE — 6370000000 HC RX 637 (ALT 250 FOR IP): Performed by: ANESTHESIOLOGY

## 2024-08-29 PROCEDURE — 2500000003 HC RX 250 WO HCPCS: Performed by: NURSE ANESTHETIST, CERTIFIED REGISTERED

## 2024-08-29 PROCEDURE — 7100000001 HC PACU RECOVERY - ADDTL 15 MIN: Performed by: SURGERY

## 2024-08-29 PROCEDURE — 04CL0ZZ EXTIRPATION OF MATTER FROM LEFT FEMORAL ARTERY, OPEN APPROACH: ICD-10-PCS | Performed by: SURGERY

## 2024-08-29 PROCEDURE — 85730 THROMBOPLASTIN TIME PARTIAL: CPT

## 2024-08-29 PROCEDURE — 2709999900 HC NON-CHARGEABLE SUPPLY: Performed by: SURGERY

## 2024-08-29 PROCEDURE — 6370000000 HC RX 637 (ALT 250 FOR IP): Performed by: NURSE PRACTITIONER

## 2024-08-29 PROCEDURE — 04UL0KZ SUPPLEMENT LEFT FEMORAL ARTERY WITH NONAUTOLOGOUS TISSUE SUBSTITUTE, OPEN APPROACH: ICD-10-PCS | Performed by: SURGERY

## 2024-08-29 PROCEDURE — 85027 COMPLETE CBC AUTOMATED: CPT

## 2024-08-29 PROCEDURE — 86850 RBC ANTIBODY SCREEN: CPT

## 2024-08-29 PROCEDURE — 2580000003 HC RX 258: Performed by: NURSE ANESTHETIST, CERTIFIED REGISTERED

## 2024-08-29 PROCEDURE — 85610 PROTHROMBIN TIME: CPT

## 2024-08-29 PROCEDURE — 80048 BASIC METABOLIC PNL TOTAL CA: CPT

## 2024-08-29 PROCEDURE — 6360000002 HC RX W HCPCS: Performed by: SURGERY

## 2024-08-29 PROCEDURE — 2000000000 HC ICU R&B

## 2024-08-29 PROCEDURE — 6370000000 HC RX 637 (ALT 250 FOR IP): Performed by: NURSE ANESTHETIST, CERTIFIED REGISTERED

## 2024-08-29 PROCEDURE — 88304 TISSUE EXAM BY PATHOLOGIST: CPT

## 2024-08-29 PROCEDURE — C1768 GRAFT, VASCULAR: HCPCS | Performed by: SURGERY

## 2024-08-29 PROCEDURE — 6370000000 HC RX 637 (ALT 250 FOR IP): Performed by: SURGERY

## 2024-08-29 PROCEDURE — 6360000002 HC RX W HCPCS: Performed by: NURSE ANESTHETIST, CERTIFIED REGISTERED

## 2024-08-29 PROCEDURE — 7100000000 HC PACU RECOVERY - FIRST 15 MIN: Performed by: SURGERY

## 2024-08-29 PROCEDURE — C1889 IMPLANT/INSERT DEVICE, NOC: HCPCS | Performed by: SURGERY

## 2024-08-29 PROCEDURE — 86901 BLOOD TYPING SEROLOGIC RH(D): CPT

## 2024-08-29 PROCEDURE — 3600000004 HC SURGERY LEVEL 4 BASE: Performed by: SURGERY

## 2024-08-29 DEVICE — CLIP LIG M BLU TI HRT SHP WIRE HORZ 6 CLIPS PER PK: Type: IMPLANTABLE DEVICE | Status: FUNCTIONAL

## 2024-08-29 DEVICE — IMPLANTABLE DEVICE: Type: IMPLANTABLE DEVICE | Status: FUNCTIONAL

## 2024-08-29 DEVICE — XENOSURE BIOLOGIC PATCH, 0.8CM X 8CM, EIFU
Type: IMPLANTABLE DEVICE | Site: GROIN | Status: FUNCTIONAL
Brand: XENOSURE BIOLOGIC PATCH

## 2024-08-29 RX ORDER — HYDROCODONE BITARTRATE AND ACETAMINOPHEN 7.5; 325 MG/1; MG/1
1 TABLET ORAL EVERY 6 HOURS PRN
Status: DISCONTINUED | OUTPATIENT
Start: 2024-08-29 | End: 2024-08-30

## 2024-08-29 RX ORDER — HYDROXYCHLOROQUINE SULFATE 200 MG/1
200 TABLET, FILM COATED ORAL DAILY
Status: DISCONTINUED | OUTPATIENT
Start: 2024-08-30 | End: 2024-08-30 | Stop reason: HOSPADM

## 2024-08-29 RX ORDER — MORPHINE SULFATE 2 MG/ML
2 INJECTION, SOLUTION INTRAMUSCULAR; INTRAVENOUS
Status: DISCONTINUED | OUTPATIENT
Start: 2024-08-29 | End: 2024-08-30 | Stop reason: HOSPADM

## 2024-08-29 RX ORDER — ROCURONIUM BROMIDE 10 MG/ML
INJECTION, SOLUTION INTRAVENOUS PRN
Status: DISCONTINUED | OUTPATIENT
Start: 2024-08-29 | End: 2024-08-29 | Stop reason: SDUPTHER

## 2024-08-29 RX ORDER — LIDOCAINE HYDROCHLORIDE 40 MG/ML
SOLUTION TOPICAL PRN
Status: DISCONTINUED | OUTPATIENT
Start: 2024-08-29 | End: 2024-08-29 | Stop reason: SDUPTHER

## 2024-08-29 RX ORDER — FENTANYL CITRATE 50 UG/ML
INJECTION, SOLUTION INTRAMUSCULAR; INTRAVENOUS PRN
Status: DISCONTINUED | OUTPATIENT
Start: 2024-08-29 | End: 2024-08-29 | Stop reason: SDUPTHER

## 2024-08-29 RX ORDER — HEPARIN SODIUM 1000 [USP'U]/ML
INJECTION, SOLUTION INTRAVENOUS; SUBCUTANEOUS PRN
Status: DISCONTINUED | OUTPATIENT
Start: 2024-08-29 | End: 2024-08-29 | Stop reason: SDUPTHER

## 2024-08-29 RX ORDER — SUCCINYLCHOLINE/SOD CL,ISO/PF 200MG/10ML
SYRINGE (ML) INTRAVENOUS PRN
Status: DISCONTINUED | OUTPATIENT
Start: 2024-08-29 | End: 2024-08-29 | Stop reason: SDUPTHER

## 2024-08-29 RX ORDER — FAMOTIDINE 10 MG/ML
INJECTION, SOLUTION INTRAVENOUS PRN
Status: DISCONTINUED | OUTPATIENT
Start: 2024-08-29 | End: 2024-08-29 | Stop reason: SDUPTHER

## 2024-08-29 RX ORDER — HYDROMORPHONE HYDROCHLORIDE 2 MG/ML
0.5 INJECTION, SOLUTION INTRAMUSCULAR; INTRAVENOUS; SUBCUTANEOUS EVERY 5 MIN PRN
Status: DISCONTINUED | OUTPATIENT
Start: 2024-08-29 | End: 2024-08-29 | Stop reason: HOSPADM

## 2024-08-29 RX ORDER — SODIUM CHLORIDE 0.9 % (FLUSH) 0.9 %
5-40 SYRINGE (ML) INJECTION EVERY 12 HOURS SCHEDULED
Status: DISCONTINUED | OUTPATIENT
Start: 2024-08-29 | End: 2024-08-29 | Stop reason: HOSPADM

## 2024-08-29 RX ORDER — LIDOCAINE HYDROCHLORIDE 20 MG/ML
INJECTION, SOLUTION EPIDURAL; INFILTRATION; INTRACAUDAL; PERINEURAL PRN
Status: DISCONTINUED | OUTPATIENT
Start: 2024-08-29 | End: 2024-08-29 | Stop reason: SDUPTHER

## 2024-08-29 RX ORDER — ONDANSETRON 4 MG/1
4 TABLET, ORALLY DISINTEGRATING ORAL EVERY 8 HOURS PRN
Status: DISCONTINUED | OUTPATIENT
Start: 2024-08-29 | End: 2024-08-30 | Stop reason: HOSPADM

## 2024-08-29 RX ORDER — CLOPIDOGREL BISULFATE 75 MG/1
75 TABLET ORAL DAILY
Status: DISCONTINUED | OUTPATIENT
Start: 2024-08-29 | End: 2024-08-30 | Stop reason: HOSPADM

## 2024-08-29 RX ORDER — ACETAMINOPHEN 500 MG
1000 TABLET ORAL ONCE
Status: COMPLETED | OUTPATIENT
Start: 2024-08-29 | End: 2024-08-29

## 2024-08-29 RX ORDER — SODIUM CHLORIDE 9 MG/ML
INJECTION, SOLUTION INTRAVENOUS PRN
Status: DISCONTINUED | OUTPATIENT
Start: 2024-08-29 | End: 2024-08-30 | Stop reason: HOSPADM

## 2024-08-29 RX ORDER — METHOCARBAMOL 750 MG/1
375 TABLET, FILM COATED ORAL 3 TIMES DAILY PRN
Status: DISCONTINUED | OUTPATIENT
Start: 2024-08-29 | End: 2024-08-30 | Stop reason: HOSPADM

## 2024-08-29 RX ORDER — ASPIRIN 81 MG/1
81 TABLET, CHEWABLE ORAL DAILY
Status: DISCONTINUED | OUTPATIENT
Start: 2024-08-29 | End: 2024-08-29 | Stop reason: SDUPTHER

## 2024-08-29 RX ORDER — SODIUM CHLORIDE 0.9 % (FLUSH) 0.9 %
5-40 SYRINGE (ML) INJECTION PRN
Status: DISCONTINUED | OUTPATIENT
Start: 2024-08-29 | End: 2024-08-29 | Stop reason: HOSPADM

## 2024-08-29 RX ORDER — PROPOFOL 10 MG/ML
INJECTION, EMULSION INTRAVENOUS PRN
Status: DISCONTINUED | OUTPATIENT
Start: 2024-08-29 | End: 2024-08-29 | Stop reason: SDUPTHER

## 2024-08-29 RX ORDER — PHENYLEPHRINE HCL IN 0.9% NACL 1 MG/10 ML
SYRINGE (ML) INTRAVENOUS PRN
Status: DISCONTINUED | OUTPATIENT
Start: 2024-08-29 | End: 2024-08-29 | Stop reason: SDUPTHER

## 2024-08-29 RX ORDER — SODIUM CHLORIDE, SODIUM LACTATE, POTASSIUM CHLORIDE, CALCIUM CHLORIDE 600; 310; 30; 20 MG/100ML; MG/100ML; MG/100ML; MG/100ML
INJECTION, SOLUTION INTRAVENOUS CONTINUOUS
Status: DISCONTINUED | OUTPATIENT
Start: 2024-08-29 | End: 2024-08-30

## 2024-08-29 RX ORDER — AMIODARONE HYDROCHLORIDE 200 MG/1
100 TABLET ORAL DAILY
Status: DISCONTINUED | OUTPATIENT
Start: 2024-08-30 | End: 2024-08-30 | Stop reason: HOSPADM

## 2024-08-29 RX ORDER — DILTIAZEM HYDROCHLORIDE 180 MG/1
180 CAPSULE, COATED, EXTENDED RELEASE ORAL DAILY
Status: DISCONTINUED | OUTPATIENT
Start: 2024-08-30 | End: 2024-08-30 | Stop reason: HOSPADM

## 2024-08-29 RX ORDER — SODIUM CHLORIDE 9 MG/ML
INJECTION, SOLUTION INTRAVENOUS CONTINUOUS
Status: DISCONTINUED | OUTPATIENT
Start: 2024-08-29 | End: 2024-08-29 | Stop reason: HOSPADM

## 2024-08-29 RX ORDER — SODIUM CHLORIDE 9 MG/ML
INJECTION, SOLUTION INTRAVENOUS PRN
Status: DISCONTINUED | OUTPATIENT
Start: 2024-08-29 | End: 2024-08-29 | Stop reason: HOSPADM

## 2024-08-29 RX ORDER — DULOXETIN HYDROCHLORIDE 30 MG/1
60 CAPSULE, DELAYED RELEASE ORAL DAILY
Status: DISCONTINUED | OUTPATIENT
Start: 2024-08-30 | End: 2024-08-30 | Stop reason: HOSPADM

## 2024-08-29 RX ORDER — SODIUM CHLORIDE 0.9 % (FLUSH) 0.9 %
5-40 SYRINGE (ML) INJECTION PRN
Status: DISCONTINUED | OUTPATIENT
Start: 2024-08-29 | End: 2024-08-30 | Stop reason: HOSPADM

## 2024-08-29 RX ORDER — ATORVASTATIN CALCIUM 10 MG/1
10 TABLET, FILM COATED ORAL NIGHTLY
Status: DISCONTINUED | OUTPATIENT
Start: 2024-08-29 | End: 2024-08-30 | Stop reason: HOSPADM

## 2024-08-29 RX ORDER — OXYCODONE HYDROCHLORIDE 5 MG/1
5 TABLET ORAL EVERY 4 HOURS PRN
Status: DISCONTINUED | OUTPATIENT
Start: 2024-08-29 | End: 2024-08-30 | Stop reason: HOSPADM

## 2024-08-29 RX ORDER — ONDANSETRON 2 MG/ML
INJECTION INTRAMUSCULAR; INTRAVENOUS PRN
Status: DISCONTINUED | OUTPATIENT
Start: 2024-08-29 | End: 2024-08-29 | Stop reason: SDUPTHER

## 2024-08-29 RX ORDER — ASPIRIN 81 MG/1
81 TABLET ORAL DAILY
Status: DISCONTINUED | OUTPATIENT
Start: 2024-08-30 | End: 2024-08-29

## 2024-08-29 RX ORDER — ONDANSETRON 2 MG/ML
4 INJECTION INTRAMUSCULAR; INTRAVENOUS EVERY 6 HOURS PRN
Status: DISCONTINUED | OUTPATIENT
Start: 2024-08-29 | End: 2024-08-30 | Stop reason: HOSPADM

## 2024-08-29 RX ORDER — SODIUM CHLORIDE 0.9 % (FLUSH) 0.9 %
5-40 SYRINGE (ML) INJECTION EVERY 12 HOURS SCHEDULED
Status: DISCONTINUED | OUTPATIENT
Start: 2024-08-29 | End: 2024-08-30 | Stop reason: HOSPADM

## 2024-08-29 RX ORDER — FUROSEMIDE 20 MG
20 TABLET ORAL DAILY
Status: DISCONTINUED | OUTPATIENT
Start: 2024-08-30 | End: 2024-08-30 | Stop reason: HOSPADM

## 2024-08-29 RX ORDER — GABAPENTIN 300 MG/1
600 CAPSULE ORAL 3 TIMES DAILY
Status: DISCONTINUED | OUTPATIENT
Start: 2024-08-29 | End: 2024-08-30 | Stop reason: HOSPADM

## 2024-08-29 RX ORDER — METOPROLOL SUCCINATE 50 MG/1
100 TABLET, EXTENDED RELEASE ORAL DAILY
Status: DISCONTINUED | OUTPATIENT
Start: 2024-08-30 | End: 2024-08-30 | Stop reason: HOSPADM

## 2024-08-29 RX ORDER — MEPERIDINE HYDROCHLORIDE 25 MG/ML
12.5 INJECTION INTRAMUSCULAR; INTRAVENOUS; SUBCUTANEOUS EVERY 5 MIN PRN
Status: DISCONTINUED | OUTPATIENT
Start: 2024-08-29 | End: 2024-08-29 | Stop reason: HOSPADM

## 2024-08-29 RX ORDER — SPIRONOLACTONE 25 MG/1
25 TABLET ORAL DAILY
Status: DISCONTINUED | OUTPATIENT
Start: 2024-08-30 | End: 2024-08-30 | Stop reason: HOSPADM

## 2024-08-29 RX ORDER — HYDROMORPHONE HYDROCHLORIDE 2 MG/ML
0.25 INJECTION, SOLUTION INTRAMUSCULAR; INTRAVENOUS; SUBCUTANEOUS EVERY 5 MIN PRN
Status: DISCONTINUED | OUTPATIENT
Start: 2024-08-29 | End: 2024-08-29 | Stop reason: HOSPADM

## 2024-08-29 RX ORDER — OXYCODONE HYDROCHLORIDE 5 MG/1
5 TABLET ORAL
Status: DISCONTINUED | OUTPATIENT
Start: 2024-08-29 | End: 2024-08-29 | Stop reason: HOSPADM

## 2024-08-29 RX ORDER — ONDANSETRON 2 MG/ML
4 INJECTION INTRAMUSCULAR; INTRAVENOUS
Status: DISCONTINUED | OUTPATIENT
Start: 2024-08-29 | End: 2024-08-29 | Stop reason: HOSPADM

## 2024-08-29 RX ORDER — NALOXONE HYDROCHLORIDE 0.4 MG/ML
INJECTION, SOLUTION INTRAMUSCULAR; INTRAVENOUS; SUBCUTANEOUS PRN
Status: DISCONTINUED | OUTPATIENT
Start: 2024-08-29 | End: 2024-08-29 | Stop reason: HOSPADM

## 2024-08-29 RX ORDER — BOSENTAN 125 MG/1
125 TABLET, FILM COATED ORAL 2 TIMES DAILY
Status: DISCONTINUED | OUTPATIENT
Start: 2024-08-29 | End: 2024-08-30 | Stop reason: HOSPADM

## 2024-08-29 RX ORDER — PROTAMINE SULFATE 10 MG/ML
INJECTION, SOLUTION INTRAVENOUS PRN
Status: DISCONTINUED | OUTPATIENT
Start: 2024-08-29 | End: 2024-08-29 | Stop reason: SDUPTHER

## 2024-08-29 RX ADMIN — ROCURONIUM BROMIDE 10 MG: 10 INJECTION, SOLUTION INTRAVENOUS at 11:20

## 2024-08-29 RX ADMIN — SODIUM CHLORIDE, POTASSIUM CHLORIDE, SODIUM LACTATE AND CALCIUM CHLORIDE: 600; 310; 30; 20 INJECTION, SOLUTION INTRAVENOUS at 15:02

## 2024-08-29 RX ADMIN — GABAPENTIN 600 MG: 300 CAPSULE ORAL at 20:51

## 2024-08-29 RX ADMIN — PROPOFOL 20 MG: 10 INJECTION, EMULSION INTRAVENOUS at 11:21

## 2024-08-29 RX ADMIN — ROCURONIUM BROMIDE 30 MG: 10 INJECTION, SOLUTION INTRAVENOUS at 11:34

## 2024-08-29 RX ADMIN — CEFAZOLIN 2000 MG: 2 INJECTION, POWDER, FOR SOLUTION INTRAMUSCULAR; INTRAVENOUS at 11:29

## 2024-08-29 RX ADMIN — LIDOCAINE HYDROCHLORIDE 2 ML: 40 SOLUTION TOPICAL at 11:22

## 2024-08-29 RX ADMIN — Medication 100 MG: at 11:21

## 2024-08-29 RX ADMIN — SODIUM CHLORIDE: 9 INJECTION, SOLUTION INTRAVENOUS at 10:58

## 2024-08-29 RX ADMIN — PROPOFOL 80 MG: 10 INJECTION, EMULSION INTRAVENOUS at 11:20

## 2024-08-29 RX ADMIN — PHENYLEPHRINE HYDROCHLORIDE 30 MCG/MIN: 10 INJECTION INTRAVENOUS at 11:32

## 2024-08-29 RX ADMIN — GABAPENTIN 600 MG: 300 CAPSULE ORAL at 15:10

## 2024-08-29 RX ADMIN — HEPARIN SODIUM 4000 UNITS: 1000 INJECTION INTRAVENOUS; SUBCUTANEOUS at 11:54

## 2024-08-29 RX ADMIN — PROTAMINE SULFATE 30 MG: 10 INJECTION, SOLUTION INTRAVENOUS at 12:33

## 2024-08-29 RX ADMIN — LIDOCAINE HYDROCHLORIDE 50 MG: 20 INJECTION, SOLUTION EPIDURAL; INFILTRATION; INTRACAUDAL; PERINEURAL at 11:20

## 2024-08-29 RX ADMIN — FENTANYL CITRATE 25 MCG: 50 INJECTION, SOLUTION INTRAMUSCULAR; INTRAVENOUS at 11:20

## 2024-08-29 RX ADMIN — SUGAMMADEX 200 MG: 100 INJECTION, SOLUTION INTRAVENOUS at 12:53

## 2024-08-29 RX ADMIN — Medication 100 MCG: at 11:27

## 2024-08-29 RX ADMIN — ONDANSETRON 4 MG: 2 INJECTION INTRAMUSCULAR; INTRAVENOUS at 11:27

## 2024-08-29 RX ADMIN — FAMOTIDINE 20 MG: 10 INJECTION INTRAVENOUS at 11:12

## 2024-08-29 RX ADMIN — SODIUM CHLORIDE, PRESERVATIVE FREE 10 ML: 5 INJECTION INTRAVENOUS at 20:55

## 2024-08-29 RX ADMIN — ATORVASTATIN CALCIUM 10 MG: 10 TABLET, FILM COATED ORAL at 20:51

## 2024-08-29 RX ADMIN — Medication 100 MCG: at 11:20

## 2024-08-29 RX ADMIN — BOSENTAN 125 MG: 125 TABLET, FILM COATED ORAL at 20:50

## 2024-08-29 RX ADMIN — ACETAMINOPHEN 1000 MG: 500 TABLET ORAL at 10:57

## 2024-08-29 ASSESSMENT — PAIN - FUNCTIONAL ASSESSMENT: PAIN_FUNCTIONAL_ASSESSMENT: 0-10

## 2024-08-29 ASSESSMENT — PAIN DESCRIPTION - DESCRIPTORS: DESCRIPTORS: SHARP;BURNING

## 2024-08-29 ASSESSMENT — ENCOUNTER SYMPTOMS: SHORTNESS OF BREATH: 1

## 2024-08-29 NOTE — OP NOTE
Operative Note      Patient: Isela Cnoner  YOB: 1945  MRN: [id number]    Date of Procedure: 8/29/2024    Pre-Op Diagnosis Codes:      * PAD (peripheral artery disease) ([facility]) [I73.9]    Post-Op Diagnosis: Same       Procedure(s):  LEFT FEMORAL ENDARTERECTOMY    Surgeon(s):  Chang Manning II, MD    Assistant:   Surgical Assistant: Jas Wheatley    Anesthesia: General    Estimated Blood Loss (mL): Minimal    Complications: None    Specimens:   ID Type Source Tests Collected by Time Destination   A : A) PLAQUE FROM LEFT FEMORAL ARTERY Tissue Tissue SURGICAL PATHOLOGY Chang Manning II, MD 8/29/2024 1220        Implants:  Implant Name Type Inv. Item Serial No.  Lot No. LRB No. Used Action   CLIP INT WECK SM WIDE RED TI TRNSVRS GRV CHEVRON SHP W/ PERCIS TIP 6 PER PK - [id number]  CLIP INT WECK SM WIDE RED TI TRNSVRS GRV CHEVRON SHP W/ PERCIS TIP 6 PER PK  [employer]- [id number] Left 3 Implanted   CLIP LIG M ROCKY TI HRT SHP WIRE HORZ 6 CLIPS PER PK - [id number]  CLIP LIG M ROCKY TI HRT SHP WIRE HORZ 6 CLIPS PER PK  [employer]- [id number] Left 3 Implanted   GRAFT VASC W0.8XL8CM THK0.35-0.75MM CAR PERICARD PROC BOV - [id number] Vascular grafts GRAFT VASC W0.8XL8CM THK0.35-0.75MM CAR PERICARD PROC BOV  [employer]- [id number] Left 1 Implanted         Drains:   [REMOVED] External Urinary Catheter (Removed)   Site Assessment Clean,dry & intact 08/19/24 1226   Placement Repositioned 08/19/24 1226   Securement Method Securing device (Describe) 08/19/24 1226   Catheter Care Suction Canister/Tubing changed 08/19/24 0535   Perineal Care Yes 08/19/24 0535   Suction 40 mmgHg continuous 08/19/24 1226   Urine Color Yellow 08/19/24 1226   Urine Appearance Clear 08/19/24 1226   Urine Odor Malodorous 08/19/24 1226   Output (mL) 350 mL 08/19/24 0535       Findings:  Infection Present At Time Of Surgery (PATOS) (choose all levels that have infection present):  No infection  present  Other Findings: As above    Electronically signed by Chang Manning II, MD on 8/29/2024 at 12:46 PM

## 2024-08-29 NOTE — PROGRESS NOTES
Pt arrived from OR to PACU, VSS, O2 100% on simple mask 6L. Pt awakens to voice. Surgical site to lower groin closed with liquiband, CDI, sift to palpation, color and temp appropriate. Left lower leg has dressing covered with kurlex and tan elastic hose, left sided toe cap refill <3 sec, cool to touch, pedal pulse confirmed with doppler. Pt denies pain at this time. Will cont to monitor.

## 2024-08-29 NOTE — PROGRESS NOTES
Pt VSS. Pt denies pain at this time. Surgical site to left groin, CDI, soft to palpation, color and temp appropriate, left pedal pulse verified with doppler.Pt seen by anesthesia. Phase I criteria met, transferring to CVU

## 2024-08-29 NOTE — ANESTHESIA POSTPROCEDURE EVALUATION
Department of Anesthesiology  Postprocedure Note    Patient: Isela Conner  MRN: [id number]  YOB: 1945  Date of evaluation: 8/29/2024    Procedure Summary       Date: 08/29/24 Room / Location: [address]    Anesthesia Start: 1115 Anesthesia Stop: 1309    Procedure: LEFT FEMORAL ENDARTERECTOMY (Left: Groin) Diagnosis:       PAD (peripheral artery disease) ([facility])      (PAD (peripheral artery disease) ([facility]) [I73.9])    Surgeons: Chang Manning II, MD Responsible Provider: Daniel Agee MD    Anesthesia Type: general ASA Status: 3            Anesthesia Type: No value filed.    Constantin Phase I: Constantin Score: 9    Constantin Phase II:      Anesthesia Post Evaluation    Patient location during evaluation: PACU  Patient participation: complete - patient participated  Level of consciousness: awake  Airway patency: patent  Nausea & Vomiting: no nausea and no vomiting  Cardiovascular status: hemodynamically stable  Respiratory status: acceptable  Hydration status: stable  Multimodal analgesia pain management approach  Pain management: adequate    No notable events documented.

## 2024-08-29 NOTE — ANESTHESIA PRE PROCEDURE
Department of Anesthesiology  Preprocedure Note       Name:  Isela Conner   Age:  79 y.o.  :  1945                                          MRN:  [id number]         Date:  2024      Surgeon: Surgeon(s):  Chang Manning II, MD    Procedure: Procedure(s):  LEFT FEMORAL ENDARTERECTOMY    Medications prior to admission:   Prior to Admission medications    Medication Sig Start Date End Date Taking? Authorizing Provider   furosemide (LASIX) 40 MG tablet Take 0.5 tablets by mouth daily 24   Odalys Jackson MD   amiodarone (CORDARONE) 200 MG tablet Take 0.5 tablets by mouth daily  Patient taking differently: Take 0.5 tablets by mouth daily 1 tablet da 24   Ty Liang APRN - CNP   digoxin (LANOXIN) 125 MCG tablet Take 1 tablet by mouth every other day  Patient taking differently: Take 1 tablet by mouth every other day Patient reports taking it every other day. Does not remember the last time she took it. 24   Ty Liang APRN - CNP   atorvastatin (LIPITOR) 10 MG tablet TAKE 1 TABLET DAILY 24   Jessi Peralta APRN - CNP   spironolactone (ALDACTONE) 25 MG tablet TAKE 1 TABLET DAILY 24   Jessi Peralta APRN - CNP   dilTIAZem (CARDIZEM CD) 180 MG extended release capsule TAKE 1 CAPSULE DAILY 6/3/24   Ty Liang APRN - CNP   metoprolol succinate (TOPROL XL) 100 MG extended release tablet TAKE 1 TABLET DAILY 6/3/24   Ty Liang APRN - CNP   estradiol (ESTRACE) 0.1 MG/GM vaginal cream Place 2 g vaginally See Admin Instructions  and thursday  Patient not taking: Reported on 8/15/2024    Provider, MD Yaneth   aspirin 81 MG EC tablet Take 1 tablet by mouth daily 24   Corina Fuentes APRN - CNP   apixaban (ELIQUIS) 5 MG TABS tablet TAKE 1 TABLET TWICE A DAY 24   Corina Fuentes APRN - CNP   DULoxetine (CYMBALTA) 60 MG extended release capsule Take 1 capsule by mouth daily 3/31/24   Helferich, Corina L, APRN - CNP   busPIRone (BUSPAR) 5 MG tablet    Airway: Mallampati: II  TM distance: >3 FB   Neck ROM: full  Mouth opening: > = 3 FB   Dental: normal exam         Pulmonary:normal exam    (+)  COPD:  shortness of breath:             (-) asthma                           Cardiovascular:  Exercise tolerance: poor (<4 METS)  (+) hypertension:, valvular problems/murmurs (TR):, pacemaker (Not pacer dependant): pacemaker, dysrhythmias: atrial flutter, CHF: diastolic, pulmonary hypertension:, hyperlipidemia    (-) CABG/stent      Rhythm: regular  Rate: normal                 ROS comment: TTE 2024:    ·  Left Ventricle: Normal left ventricular systolic function with a visually estimated EF of 60 - 65%. EF by 2D Simpsons Biplane is 72%. Left ventricle size is normal. Normal wall thickness. Normal wall motion.  ·  Mitral Valve: Moderately calcified leaflets. Mild regurgitation.  ·  Tricuspid Valve: Moderate regurgitation. Elevated RVSP (52 mm Hg + Right atrial pressure)  ·  Left Atrium: Left atrial volume index is mildly increased (35-41 mL/m2).  ·  Image quality is adequate.       Neuro/Psych:      (-) seizures, TIA and CVA           GI/Hepatic/Renal:   (+) GERD: well controlled, renal disease: CRI     (-) liver disease       Endo/Other:    (+) DiabetesType II DM, poorly controlled, blood dyscrasia: anticoagulation therapy and anemia, arthritis:..    (-) hypothyroidism, hyperthyroidism               Abdominal:         (-) obese       Vascular:   + PVD, aortic or cerebral.       Other Findings:             Anesthesia Plan      general     ASA 3     (T&S, 3 L O2 at baseline, AC held)  Induction: intravenous.    MIPS: Postoperative opioids intended and Prophylactic antiemetics administered.  Anesthetic plan and risks discussed with patient.    Use of blood products discussed with patient whom consented to blood products.    Plan discussed with CRNA.                    Daniel Agee MD   8/29/2024

## 2024-08-29 NOTE — DISCHARGE SUMMARY
DISCHARGE SUMMARY      Patient ID:  Isela Conner  [id number] 79 y.o. 1945      Admission Date:  8/29/2024  9:45 AM  Discharge Date:  8/30/24    Principle Diagnosis:  PAD (peripheral artery disease) ([facility])    Secondary Diagnosis:  Principal Problem:    PAD (peripheral artery disease) ([facility])  Active Problems:    Atherosclerosis of native arteries of left leg with ulceration of other part of lower leg (HCC)    Atherosclerosis of native arteries of left leg with ulceration of other part of foot ([facility])  Resolved Problems:    * No resolved hospital problems. *    Patient Active Problem List   Diagnosis    Diabetes type 2, controlled ([facility])    Peripheral neuropathy    GERD (gastroesophageal reflux disease)    Scleroderma ([facility])    Chronic narcotic dependence ([facility])    Spinal stenosis of lumbar region without neurogenic claudication    PAF (paroxysmal atrial fibrillation) ([facility])    Diffusion capacity of lung (dl), decreased    Lumbosacral spondylosis without myelopathy    Restrictive ventilatory defect    Vocal cord polyp    Benign essential HTN    Near syncope    SSS (sick sinus syndrome) ([facility])    Pacemaker    Chronic obstructive pulmonary disease ([facility])    Degeneration of intervertebral disc of lumbosacral region    Herniation of lumbar intervertebral disc with radiculopathy    Atrial flutter ([facility])    Osteoporosis, postmenopausal    Osteoporotic fracture of right hip ([facility])    Closed displaced transverse fracture of shaft of femur with routine healing    Chronic congestive heart failure ([facility])    Pulmonary hypertension (HCC)    PAD (peripheral artery disease) ([facility])    Dyspnea    Atherosclerosis of native arteries of left leg with ulceration of other part of lower leg ([facility])    Acute on chronic diastolic congestive heart failure ([facility])    Atrial fibrillation, chronic ([facility])    Atherosclerosis of native arteries of left leg with ulceration of other part of foot ([facility])        Consults:  IP CONSULT TO SOCIAL WORK  IP CONSULT TO

## 2024-08-30 VITALS
OXYGEN SATURATION: 96 % | TEMPERATURE: 97.8 F | RESPIRATION RATE: 21 BRPM | HEIGHT: 67 IN | SYSTOLIC BLOOD PRESSURE: 101 MMHG | BODY MASS INDEX: 26.26 KG/M2 | DIASTOLIC BLOOD PRESSURE: 43 MMHG | WEIGHT: 167.33 LBS | HEART RATE: 61 BPM

## 2024-08-30 LAB
ANION GAP SERPL CALCULATED.3IONS-SCNC: 8 MMOL/L (ref 3–16)
BASOPHILS # BLD: 0 K/UL (ref 0–0.2)
BASOPHILS NFR BLD: 0.5 %
BUN SERPL-MCNC: 23 MG/DL (ref 7–20)
CALCIUM SERPL-MCNC: 7.9 MG/DL (ref 8.3–10.6)
CHLORIDE SERPL-SCNC: 105 MMOL/L (ref 99–110)
CO2 SERPL-SCNC: 27 MMOL/L (ref 21–32)
CREAT SERPL-MCNC: 1.1 MG/DL (ref 0.6–1.2)
DEPRECATED RDW RBC AUTO: 14.1 % (ref 12.4–15.4)
EOSINOPHIL # BLD: 0 K/UL (ref 0–0.6)
EOSINOPHIL NFR BLD: 0 %
GFR SERPLBLD CREATININE-BSD FMLA CKD-EPI: 50 ML/MIN/{1.73_M2}
GLUCOSE SERPL-MCNC: 235 MG/DL (ref 70–99)
HCT VFR BLD AUTO: 27.8 % (ref 36–48)
HGB BLD-MCNC: 9.2 G/DL (ref 12–16)
LYMPHOCYTES # BLD: 0.6 K/UL (ref 1–5.1)
LYMPHOCYTES NFR BLD: 6.3 %
MCH RBC QN AUTO: 30 PG (ref 26–34)
MCHC RBC AUTO-ENTMCNC: 33.3 G/DL (ref 31–36)
MCV RBC AUTO: 90.1 FL (ref 80–100)
MONOCYTES # BLD: 0.5 K/UL (ref 0–1.3)
MONOCYTES NFR BLD: 5.6 %
NEUTROPHILS # BLD: 8 K/UL (ref 1.7–7.7)
NEUTROPHILS NFR BLD: 87.6 %
PLATELET # BLD AUTO: 160 K/UL (ref 135–450)
PMV BLD AUTO: 9.2 FL (ref 5–10.5)
POTASSIUM SERPL-SCNC: 4.8 MMOL/L (ref 3.5–5.1)
RBC # BLD AUTO: 3.08 M/UL (ref 4–5.2)
SODIUM SERPL-SCNC: 140 MMOL/L (ref 136–145)
WBC # BLD AUTO: 9.1 K/UL (ref 4–11)

## 2024-08-30 PROCEDURE — 97530 THERAPEUTIC ACTIVITIES: CPT

## 2024-08-30 PROCEDURE — 2580000003 HC RX 258: Performed by: SURGERY

## 2024-08-30 PROCEDURE — 6370000000 HC RX 637 (ALT 250 FOR IP): Performed by: NURSE PRACTITIONER

## 2024-08-30 PROCEDURE — 97116 GAIT TRAINING THERAPY: CPT

## 2024-08-30 PROCEDURE — 80048 BASIC METABOLIC PNL TOTAL CA: CPT

## 2024-08-30 PROCEDURE — 97161 PT EVAL LOW COMPLEX 20 MIN: CPT

## 2024-08-30 PROCEDURE — 97535 SELF CARE MNGMENT TRAINING: CPT

## 2024-08-30 PROCEDURE — 99024 POSTOP FOLLOW-UP VISIT: CPT | Performed by: SURGERY

## 2024-08-30 PROCEDURE — APPNB30 APP NON BILLABLE TIME 0-30 MINS: Performed by: NURSE PRACTITIONER

## 2024-08-30 PROCEDURE — 85025 COMPLETE CBC W/AUTO DIFF WBC: CPT

## 2024-08-30 PROCEDURE — 6370000000 HC RX 637 (ALT 250 FOR IP): Performed by: SURGERY

## 2024-08-30 PROCEDURE — 97166 OT EVAL MOD COMPLEX 45 MIN: CPT

## 2024-08-30 PROCEDURE — APPSS15 APP SPLIT SHARED TIME 0-15 MINUTES: Performed by: NURSE PRACTITIONER

## 2024-08-30 RX ORDER — CLOPIDOGREL BISULFATE 75 MG/1
75 TABLET ORAL DAILY
Qty: 30 TABLET | Refills: 5 | Status: SHIPPED | OUTPATIENT
Start: 2024-08-31

## 2024-08-30 RX ORDER — CLOPIDOGREL BISULFATE 75 MG/1
75 TABLET ORAL DAILY
Qty: 30 TABLET | Refills: 5 | Status: SHIPPED | OUTPATIENT
Start: 2024-08-31 | End: 2024-08-30

## 2024-08-30 RX ORDER — CLOPIDOGREL BISULFATE 75 MG/1
75 TABLET ORAL DAILY
Qty: 30 TABLET | Refills: 0 | Status: SHIPPED | OUTPATIENT
Start: 2024-08-31 | End: 2024-08-30

## 2024-08-30 RX ADMIN — GABAPENTIN 600 MG: 300 CAPSULE ORAL at 07:45

## 2024-08-30 RX ADMIN — DILTIAZEM HYDROCHLORIDE 180 MG: 180 CAPSULE, EXTENDED RELEASE ORAL at 07:45

## 2024-08-30 RX ADMIN — GABAPENTIN 600 MG: 300 CAPSULE ORAL at 14:06

## 2024-08-30 RX ADMIN — FUROSEMIDE 20 MG: 20 TABLET ORAL at 07:45

## 2024-08-30 RX ADMIN — CLOPIDOGREL BISULFATE 75 MG: 75 TABLET ORAL at 07:45

## 2024-08-30 RX ADMIN — METOPROLOL SUCCINATE 100 MG: 50 TABLET, EXTENDED RELEASE ORAL at 07:46

## 2024-08-30 RX ADMIN — OXYCODONE HYDROCHLORIDE 5 MG: 5 TABLET ORAL at 07:45

## 2024-08-30 RX ADMIN — HYDROXYCHLOROQUINE SULFATE 200 MG: 200 TABLET ORAL at 07:48

## 2024-08-30 RX ADMIN — SODIUM CHLORIDE, PRESERVATIVE FREE 10 ML: 5 INJECTION INTRAVENOUS at 07:48

## 2024-08-30 RX ADMIN — AMIODARONE HYDROCHLORIDE 100 MG: 200 TABLET ORAL at 07:46

## 2024-08-30 RX ADMIN — SODIUM CHLORIDE, POTASSIUM CHLORIDE, SODIUM LACTATE AND CALCIUM CHLORIDE: 600; 310; 30; 20 INJECTION, SOLUTION INTRAVENOUS at 00:14

## 2024-08-30 RX ADMIN — BOSENTAN 125 MG: 125 TABLET, FILM COATED ORAL at 07:48

## 2024-08-30 RX ADMIN — SPIRONOLACTONE 25 MG: 25 TABLET ORAL at 07:46

## 2024-08-30 RX ADMIN — DULOXETINE HYDROCHLORIDE 60 MG: 30 CAPSULE, DELAYED RELEASE ORAL at 07:45

## 2024-08-30 ASSESSMENT — PAIN SCALES - GENERAL
PAINLEVEL_OUTOF10: 2
PAINLEVEL_OUTOF10: 2
PAINLEVEL_OUTOF10: 6
PAINLEVEL_OUTOF10: 6

## 2024-08-30 NOTE — CARE COORDINATION
08/30/24 1314   IMM Letter   IMM Letter given to Patient/Family/Significant other/Guardian/POA/by: CM provided copy of IMM, pt is agreeable to discharge despite less than 4 hour notice of this IMM   IMM Letter date given: 08/30/24   IMM Letter time given: 1315     Electronically signed by Amna Moss on 8/30/2024 at 1:15 PM

## 2024-08-30 NOTE — OP NOTE
[address] [address]                            OPERATIVE REPORT      PATIENT NAME: MYKE ALLEN              : 1945  MED REC NO: [id number]                      ROOM: [place] [address]  ACCOUNT NO: [id number]                       ADMIT DATE: 2024  PROVIDER: Chang Manning II, MD      DATE OF PROCEDURE:  2024    SURGEON:  Chang Manning II, MD    PREPROCEDURE DIAGNOSIS:  Left lower extremity ulceration.    POSTPROCEDURE DIAGNOSIS:  Left lower extremity ulceration.    PROCEDURE:  Left femoral endarterectomy with bovine patch angioplasty.    ANESTHESIA:  General endotracheal anesthesia.    ESTIMATED BLOOD LOSS:  100 mL.    COMPLICATIONS:  None.    INDICATION:  The patient is an 79-year-old female with a nonhealing ulceration of her left lower extremity and occlusion of her distal left common femoral artery and presents today for endarterectomy.  All risks, benefits, and alternatives were discussed in detail.  Questions were answered.    DESCRIPTION OF PROCEDURE:  After witnessed informed consent was obtained, the patient was brought to the operating room and general anesthesia was administered.  Her left arm was carefully prepped and draped.  A skin incision was made overlying the left common femoral artery with #10 scalpel blade and dissected down through the subcutaneous tissue with Bovie electrocautery.  The common femoral at the inguinal ligament was carefully dissected free and encircled with vessel loops.  Dissection continued distally identifying both the superficial femoral artery and deep femoral arteries, which were both individually encircled with vessel loops.  5000 heparin was given to the patient and after 3 minutes of circulating time, vascular control was obtained.  Arteriotomy was then created with a #11 scalpel blade on the common femoral artery and extended both proximally and distally on to the SFA

## 2024-08-30 NOTE — DISCHARGE INSTR - COC
Continuity of Care Form    Patient Name: Isela Conner   :  1945  MRN:  [id number]    Admit date:  2024  Discharge date:  ***    Code Status Order: Full Code   Advance Directives:   Advance Care Flowsheet Documentation        Date/Time Healthcare Directive Type of Healthcare Directive Copy in Chart Healthcare Agent Appointed Healthcare Agent's Name Healthcare Agent's Phone Number    [phone] Yes, patient has an advance directive for healthcare treatment  Durable power of  for health care;Health care treatment directive;Living will  No, copy requested from family  --  --  --                     Admitting Physician:  Chang Manning II, MD  PCP: Soha Tom APRN - NP    Discharging Nurse: ***  Discharging Hospital Unit/Room#: CVU-2916/2916-01  Discharging Unit Phone Number: ***    Emergency Contact:   Extended Emergency Contact Information  Primary Emergency Contact: KishorDarian rosasna  Home Phone: [phone]  Mobile Phone: [phone]  Relation: Other Relative    Past Surgical History:  Past Surgical History:   Procedure Laterality Date    ARTHROPLASTY  08/10/2012    FIFTH TOE LEFT FOOT    BACK SURGERY      CHOLECYSTECTOMY  1989    CYSTOSCOPY  2018    with botox injection     FEMORAL ENDARTERECTOMY Left 2024    LEFT FEMORAL ENDARTERECTOMY performed by Chang Manning II, MD at [facility] OR    FEMUR FRACTURE SURGERY      FIBULA FRACTURE SURGERY  2017    3 separate surgeries for a fractured left fibula tibial a closed done by Dr. Desai    HYSTERECTOMY (CERVIX STATUS UNKNOWN)      INVASIVE VASCULAR Left 2024    Aortagram abdominal w runoff performed by Chang Manning II, MD at [facility] CARDIAC CATH LAB    INVASIVE VASCULAR Left 2024    Angiography lower ext left performed by Chang Manning II, MD at [facility] CARDIAC CATH LAB    LUMBAR DISC SURGERY  2007    PARTIAL HIP ARTHROPLASTY Right 2019    [facility]    THROAT SURGERY  2012    vocal cord polyp  PAD (peripheral artery disease) ([facility]) I73.9    Dyspnea R06.00    Atherosclerosis of native arteries of left leg with ulceration of other part of lower leg ([facility]) I70.248    Acute on chronic diastolic congestive heart failure ([facility]) I50.33    Atrial fibrillation, chronic ([facility]) I48.20    Atherosclerosis of native arteries of left leg with ulceration of other part of foot ([facility]) I70.245       Isolation/Infection:   Isolation            No Isolation          Patient Infection Status       None to display                     Nurse Assessment:  Last Vital Signs: /65   Pulse 70   Temp 97.7 °F (36.5 °C) (Temporal)   Resp 19   Ht 1.702 m (5' 7\")   Wt 75.9 kg (167 lb 5.3 oz)   SpO2 95%   BMI 26.21 kg/m²     Last documented pain score (0-10 scale): Pain Level: 2  Last Weight:   Wt Readings from Last 1 Encounters:   08/30/24 75.9 kg (167 lb 5.3 oz)     Mental Status:  {IP PT MENTAL STATUS:85587}    IV Access:  { YONG IV ACCESS:153886362}    Nursing Mobility/ADLs:  Walking   {CHP DME ADLs:903870747}  Transfer  {CHP DME ADLs:935332403}  Bathing  {CHP DME ADLs:597433038}  Dressing  {CHP DME ADLs:583427732}  Toileting  {CHP DME ADLs:250744590}  Feeding  {CHP DME ADLs:189802992}  Med Admin  {CHP DME ADLs:058056818}  Med Delivery   {[facility] MED Delivery:776954665}    Wound Care Documentation and Therapy:  Wound 03/31/24 Heel Right placed a foam dressing (Active)   Number of days: 152       Wound 08/15/24 Heel Left non healing wound (Active)   Wound Image   08/15/24 1225   Wound Etiology Diabetic 08/30/24 0740   Dressing Status Clean;Dry;Intact 08/30/24 0740   Wound Cleansed Not Cleansed 08/29/24 2000   Dressing/Treatment Dry dressing;Ace wrap;Roll gauze;Betadine swabs/povidone iodine 08/30/24 0740   Dressing Change Due 08/18/24 08/19/24 1226   Wound Assessment Pink/red;Devitalized tissue 08/19/24 0357   Drainage Amount None (dry) 08/30/24 0740   Drainage Description Purulent 08/18/24 0410   Odor None 08/30/24 0712

## 2024-08-30 NOTE — PROGRESS NOTES
CLINICAL PHARMACY NOTE: MEDS TO BEDS    Total # of Prescriptions Filled: 1   The following medications were delivered to the patient:  Clopidogrel 75mg    Additional Documentation:     JUVENAL Armendariz approved medication delivery    Medication was delivered to patient's room and patient's Juliette signed for    Belen Dugan CPhT    [phone]

## 2024-08-30 NOTE — DISCHARGE INSTRUCTIONS
flow and helps prevent pneumonia and constipation.     Avoid strenuous activities, such as bicycle riding, jogging, weight lifting, or aerobic exercise, until your doctor says it is okay. This may be for at least 1 or 2 weeks.     Ask your doctor when you can drive again.     You may need to take off 1 to 4 weeks from work. It depends on the type of work you do and how you feel.   Diet    You can eat your normal diet. If your stomach is upset, try bland, low-fat foods like plain rice, broiled chicken, toast, and yogurt.     Eat heart-healthy foods. These include fruits, vegetables, and whole grains. If you have not been eating this way, talk to your doctor. You also may want to talk to a dietitian. This expert can help you learn about healthy foods and plan meals.     Drink plenty of fluids (unless your doctor tells you not to).     You may notice that your bowel movements are not regular right after your surgery. This is common. You may want to take a fiber supplement every day. If you have not had a bowel movement after a couple of days, ask your doctor about taking a mild laxative.   Medicines    Your doctor will tell you if and when you can restart your medicines. The doctor will also give you instructions about taking any new medicines.     If you stopped taking aspirin or some other blood thinner, your doctor will tell you when to start taking it again.     Be safe with medicines. Take your medicines exactly as prescribed. Call your doctor if you think you are having a problem with your medicine.     Take pain medicines exactly as directed.  If the doctor gave you a prescription medicine for pain, take it as prescribed.  If you are not taking a prescription pain medicine, ask your doctor if you can take an over-the-counter medicine.  Store your prescription pain medicines where no one else can get to them. When you are done using them, dispose of them quickly and safely. Your local pharmacy or hospital may  have a drop-off site.     If you think your pain medicine is making you sick to your stomach:  Take your medicine after meals (unless your doctor has told you not to).  Ask your doctor for a different pain medicine.     If your doctor prescribed antibiotics, take them as directed. Do not stop taking them just because you feel better. You need to take the full course of antibiotics.     Your doctor may prescribe a blood thinner when you go home. This helps prevent blood clots. Be sure you get instructions about how to take your medicine safely. Blood thinners can cause serious bleeding problems.   Incision care    If you have strips of tape on the cut (incision) the doctor made, leave the tape on for a week or until it falls off. Or follow your doctor's instructions for removing the tape.     You may shower as usual. Do not take a bath for the first 2 weeks or until your doctor tells you it is okay.     Wash the area daily with warm, soapy water, and pat it dry. Don't use hydrogen peroxide or alcohol, which can slow healing. You may cover the area with a gauze bandage if it oozes or rubs against clothing. Change the bandage every day.     Keep the area clean and dry.   Follow-up care is a key part of your treatment and safety. Be sure to make and go to all appointments, and call your doctor if you are having problems. It's also a good idea to know your test results and keep a list of the medicines you take.  When should you call for help?   Call 911 anytime you think you may need emergency care. For example, call if:    You passed out (lost consciousness).     You have trouble breathing.   Call your doctor now or seek immediate medical care if:    You have sudden symptoms in your leg or foot such as severe pain, numbness, weakness, tingling, cool skin, or skin color changes. Your skin may be pale, bluish, or purplish.     You have pain that does not get better after you take pain medicine.     You have loose

## 2024-08-30 NOTE — PROGRESS NOTES
details.    If patient discharges prior to next session this note will serve as a discharge summary.  Please see below for the latest assessment towards goals.     HOME HEALTH CARE: LEVEL 1 STANDARD    - Initial home health evaluation to occur within 24-48 hours, in patient home   - Therapy to evaluate with goal of regaining prior level of functioning   - Therapy to evaluate if patient has Home Health Aide needs for personal care    DME Required For Discharge: no DME required at discharge  Precautions/Restrictions: high fall risk, up as tolerated  Weight Bearing Restrictions: no restrictions  [] Right Upper Extremity  [] Left Upper Extremity [] Right Lower Extremity  [] Left Lower Extremity     Required Braces/Orthotics: no braces required   [] Right  [] Left  Positional Restrictions:no positional restrictions    Pre-Admission Information   Lives With: alone    Type of Home:  [place]  Home Layout: one level  Home Access: elevator  Bathroom Layout: walk in shower  Bathroom Equipment: grab bars in shower, grab bars around toilet, shower chair, toilet raiser  Toilet Height: elevated height  Home Equipment: rolling walker, rollator - 4 wheeled walker, single point cane, manual wheelchair, electric scooter, alert button  Transfer Assistance: Independent without use of device  Ambulation Assistance: Pt ambulates very short distances within [place] apartment (furniture walks, space is too small to use a RW); primary means of mobility is the manual w/c- propels with B UEs and LEs  ADL Assistance:  Gets assistance from staff with bathing, independent with dressing, independent with toileting  IADL Assistance:  Staff at [place] assists with cleaning, cooking, pt manages her own medications  Active :        [] Yes  [x] No  Hand Dominance: [] Left  [x] Right  Current Employment: retired.  Occupation:   Hobbies: \"Not much of anything\"  Recent Falls: Pt denies falls in the past 6 months  Available  independent    Above goals reviewed on 8/30/2024.  All goals are ongoing at this time unless indicated above.      Therapy Session Time      Individual Group Co-treatment   Time In     0954   Time Out     1032   Minutes     38     Timed Code Treatment Minutes: 23 Minutes  Total Treatment Minutes: 38 Minutes        Electronically Signed By: Vivian Quinones, PT  Vivian Quinones PT, DPT [id number]

## 2024-08-30 NOTE — PLAN OF CARE
Problem: Chronic Conditions and Co-morbidities  Goal: Patient's chronic conditions and co-morbidity symptoms are monitored and maintained or improved  Outcome: Progressing  Flowsheets (Taken 8/29/2024 2000)  Care Plan - Patient's Chronic Conditions and Co-Morbidity Symptoms are Monitored and Maintained or Improved:   Monitor and assess patient's chronic conditions and comorbid symptoms for stability, deterioration, or improvement   Collaborate with multidisciplinary team to address chronic and comorbid conditions and prevent exacerbation or deterioration   Update acute care plan with appropriate goals if chronic or comorbid symptoms are exacerbated and prevent overall improvement and discharge     Problem: Discharge Planning  Goal: Discharge to home or other facility with appropriate resources  Outcome: Progressing  Flowsheets (Taken 8/29/2024 2000)  Discharge to home or other facility with appropriate resources:   Identify barriers to discharge with patient and caregiver   Arrange for needed discharge resources and transportation as appropriate   Refer to discharge planning if patient needs post-hospital services based on physician order or complex needs related to functional status, cognitive ability or social support system     Problem: Safety - Adult  Goal: Free from fall injury  Outcome: Progressing     Problem: Pain  Goal: Verbalizes/displays adequate comfort level or baseline comfort level  Outcome: Progressing  Flowsheets  Taken 8/30/2024 0010  Verbalizes/displays adequate comfort level or baseline comfort level:   Encourage patient to monitor pain and request assistance   Assess pain using appropriate pain scale  Taken 8/29/2024 2000  Verbalizes/displays adequate comfort level or baseline comfort level:   Encourage patient to monitor pain and request assistance   Assess pain using appropriate pain scale     Problem: Skin/Tissue Integrity  Goal: Absence of new skin breakdown  Description: 1.  Monitor for  areas of redness and/or skin breakdown  2.  Assess vascular access sites hourly  3.  Every 4-6 hours minimum:  Change oxygen saturation probe site  4.  Every 4-6 hours:  If on nasal continuous positive airway pressure, respiratory therapy assess nares and determine need for appliance change or resting period.  Outcome: Progressing     Problem: ABCDS Injury Assessment  Goal: Absence of physical injury  Outcome: Progressing

## 2024-08-30 NOTE — CARE COORDINATION
Case Management Assessment  Initial Evaluation    Date/Time of Evaluation: 8/30/2024 1:12 PM  Assessment Completed by: Amna Moss    If patient is discharged prior to next notation, then this note serves as note for discharge by case management.    Patient Name: Isela Conner                   YOB: 1945  Diagnosis: PAD (peripheral artery disease) (HCC) [I73.9]  Atherosclerosis of native arteries of left leg with ulceration of other part of foot (HCC) [I70.245]                   Date / Time: 8/29/2024  9:45 AM    Patient Admission Status: Inpatient   Readmission Risk (Low < 19, Mod (19-27), High > 27): Readmission Risk Score: 21.8    Current PCP: Soha Tom APRN - NP  PCP verified by CM? (P) Yes    Chart Reviewed: Yes      History Provided by: (P) Patient  Patient Orientation: (P) Alert and Oriented, Person, Place, Situation    Patient Cognition: (P) Alert    Hospitalization in the last 30 days (Readmission):  Yes    If yes, Readmission Assessment in  Navigator will be completed.    Advance Directives:      Code Status: Full Code   Patient's Primary Decision Maker is: (P) Legal Next of Kin      Discharge Planning:    Patient lives with: (P) Alone Type of Home: (P) Independent Living  Primary Care Giver: (P) Self (Pt lives at [facility])  Patient Support Systems include: (P) Family Members, Home Care Staff   Current Financial resources: (P) Medicare  Current community resources: (P) ECF/Home Care  Current services prior to admission: (P) Durable Medical Equipment, Emergency Call  System            Current DME: (P) Walker, Oxygen Therapy (Comment), Wheelchair            Type of Home Care services:  (P) OT, PT, Nursing Services    ADLS  Prior functional level: (P) Assistance with the following:, Cooking, Housework, Shopping, Mobility  Current functional level: (P) Assistance with the following:, Cooking, Housework, Shopping, Mobility    PT AM-PAC: 19 /24  OT AM-PAC: 18 /24    Family  can provide assistance at DC: (P) Other (comment) (Pt lives in independant living.)  Would you like Case Management to discuss the discharge plan with any other family members/significant others, and if so, who? (P) No  Plans to Return to Present Housing: (P) Yes  Other Identified Issues/Barriers to RETURNING to current housing: none once medically stable for discharge   Potential Assistance needed at discharge: (P) Home Care            Potential DME:    Patient expects to discharge to: (P) Independent living facility  Plan for transportation at discharge: (P) Family    Financial    Payor: MEDICARE / Plan: MEDICARE PART A AND B / Product Type: *No Product type* /     Does insurance require precert for SNF: Yes    Potential assistance Purchasing Medications: (P) No  Meds-to-Beds request: Yes      [facility] - [address] [phone] - F [phone]  [address] [place] [address]  Phone: [phone] Fax: [phone]    [facility] [address] [place] [address] [address] [place] [address] [phone] - F [phone]  [address] [facility] [address]  Phone: [phone] Fax: [phone]    [address] [phone] - F [phone]  [address] [facility] [address]  Phone: [phone] Fax: [phone]      Notes:    Factors facilitating achievement of predicted outcomes: Family support and Caregiver support    Barriers to discharge: Decreased endurance    Additional Case Management Notes: CM met with Pt who reports Active with [facility] for RN PT/OT, CM confirmed this with Jamie at [facility] [phone], who reports can accept pt for restart of services. Pt lives at [facility]. Pt reports family is able to transport home. Pt also reports she is [facility] for [facility] needs. Pt reports her family will transport home.     The Plan for Transition of Care is related to the following treatment goals of

## 2024-08-30 NOTE — PLAN OF CARE
Problem: Chronic Conditions and Co-morbidities  Goal: Patient's chronic conditions and co-morbidity symptoms are monitored and maintained or improved  8/30/2024 1141 by Kala Zavala RN  Outcome: Adequate for Discharge     Problem: Discharge Planning  Goal: Discharge to home or other facility with appropriate resources  8/30/2024 1141 by Kala Zavala RN  Outcome: Adequate for Discharge     Problem: Safety - Adult  Goal: Free from fall injury  8/30/2024 1141 by Kala Zavala RN  Outcome: Adequate for Discharge     Problem: Pain  Goal: Verbalizes/displays adequate comfort level or baseline comfort level  8/30/2024 1141 by Kala Zavala RN  Outcome: Adequate for Discharge     Problem: Skin/Tissue Integrity  Goal: Absence of new skin breakdown  Description: 1.  Monitor for areas of redness and/or skin breakdown  2.  Assess vascular access sites hourly  3.  Every 4-6 hours minimum:  Change oxygen saturation probe site  4.  Every 4-6 hours:  If on nasal continuous positive airway pressure, respiratory therapy assess nares and determine need for appliance change or resting period.  8/30/2024 1141 by Kala Zavala RN  Outcome: Adequate for Discharge     Problem: ABCDS Injury Assessment  Goal: Absence of physical injury  8/30/2024 1141 by Kala Zavala RN  Outcome: Adequate for Discharge

## 2024-08-30 NOTE — CARE COORDINATION
08/30/24 1401   Readmission Assessment   Number of Days since last admission? 8-30 days   Previous Disposition Home Alone   Who is being Interviewed Patient   What was the patient's/caregiver's perception as to why they think they needed to return back to the hospital? Other (Comment)  (came in for chf - and wound on ankle foolowing with wound clinic .)   Did you visit your Primary Care Physician after you left the hospital, before you returned this time? No   Why weren't you able to visit your PCP? Did not have an appointment  (So quick - didnt get a change to follow up)   Did you see a specialist, such as Cardiac, Pulmonary, Orthopedic Physician, etc. after you left the hospital? Yes   Who advised the patient to return to the hospital? Physician  (Dr Moe -saw him tuesday)   Does the patient report anything that got in the way of taking their medications? No   In our efforts to provide the best possible care to you and others like you, can you think of anything that we could have done to help you after you left the hospital the first time, so that you might not have needed to return so soon? Other (Comment)     Electronically signed by Amna Moss on 8/30/2024 at 2:03 PM

## 2024-08-30 NOTE — PROGRESS NOTES
Patient ready for discharged to home. IV removed, belongings packed. Discharge instructions reviewed, she verbalized understanding of all instructions. Medications filled through outpatient pharmacy. Family at bedside. Patient transported out via wheelchair

## 2024-08-30 NOTE — PROGRESS NOTES
[facility] - Inpatient Rehabilitation Department   Phone: [phone]    Occupational Therapy    [x] Initial Evaluation            [] Daily Treatment Note         [] Discharge Summary      Patient: Isela Conner   : 1945   MRN: [id number]   Date of Service:  2024    Admitting Diagnosis:  PAD (peripheral artery disease) (HCC)    Current Admission Summary:     Date of Procedure: 2024  Pre-Op Diagnosis Codes:      * PAD (peripheral artery disease) (HCC) [I73.9]   Post-Op Diagnosis: Same     Procedure(s):  LEFT FEMORAL ENDARTERECTOMY    Past Medical History:  has a past medical history of CHF (congestive heart failure) (HCC), CKD stage 3a, GFR 45-59 ml/min (HCC), Diabetes mellitus (HCC), Fatty liver, Femur fracture, left (HCC), Fibromyalgia, Fracture, hip (HCC), GERD (gastroesophageal reflux disease), Hypertension, IBS (irritable bowel syndrome), Osteoarthritis, Peripheral neuropathy, Postmenopausal, Pulmonary hypertension (HCC), and Scleroderma ([facility]).  Past Surgical History:  has a past surgical history that includes Hysterectomy (); Cholecystectomy (); Lumbar disc surgery (); Throat surgery (); arthroplasty (08/10/2012); Fibula Fracture Surgery (); back surgery; Cystocopy (2018); Partial hip arthroplasty (Right, 2019); Femur fracture surgery; invasive vascular (Left, 2024); invasive vascular (Left, 2024); and Femoral Endarterectomy (Left, 2024).    Discharge Recommendations: Isela Conner scored a 18/24 on the AM-PAC ADL Inpatient form. Current research shows that an AM-PAC score of 18 or greater is typically associated with a discharge to the patient's home setting. Based on the patient's AM-PAC score, and their current ADL deficits, it is recommended that the patient have 2-3 sessions per week of Occupational Therapy at d/c to increase the patient's independence.  At this time, this patient demonstrates the endurance and safety to  Sitting Balance: fair (-): maintains balance at CGA with use of UE support  Static Standing Balance: fair: maintains balance at CGA without use of UE support  Comments:    Other Therapeutic Interventions    Functional Outcomes  AM-PAC Inpatient Daily Activity Raw Score: 18                                    Cognition  WFL  Orientation:    alert and oriented x 4  Command Following:   WFL     Education  Barriers To Learning: none  Patient Education: patient educated on goals, OT role and benefits, plan of care, disease specific education, discharge recommendations  Learning Assessment:  patient verbalizes and demonstrates understanding    Assessment  Activity Tolerance: Patient does not ambulate far distances even prior to admission, patient demo good activity tolerance (is O2 dependent at home)  Impairments Requiring Therapeutic Intervention: decreased functional mobility, decreased ADL status, decreased endurance  Prognosis: good  Clinical Assessment: Patient is mildly below assist for ADls and mobility. Recommend return home with home therapy (patient stated gets home OT and PT prior to admission)  Safety Interventions: patient left in chair, call light within reach, nurse notified, and unable to find chair alarm pad, nurse aware    Plan  Frequency: 3-5 x/per week  Current Treatment Recommendations: functional mobility training, transfer training, endurance training, patient/caregiver education, and ADL/self-care training    Goals  Patient Goals: Return home   Short Term Goals:  Time Frame: until discharge  Patient will complete lower body ADL at modified independent   Patient will complete toileting at modified independent   Patient will complete functional transfers at modified independent   Patient will complete functional mobility at modified independent   Patient will increase [facility] ADL score = to or > than 21/24 (gets assist for bathing pta)    Above goals reviewed on 8/30/2024.  All goals are ongoing at this

## 2024-08-30 NOTE — PROGRESS NOTES
VASCULAR  POD#1    Feels good - can feel her foot now but not preop    VSS Afeb  L groin with ecchymosis but no hematoma  L foot well perfused  Dressing in place L foot.    Labs reviewed    A/P: S/P L femoral endarterectomy - patent clinically.   DC IVFs   Up in chair and ambulate as possible (preadmission W/C only).   Plan return to independent living facility either today or tomorrow.     Genaro Maurice

## 2024-09-06 ENCOUNTER — HOSPITAL ENCOUNTER (EMERGENCY)
Age: 79
Discharge: HOME | End: 2024-09-06
Payer: MEDICARE

## 2024-09-06 VITALS
OXYGEN SATURATION: 98 % | RESPIRATION RATE: 18 BRPM | DIASTOLIC BLOOD PRESSURE: 64 MMHG | BODY MASS INDEX: 24.96 KG/M2 | WEIGHT: 159 LBS | SYSTOLIC BLOOD PRESSURE: 150 MMHG | HEIGHT: 67 IN | HEART RATE: 66 BPM | TEMPERATURE: 98.5 F

## 2024-09-06 DIAGNOSIS — T14.8XXA BLEEDING FROM WOUND: Primary | ICD-10-CM

## 2024-09-06 DIAGNOSIS — Z79.01 CHRONIC ANTICOAGULATION: ICD-10-CM

## 2024-09-06 PROCEDURE — 99284 EMERGENCY DEPT VISIT MOD MDM: CPT

## 2024-09-06 ASSESSMENT — ENCOUNTER SYMPTOMS
DIARRHEA: 0
ABDOMINAL PAIN: 0
NAUSEA: 0
SHORTNESS OF BREATH: 0
CHEST TIGHTNESS: 0
VOMITING: 0

## 2024-09-06 ASSESSMENT — PAIN - FUNCTIONAL ASSESSMENT: PAIN_FUNCTIONAL_ASSESSMENT: NONE - DENIES PAIN

## 2024-09-07 NOTE — ED PROVIDER NOTES
[facility] EMERGENCY DEPARTMENT  EMERGENCY DEPARTMENT ENCOUNTER        Pt Name: Isela Conner  MRN: [id number]  Birthdate 1945  Date of evaluation: 9/6/2024  Provider: Dany Carrero PA-C  PCP: Soha Tom APRN - NP  Note Started: 10:33 PM EDT 9/6/24      ANDREA. I have evaluated this patient.        CHIEF COMPLAINT       Chief Complaint   Patient presents with    Wound Check     [facility] with reports of after left ankle debridement, will not stop bleeding. Also to left shin, was accidentally scratched by friend's nail.        HISTORY OF PRESENT ILLNESS: 1 or more Elements     History from : Patient    Limitations to history : None    Isela Conner is a 79 y.o. female who presents to the emergency department today stating she has a chronic wound on her left lower extremity which was debrided today by wound care.  She states she has had some bleeding from the area since that time.  She is on Eliquis and Plavix.  She also has a small area on her left shin where a friend accidentally scratched her with her nail today while changing dressings.  Patient denies pain.         Nursing Notes were all reviewed and agreed with or any disagreements were addressed in the HPI.    REVIEW OF SYSTEMS :      Review of Systems   Constitutional:  Negative for chills and fever.   Respiratory:  Negative for chest tightness and shortness of breath.    Cardiovascular:  Negative for chest pain.   Gastrointestinal:  Negative for abdominal pain, diarrhea, nausea and vomiting.   Genitourinary:  Negative for dysuria.   Musculoskeletal:  Negative for arthralgias.   Skin:  Positive for wound.   All other systems reviewed and are negative.      Positives and Pertinent negatives as per HPI.     SURGICAL HISTORY     Past Surgical History:   Procedure Laterality Date    ARTHROPLASTY  08/10/2012    FIFTH TOE LEFT FOOT    BACK SURGERY      CHOLECYSTECTOMY  1989    CYSTOSCOPY  08/01/2018    with botox

## 2024-09-09 ENCOUNTER — TELEPHONE (OUTPATIENT)
Dept: CARDIOLOGY CLINIC | Age: 79
End: 2024-09-09

## 2024-09-09 NOTE — TELEPHONE ENCOUNTER
Barbara called to make sure zane was aware that Kerri Cheatham put her on PLAVIX 75MG and told her to discontinue Asprin. She states she is still on eliquis and bleeds quite easily.     Please advise

## 2024-09-24 ENCOUNTER — OFFICE VISIT (OUTPATIENT)
Dept: VASCULAR SURGERY | Age: 79
End: 2024-09-24

## 2024-09-24 VITALS
HEIGHT: 67 IN | BODY MASS INDEX: 24.96 KG/M2 | DIASTOLIC BLOOD PRESSURE: 78 MMHG | SYSTOLIC BLOOD PRESSURE: 122 MMHG | WEIGHT: 159 LBS

## 2024-09-24 DIAGNOSIS — I70.223 ATHEROSCLEROSIS OF NATIVE ARTERIES OF EXTREMITIES WITH REST PAIN, BILATERAL LEGS (HCC): Primary | ICD-10-CM

## 2024-09-24 PROCEDURE — 99024 POSTOP FOLLOW-UP VISIT: CPT | Performed by: SURGERY

## 2024-09-25 ENCOUNTER — TELEPHONE (OUTPATIENT)
Dept: VASCULAR SURGERY | Age: 79
End: 2024-09-25

## 2024-10-02 RX ORDER — DIGOXIN 125 MCG
125 TABLET ORAL EVERY OTHER DAY
Qty: 45 TABLET | Refills: 1 | Status: SHIPPED | OUTPATIENT
Start: 2024-10-02

## 2024-10-02 NOTE — TELEPHONE ENCOUNTER
Received refill request for Digoxin  from [facility] pharmacy.     Last OV: 8/9/2024 NPSR    Next OV:  Recall List     Last Labs: 8/15/2024 Dig Level    Last Filled: 8/19/2024 NPSR Sig adjust

## 2024-10-09 LAB
ANION GAP SERPL CALCULATED.3IONS-SCNC: 4 MMOL/L (ref 4–16)
B-TYPE NATRIURETIC PEPTIDE: 142 PG/ML (ref 0–95)
BUN BLDV-MCNC: 20 MG/DL (ref 8–26)
CALCIUM SERPL-MCNC: 9.1 MG/DL (ref 8.5–10.4)
CHLORIDE BLD-SCNC: 101 MMOL/L (ref 98–111)
CO2: 33 MMOL/L (ref 21–31)
CREAT SERPL-MCNC: 1.41 MG/DL (ref 0.6–1.2)
EGFR (CKD-EPI): 37 ML/MIN/1.73 M2
GLUCOSE BLD-MCNC: 224 MG/DL (ref 70–99)
POTASSIUM SERPL-SCNC: 4.9 MMOL/L (ref 3.6–5.1)
SODIUM BLD-SCNC: 138 MMOL/L (ref 135–145)

## 2024-10-10 ENCOUNTER — TELEPHONE (OUTPATIENT)
Dept: CARDIOLOGY CLINIC | Age: 79
End: 2024-10-10

## 2024-10-10 NOTE — TELEPHONE ENCOUNTER
----- Message from TOM Panda CNP sent at 10/9/2024  1:42 PM EDT -----  Labs look good, how is she feeing? RILEYV  Tried to reach patient LMOM for her to return my call.    Patient calling in to the office  about how she is feeling, sob with exertion, no swelling, current weight 155, feeling good, taking furosemide 20 mg daily.

## 2024-10-17 ASSESSMENT — ENCOUNTER SYMPTOMS
SHORTNESS OF BREATH: 1
GASTROINTESTINAL NEGATIVE: 1

## 2024-10-17 NOTE — PROGRESS NOTES
[facility]   Congestive Heart Failure    Primary Care Doctor:  Soha Tom APRN - NP  Primary Cardiologist: Karen         Chief Complaint:  SOB    History of Present Illness:  Isela Conner is a 79 y.o. female with PMH PAF, SSS s/p PPM , HTN, restrictive lung disease, severe MR, PAH, scleroderma, DM, CKD and HFpEF who presents today for chf f/u. She had left femoral endarterectomy end of August.      Today:  she is feeling well, she had to stop plavix 2/2 nosebleeds. Her SOB is stable and she denies chest pain, palpitations, orthopnea, PND, exertional chest pressure/discomfort, fatigue, early saiety, syncope, or edema     Today's home wt:  155>157      Baseline Weight: 152  Wt Readings from Last 3 Encounters:   10/18/24 73 kg (161 lb)   09/24/24 72.1 kg (159 lb)   09/17/24 72.1 kg (159 lb)       EF: 60%  Cardiac Imaging: limited echo 8/15/24:     Left Ventricle: Normal left ventricular systolic function with a visually estimated EF of 60 - 65%. EF by 2D Simpsons Biplane is 72%. Left ventricle size is normal. Normal wall thickness. Normal wall motion.    Mitral Valve: Moderately calcified leaflets. Mild regurgitation.    Tricuspid Valve: Moderate regurgitation. Elevated RVSP (52 mm Hg + Right atrial pressure)    Left Atrium: Left atrial volume index is mildly increased (35-41 mL/m2).    Image quality is adequate.  Echo 6/16/23:    Summary   Normal left ventricle size, wall thickness, and systolic function with an   estimated ejection fraction of 60-65%.   Grade III diastolic dysfunction with elevated LV filling pressures.   Normal right ventricular size and function.   Mild left atrial enlargement.   Severe mitral annular calcification without evidence of significant stenosis   and mild regurgitation.   The PASP appears severely elevated, estimated at 65 mmHg assuming a RAP of 8   mmHg    Echo: 11/16/2022   Summary   Normal left ventricle size, wall thickness and systolic function with an

## 2024-10-18 ENCOUNTER — OFFICE VISIT (OUTPATIENT)
Dept: CARDIOLOGY CLINIC | Age: 79
End: 2024-10-18
Payer: MEDICARE

## 2024-10-18 VITALS
BODY MASS INDEX: 25.27 KG/M2 | HEIGHT: 67 IN | DIASTOLIC BLOOD PRESSURE: 60 MMHG | SYSTOLIC BLOOD PRESSURE: 124 MMHG | HEART RATE: 67 BPM | OXYGEN SATURATION: 94 % | WEIGHT: 161 LBS

## 2024-10-18 DIAGNOSIS — I48.20 ATRIAL FIBRILLATION, CHRONIC (HCC): ICD-10-CM

## 2024-10-18 DIAGNOSIS — R06.09 DYSPNEA ON EXERTION: ICD-10-CM

## 2024-10-18 DIAGNOSIS — I50.32 CHRONIC DIASTOLIC CONGESTIVE HEART FAILURE (HCC): Primary | ICD-10-CM

## 2024-10-18 DIAGNOSIS — I10 BENIGN ESSENTIAL HTN: ICD-10-CM

## 2024-10-18 PROCEDURE — 3078F DIAST BP <80 MM HG: CPT | Performed by: NURSE PRACTITIONER

## 2024-10-18 PROCEDURE — G8427 DOCREV CUR MEDS BY ELIG CLIN: HCPCS | Performed by: NURSE PRACTITIONER

## 2024-10-18 PROCEDURE — G8417 CALC BMI ABV UP PARAM F/U: HCPCS | Performed by: NURSE PRACTITIONER

## 2024-10-18 PROCEDURE — 99214 OFFICE O/P EST MOD 30 MIN: CPT | Performed by: NURSE PRACTITIONER

## 2024-10-18 PROCEDURE — G8484 FLU IMMUNIZE NO ADMIN: HCPCS | Performed by: NURSE PRACTITIONER

## 2024-10-18 PROCEDURE — 1036F TOBACCO NON-USER: CPT | Performed by: NURSE PRACTITIONER

## 2024-10-18 PROCEDURE — G8399 PT W/DXA RESULTS DOCUMENT: HCPCS | Performed by: NURSE PRACTITIONER

## 2024-10-18 PROCEDURE — 1123F ACP DISCUSS/DSCN MKR DOCD: CPT | Performed by: NURSE PRACTITIONER

## 2024-10-18 PROCEDURE — 1090F PRES/ABSN URINE INCON ASSESS: CPT | Performed by: NURSE PRACTITIONER

## 2024-10-18 PROCEDURE — 3074F SYST BP LT 130 MM HG: CPT | Performed by: NURSE PRACTITIONER

## 2024-10-18 NOTE — PATIENT INSTRUCTIONS
Instructions:   Medications: no change in meds  Labs: just done  Lifestyle Recommendations: Weigh yourself every day in the morning after urination, call Jessi if wt increases 2-3lb in one day or 5lb in one week, Limit sodium to 3000mg/day and fluids to 2L or 64oz/day.   Follow up: Jessi in 3-4months        [place] CHF Resource Line: [phone]

## 2024-10-28 RX ORDER — AMIODARONE HYDROCHLORIDE 200 MG/1
100 TABLET ORAL DAILY
Qty: 90 TABLET | Refills: 0 | Status: SHIPPED | OUTPATIENT
Start: 2024-10-28

## 2024-10-28 NOTE — TELEPHONE ENCOUNTER
Medication Refill    Medication needing refilled:  amiodarone (CORDARONE)    Dosage of the medication:  200 MG tablet   How are you taking this medication (QD, BID, TID, QID, PRN):  Take 0.5 tablets by mouth daily (please advise if there is a 100 mg so pt does not have to cut the pills in half)  30 or 90 day supply called in:  90 day supply  When will you run out of your medication:  About one week  Which Pharmacy are we sending the medication to?:  [facility] - [address] -  [phone] - F [phone]     Pt states please hurry because she does not want to run out.  She did not realize she was almost out until she filled her daily containers.

## 2024-10-28 NOTE — TELEPHONE ENCOUNTER
Called patient and told her 100 mg tablets are typically more expensive. She v/u and opted to stick with the 200 mg pill and break them in half. Refill sent

## 2024-12-04 RX ORDER — FUROSEMIDE 40 MG/1
20 TABLET ORAL DAILY
Qty: 45 TABLET | Refills: 0 | Status: SHIPPED | OUTPATIENT
Start: 2024-12-04

## 2024-12-04 NOTE — TELEPHONE ENCOUNTER
Medication Refill    Medication needing refilled:furosemide (LASIX) - Send to mail service listed below    Dosage of the medication: 20 mg     How are you taking this medication (QD, BID, TID, QID, PRN): taking one tab daily    30 or 90 day supply called in: 90 day supply    When will you run out of your medication:    Which Pharmacy are we sending the medication to?: [facility] - [address] -  [phone] - F [phone]

## 2024-12-09 NOTE — TELEPHONE ENCOUNTER
Received refill request for Eliquis from [facility].    Last ov:10/18/2024 NPKV    Last labs:10/09/2024 BMP    Last Refill:04/02/2024    Next appointment:01/28/2025 [id number]

## 2025-01-07 ENCOUNTER — OFFICE VISIT (OUTPATIENT)
Dept: VASCULAR SURGERY | Age: 80
End: 2025-01-07
Payer: MEDICARE

## 2025-01-07 VITALS
WEIGHT: 155 LBS | DIASTOLIC BLOOD PRESSURE: 70 MMHG | BODY MASS INDEX: 24.33 KG/M2 | SYSTOLIC BLOOD PRESSURE: 140 MMHG | HEIGHT: 67 IN

## 2025-01-07 DIAGNOSIS — I70.223 ATHEROSCLEROSIS OF NATIVE ARTERIES OF EXTREMITIES WITH REST PAIN, BILATERAL LEGS (HCC): Primary | ICD-10-CM

## 2025-01-07 PROCEDURE — G8420 CALC BMI NORM PARAMETERS: HCPCS | Performed by: SURGERY

## 2025-01-07 PROCEDURE — G8399 PT W/DXA RESULTS DOCUMENT: HCPCS | Performed by: SURGERY

## 2025-01-07 PROCEDURE — 1090F PRES/ABSN URINE INCON ASSESS: CPT | Performed by: SURGERY

## 2025-01-07 PROCEDURE — 99213 OFFICE O/P EST LOW 20 MIN: CPT | Performed by: SURGERY

## 2025-01-07 PROCEDURE — 3078F DIAST BP <80 MM HG: CPT | Performed by: SURGERY

## 2025-01-07 PROCEDURE — 3077F SYST BP >= 140 MM HG: CPT | Performed by: SURGERY

## 2025-01-07 PROCEDURE — 1159F MED LIST DOCD IN RCRD: CPT | Performed by: SURGERY

## 2025-01-07 PROCEDURE — 1036F TOBACCO NON-USER: CPT | Performed by: SURGERY

## 2025-01-07 PROCEDURE — 1123F ACP DISCUSS/DSCN MKR DOCD: CPT | Performed by: SURGERY

## 2025-01-07 PROCEDURE — G8427 DOCREV CUR MEDS BY ELIG CLIN: HCPCS | Performed by: SURGERY

## 2025-01-07 PROCEDURE — M1308 PR FLU IMMUNIZE NO ADMIN: HCPCS | Performed by: SURGERY

## 2025-01-07 NOTE — PROGRESS NOTES
auscultation bilaterally  Heart: regular rate and rhythm, S1, S2 normal, no murmur, click, rub or gallop  Abdomen: soft, non-tender. Bowel sounds normal. No masses,  no organomegaly  Extremities:  Left groin incision well-healed  Biphasic distal signals    MEDICAL DECISION MAKING/TESTING  I have reviewed the testing personally and my interpretation is below.    1/7/2024:    Right side findings: Resting NABIL is 0.84.    Left side findings: Resting NABIL is 0.93.       Assessment:     Patient Active Problem List   Diagnosis    Diabetes type 2, controlled ([facility])    Peripheral neuropathy    GERD (gastroesophageal reflux disease)    Scleroderma ([facility])    Chronic narcotic dependence ([facility])    Spinal stenosis of lumbar region without neurogenic claudication    PAF (paroxysmal atrial fibrillation) ([facility])    Diffusion capacity of lung (dl), decreased    Lumbosacral spondylosis without myelopathy    Restrictive ventilatory defect    Vocal cord polyp    Benign essential HTN    Near syncope    SSS (sick sinus syndrome) ([facility])    Pacemaker    Chronic obstructive pulmonary disease ([facility])    Degeneration of intervertebral disc of lumbosacral region    Herniation of lumbar intervertebral disc with radiculopathy    Atrial flutter ([facility])    Osteoporosis, postmenopausal    Osteoporotic fracture of right hip ([facility])    Closed displaced transverse fracture of shaft of femur with routine healing    Chronic congestive heart failure ([facility])    Pulmonary hypertension ([facility])    PAD (peripheral artery disease) ([facility])    Dyspnea    Atherosclerosis of native arteries of left leg with ulceration of other part of lower leg ([facility])    Acute on chronic diastolic congestive heart failure ([facility])    Atrial fibrillation, chronic ([facility])    Atherosclerosis of native arteries of left leg with ulceration of other part of foot ([facility])       Plan:  79-year-old female doing well following left femoral endarterectomy.  Her endarterectomy site is widely patent with no elevated

## 2025-01-27 ASSESSMENT — ENCOUNTER SYMPTOMS
SHORTNESS OF BREATH: 1
GASTROINTESTINAL NEGATIVE: 1

## 2025-01-28 ENCOUNTER — OFFICE VISIT (OUTPATIENT)
Dept: CARDIOLOGY CLINIC | Age: 80
End: 2025-01-28
Payer: MEDICARE

## 2025-01-28 VITALS
WEIGHT: 159 LBS | HEART RATE: 64 BPM | DIASTOLIC BLOOD PRESSURE: 48 MMHG | BODY MASS INDEX: 24.96 KG/M2 | SYSTOLIC BLOOD PRESSURE: 106 MMHG | OXYGEN SATURATION: 98 % | HEIGHT: 67 IN

## 2025-01-28 DIAGNOSIS — I50.32 CHRONIC DIASTOLIC CONGESTIVE HEART FAILURE (HCC): Primary | ICD-10-CM

## 2025-01-28 DIAGNOSIS — I27.20 PULMONARY HYPERTENSION (HCC): ICD-10-CM

## 2025-01-28 DIAGNOSIS — I48.20 ATRIAL FIBRILLATION, CHRONIC (HCC): ICD-10-CM

## 2025-01-28 PROCEDURE — 3078F DIAST BP <80 MM HG: CPT | Performed by: NURSE PRACTITIONER

## 2025-01-28 PROCEDURE — 1036F TOBACCO NON-USER: CPT | Performed by: NURSE PRACTITIONER

## 2025-01-28 PROCEDURE — 3074F SYST BP LT 130 MM HG: CPT | Performed by: NURSE PRACTITIONER

## 2025-01-28 PROCEDURE — 99214 OFFICE O/P EST MOD 30 MIN: CPT | Performed by: NURSE PRACTITIONER

## 2025-01-28 PROCEDURE — 1159F MED LIST DOCD IN RCRD: CPT | Performed by: NURSE PRACTITIONER

## 2025-01-28 PROCEDURE — G8427 DOCREV CUR MEDS BY ELIG CLIN: HCPCS | Performed by: NURSE PRACTITIONER

## 2025-01-28 PROCEDURE — 1090F PRES/ABSN URINE INCON ASSESS: CPT | Performed by: NURSE PRACTITIONER

## 2025-01-28 PROCEDURE — 1160F RVW MEDS BY RX/DR IN RCRD: CPT | Performed by: NURSE PRACTITIONER

## 2025-01-28 PROCEDURE — G8420 CALC BMI NORM PARAMETERS: HCPCS | Performed by: NURSE PRACTITIONER

## 2025-01-28 PROCEDURE — G8399 PT W/DXA RESULTS DOCUMENT: HCPCS | Performed by: NURSE PRACTITIONER

## 2025-01-28 PROCEDURE — 1123F ACP DISCUSS/DSCN MKR DOCD: CPT | Performed by: NURSE PRACTITIONER

## 2025-01-28 RX ORDER — MIRABEGRON 50 MG/1
50 TABLET, FILM COATED, EXTENDED RELEASE ORAL DAILY
COMMUNITY
Start: 2024-02-23

## 2025-01-28 NOTE — PATIENT INSTRUCTIONS
Instructions:   Medications: no change in meds  Labs: before next office visit  Lifestyle Recommendations: Weigh yourself every day in the morning after urination, call Jessi if wt increases 2-3lb in one day or 5lb in one week, Limit sodium to 3000mg/day and fluids to 2L or 64oz/day.   Follow up: Dr. Jones in 3-6months        [place] CHF Resource Line: [phone]

## 2025-02-11 NOTE — PROGRESS NOTES
[facility]   Electrophysiology Follow up   Date: 2/18/2025  I had the privilege of visiting Isela Conner in the office.     CC: PAF, SSS, Device check   HPI: Isela Conner is a 79 y.o. female  with a PMH of atrial fibrillation, pre-syncope, CREST syndrome, DM, PAD, HTN and fibromyalgia.     Her 30 day monitor reported PAF.  She was started on Eliquis. An ILR was implanted on 8/20/19 to better determine whether she has atrial fibrillation.     Her loop recorder showed 1st degree AVB with episodes of atrial fib/flutter with RVR and conversion pauses up to 10 seconds.     5/19/20 Insertion of dual chamber pacemaker for symptomatic SSS and removal of ILR.    9/24/2024 S/p left femoral endarterectomy and was started on Plavix. She was seen in ED 9/2024 and 10/2024 for epistaxis. Plavix discontinued.     Interval History:  History of Present Illness  The patient is an 79-year-old female who presents for evaluation of atrial fibrillation.    She reports experiencing epistaxis, which she attributes to her use of Eliquis. She recalls a previous episode of bleeding following a Botox injection in her bladder, even though the administering physician did not deem it necessary to discontinue Eliquis. She also notes that her PT/PTT levels remained elevated for several days after hospitalization. She has been advised by another physician to discontinue Eliquis.    She underwent an endarterectomy under general anesthesia in September 2024 without any complications.    MEDICATIONS  Current: Eliquis, amiodarone    Assessment & Plan  Assessment for watchman   She continues to experience intermittent episodes of atrial fibrillation or tachycardia, with the longest recorded episode lasting 18 minutes. Given her history of bleeding, the Watchman procedure is deemed a suitable option. Her hypertension and heart failure conditions appear to be stable. The pacemaker has an estimated lifespan of 7.8 years remaining. A

## 2025-02-14 RX ORDER — FUROSEMIDE 40 MG/1
20 TABLET ORAL DAILY
Qty: 45 TABLET | Refills: 0 | Status: SHIPPED | OUTPATIENT
Start: 2025-02-14

## 2025-02-14 NOTE — TELEPHONE ENCOUNTER
Received refill request for : furosemide (LASIX) 40 MG tablet  from [facility] pharmacy.     Last OV: 1/28/2025 NPKV    Next OV: 2/18/2025 MXA    Last Labs: 10/9/2024 BMP    Last Filled: 12/4/2024 NPRG

## 2025-02-18 ENCOUNTER — OFFICE VISIT (OUTPATIENT)
Dept: CARDIOLOGY CLINIC | Age: 80
End: 2025-02-18
Payer: MEDICARE

## 2025-02-18 ENCOUNTER — TELEPHONE (OUTPATIENT)
Dept: CARDIOLOGY CLINIC | Age: 80
End: 2025-02-18

## 2025-02-18 ENCOUNTER — NURSE ONLY (OUTPATIENT)
Dept: CARDIOLOGY CLINIC | Age: 80
End: 2025-02-18

## 2025-02-18 VITALS
BODY MASS INDEX: 24.64 KG/M2 | DIASTOLIC BLOOD PRESSURE: 64 MMHG | SYSTOLIC BLOOD PRESSURE: 114 MMHG | HEIGHT: 67 IN | OXYGEN SATURATION: 97 % | WEIGHT: 157 LBS | HEART RATE: 67 BPM

## 2025-02-18 DIAGNOSIS — I48.0 PAROXYSMAL A-FIB (HCC): ICD-10-CM

## 2025-02-18 DIAGNOSIS — I48.92 ATRIAL FLUTTER, UNSPECIFIED TYPE (HCC): ICD-10-CM

## 2025-02-18 DIAGNOSIS — Z01.818 PRE-OP TESTING: ICD-10-CM

## 2025-02-18 DIAGNOSIS — I48.0 PAF (PAROXYSMAL ATRIAL FIBRILLATION) (HCC): ICD-10-CM

## 2025-02-18 DIAGNOSIS — I50.32 CHRONIC DIASTOLIC CONGESTIVE HEART FAILURE (HCC): ICD-10-CM

## 2025-02-18 DIAGNOSIS — I49.5 SSS (SICK SINUS SYNDROME) (HCC): ICD-10-CM

## 2025-02-18 DIAGNOSIS — Z95.0 PACEMAKER: Primary | ICD-10-CM

## 2025-02-18 DIAGNOSIS — I48.0 PAF (PAROXYSMAL ATRIAL FIBRILLATION) (HCC): Primary | ICD-10-CM

## 2025-02-18 DIAGNOSIS — N39.0 URINARY TRACT INFECTION WITH HEMATURIA, SITE UNSPECIFIED: Primary | ICD-10-CM

## 2025-02-18 DIAGNOSIS — I10 BENIGN ESSENTIAL HTN: ICD-10-CM

## 2025-02-18 DIAGNOSIS — Z95.0 PACEMAKER: ICD-10-CM

## 2025-02-18 DIAGNOSIS — Z95.818 PRESENCE OF WATCHMAN LEFT ATRIAL APPENDAGE CLOSURE DEVICE: ICD-10-CM

## 2025-02-18 DIAGNOSIS — I48.20 ATRIAL FIBRILLATION, CHRONIC (HCC): ICD-10-CM

## 2025-02-18 DIAGNOSIS — R31.9 URINARY TRACT INFECTION WITH HEMATURIA, SITE UNSPECIFIED: Primary | ICD-10-CM

## 2025-02-18 PROCEDURE — 3078F DIAST BP <80 MM HG: CPT | Performed by: INTERNAL MEDICINE

## 2025-02-18 PROCEDURE — 1123F ACP DISCUSS/DSCN MKR DOCD: CPT | Performed by: INTERNAL MEDICINE

## 2025-02-18 PROCEDURE — 1090F PRES/ABSN URINE INCON ASSESS: CPT | Performed by: INTERNAL MEDICINE

## 2025-02-18 PROCEDURE — 93000 ELECTROCARDIOGRAM COMPLETE: CPT | Performed by: INTERNAL MEDICINE

## 2025-02-18 PROCEDURE — 1036F TOBACCO NON-USER: CPT | Performed by: INTERNAL MEDICINE

## 2025-02-18 PROCEDURE — G8420 CALC BMI NORM PARAMETERS: HCPCS | Performed by: INTERNAL MEDICINE

## 2025-02-18 PROCEDURE — G2211 COMPLEX E/M VISIT ADD ON: HCPCS | Performed by: INTERNAL MEDICINE

## 2025-02-18 PROCEDURE — 1159F MED LIST DOCD IN RCRD: CPT | Performed by: INTERNAL MEDICINE

## 2025-02-18 PROCEDURE — G8399 PT W/DXA RESULTS DOCUMENT: HCPCS | Performed by: INTERNAL MEDICINE

## 2025-02-18 PROCEDURE — G8427 DOCREV CUR MEDS BY ELIG CLIN: HCPCS | Performed by: INTERNAL MEDICINE

## 2025-02-18 PROCEDURE — 3074F SYST BP LT 130 MM HG: CPT | Performed by: INTERNAL MEDICINE

## 2025-02-18 NOTE — PATIENT INSTRUCTIONS
Continue current medications    You will receive a call to discuss workup for Watchman implant. You will

## 2025-02-19 ENCOUNTER — TELEPHONE (OUTPATIENT)
Dept: CARDIOLOGY CLINIC | Age: 80
End: 2025-02-19

## 2025-02-19 NOTE — TELEPHONE ENCOUNTER
----- Message from Dr. Tashia Jones MD sent at 2/19/2025  3:18 PM EST -----  You can schedule her next available for watchman evaluation.  ----- Message -----  From: Carolann Edwards RN  Sent: 2/19/2025   1:31 PM EST  To: Tashia Jones MD    Ok. Jose we have been in contact with Jose to get her scheduled. She has a history of pseudoaneurysm, hematuria, nose bleeds (she was on Plavix at the time too) and multiple falls.  ----- Message -----  From: Tashia Jones MD  Sent: 2/18/2025   9:12 PM EST  To: Carolann Edwards RN    I have not seen her recently so I would need to see her.  What is the reason for the Watchman?  ----- Message -----  From: Carolann Edwadrs RN  Sent: 2/18/2025   4:43 PM EST  To: Manan Elkins MD; Hosea Monroy RN; #    Hi, Dr. Jones,    Ms. Conner was seen in the office today and Dr. Elkins is recommending to proceed with Watchman evaluation. He is requesting to know if you are agreeable completing a shared decision making note or do you prefer to see her in office?    Thank you  Rebecca

## 2025-02-25 NOTE — PROGRESS NOTES
[facility]   Advanced Heart Failure/Pulmonary Hypertension  Cardiac Evaluation      Isela Conner  YOB: 1945    Date of Visit:  3/24/25    Chief Complaint   Patient presents with    Congestive Heart Failure        History of Present Illness:  Isela Conner has a history of pulmonary hypertension, Scleroderma, and Crest syndrome with Raynaud's / telangiosis including arthralgia and myalgia. She also has Fibromyalgia which is stable, and severe fatty liver infiltration-borderline elevated LFTs. She had a PFT 6/5/2014--and it shows mild obstructive defect with no response to bronchodilators. Air trapping was present on lung volumes and moderate decrease in diffusion capacity. Clinical correlation is recommended since the severity of her decrease in diffusion capacity might indicate pulmonary hypertension in a patient with scleroderma. She had some hair loss that she thinks was from taking Plaquenil so it was stopped, and the hair loss has lessened. She had an echo, and it did not show PHTN. She is getting echos for surveying scleroderma.      She had a dual chamber pacemaker placed per Dr. Elkins on 5-19-20 for SSS with a post conversion pause of up to 10 seconds.  At the time, she had an IRL implanted 8-20-19 which was reviewed s/p fall and hip fracture.     Standing Orders: no  Cardiac Stent: no  LOV Plan: no changes  Since Last Visit: Labs 3/21/25    Pt was last seen in office 9/8/2022 and presents today for a follow up of PAH. Last EF 60-65% on 8/15/24. Last appointment with Jessi Peralta NP 1/28/25.  . Epistaxis, bleeding and clots occurred on Eliquis. She had botox in bladder. She was admitted for ten days after passing blood clots. She was not aware of Afib. She did not get a blood transfusion. Here for watchman decision. No date for procedure at this time. 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 Follows with Soha Tom APRN - NP for PCP. She is on bosentan and follows with

## 2025-03-17 RX ORDER — SPIRONOLACTONE 25 MG/1
25 TABLET ORAL DAILY
Qty: 90 TABLET | Refills: 3 | Status: SHIPPED | OUTPATIENT
Start: 2025-03-17

## 2025-03-21 ENCOUNTER — HOSPITAL ENCOUNTER (OUTPATIENT)
Age: 80
Discharge: HOME | End: 2025-03-21
Payer: MEDICARE

## 2025-03-21 DIAGNOSIS — I50.32 CHRONIC DIASTOLIC CONGESTIVE HEART FAILURE (HCC): ICD-10-CM

## 2025-03-21 LAB
ANION GAP SERPL CALCULATED.3IONS-SCNC: 9 MMOL/L (ref 3–16)
BUN SERPL-MCNC: 20 MG/DL (ref 7–20)
CALCIUM SERPL-MCNC: 9.1 MG/DL (ref 8.3–10.6)
CHLORIDE SERPL-SCNC: 99 MMOL/L (ref 99–110)
CO2 SERPL-SCNC: 30 MMOL/L (ref 21–32)
CREAT SERPL-MCNC: 1.3 MG/DL (ref 0.6–1.2)
DEPRECATED RDW RBC AUTO: 13.2 % (ref 12.4–15.4)
GFR SERPLBLD CREATININE-BSD FMLA CKD-EPI: 41 ML/MIN/{1.73_M2}
GLUCOSE SERPL-MCNC: 213 MG/DL (ref 70–99)
HCT VFR BLD AUTO: 37.4 % (ref 36–48)
HGB BLD-MCNC: 12.2 G/DL (ref 12–16)
MCH RBC QN AUTO: 30 PG (ref 26–34)
MCHC RBC AUTO-ENTMCNC: 32.7 G/DL (ref 31–36)
MCV RBC AUTO: 91.8 FL (ref 80–100)
NT-PROBNP SERPL-MCNC: 761 PG/ML (ref 0–449)
PLATELET # BLD AUTO: 157 K/UL (ref 135–450)
PMV BLD AUTO: 10.3 FL (ref 5–10.5)
POTASSIUM SERPL-SCNC: 4.9 MMOL/L (ref 3.5–5.1)
RBC # BLD AUTO: 4.08 M/UL (ref 4–5.2)
SODIUM SERPL-SCNC: 138 MMOL/L (ref 136–145)
WBC # BLD AUTO: 7.1 K/UL (ref 4–11)

## 2025-03-21 PROCEDURE — 83880 ASSAY OF NATRIURETIC PEPTIDE: CPT

## 2025-03-21 PROCEDURE — 80048 BASIC METABOLIC PNL TOTAL CA: CPT

## 2025-03-21 PROCEDURE — 36415 COLL VENOUS BLD VENIPUNCTURE: CPT

## 2025-03-21 PROCEDURE — 85027 COMPLETE CBC AUTOMATED: CPT

## 2025-03-24 ENCOUNTER — OFFICE VISIT (OUTPATIENT)
Dept: CARDIOLOGY CLINIC | Age: 80
End: 2025-03-24
Payer: MEDICARE

## 2025-03-24 ENCOUNTER — RESULTS FOLLOW-UP (OUTPATIENT)
Dept: CARDIOLOGY CLINIC | Age: 80
End: 2025-03-24

## 2025-03-24 VITALS
HEART RATE: 61 BPM | HEIGHT: 67 IN | OXYGEN SATURATION: 97 % | BODY MASS INDEX: 25.27 KG/M2 | DIASTOLIC BLOOD PRESSURE: 50 MMHG | WEIGHT: 161 LBS | SYSTOLIC BLOOD PRESSURE: 110 MMHG

## 2025-03-24 DIAGNOSIS — I48.20 ATRIAL FIBRILLATION, CHRONIC (HCC): Primary | ICD-10-CM

## 2025-03-24 DIAGNOSIS — R06.02 SOB (SHORTNESS OF BREATH): ICD-10-CM

## 2025-03-24 DIAGNOSIS — I50.32 CHRONIC DIASTOLIC HEART FAILURE (HCC): ICD-10-CM

## 2025-03-24 DIAGNOSIS — I48.0 PAF (PAROXYSMAL ATRIAL FIBRILLATION) (HCC): ICD-10-CM

## 2025-03-24 DIAGNOSIS — I10 BENIGN ESSENTIAL HTN: ICD-10-CM

## 2025-03-24 DIAGNOSIS — I50.32 CHRONIC DIASTOLIC CONGESTIVE HEART FAILURE (HCC): ICD-10-CM

## 2025-03-24 DIAGNOSIS — M34.9 SCLERODERMA (HCC): ICD-10-CM

## 2025-03-24 DIAGNOSIS — I27.21 PAH (PULMONARY ARTERY HYPERTENSION) (HCC): ICD-10-CM

## 2025-03-24 DIAGNOSIS — Z13.220 LIPID SCREENING: ICD-10-CM

## 2025-03-24 PROCEDURE — 1123F ACP DISCUSS/DSCN MKR DOCD: CPT | Performed by: INTERNAL MEDICINE

## 2025-03-24 PROCEDURE — G2211 COMPLEX E/M VISIT ADD ON: HCPCS | Performed by: INTERNAL MEDICINE

## 2025-03-24 PROCEDURE — G8427 DOCREV CUR MEDS BY ELIG CLIN: HCPCS | Performed by: INTERNAL MEDICINE

## 2025-03-24 PROCEDURE — G8417 CALC BMI ABV UP PARAM F/U: HCPCS | Performed by: INTERNAL MEDICINE

## 2025-03-24 PROCEDURE — 3078F DIAST BP <80 MM HG: CPT | Performed by: INTERNAL MEDICINE

## 2025-03-24 PROCEDURE — 99215 OFFICE O/P EST HI 40 MIN: CPT | Performed by: INTERNAL MEDICINE

## 2025-03-24 PROCEDURE — 1090F PRES/ABSN URINE INCON ASSESS: CPT | Performed by: INTERNAL MEDICINE

## 2025-03-24 PROCEDURE — 3074F SYST BP LT 130 MM HG: CPT | Performed by: INTERNAL MEDICINE

## 2025-03-24 PROCEDURE — 1159F MED LIST DOCD IN RCRD: CPT | Performed by: INTERNAL MEDICINE

## 2025-03-24 PROCEDURE — G8399 PT W/DXA RESULTS DOCUMENT: HCPCS | Performed by: INTERNAL MEDICINE

## 2025-03-24 PROCEDURE — 1036F TOBACCO NON-USER: CPT | Performed by: INTERNAL MEDICINE

## 2025-03-24 NOTE — TELEPHONE ENCOUNTER
----- Message from TOM Panda CNP sent at 3/24/2025  8:47 AM EDT -----  Fluid number is up, how is she feeling/wt? MILAGROS    Spoke to patient she is a bit sob, patient was seen by Car this morning weight was 161, should she take more lasix? Car made no medication changes.

## 2025-03-24 NOTE — PATIENT INSTRUCTIONS
Continue current medications, no changes  Follow up with me in May as scheduled  Call my office for any worsening symptoms such as shortness of breath, chest pain, sudden weight gain or loss, or any increased [phone]

## 2025-03-26 RX ORDER — CHLORHEXIDINE GLUCONATE 40 MG/ML
SOLUTION TOPICAL ONCE
Qty: 1 EACH | Refills: 0 | Status: SHIPPED | OUTPATIENT
Start: 2025-03-26 | End: 2025-03-26

## 2025-03-26 NOTE — TELEPHONE ENCOUNTER
Dr. Elkins 2/18/2025 referred for Watchman procedure.  Dr. Elkins consulted 2/18/2025.  Dr. Tashia Jones shared decision on 3/24/2025.  ANNETTE to be done the day of the procedure.  Insurance to be approved per Anne-Marie Conroy

## 2025-03-26 NOTE — TELEPHONE ENCOUNTER
Spoke with patient's cousin [place] regarding the scheduling of his Watchman procedure that has been scheduled for 4/14/2025 9:30 AM arrival.  Lab work has been ordered and instructed to have done at [facility] 4/7/2025.  All procedure instructions were to be e-mailed to patient Instructed to contact me with any questions prior to procedure.

## 2025-04-01 NOTE — TELEPHONE ENCOUNTER
Called patient left a message reminding her to get pre-op lab work done on 4/7/2025 for upcoming Watchman procedure to be done on 4/14/2025.

## 2025-04-07 ENCOUNTER — HOSPITAL ENCOUNTER (OUTPATIENT)
Age: 80
Discharge: HOME | End: 2025-04-07
Payer: MEDICARE

## 2025-04-07 DIAGNOSIS — I48.0 PAROXYSMAL A-FIB (HCC): ICD-10-CM

## 2025-04-07 DIAGNOSIS — Z01.818 PRE-OP TESTING: ICD-10-CM

## 2025-04-07 LAB
ABO/RH: NORMAL
ANION GAP SERPL CALCULATED.3IONS-SCNC: 7 MMOL/L (ref 3–16)
ANTIBODY SCREEN: NORMAL
BACTERIA URNS QL MICRO: ABNORMAL /HPF
BILIRUB UR QL STRIP.AUTO: NEGATIVE
BUN SERPL-MCNC: 19 MG/DL (ref 7–20)
CALCIUM SERPL-MCNC: 9.3 MG/DL (ref 8.3–10.6)
CHLORIDE SERPL-SCNC: 98 MMOL/L (ref 99–110)
CLARITY UR: CLEAR
CO2 SERPL-SCNC: 32 MMOL/L (ref 21–32)
COLOR UR: YELLOW
CREAT SERPL-MCNC: 1.3 MG/DL (ref 0.6–1.2)
CREAT UR-MCNC: 141 MG/DL (ref 28–259)
DEPRECATED RDW RBC AUTO: 13.1 % (ref 12.4–15.4)
EPI CELLS #/AREA URNS AUTO: 8 /HPF (ref 0–5)
GFR SERPLBLD CREATININE-BSD FMLA CKD-EPI: 41 ML/MIN/{1.73_M2}
GLUCOSE SERPL-MCNC: 225 MG/DL (ref 70–99)
GLUCOSE UR STRIP.AUTO-MCNC: NEGATIVE MG/DL
HCT VFR BLD AUTO: 38.3 % (ref 36–48)
HGB BLD-MCNC: 12.8 G/DL (ref 12–16)
HGB UR QL STRIP.AUTO: ABNORMAL
HYALINE CASTS #/AREA URNS AUTO: 2 /LPF (ref 0–8)
INR PPP: 1.48 (ref 0.85–1.15)
KETONES UR STRIP.AUTO-MCNC: NEGATIVE MG/DL
LEUKOCYTE ESTERASE UR QL STRIP.AUTO: ABNORMAL
MAGNESIUM SERPL-MCNC: 2.49 MG/DL (ref 1.8–2.4)
MCH RBC QN AUTO: 30.6 PG (ref 26–34)
MCHC RBC AUTO-ENTMCNC: 33.4 G/DL (ref 31–36)
MCV RBC AUTO: 91.4 FL (ref 80–100)
NITRITE UR QL STRIP.AUTO: NEGATIVE
PH UR STRIP.AUTO: 6 [PH] (ref 5–8)
PHOSPHATE SERPL-MCNC: 4.1 MG/DL (ref 2.5–4.9)
PLATELET # BLD AUTO: 152 K/UL (ref 135–450)
PMV BLD AUTO: 11 FL (ref 5–10.5)
POTASSIUM SERPL-SCNC: 5.1 MMOL/L (ref 3.5–5.1)
PROT UR STRIP.AUTO-MCNC: ABNORMAL MG/DL
PROT UR-MCNC: 18.4 MG/DL
PROT/CREAT UR-RTO: 0.1 MG/DL
PROTHROMBIN TIME: 18.1 SEC (ref 11.9–14.9)
RBC # BLD AUTO: 4.19 M/UL (ref 4–5.2)
RBC CLUMPS #/AREA URNS AUTO: 179 /HPF (ref 0–4)
SODIUM SERPL-SCNC: 137 MMOL/L (ref 136–145)
SP GR UR STRIP.AUTO: 1.02 (ref 1–1.03)
UA DIPSTICK W REFLEX MICRO PNL UR: YES
URN SPEC COLLECT METH UR: ABNORMAL
UROBILINOGEN UR STRIP-ACNC: 0.2 E.U./DL
WBC # BLD AUTO: 6.6 K/UL (ref 4–11)
WBC #/AREA URNS AUTO: 16 /HPF (ref 0–5)

## 2025-04-07 PROCEDURE — 86850 RBC ANTIBODY SCREEN: CPT

## 2025-04-07 PROCEDURE — 80048 BASIC METABOLIC PNL TOTAL CA: CPT

## 2025-04-07 PROCEDURE — 86901 BLOOD TYPING SEROLOGIC RH(D): CPT

## 2025-04-07 PROCEDURE — 84156 ASSAY OF PROTEIN URINE: CPT

## 2025-04-07 PROCEDURE — 83735 ASSAY OF MAGNESIUM: CPT

## 2025-04-07 PROCEDURE — 82570 ASSAY OF URINE CREATININE: CPT

## 2025-04-07 PROCEDURE — 36415 COLL VENOUS BLD VENIPUNCTURE: CPT

## 2025-04-07 PROCEDURE — 81001 URINALYSIS AUTO W/SCOPE: CPT

## 2025-04-07 PROCEDURE — 85610 PROTHROMBIN TIME: CPT

## 2025-04-07 PROCEDURE — 85027 COMPLETE CBC AUTOMATED: CPT

## 2025-04-07 PROCEDURE — 84100 ASSAY OF PHOSPHORUS: CPT

## 2025-04-07 PROCEDURE — 86900 BLOOD TYPING SEROLOGIC ABO: CPT

## 2025-04-08 RX ORDER — AMIODARONE HYDROCHLORIDE 200 MG/1
100 TABLET ORAL DAILY
Qty: 45 TABLET | Refills: 1 | Status: SHIPPED | OUTPATIENT
Start: 2025-04-08 | End: 2025-04-09 | Stop reason: SDUPTHER

## 2025-04-08 RX ORDER — LEVOFLOXACIN 250 MG/1
250 TABLET, FILM COATED ORAL DAILY
Qty: 3 TABLET | Refills: 0 | Status: SHIPPED | OUTPATIENT
Start: 2025-04-08 | End: 2025-04-11

## 2025-04-08 RX ORDER — LEVOFLOXACIN 250 MG/1
250 TABLET, FILM COATED ORAL DAILY
Qty: 3 TABLET | Refills: 0 | Status: SHIPPED | OUTPATIENT
Start: 2025-04-08 | End: 2025-04-08

## 2025-04-08 NOTE — TELEPHONE ENCOUNTER
I spoke with Pharmacist at [facility]  Patient has an allergy to multiple antibiotics. Patient tolerated Levaquin in past.  Will give patient Levaquin 250 mg for 3 days per  for UTI.  I called patient and informed of the medication sent to her pharmacy.

## 2025-04-08 NOTE — TELEPHONE ENCOUNTER
Watchman 4/14/2025     9:30 AM Isela Conner 1945 [facility]    Spoke with patient and went over all instructions.     Creatinine 1.3  Glucose 225  Hgb 12.8  Plt 152  U/A UTI placed on antibiotics.  PT/INR 18.1 / 1.48    [id number] score: 6 (Age x2, Gender, CHF, HTN, PAD)  HASbled is at least 4.      Currently on Eliquis. She is a poor candidate for chronic anticoagulation with her history of Epistaxis, Fall Risk.    Dr. Elkins 2/18/2025 referred for Watchman procedure.  Dr. Elkins consulted 2/18/2025.  Dr. Tashia Jones shared decision on 3/24/2025.  ANNETTE to be done the day of the procedure.  Insurance to be approved per Anne-Marie Conroy      Call patient on Friday to review medications/problems. YES.  UTI (Antibiotic taken)? UTI placed on Antibiotics.  Any groin issues? look for any type of rash, open skin, etc NO.  On any blood thinners & when to stop taking? If you are still on Eliquis please stop taking after 4/12/2025 doses. Take a Baby Aspirin the day of the procedure.  Lab work addressed prior to admission. YES.  Allergies addressed? YES.  Pre-medication necessary? NO.  Pt. staying overnight? YES.  PT/INR, T&C & Glucose ordered for day of procedure on every patient. YES.  \"Electrophysiology Testing\" order placed on every patient? YES.

## 2025-04-08 NOTE — TELEPHONE ENCOUNTER
Received refill request for  amiodarone (CORDARONE) 200 MG tablet  from [facility] pharmacy.     Last OV: 3/24/2025 KAREN    Next OV: 5/28/2025 KAREN    Last Labs: 2/18/2025 EKG    Last Filled: 10/28/2024 NPSR

## 2025-04-09 RX ORDER — AMIODARONE HYDROCHLORIDE 200 MG/1
100 TABLET ORAL DAILY
Qty: 45 TABLET | Refills: 1 | Status: SHIPPED | OUTPATIENT
Start: 2025-04-09

## 2025-04-09 NOTE — TELEPHONE ENCOUNTER
Received communication from [facility] that amiodarone 200 mg tabs are not available. Spoke with patient and she would like amiodarone to be sent to local pharmacy.

## 2025-04-14 ENCOUNTER — HOSPITAL ENCOUNTER (OUTPATIENT)
Age: 80
Discharge: HOME | DRG: 274 | End: 2025-04-16
Attending: INTERNAL MEDICINE
Payer: MEDICARE

## 2025-04-14 ENCOUNTER — ANESTHESIA EVENT (OUTPATIENT)
Age: 80
DRG: 274 | End: 2025-04-14
Payer: MEDICARE

## 2025-04-14 ENCOUNTER — HOSPITAL ENCOUNTER (INPATIENT)
Age: 80
LOS: 1 days | Discharge: HOME | DRG: 274 | End: 2025-04-15
Attending: INTERNAL MEDICINE | Admitting: INTERNAL MEDICINE
Payer: MEDICARE

## 2025-04-14 ENCOUNTER — ANESTHESIA (OUTPATIENT)
Age: 80
DRG: 274 | End: 2025-04-14
Payer: MEDICARE

## 2025-04-14 DIAGNOSIS — I48.0 PAROXYSMAL A-FIB (HCC): ICD-10-CM

## 2025-04-14 DIAGNOSIS — I48.0 PAROXYSMAL ATRIAL FIBRILLATION (HCC): ICD-10-CM

## 2025-04-14 DIAGNOSIS — Z01.818 PRE-OP TESTING: ICD-10-CM

## 2025-04-14 LAB
ABO/RH: NORMAL
ANION GAP SERPL CALCULATED.3IONS-SCNC: 8 MMOL/L (ref 3–16)
ANTIBODY SCREEN: NORMAL
BUN SERPL-MCNC: 20 MG/DL (ref 7–20)
CALCIUM SERPL-MCNC: 8.3 MG/DL (ref 8.3–10.6)
CHLORIDE SERPL-SCNC: 104 MMOL/L (ref 99–110)
CO2 SERPL-SCNC: 26 MMOL/L (ref 21–32)
CREAT SERPL-MCNC: 1.1 MG/DL (ref 0.6–1.2)
ECHO BSA: 1.86 M2
ECHO BSA: 1.86 M2
EKG ATRIAL RATE: 72 BPM
EKG DIAGNOSIS: NORMAL
EKG P-R INTERVAL: 396 MS
EKG Q-T INTERVAL: 418 MS
EKG QRS DURATION: 110 MS
EKG QTC CALCULATION (BAZETT): 457 MS
EKG R AXIS: -44 DEGREES
EKG T AXIS: 100 DEGREES
EKG VENTRICULAR RATE: 72 BPM
GFR SERPLBLD CREATININE-BSD FMLA CKD-EPI: 50 ML/MIN/{1.73_M2}
GLUCOSE BLD-MCNC: 213 MG/DL (ref 70–99)
GLUCOSE BLD-MCNC: 346 MG/DL (ref 70–99)
GLUCOSE SERPL-MCNC: 274 MG/DL (ref 70–99)
INR PPP: 1.25 (ref 0.85–1.15)
MAGNESIUM SERPL-MCNC: 2.32 MG/DL (ref 1.8–2.4)
PERFORMED ON: ABNORMAL
PERFORMED ON: ABNORMAL
POC ACT LR: 361 SEC
POTASSIUM SERPL-SCNC: 5.4 MMOL/L (ref 3.5–5.1)
PROTHROMBIN TIME: 15.9 SEC (ref 11.9–14.9)
SODIUM SERPL-SCNC: 138 MMOL/L (ref 136–145)

## 2025-04-14 PROCEDURE — 6360000002 HC RX W HCPCS: Performed by: NURSE ANESTHETIST, CERTIFIED REGISTERED

## 2025-04-14 PROCEDURE — 86900 BLOOD TYPING SEROLOGIC ABO: CPT

## 2025-04-14 PROCEDURE — 83735 ASSAY OF MAGNESIUM: CPT

## 2025-04-14 PROCEDURE — C1760 CLOSURE DEV, VASC: HCPCS | Performed by: INTERNAL MEDICINE

## 2025-04-14 PROCEDURE — C1889 IMPLANT/INSERT DEVICE, NOC: HCPCS | Performed by: INTERNAL MEDICINE

## 2025-04-14 PROCEDURE — 33340 PERQ CLSR TCAT L ATR APNDGE: CPT | Performed by: INTERNAL MEDICINE

## 2025-04-14 PROCEDURE — 80048 BASIC METABOLIC PNL TOTAL CA: CPT

## 2025-04-14 PROCEDURE — 93010 ELECTROCARDIOGRAM REPORT: CPT | Performed by: INTERNAL MEDICINE

## 2025-04-14 PROCEDURE — 2580000003 HC RX 258: Performed by: NURSE ANESTHETIST, CERTIFIED REGISTERED

## 2025-04-14 PROCEDURE — C1769 GUIDE WIRE: HCPCS | Performed by: INTERNAL MEDICINE

## 2025-04-14 PROCEDURE — 2580000003 HC RX 258: Performed by: INTERNAL MEDICINE

## 2025-04-14 PROCEDURE — 85610 PROTHROMBIN TIME: CPT

## 2025-04-14 PROCEDURE — 85347 COAGULATION TIME ACTIVATED: CPT

## 2025-04-14 PROCEDURE — 6370000000 HC RX 637 (ALT 250 FOR IP): Performed by: NURSE PRACTITIONER

## 2025-04-14 PROCEDURE — 6360000002 HC RX W HCPCS: Performed by: STUDENT IN AN ORGANIZED HEALTH CARE EDUCATION/TRAINING PROGRAM

## 2025-04-14 PROCEDURE — 6360000002 HC RX W HCPCS: Performed by: INTERNAL MEDICINE

## 2025-04-14 PROCEDURE — 86850 RBC ANTIBODY SCREEN: CPT

## 2025-04-14 PROCEDURE — 6360000004 HC RX CONTRAST MEDICATION: Performed by: INTERNAL MEDICINE

## 2025-04-14 PROCEDURE — B24BZZ4 ULTRASONOGRAPHY OF HEART WITH AORTA, TRANSESOPHAGEAL: ICD-10-PCS | Performed by: INTERNAL MEDICINE

## 2025-04-14 PROCEDURE — 6360000002 HC RX W HCPCS

## 2025-04-14 PROCEDURE — 6370000000 HC RX 637 (ALT 250 FOR IP): Performed by: INTERNAL MEDICINE

## 2025-04-14 PROCEDURE — 02L73DK OCCLUSION OF LEFT ATRIAL APPENDAGE WITH INTRALUMINAL DEVICE, PERCUTANEOUS APPROACH: ICD-10-PCS | Performed by: INTERNAL MEDICINE

## 2025-04-14 PROCEDURE — 7100000001 HC PACU RECOVERY - ADDTL 15 MIN: Performed by: INTERNAL MEDICINE

## 2025-04-14 PROCEDURE — 86901 BLOOD TYPING SEROLOGIC RH(D): CPT

## 2025-04-14 PROCEDURE — 1200000000 HC SEMI PRIVATE

## 2025-04-14 PROCEDURE — 3700000001 HC ADD 15 MINUTES (ANESTHESIA): Performed by: INTERNAL MEDICINE

## 2025-04-14 PROCEDURE — 2500000003 HC RX 250 WO HCPCS: Performed by: NURSE ANESTHETIST, CERTIFIED REGISTERED

## 2025-04-14 PROCEDURE — 7100000000 HC PACU RECOVERY - FIRST 15 MIN: Performed by: INTERNAL MEDICINE

## 2025-04-14 PROCEDURE — 2709999900 HC NON-CHARGEABLE SUPPLY: Performed by: INTERNAL MEDICINE

## 2025-04-14 PROCEDURE — 36415 COLL VENOUS BLD VENIPUNCTURE: CPT

## 2025-04-14 PROCEDURE — C1894 INTRO/SHEATH, NON-LASER: HCPCS | Performed by: INTERNAL MEDICINE

## 2025-04-14 PROCEDURE — 3700000000 HC ANESTHESIA ATTENDED CARE: Performed by: INTERNAL MEDICINE

## 2025-04-14 PROCEDURE — 93355 ECHO TRANSESOPHAGEAL (TEE): CPT

## 2025-04-14 PROCEDURE — 2500000003 HC RX 250 WO HCPCS: Performed by: INTERNAL MEDICINE

## 2025-04-14 PROCEDURE — 93005 ELECTROCARDIOGRAM TRACING: CPT | Performed by: INTERNAL MEDICINE

## 2025-04-14 PROCEDURE — C1766 INTRO/SHEATH,STRBLE,NON-PEEL: HCPCS | Performed by: INTERNAL MEDICINE

## 2025-04-14 DEVICE — LEFT ATRIAL APPENDAGE CLOSURE DEVICE WITH DELIVERY SYSTEM
Type: IMPLANTABLE DEVICE | Status: FUNCTIONAL
Brand: WATCHMAN FLX™ PRO

## 2025-04-14 RX ORDER — ASPIRIN 81 MG/1
81 TABLET, CHEWABLE ORAL DAILY
COMMUNITY

## 2025-04-14 RX ORDER — DEXTROSE MONOHYDRATE 100 MG/ML
INJECTION, SOLUTION INTRAVENOUS CONTINUOUS PRN
Status: DISCONTINUED | OUTPATIENT
Start: 2025-04-14 | End: 2025-04-15 | Stop reason: HOSPADM

## 2025-04-14 RX ORDER — AMIODARONE HYDROCHLORIDE 200 MG/1
100 TABLET ORAL DAILY
Status: DISCONTINUED | OUTPATIENT
Start: 2025-04-15 | End: 2025-04-15 | Stop reason: HOSPADM

## 2025-04-14 RX ORDER — DULOXETIN HYDROCHLORIDE 60 MG/1
60 CAPSULE, DELAYED RELEASE ORAL DAILY
Status: DISCONTINUED | OUTPATIENT
Start: 2025-04-15 | End: 2025-04-15 | Stop reason: HOSPADM

## 2025-04-14 RX ORDER — SODIUM CHLORIDE 9 MG/ML
INJECTION, SOLUTION INTRAVENOUS CONTINUOUS
Status: DISCONTINUED | OUTPATIENT
Start: 2025-04-14 | End: 2025-04-14 | Stop reason: HOSPADM

## 2025-04-14 RX ORDER — LIDOCAINE HYDROCHLORIDE 20 MG/ML
INJECTION, SOLUTION EPIDURAL; INFILTRATION; INTRACAUDAL; PERINEURAL
Status: DISCONTINUED | OUTPATIENT
Start: 2025-04-14 | End: 2025-04-14 | Stop reason: SDUPTHER

## 2025-04-14 RX ORDER — HEPARIN SODIUM 1000 [USP'U]/ML
INJECTION, SOLUTION INTRAVENOUS; SUBCUTANEOUS
Status: DISCONTINUED | OUTPATIENT
Start: 2025-04-14 | End: 2025-04-14 | Stop reason: SDUPTHER

## 2025-04-14 RX ORDER — FENTANYL CITRATE 50 UG/ML
25 INJECTION, SOLUTION INTRAMUSCULAR; INTRAVENOUS EVERY 5 MIN PRN
Status: COMPLETED | OUTPATIENT
Start: 2025-04-14 | End: 2025-04-14

## 2025-04-14 RX ORDER — SODIUM CHLORIDE 9 MG/ML
INJECTION, SOLUTION INTRAVENOUS
Status: DISCONTINUED | OUTPATIENT
Start: 2025-04-14 | End: 2025-04-14 | Stop reason: SDUPTHER

## 2025-04-14 RX ORDER — ROCURONIUM BROMIDE 10 MG/ML
INJECTION, SOLUTION INTRAVENOUS
Status: DISCONTINUED | OUTPATIENT
Start: 2025-04-14 | End: 2025-04-14 | Stop reason: SDUPTHER

## 2025-04-14 RX ORDER — IOPAMIDOL 755 MG/ML
INJECTION, SOLUTION INTRAVASCULAR PRN
Status: DISCONTINUED | OUTPATIENT
Start: 2025-04-14 | End: 2025-04-14 | Stop reason: HOSPADM

## 2025-04-14 RX ORDER — GLUCAGON 1 MG/ML
1 KIT INJECTION PRN
Status: DISCONTINUED | OUTPATIENT
Start: 2025-04-14 | End: 2025-04-15 | Stop reason: HOSPADM

## 2025-04-14 RX ORDER — METOPROLOL SUCCINATE 50 MG/1
100 TABLET, EXTENDED RELEASE ORAL DAILY
Status: DISCONTINUED | OUTPATIENT
Start: 2025-04-15 | End: 2025-04-15 | Stop reason: HOSPADM

## 2025-04-14 RX ORDER — SODIUM CHLORIDE 9 MG/ML
INJECTION, SOLUTION INTRAVENOUS PRN
Status: DISCONTINUED | OUTPATIENT
Start: 2025-04-14 | End: 2025-04-14 | Stop reason: HOSPADM

## 2025-04-14 RX ORDER — ACETAMINOPHEN 500 MG
500 TABLET ORAL EVERY 6 HOURS PRN
Status: DISCONTINUED | OUTPATIENT
Start: 2025-04-14 | End: 2025-04-14

## 2025-04-14 RX ORDER — ONDANSETRON 2 MG/ML
INJECTION INTRAMUSCULAR; INTRAVENOUS
Status: DISCONTINUED | OUTPATIENT
Start: 2025-04-14 | End: 2025-04-14 | Stop reason: SDUPTHER

## 2025-04-14 RX ORDER — DIGOXIN 125 MCG
125 TABLET ORAL EVERY OTHER DAY
Status: DISCONTINUED | OUTPATIENT
Start: 2025-04-15 | End: 2025-04-15 | Stop reason: HOSPADM

## 2025-04-14 RX ORDER — ASPIRIN 81 MG/1
81 TABLET, CHEWABLE ORAL DAILY
Status: DISCONTINUED | OUTPATIENT
Start: 2025-04-15 | End: 2025-04-15 | Stop reason: HOSPADM

## 2025-04-14 RX ORDER — SODIUM CHLORIDE 0.9 % (FLUSH) 0.9 %
5-40 SYRINGE (ML) INJECTION EVERY 12 HOURS SCHEDULED
Status: DISCONTINUED | OUTPATIENT
Start: 2025-04-14 | End: 2025-04-15 | Stop reason: HOSPADM

## 2025-04-14 RX ORDER — OXYCODONE HYDROCHLORIDE 5 MG/1
5 TABLET ORAL PRN
Status: DISCONTINUED | OUTPATIENT
Start: 2025-04-14 | End: 2025-04-14 | Stop reason: HOSPADM

## 2025-04-14 RX ORDER — ATORVASTATIN CALCIUM 10 MG/1
10 TABLET, FILM COATED ORAL DAILY
Status: DISCONTINUED | OUTPATIENT
Start: 2025-04-15 | End: 2025-04-15 | Stop reason: HOSPADM

## 2025-04-14 RX ORDER — PROPOFOL 10 MG/ML
INJECTION, EMULSION INTRAVENOUS
Status: DISCONTINUED | OUTPATIENT
Start: 2025-04-14 | End: 2025-04-14 | Stop reason: SDUPTHER

## 2025-04-14 RX ORDER — DEXAMETHASONE SODIUM PHOSPHATE 4 MG/ML
INJECTION, SOLUTION INTRA-ARTICULAR; INTRALESIONAL; INTRAMUSCULAR; INTRAVENOUS; SOFT TISSUE
Status: DISCONTINUED | OUTPATIENT
Start: 2025-04-14 | End: 2025-04-14 | Stop reason: SDUPTHER

## 2025-04-14 RX ORDER — CLOPIDOGREL BISULFATE 75 MG/1
75 TABLET ORAL DAILY
Status: DISCONTINUED | OUTPATIENT
Start: 2025-04-14 | End: 2025-04-15 | Stop reason: HOSPADM

## 2025-04-14 RX ORDER — SODIUM CHLORIDE 0.9 % (FLUSH) 0.9 %
5-40 SYRINGE (ML) INJECTION EVERY 12 HOURS SCHEDULED
Status: DISCONTINUED | OUTPATIENT
Start: 2025-04-14 | End: 2025-04-14 | Stop reason: HOSPADM

## 2025-04-14 RX ORDER — METOCLOPRAMIDE HYDROCHLORIDE 5 MG/ML
10 INJECTION INTRAMUSCULAR; INTRAVENOUS
Status: DISCONTINUED | OUTPATIENT
Start: 2025-04-14 | End: 2025-04-14 | Stop reason: HOSPADM

## 2025-04-14 RX ORDER — PROTAMINE SULFATE 10 MG/ML
INJECTION, SOLUTION INTRAVENOUS
Status: DISCONTINUED | OUTPATIENT
Start: 2025-04-14 | End: 2025-04-14 | Stop reason: SDUPTHER

## 2025-04-14 RX ORDER — ACETAMINOPHEN 325 MG/1
650 TABLET ORAL EVERY 4 HOURS PRN
Status: DISCONTINUED | OUTPATIENT
Start: 2025-04-14 | End: 2025-04-15 | Stop reason: HOSPADM

## 2025-04-14 RX ORDER — GABAPENTIN 300 MG/1
300 CAPSULE ORAL 3 TIMES DAILY
Status: DISCONTINUED | OUTPATIENT
Start: 2025-04-14 | End: 2025-04-15 | Stop reason: HOSPADM

## 2025-04-14 RX ORDER — ONDANSETRON 2 MG/ML
4 INJECTION INTRAMUSCULAR; INTRAVENOUS
Status: DISCONTINUED | OUTPATIENT
Start: 2025-04-14 | End: 2025-04-14 | Stop reason: HOSPADM

## 2025-04-14 RX ORDER — FUROSEMIDE 20 MG/1
20 TABLET ORAL DAILY
Status: DISCONTINUED | OUTPATIENT
Start: 2025-04-14 | End: 2025-04-15 | Stop reason: HOSPADM

## 2025-04-14 RX ORDER — SODIUM CHLORIDE 9 MG/ML
INJECTION, SOLUTION INTRAVENOUS PRN
Status: DISCONTINUED | OUTPATIENT
Start: 2025-04-14 | End: 2025-04-15 | Stop reason: HOSPADM

## 2025-04-14 RX ORDER — DILTIAZEM HYDROCHLORIDE 180 MG/1
180 CAPSULE, COATED, EXTENDED RELEASE ORAL DAILY
Status: DISCONTINUED | OUTPATIENT
Start: 2025-04-15 | End: 2025-04-15 | Stop reason: HOSPADM

## 2025-04-14 RX ORDER — SODIUM CHLORIDE 0.9 % (FLUSH) 0.9 %
5-40 SYRINGE (ML) INJECTION PRN
Status: DISCONTINUED | OUTPATIENT
Start: 2025-04-14 | End: 2025-04-14 | Stop reason: HOSPADM

## 2025-04-14 RX ORDER — SODIUM CHLORIDE 0.9 % (FLUSH) 0.9 %
5-40 SYRINGE (ML) INJECTION PRN
Status: DISCONTINUED | OUTPATIENT
Start: 2025-04-14 | End: 2025-04-15 | Stop reason: HOSPADM

## 2025-04-14 RX ORDER — NALOXONE HYDROCHLORIDE 0.4 MG/ML
INJECTION, SOLUTION INTRAMUSCULAR; INTRAVENOUS; SUBCUTANEOUS PRN
Status: DISCONTINUED | OUTPATIENT
Start: 2025-04-14 | End: 2025-04-14 | Stop reason: HOSPADM

## 2025-04-14 RX ORDER — FENTANYL CITRATE 50 UG/ML
INJECTION, SOLUTION INTRAMUSCULAR; INTRAVENOUS
Status: COMPLETED
Start: 2025-04-14 | End: 2025-04-14

## 2025-04-14 RX ORDER — HYDROMORPHONE HYDROCHLORIDE 2 MG/ML
0.5 INJECTION, SOLUTION INTRAMUSCULAR; INTRAVENOUS; SUBCUTANEOUS EVERY 5 MIN PRN
Status: DISCONTINUED | OUTPATIENT
Start: 2025-04-14 | End: 2025-04-14 | Stop reason: HOSPADM

## 2025-04-14 RX ORDER — OXYCODONE HYDROCHLORIDE 5 MG/1
10 TABLET ORAL PRN
Status: DISCONTINUED | OUTPATIENT
Start: 2025-04-14 | End: 2025-04-14 | Stop reason: HOSPADM

## 2025-04-14 RX ORDER — INSULIN LISPRO 100 [IU]/ML
0-8 INJECTION, SOLUTION INTRAVENOUS; SUBCUTANEOUS
Status: DISCONTINUED | OUTPATIENT
Start: 2025-04-14 | End: 2025-04-15

## 2025-04-14 RX ORDER — HYDROCODONE BITARTRATE AND ACETAMINOPHEN 7.5; 325 MG/1; MG/1
1 TABLET ORAL EVERY 6 HOURS PRN
Status: DISCONTINUED | OUTPATIENT
Start: 2025-04-14 | End: 2025-04-15 | Stop reason: HOSPADM

## 2025-04-14 RX ADMIN — INSULIN LISPRO 6 UNITS: 100 INJECTION, SOLUTION INTRAVENOUS; SUBCUTANEOUS at 20:28

## 2025-04-14 RX ADMIN — CLOPIDOGREL BISULFATE 75 MG: 75 TABLET, FILM COATED ORAL at 20:28

## 2025-04-14 RX ADMIN — PROPOFOL 100 MG: 10 INJECTION, EMULSION INTRAVENOUS at 10:33

## 2025-04-14 RX ADMIN — HYDROCODONE BITARTRATE AND ACETAMINOPHEN 1 TABLET: 7.5; 325 TABLET ORAL at 19:43

## 2025-04-14 RX ADMIN — ONDANSETRON 4 MG: 2 INJECTION INTRAMUSCULAR; INTRAVENOUS at 10:44

## 2025-04-14 RX ADMIN — SODIUM CHLORIDE: 9 INJECTION, SOLUTION INTRAVENOUS at 10:24

## 2025-04-14 RX ADMIN — DEXAMETHASONE SODIUM PHOSPHATE 8 MG: 4 INJECTION, SOLUTION INTRAMUSCULAR; INTRAVENOUS at 10:44

## 2025-04-14 RX ADMIN — FUROSEMIDE 20 MG: 20 TABLET ORAL at 20:28

## 2025-04-14 RX ADMIN — PROTAMINE SULFATE 30 MG: 10 INJECTION, SOLUTION INTRAVENOUS at 11:27

## 2025-04-14 RX ADMIN — GABAPENTIN 300 MG: 300 CAPSULE ORAL at 20:28

## 2025-04-14 RX ADMIN — FENTANYL CITRATE 25 MCG: 50 INJECTION INTRAMUSCULAR; INTRAVENOUS at 12:01

## 2025-04-14 RX ADMIN — VANCOMYCIN HYDROCHLORIDE 1000 MG: 1 INJECTION, POWDER, LYOPHILIZED, FOR SOLUTION INTRAVENOUS at 10:07

## 2025-04-14 RX ADMIN — FENTANYL CITRATE 25 MCG: 50 INJECTION INTRAMUSCULAR; INTRAVENOUS at 11:52

## 2025-04-14 RX ADMIN — HEPARIN SODIUM 4000 UNITS: 1000 INJECTION INTRAVENOUS; SUBCUTANEOUS at 10:58

## 2025-04-14 RX ADMIN — SODIUM CHLORIDE: 9 INJECTION, SOLUTION INTRAVENOUS at 11:32

## 2025-04-14 RX ADMIN — ROCURONIUM BROMIDE 50 MG: 10 INJECTION, SOLUTION INTRAVENOUS at 10:34

## 2025-04-14 RX ADMIN — HEPARIN SODIUM 5000 UNITS: 1000 INJECTION INTRAVENOUS; SUBCUTANEOUS at 11:09

## 2025-04-14 RX ADMIN — LIDOCAINE HYDROCHLORIDE 100 MG: 20 INJECTION, SOLUTION EPIDURAL; INFILTRATION; INTRACAUDAL; PERINEURAL at 10:33

## 2025-04-14 RX ADMIN — SUGAMMADEX 200 MG: 100 INJECTION, SOLUTION INTRAVENOUS at 11:27

## 2025-04-14 RX ADMIN — SODIUM CHLORIDE, PRESERVATIVE FREE 10 ML: 5 INJECTION INTRAVENOUS at 20:31

## 2025-04-14 ASSESSMENT — PAIN DESCRIPTION - LOCATION
LOCATION: BACK
LOCATION: FOOT

## 2025-04-14 ASSESSMENT — PAIN - FUNCTIONAL ASSESSMENT: PAIN_FUNCTIONAL_ASSESSMENT: ACTIVITIES ARE NOT PREVENTED

## 2025-04-14 ASSESSMENT — PAIN DESCRIPTION - DESCRIPTORS: DESCRIPTORS: BURNING;OTHER (COMMENT)

## 2025-04-14 ASSESSMENT — ENCOUNTER SYMPTOMS: SHORTNESS OF BREATH: 1

## 2025-04-14 ASSESSMENT — PAIN SCALES - GENERAL
PAINLEVEL_OUTOF10: 7
PAINLEVEL_OUTOF10: 9
PAINLEVEL_OUTOF10: 5

## 2025-04-14 ASSESSMENT — PAIN DESCRIPTION - ORIENTATION: ORIENTATION: LEFT

## 2025-04-14 NOTE — H&P
[facility]  Cardiology Consult    Isela Conner  1945    April 14, 2025      Reason for Referral: LAAC    CC: Epistaxis, falls, Hematuria       Subjective:     History of Present Illness:    Isela Conner is a 80 y.o. patient with a PMH significant for CHF, PPM, PAFIB presented with complaints of hematuria.            Past Medical History:   has a past medical history of CHF (congestive heart failure) (HCC), CKD stage 3a, GFR 45-59 ml/min (HCC), Diabetes mellitus (HCC), Fatty liver, Femur fracture, left (HCC), Fibromyalgia, Fracture, hip (HCC), GERD (gastroesophageal reflux disease), Hypertension, IBS (irritable bowel syndrome), Osteoarthritis, Peripheral neuropathy, Postmenopausal, Pulmonary hypertension (HCC), and Scleroderma (HCC).    Surgical History:   has a past surgical history that includes Hysterectomy (1987); Cholecystectomy (1989); Lumbar disc surgery (2007); Throat surgery (2012); arthroplasty (08/10/2012); Fibula Fracture Surgery (2017); back surgery; Cystocopy (08/01/2018); Partial hip arthroplasty (Right, 09/2019); Femur fracture surgery; invasive vascular (Left, 8/16/2024); invasive vascular (Left, 8/16/2024); and Femoral Endarterectomy (Left, 8/29/2024).     Social History:   reports that she quit smoking about 13 years ago. Her smoking use included cigarettes. She started smoking about 65 years ago. She has a 154.9 pack-year smoking history. She has never used smokeless tobacco. She reports that she does not drink alcohol and does not use drugs.     Family History:  family history includes Cancer in her mother and another family member; Diabetes in her mother and sister; Heart Disease in her mother; Hypertension in her father and mother; Osteoporosis in her mother and sister; Stroke in her father.    Home Medications:  Were reviewed and are listed in nursing record and/or below  Prior to Admission medications    Medication Sig Start Date End Date Taking? Authorizing Provider

## 2025-04-14 NOTE — PROGRESS NOTES
Patient to PACU from Cath lab. Alert, c/o back pain. VSS. Right groin intact. + 1 bilat pedal pulses.

## 2025-04-14 NOTE — TELEPHONE ENCOUNTER
4/14/2025 s/p SUCCESSFUL WATCHMAN LAAC PROCEDURE PER DR. Torres Hector of the left atrial appendage occlusion device with no complication, WATCHMAN device used 27 mm size.     --6-month f/u Post Watchman.   NCDR window: 09/13/2025 to 12/10/2025  --1-year f/u Post Watchman.  NCDR window: 02/13/2026 to 06/13/2026   --2-year f/u Post Watchman.  NCDR window: 02/13/2027 to 06/13/2027    Carolann, Schedule at [facility]  Post procedure ANNETTE to be completed 45-59 days post procedure (5/29/2025-6/12/2025) Order placed.     Thanks.   Jose Monroy RN, BSN   Watchman Coordinator

## 2025-04-14 NOTE — CARE COORDINATION
04/14/25 1048   IMM Letter   IMM Letter given to Patient/Family/Significant other/Guardian/POA/by: IMM Given   IMM Letter date given: 04/14/25   IMM Letter time given: 0921

## 2025-04-14 NOTE — ANESTHESIA PRE PROCEDURE
Department of Anesthesiology  Preprocedure Note       Name:  Isela Conner   Age:  80 y.o.  :  1945                                          MRN:  [id number]         Date:  2025      Surgeon: Surgeon(s):  Manan Elkins MD Verma, Anil, MD    Procedure: Procedure(s):  Watchman saturnino closure device    Medications prior to admission:   Prior to Admission medications    Medication Sig Start Date End Date Taking? Authorizing Provider   amiodarone (CORDARONE) 200 MG tablet Take 0.5 tablets by mouth daily 25   David Liang MD   spironolactone (ALDACTONE) 25 MG tablet TAKE 1 TABLET DAILY 3/17/25   Jessi Peralta APRN - CNP   furosemide (LASIX) 40 MG tablet TAKE ONE-HALF (1/2) TABLET DAILY 25   Jessi Peralta APRN - CNP   mirabegron (MYRBETRIQ) 50 MG TB24 Take 50 mg by mouth daily 24   Yaneth Duke MD   ferrous sulfate (FRANCISCA-IN-SOL) 75 (15 Fe) MG/ML solution Take 1 mL by mouth daily    Yaneth Duke MD   apixaban (ELIQUIS) 5 MG TABS tablet TAKE 1 TABLET TWICE A DAY 24   Ty Liang APRN - CNP   digoxin (LANOXIN) 125 MCG tablet Take 1 tablet by mouth every other day 10/2/24   Ty Liang APRN - CNP   atorvastatin (LIPITOR) 10 MG tablet TAKE 1 TABLET DAILY 24   Jessi Peralta APRN - CNP   dilTIAZem (CARDIZEM CD) 180 MG extended release capsule TAKE 1 CAPSULE DAILY 6/3/24   Ty Liang APRN - CNP   metoprolol succinate (TOPROL XL) 100 MG extended release tablet TAKE 1 TABLET DAILY 6/3/24   Ty Liang APRN - CNP   estradiol (ESTRACE) 0.1 MG/GM vaginal cream Place 2 g vaginally See Admin Instructions  and thursday    Yaneth Duke MD   DULoxetine (CYMBALTA) 60 MG extended release capsule Take 1 capsule by mouth daily 3/31/24   Corina Fuentes APRN - CNP   Semaglutide,0.25 or 0.5MG/DOS, (OZEMPIC, 0.25 OR 0.5 MG/DOSE,) 2 MG/1.5ML SOPN Inject 0.5 mg into the skin Once a week at 5 PM    Provider, MD Yaneth   bosentan (TRACLEER) 125 MG

## 2025-04-14 NOTE — DISCHARGE INSTRUCTIONS
Watchman Discharge Instruction    Care of your puncture site:  Remove bandage 24 hours after the procedure.  May shower in 24 hours but do not sit in a bathtub/pool of water for 5 days or until the wound is healed.  Gently clean groin using soap and water.  Dry thoroughly and apply a Band-Aid that covers the entire site. Use Band-Aid until skin heals over in about 3-5 days.   Do not apply powder or lotion.    Limit walking and stair climbing today.    Normal Observations:  Soreness or tenderness which may last one week.  Mild oozing from the incision site.  Possible bruising that could last 2 weeks.    Activity:  You may resume normal activity in 5 days or after the wound heals.  Avoid lifting more than 10 pounds for 5 days or until the wound heals.  Avoid strenuous exercise or activity for 1 week.  You may return to work in 1 week  without restrictions       Call your doctor immediately if your condition worsens, for any other concerns, for a follow-up appointment or if you experience any of the following:  Increased swelling on the groin or leg.  Unusual pain, numbness, or tingling of the groin or down the leg.  Any signs of infection such as: redness, yellow drainage at the site, swelling or pain.    IF GROIN STARTS BLEEDING SIGNIFICANTLY:   LAY FLAT, HOLD FIRM DIRECT PRESSURE, AND CALL 911     antibiotic prophylaxis (amoxicillin 2 g once 60 minutes prior to procedure) for 6 months for:  a.     Dental procedures including cleanings  b.     Gastrointestinal procedures  c.     Genitourinary procedures  d.     Respiratory procedures involving incision or biopsy  e.     Procedures on infected skin or muscle.       
Previously Declined (within the last year)

## 2025-04-14 NOTE — ANESTHESIA POSTPROCEDURE EVALUATION
Department of Anesthesiology  Postprocedure Note    Patient: Isela Conner  MRN: [id number]  YOB: 1945  Date of evaluation: 4/14/2025    Procedure Summary       Date: 04/14/25 Room / Location: [facility] EP LAB 5 / [facility] CARDIAC CATH LAB    Anesthesia Start: 1026 Anesthesia Stop: 1149    Procedure: Watchman saturnino closure device Diagnosis:       Paroxysmal atrial fibrillation (HCC)      (Paroxysmal atrial fibrillation (HCC) [I48.0])    Providers: Torres Hector MD Responsible Provider: Seun Edwards DO    Anesthesia Type: general ASA Status: 3            Anesthesia Type: No value filed.    Constantin Phase I: Constantin Score: 9    Constantin Phase II:      Anesthesia Post Evaluation    Patient location during evaluation: PACU  Patient participation: complete - patient participated  Level of consciousness: awake and alert  Airway patency: patent  Nausea & Vomiting: no nausea  Cardiovascular status: hemodynamically stable  Respiratory status: acceptable  Hydration status: stable  Pain management: adequate    There were no known notable events for this encounter.

## 2025-04-15 VITALS
RESPIRATION RATE: 18 BRPM | BODY MASS INDEX: 25.11 KG/M2 | SYSTOLIC BLOOD PRESSURE: 110 MMHG | OXYGEN SATURATION: 100 % | DIASTOLIC BLOOD PRESSURE: 53 MMHG | TEMPERATURE: 98.2 F | WEIGHT: 160 LBS | HEIGHT: 67 IN | HEART RATE: 74 BPM

## 2025-04-15 LAB
ANION GAP SERPL CALCULATED.3IONS-SCNC: 7 MMOL/L (ref 3–16)
BASOPHILS # BLD: 0.1 K/UL (ref 0–0.2)
BASOPHILS NFR BLD: 0.6 %
BUN SERPL-MCNC: 19 MG/DL (ref 7–20)
CALCIUM SERPL-MCNC: 8.3 MG/DL (ref 8.3–10.6)
CHLORIDE SERPL-SCNC: 102 MMOL/L (ref 99–110)
CO2 SERPL-SCNC: 27 MMOL/L (ref 21–32)
CREAT SERPL-MCNC: 1.1 MG/DL (ref 0.6–1.2)
DEPRECATED RDW RBC AUTO: 13.2 % (ref 12.4–15.4)
EOSINOPHIL # BLD: 0 K/UL (ref 0–0.6)
EOSINOPHIL NFR BLD: 0 %
GFR SERPLBLD CREATININE-BSD FMLA CKD-EPI: 50 ML/MIN/{1.73_M2}
GLUCOSE BLD-MCNC: 219 MG/DL (ref 70–99)
GLUCOSE BLD-MCNC: 268 MG/DL (ref 70–99)
GLUCOSE SERPL-MCNC: 246 MG/DL (ref 70–99)
HCT VFR BLD AUTO: 33.7 % (ref 36–48)
HGB BLD-MCNC: 11.4 G/DL (ref 12–16)
LYMPHOCYTES # BLD: 0.7 K/UL (ref 1–5.1)
LYMPHOCYTES NFR BLD: 6.4 %
MCH RBC QN AUTO: 30.2 PG (ref 26–34)
MCHC RBC AUTO-ENTMCNC: 33.7 G/DL (ref 31–36)
MCV RBC AUTO: 89.5 FL (ref 80–100)
MONOCYTES # BLD: 0.7 K/UL (ref 0–1.3)
MONOCYTES NFR BLD: 5.7 %
NEUTROPHILS # BLD: 10 K/UL (ref 1.7–7.7)
NEUTROPHILS NFR BLD: 87.3 %
PERFORMED ON: ABNORMAL
PERFORMED ON: ABNORMAL
PLATELET # BLD AUTO: 147 K/UL (ref 135–450)
PMV BLD AUTO: 10 FL (ref 5–10.5)
POTASSIUM SERPL-SCNC: 4.8 MMOL/L (ref 3.5–5.1)
RBC # BLD AUTO: 3.76 M/UL (ref 4–5.2)
SODIUM SERPL-SCNC: 136 MMOL/L (ref 136–145)
WBC # BLD AUTO: 11.5 K/UL (ref 4–11)

## 2025-04-15 PROCEDURE — 36415 COLL VENOUS BLD VENIPUNCTURE: CPT

## 2025-04-15 PROCEDURE — 80048 BASIC METABOLIC PNL TOTAL CA: CPT

## 2025-04-15 PROCEDURE — 6370000000 HC RX 637 (ALT 250 FOR IP): Performed by: INTERNAL MEDICINE

## 2025-04-15 PROCEDURE — 2500000003 HC RX 250 WO HCPCS: Performed by: INTERNAL MEDICINE

## 2025-04-15 PROCEDURE — 6370000000 HC RX 637 (ALT 250 FOR IP): Performed by: NURSE PRACTITIONER

## 2025-04-15 PROCEDURE — 85025 COMPLETE CBC W/AUTO DIFF WBC: CPT

## 2025-04-15 PROCEDURE — 99233 SBSQ HOSP IP/OBS HIGH 50: CPT

## 2025-04-15 RX ORDER — INSULIN LISPRO 100 [IU]/ML
0-16 INJECTION, SOLUTION INTRAVENOUS; SUBCUTANEOUS
Status: DISCONTINUED | OUTPATIENT
Start: 2025-04-15 | End: 2025-04-15 | Stop reason: HOSPADM

## 2025-04-15 RX ORDER — GLUCAGON 1 MG/ML
1 KIT INJECTION PRN
Status: CANCELLED | OUTPATIENT
Start: 2025-04-15

## 2025-04-15 RX ORDER — CLOPIDOGREL BISULFATE 75 MG/1
75 TABLET ORAL DAILY
Qty: 30 TABLET | Refills: 3 | Status: SHIPPED | OUTPATIENT
Start: 2025-04-16 | End: 2025-04-16 | Stop reason: SDUPTHER

## 2025-04-15 RX ORDER — DEXTROSE MONOHYDRATE 100 MG/ML
INJECTION, SOLUTION INTRAVENOUS CONTINUOUS PRN
Status: CANCELLED | OUTPATIENT
Start: 2025-04-15

## 2025-04-15 RX ADMIN — AMIODARONE HYDROCHLORIDE 100 MG: 200 TABLET ORAL at 07:57

## 2025-04-15 RX ADMIN — CLOPIDOGREL BISULFATE 75 MG: 75 TABLET, FILM COATED ORAL at 07:58

## 2025-04-15 RX ADMIN — ATORVASTATIN CALCIUM 10 MG: 10 TABLET, FILM COATED ORAL at 07:58

## 2025-04-15 RX ADMIN — GABAPENTIN 300 MG: 300 CAPSULE ORAL at 07:57

## 2025-04-15 RX ADMIN — INSULIN LISPRO 4 UNITS: 100 INJECTION, SOLUTION INTRAVENOUS; SUBCUTANEOUS at 12:29

## 2025-04-15 RX ADMIN — DULOXETINE HYDROCHLORIDE 60 MG: 60 CAPSULE, DELAYED RELEASE ORAL at 07:57

## 2025-04-15 RX ADMIN — GABAPENTIN 300 MG: 300 CAPSULE ORAL at 14:39

## 2025-04-15 RX ADMIN — DILTIAZEM HYDROCHLORIDE 180 MG: 180 CAPSULE, EXTENDED RELEASE ORAL at 07:57

## 2025-04-15 RX ADMIN — INSULIN LISPRO 4 UNITS: 100 INJECTION, SOLUTION INTRAVENOUS; SUBCUTANEOUS at 11:47

## 2025-04-15 RX ADMIN — SODIUM CHLORIDE, PRESERVATIVE FREE 10 ML: 5 INJECTION INTRAVENOUS at 07:58

## 2025-04-15 RX ADMIN — FUROSEMIDE 20 MG: 20 TABLET ORAL at 07:58

## 2025-04-15 RX ADMIN — HYDROCODONE BITARTRATE AND ACETAMINOPHEN 1 TABLET: 7.5; 325 TABLET ORAL at 11:46

## 2025-04-15 RX ADMIN — DIGOXIN 125 MCG: 125 TABLET ORAL at 07:57

## 2025-04-15 RX ADMIN — INSULIN LISPRO 2 UNITS: 100 INJECTION, SOLUTION INTRAVENOUS; SUBCUTANEOUS at 07:57

## 2025-04-15 RX ADMIN — ASPIRIN 81 MG: 81 TABLET, CHEWABLE ORAL at 07:58

## 2025-04-15 RX ADMIN — METOPROLOL SUCCINATE 100 MG: 50 TABLET, EXTENDED RELEASE ORAL at 07:57

## 2025-04-15 ASSESSMENT — PAIN DESCRIPTION - ORIENTATION: ORIENTATION: LEFT

## 2025-04-15 ASSESSMENT — PAIN SCALES - GENERAL
PAINLEVEL_OUTOF10: 7
PAINLEVEL_OUTOF10: 3

## 2025-04-15 ASSESSMENT — PAIN DESCRIPTION - DESCRIPTORS: DESCRIPTORS: BURNING

## 2025-04-15 ASSESSMENT — PAIN DESCRIPTION - LOCATION: LOCATION: FOOT

## 2025-04-15 NOTE — PROGRESS NOTES
Data- discharge order received, pt verbalized agreement to discharge, disposition to previous residence, no needs for HHC/DME.     Action- discharge instructions prepared and given to pt, pt verbalized understanding. Medication information packet given r/t NEW and/or CHANGED prescriptions emphasizing name/purpose/side effects, pt verbalized understanding. Discharge instruction summary: Diet- general, Activity- restrictions following Watchman procedure reviewed with pt, Primary Care Physician as follows: Soha Tom APRN - [phone] f/u appointment in 1 week, prescription medications filled at [facility] in [place]. Inpatient surgical procedure precautions reviewed: Watchman. CHF Education reviewed. Pt/ Family has had a total of 60 minutes CHF education this admission encounter.     1. WEIGHT: Admit Weight - Scale: 73 kg (161 lb) (04/14/25 0943)        Today  Weight - Scale: 72.6 kg (160 lb) (04/15/25 0434)       2. O2 SAT.: SpO2: 100 % (04/15/25 1144)    Response- Pt belongings gathered, IV removed. Disposition is home (no HHC/DME needs), transported with family, taken to lobby via w/c w/ PCT, no complications.

## 2025-04-15 NOTE — DISCHARGE SUMMARY
[facility]    DISCHARGE SUMMARY      Patient ID:  Isela Conner  [id number] 80 y.o. 1945    Discharge date: 4/15/2025 04/15/25    Admitting Physician: Torres Hector MD     Discharge NP: Flor Godoy, APRN - CNP    Admission Diagnoses: Paroxysmal atrial fibrillation (HCC) [I48.0]  Paroxysmal A-fib (HCC) [I48.0]    Discharge Diagnoses:   Patient Active Problem List   Diagnosis    Diabetes type 2, controlled (HCC)    Peripheral neuropathy    GERD (gastroesophageal reflux disease)    Scleroderma (HCC)    Chronic narcotic dependence (HCC)    Spinal stenosis of lumbar region without neurogenic claudication    PAF (paroxysmal atrial fibrillation) ([facility])    Diffusion capacity of lung (dl), decreased    Lumbosacral spondylosis without myelopathy    Restrictive ventilatory defect    Vocal cord polyp    Benign essential HTN    Near syncope    SSS (sick sinus syndrome) ([facility])    Pacemaker    Chronic obstructive pulmonary disease (HCC)    Degeneration of intervertebral disc of lumbosacral region    Herniation of lumbar intervertebral disc with radiculopathy    Atrial flutter (HCC)    Osteoporosis, postmenopausal    Osteoporotic fracture of right hip    Closed displaced transverse fracture of shaft of femur with routine healing    Chronic congestive heart failure (HCC)    Pulmonary hypertension (HCC)    PAD (peripheral artery disease)    Dyspnea    Atherosclerosis of native arteries of left leg with ulceration of other part of lower leg (HCC)    Acute on chronic diastolic congestive heart failure (HCC)    Atrial fibrillation, chronic (HCC)    Atherosclerosis of native arteries of left leg with ulceration of other part of foot (HCC)    Paroxysmal A-fib (HCC)         Discharged Condition: good    Hospital Course: Isela Conner is a 80 y.o. female patient  with a PMH significant for CHF, PPM, PAFIB presented with complaints of hematuria.   (per H&P)    Patient presented today for Watchman Device implantation.

## 2025-04-15 NOTE — PLAN OF CARE
Problem: Chronic Conditions and Co-morbidities  Goal: Patient's chronic conditions and co-morbidity symptoms are monitored and maintained or improved  4/15/2025 0836 by Avila Simons RN  Outcome: Progressing  4/15/2025 0016 by Madisyn Gloria RN  Outcome: Progressing     Problem: Discharge Planning  Goal: Discharge to home or other facility with appropriate resources  4/15/2025 0836 by Avila Simons RN  Outcome: Progressing  4/15/2025 0016 by Madisyn Gloria RN  Outcome: Progressing     Problem: Safety - Adult  Goal: Free from fall injury  4/15/2025 0836 by Avila Simons RN  Outcome: Progressing  4/15/2025 0016 by Madisyn Gloria RN  Outcome: Progressing     Problem: Skin/Tissue Integrity  Goal: Skin integrity remains intact  Description: 1.  Monitor for areas of redness and/or skin breakdown2.  Assess vascular access sites hourly3.  Every 4-6 hours minimum:  Change oxygen saturation probe site4.  Every 4-6 hours:  If on nasal continuous positive airway pressure, respiratory therapy assess nares and determine need for appliance change or resting period  4/15/2025 0836 by Avila Simons RN  Outcome: Progressing  4/15/2025 0016 by Madisyn Gloria RN  Outcome: Progressing     Problem: ABCDS Injury Assessment  Goal: Absence of physical injury  4/15/2025 0836 by Avila Simons RN  Outcome: Progressing  4/15/2025 0016 by Madisyn Gloria RN  Outcome: Progressing     Problem: Neurosensory - Adult  Goal: Achieves maximal functionality and self care  Outcome: Progressing     Problem: Respiratory - Adult  Goal: Achieves optimal ventilation and oxygenation  Outcome: Progressing     Problem: Cardiovascular - Adult  Goal: Maintains optimal cardiac output and hemodynamic stability  Outcome: Progressing  Goal: Absence of cardiac dysrhythmias or at baseline  Outcome: Progressing     Problem: Skin/Tissue Integrity - Adult  Goal: Skin integrity remains intact  Description: 1.  Monitor for areas of redness

## 2025-04-15 NOTE — CARE COORDINATION
Discharge Planning Note:  Chart reviewed and it appears that patient has minimal needs for discharge at this time. Patient  from [facility] [place]. S/P planned Watchman procedure      Primary Care Physician is Soha Tom APRN - NP    Primary insurance is Medicare    Please notify case management if any discharge needs are identified.      Case management will continue to follow progress and update discharge plan as needed.

## 2025-04-15 NOTE — PLAN OF CARE
Problem: Chronic Conditions and Co-morbidities  Goal: Patient's chronic conditions and co-morbidity symptoms are monitored and maintained or improved  4/15/2025 1524 by Avila Simons RN  Outcome: Completed  4/15/2025 0836 by Avila Simons RN  Outcome: Progressing     Problem: Discharge Planning  Goal: Discharge to home or other facility with appropriate resources  4/15/2025 1524 by Avila Simons RN  Outcome: Completed  4/15/2025 0836 by Avila Simons RN  Outcome: Progressing     Problem: Safety - Adult  Goal: Free from fall injury  4/15/2025 1524 by Avila Simons RN  Outcome: Completed  4/15/2025 0836 by Avila Simons RN  Outcome: Progressing     Problem: Skin/Tissue Integrity  Goal: Skin integrity remains intact  Description: 1.  Monitor for areas of redness and/or skin breakdown2.  Assess vascular access sites hourly3.  Every 4-6 hours minimum:  Change oxygen saturation probe site4.  Every 4-6 hours:  If on nasal continuous positive airway pressure, respiratory therapy assess nares and determine need for appliance change or resting period  4/15/2025 1524 by Avila Simons RN  Outcome: Completed  4/15/2025 0836 by Avila Simons RN  Outcome: Progressing     Problem: ABCDS Injury Assessment  Goal: Absence of physical injury  4/15/2025 1524 by Avila Simons RN  Outcome: Completed  4/15/2025 0836 by Avila Simons RN  Outcome: Progressing     Problem: Neurosensory - Adult  Goal: Achieves maximal functionality and self care  4/15/2025 1524 by Avila Simons RN  Outcome: Completed  4/15/2025 0836 by Avila Simons RN  Outcome: Progressing     Problem: Respiratory - Adult  Goal: Achieves optimal ventilation and oxygenation  4/15/2025 1524 by Avila Simons RN  Outcome: Completed  4/15/2025 0836 by Avila Simons RN  Outcome: Progressing     Problem: Cardiovascular - Adult  Goal: Maintains optimal cardiac output and hemodynamic stability  4/15/2025 1524 by Kristyn  Subclavian vein accessed.

## 2025-04-15 NOTE — INTERDISCIPLINARY ROUNDS
Spiritual Health History and Assessment/Progress Note  [facility]    Interdisciplinary rounds,  ,  ,      Name: Isela Conner MRN: [id number]    Age: 80 y.o.     Sex: female   Language: English   Lutheran: Non-Lutheran   Paroxysmal A-fib (HCC)     Date: 4/15/2025            Total Time Calculated: 10 min              Spiritual Assessment began in [facility]        Referral/Consult From: Rounding   Encounter Overview/Reason: Interdisciplinary rounds  Service Provided For: Patient    Sheela, Belief, Meaning:   Patient identifies as spiritual, is connected with a sheela tradition or spiritual practice, and has beliefs or practices that help with coping during difficult times  Family/Friends No family/friends present      Importance and Influence:  Patient has spiritual/personal beliefs that influence decisions regarding their health  Family/Friends No family/friends present    Community:  Patient is connected with a spiritual community and feels well-supported. Support system includes: Sheela Community and Extended family  Family/Friends No family/friends present    Assessment and Plan of Care:     Patient Interventions include: Facilitated expression of thoughts and feelings, Explored spiritual coping/struggle/distress, Engaged in theological reflection, and Affirmed coping skills/support systems  Family/Friends Interventions include: No family/friends present    Patient Plan of Care: Spiritual Care available upon further referral  Family/Friends Plan of Care: No family/friends present    Electronically signed by Juaquin Saravia Chaplain Resident on 4/15/2025 at 10:49 AM

## 2025-04-15 NOTE — PROGRESS NOTES
Pt taking sliding scale insulin. RN gave 4u of  insulin per medium sliding scale dose for a BG of 268. MD discontinued medium sliding scale dose and ordered high sliding scale dose after RN administered 4u. For high sliding scale order, pt would receive a total of 8u of insulin for BG of 268. This RN messaged MD, per MD RN should give 4u more of insulin to equal the total of 8u. RN administered 4u more of insulin as ordered.

## 2025-04-15 NOTE — PLAN OF CARE
Problem: Chronic Conditions and Co-morbidities  Goal: Patient's chronic conditions and co-morbidity symptoms are monitored and maintained or improved  Outcome: Progressing     Problem: Discharge Planning  Goal: Discharge to home or other facility with appropriate resources  Outcome: Progressing     Problem: Safety - Adult  Goal: Free from fall injury  Outcome: Progressing     Problem: Skin/Tissue Integrity  Goal: Skin integrity remains intact  Description: 1.  Monitor for areas of redness and/or skin breakdown2.  Assess vascular access sites hourly3.  Every 4-6 hours minimum:  Change oxygen saturation probe site4.  Every 4-6 hours:  If on nasal continuous positive airway pressure, respiratory therapy assess nares and determine need for appliance change or resting period  Outcome: Progressing     Problem: ABCDS Injury Assessment  Goal: Absence of physical injury  Outcome: Progressing

## 2025-04-16 NOTE — TELEPHONE ENCOUNTER
Spoke with patient daniela Bartholomew, reviewed all preps with understanding.     Instructions for your ANNETTE:    Bring a list of your medications.  Do not eat or drink anything at all after midnight (or 8 hours) prior to the procedure.   Take all morning medications the day of the procedure with a small amount of water.  You MUST have someone to drive you home - NO driving for 24 hours after your procedure.  Discharge instructions will be given to you at the time of your procedure.      Date of procedure: 6-5-25  Time of patient arrival: 12:30 pm   Procedure time: 1:30 pm  Cardiologist performing the procedure: Dr. Manan Elkins  Please report to the Round Information Desk in [facility], you will be registered, given paperwork/wristband, and will be directed to the Cath Lab.   In case of questions or to reschedule please call Svetlana at [phone].    6 mo - 10-21-25 WILLIAM patel/RAVEN @ 11:15 am    1 yr - Recall set for F/U     2 yr - Recall set for F/U

## 2025-04-25 LAB — ECHO BSA: 1.86 M2

## 2025-04-28 RX ORDER — AMIODARONE HYDROCHLORIDE 200 MG/1
200 TABLET ORAL DAILY
Qty: 30 TABLET | Refills: 0 | OUTPATIENT
Start: 2025-04-28

## 2025-04-28 NOTE — TELEPHONE ENCOUNTER
Pt states amiodarone script was not received by [facility] pharmacy  on 4/9/25. Please send   [id number] - [place], [place] - [address] - P [phone] - F [phone] (Pharmacy) [phone]

## 2025-04-28 NOTE — TELEPHONE ENCOUNTER
Received refill request for  amiodarone (CORDARONE) 200 MG tablet  from [facility] pharmacy.     Last OV: 3/24/2025 KAREN    Next OV: 5/28/2025 KAREN    Last Labs: 4/14/2025 EKG    Last Filled:

## 2025-04-29 ENCOUNTER — PATIENT MESSAGE (OUTPATIENT)
Dept: CARDIOLOGY CLINIC | Age: 80
End: 2025-04-29

## 2025-04-29 NOTE — PROGRESS NOTES
[facility]   Advanced Heart Failure/Pulmonary Hypertension  Cardiac Evaluation      Isela Conner  YOB: 1945    Date of Visit:  5/28/25    Chief Complaint   Patient presents with    Congestive Heart Failure    Dizziness        History of Present Illness:  Isela Conner has a history of pulmonary hypertension, Scleroderma, and Crest syndrome with Raynaud's / telangiosis including arthralgia and myalgia. She also has Fibromyalgia which is stable, and severe fatty liver infiltration-borderline elevated LFTs. She had a PFT 6/5/2014--and it shows mild obstructive defect with no response to bronchodilators. Air trapping was present on lung volumes and moderate decrease in diffusion capacity. Clinical correlation is recommended since the severity of her decrease in diffusion capacity might indicate pulmonary hypertension in a patient with scleroderma. She had some hair loss that she thinks was from taking Plaquenil so it was stopped, and the hair loss has lessened. She had an echo, and it did not show PHTN. She is getting echos for surveying scleroderma.      She had a dual chamber pacemaker placed per Dr. Elkins on 5-19-20 for SSS with a post conversion pause of up to 10 seconds.  At the time, she had an IRL implanted 8-20-19 which was reviewed s/p fall and hip fracture.     Standing Orders: no  Cardiac Stent: no  Cardiac Device: yesMedtronic [id number] [place] XT DR DUNN Dual Chamber PPM-MXA-5/19/2020    Pt was last seen in office 3/24/2025 and presents today for a follow up of PAH. Last EF 60-65% on 8/15/24. Successful watchman procedure 4/25/25 with Dr. Elkins, start ASA 81 mg + Plavix for total of 6 months post implantation date. Post watchman echo scheduled 6/5/25. States her watchman procedure went well. She is off the blood thinner and only on plavix now. Follows with Dr. Stephenson, nephrology.  on 3/21/25. She is on Tracleer for PH. Pt is using a wheelchair for mobility today. On oxygen

## 2025-04-30 RX ORDER — AMIODARONE HYDROCHLORIDE 200 MG/1
100 TABLET ORAL DAILY
Qty: 45 TABLET | Refills: 1 | Status: SHIPPED | OUTPATIENT
Start: 2025-04-30

## 2025-04-30 NOTE — TELEPHONE ENCOUNTER
Received [facility] for [facility].     Last OV: 2/18/2025 MXA    Next OV: 10/21/2025 MXA     Last Labs: 4/14/2025 EKG     Previously sent to [facility]- patient is needing it to be sent to [facility].

## 2025-05-15 RX ORDER — FUROSEMIDE 40 MG/1
20 TABLET ORAL DAILY
Qty: 45 TABLET | Refills: 3 | Status: SHIPPED | OUTPATIENT
Start: 2025-05-15

## 2025-05-15 NOTE — TELEPHONE ENCOUNTER
Prescription refill:  Requested Prescriptions     Pending Prescriptions Disp Refills    furosemide (LASIX) 40 MG tablet [Pharmacy Med Name: FUROSEMIDE TABS 40MG] 45 tablet 3     Sig: TAKE ONE-HALF (1/2) TABLET DAILY       Refill parameters per protocol were met    Last OV: 3/24/2025     Future Appt: 5/28/2025     LABS: 4/1525

## 2025-05-20 ENCOUNTER — HOSPITAL ENCOUNTER (OUTPATIENT)
Age: 80
Discharge: HOME | End: 2025-05-20
Payer: MEDICARE

## 2025-05-20 DIAGNOSIS — R06.02 SOB (SHORTNESS OF BREATH): ICD-10-CM

## 2025-05-20 DIAGNOSIS — I27.21 PAH (PULMONARY ARTERY HYPERTENSION) (HCC): ICD-10-CM

## 2025-05-20 DIAGNOSIS — Z13.220 LIPID SCREENING: ICD-10-CM

## 2025-05-20 LAB
ALBUMIN SERPL-MCNC: 4.2 G/DL (ref 3.4–5)
ALBUMIN/GLOB SERPL: 1.8 {RATIO} (ref 1.1–2.2)
ALP SERPL-CCNC: 52 U/L (ref 40–129)
ALT SERPL-CCNC: 12 U/L (ref 10–40)
ANION GAP SERPL CALCULATED.3IONS-SCNC: 7 MMOL/L (ref 3–16)
AST SERPL-CCNC: 20 U/L (ref 15–37)
BILIRUB SERPL-MCNC: 0.4 MG/DL (ref 0–1)
BUN SERPL-MCNC: 19 MG/DL (ref 7–20)
CALCIUM SERPL-MCNC: 9.3 MG/DL (ref 8.3–10.6)
CHLORIDE SERPL-SCNC: 101 MMOL/L (ref 99–110)
CHOLEST SERPL-MCNC: 111 MG/DL (ref 0–199)
CO2 SERPL-SCNC: 30 MMOL/L (ref 21–32)
CREAT SERPL-MCNC: 1.3 MG/DL (ref 0.6–1.2)
DEPRECATED RDW RBC AUTO: 12.7 % (ref 12.4–15.4)
GFR SERPLBLD CREATININE-BSD FMLA CKD-EPI: 41 ML/MIN/{1.73_M2}
GLUCOSE SERPL-MCNC: 189 MG/DL (ref 70–99)
HCT VFR BLD AUTO: 35.6 % (ref 36–48)
HDLC SERPL-MCNC: 56 MG/DL (ref 40–60)
HGB BLD-MCNC: 12.2 G/DL (ref 12–16)
LDL CHOLESTEROL: 32 MG/DL
MCH RBC QN AUTO: 30.4 PG (ref 26–34)
MCHC RBC AUTO-ENTMCNC: 34.2 G/DL (ref 31–36)
MCV RBC AUTO: 88.8 FL (ref 80–100)
NT-PROBNP SERPL-MCNC: 755 PG/ML (ref 0–449)
PLATELET # BLD AUTO: 146 K/UL (ref 135–450)
PMV BLD AUTO: 10.2 FL (ref 5–10.5)
POTASSIUM SERPL-SCNC: 4.8 MMOL/L (ref 3.5–5.1)
PROT SERPL-MCNC: 6.5 G/DL (ref 6.4–8.2)
RBC # BLD AUTO: 4.01 M/UL (ref 4–5.2)
SODIUM SERPL-SCNC: 138 MMOL/L (ref 136–145)
TRIGL SERPL-MCNC: 117 MG/DL (ref 0–150)
VLDLC SERPL CALC-MCNC: 23 MG/DL
WBC # BLD AUTO: 7.7 K/UL (ref 4–11)

## 2025-05-20 PROCEDURE — 85027 COMPLETE CBC AUTOMATED: CPT

## 2025-05-20 PROCEDURE — 80061 LIPID PANEL: CPT

## 2025-05-20 PROCEDURE — 83880 ASSAY OF NATRIURETIC PEPTIDE: CPT

## 2025-05-20 PROCEDURE — 80053 COMPREHEN METABOLIC PANEL: CPT

## 2025-05-20 PROCEDURE — 36415 COLL VENOUS BLD VENIPUNCTURE: CPT

## 2025-05-21 ENCOUNTER — RESULTS FOLLOW-UP (OUTPATIENT)
Dept: CARDIOLOGY CLINIC | Age: 80
End: 2025-05-21

## 2025-05-23 ENCOUNTER — TELEPHONE (OUTPATIENT)
Dept: CARDIOLOGY CLINIC | Age: 80
End: 2025-05-23

## 2025-05-23 NOTE — TELEPHONE ENCOUNTER
Pt's cousin [place] states pt was holding plavix for breast biopsy on 5/22/25 at [facility] which was canceled due to being hacked. [place] states she can not get thru to [facility] and asking if pt should go back on her plavix. Please call to advise

## 2025-05-23 NOTE — TELEPHONE ENCOUNTER
Called to speak to [place]. Patient just had watchman last month and has not even had ANNETTE yet. Clearance to hold does not appear to have been sent to office.   We suggest that she restart plavix as soon as possible to help minimize stroke risk. [place] is agreeable and said that they are going to push out the biopsy once [facility]'s system is back up to give Barbara more time to recover.

## 2025-05-28 ENCOUNTER — OFFICE VISIT (OUTPATIENT)
Dept: CARDIOLOGY CLINIC | Age: 80
End: 2025-05-28
Payer: MEDICARE

## 2025-05-28 VITALS
OXYGEN SATURATION: 96 % | SYSTOLIC BLOOD PRESSURE: 116 MMHG | BODY MASS INDEX: 24.8 KG/M2 | WEIGHT: 158 LBS | HEIGHT: 67 IN | DIASTOLIC BLOOD PRESSURE: 50 MMHG | HEART RATE: 60 BPM

## 2025-05-28 DIAGNOSIS — R06.02 SOB (SHORTNESS OF BREATH): ICD-10-CM

## 2025-05-28 DIAGNOSIS — M34.9 SCLERODERMA (HCC): ICD-10-CM

## 2025-05-28 DIAGNOSIS — I50.32 CHRONIC DIASTOLIC CONGESTIVE HEART FAILURE (HCC): ICD-10-CM

## 2025-05-28 DIAGNOSIS — I50.32 CHRONIC DIASTOLIC HEART FAILURE (HCC): ICD-10-CM

## 2025-05-28 DIAGNOSIS — I10 BENIGN ESSENTIAL HTN: ICD-10-CM

## 2025-05-28 DIAGNOSIS — Z13.220 LIPID SCREENING: ICD-10-CM

## 2025-05-28 DIAGNOSIS — I27.21 PAH (PULMONARY ARTERY HYPERTENSION) (HCC): Primary | ICD-10-CM

## 2025-05-28 DIAGNOSIS — I48.0 PAROXYSMAL A-FIB (HCC): ICD-10-CM

## 2025-05-28 PROCEDURE — 99215 OFFICE O/P EST HI 40 MIN: CPT | Performed by: INTERNAL MEDICINE

## 2025-05-28 PROCEDURE — 3074F SYST BP LT 130 MM HG: CPT | Performed by: INTERNAL MEDICINE

## 2025-05-28 PROCEDURE — 1123F ACP DISCUSS/DSCN MKR DOCD: CPT | Performed by: INTERNAL MEDICINE

## 2025-05-28 PROCEDURE — 3078F DIAST BP <80 MM HG: CPT | Performed by: INTERNAL MEDICINE

## 2025-05-28 PROCEDURE — 1159F MED LIST DOCD IN RCRD: CPT | Performed by: INTERNAL MEDICINE

## 2025-05-28 PROCEDURE — G8399 PT W/DXA RESULTS DOCUMENT: HCPCS | Performed by: INTERNAL MEDICINE

## 2025-05-28 PROCEDURE — 1036F TOBACCO NON-USER: CPT | Performed by: INTERNAL MEDICINE

## 2025-05-28 PROCEDURE — G8420 CALC BMI NORM PARAMETERS: HCPCS | Performed by: INTERNAL MEDICINE

## 2025-05-28 PROCEDURE — 1090F PRES/ABSN URINE INCON ASSESS: CPT | Performed by: INTERNAL MEDICINE

## 2025-05-28 PROCEDURE — G8427 DOCREV CUR MEDS BY ELIG CLIN: HCPCS | Performed by: INTERNAL MEDICINE

## 2025-05-28 RX ORDER — DILTIAZEM HYDROCHLORIDE 180 MG/1
180 CAPSULE, COATED, EXTENDED RELEASE ORAL DAILY
Qty: 90 CAPSULE | Refills: 3 | Status: SHIPPED | OUTPATIENT
Start: 2025-05-28

## 2025-05-28 NOTE — TELEPHONE ENCOUNTER
Last ov:25 KAREN  Next ov:10/21/25 MXA  Last EK25  Last labs:25  Last filled:   Disp Refills Start End    dilTIAZem (CARDIZEM CD) 180 MG extended release capsule 90 capsule 3 6/3/2024 --    Sig: TAKE 1 CAPSULE DAILY    Sent to pharmacy as: dilTIAZem HCl ER Coated Beads 180 MG Oral Capsule Extended Release 24 Hour (CARDIZEM CD)    E-Prescribing Status: Receipt confirmed by pharmacy (6/3/2024  3:58 PM EDT)

## 2025-06-02 ENCOUNTER — TELEPHONE (OUTPATIENT)
Dept: CARDIOLOGY CLINIC | Age: 80
End: 2025-06-02

## 2025-06-02 NOTE — TELEPHONE ENCOUNTER
CARDIAC CLEARANCE     What type of procedure are you having?  Breast Biopsy   Which physician is performing your procedure?  N/A  When is your procedure scheduled for?  Wants to schedule for  6/12  Where are you having this procedure?  [facility]   Are you taking Blood Thinners?   If so what? (Name/dose/frequesncy)  Plavix 75 MG /Take 1 tablet by mouth daily     aspirin 81 MG chewable tablet /Take 1 tablet by mouth daily    Does the surgeon want you to stop your blood thinner?  If so for how long?  5 days prior  Phone Number and Contact Name for Physicians office:  [phone]  Fax number to send information:    [phone]

## 2025-06-05 ENCOUNTER — HOSPITAL ENCOUNTER (OUTPATIENT)
Age: 80
Discharge: HOME | End: 2025-06-07
Attending: INTERNAL MEDICINE
Payer: MEDICARE

## 2025-06-05 VITALS
TEMPERATURE: 98 F | WEIGHT: 160 LBS | RESPIRATION RATE: 18 BRPM | SYSTOLIC BLOOD PRESSURE: 137 MMHG | BODY MASS INDEX: 25.11 KG/M2 | DIASTOLIC BLOOD PRESSURE: 52 MMHG | HEIGHT: 67 IN | HEART RATE: 63 BPM | OXYGEN SATURATION: 100 %

## 2025-06-05 DIAGNOSIS — Z95.818 PRESENCE OF WATCHMAN LEFT ATRIAL APPENDAGE CLOSURE DEVICE: ICD-10-CM

## 2025-06-05 DIAGNOSIS — I48.0 PAROXYSMAL A-FIB (HCC): ICD-10-CM

## 2025-06-05 LAB — ECHO BSA: 1.85 M2

## 2025-06-05 PROCEDURE — 7100000010 HC PHASE II RECOVERY - FIRST 15 MIN: Performed by: INTERNAL MEDICINE

## 2025-06-05 PROCEDURE — 99152 MOD SED SAME PHYS/QHP 5/>YRS: CPT | Performed by: INTERNAL MEDICINE

## 2025-06-05 PROCEDURE — 7100000011 HC PHASE II RECOVERY - ADDTL 15 MIN: Performed by: INTERNAL MEDICINE

## 2025-06-05 PROCEDURE — 6360000002 HC RX W HCPCS: Performed by: INTERNAL MEDICINE

## 2025-06-05 PROCEDURE — 93312 ECHO TRANSESOPHAGEAL: CPT

## 2025-06-05 RX ORDER — FENTANYL CITRATE 50 UG/ML
INJECTION, SOLUTION INTRAMUSCULAR; INTRAVENOUS PRN
Status: COMPLETED | OUTPATIENT
Start: 2025-06-05 | End: 2025-06-05

## 2025-06-05 RX ORDER — MIDAZOLAM HYDROCHLORIDE 1 MG/ML
INJECTION, SOLUTION INTRAMUSCULAR; INTRAVENOUS PRN
Status: COMPLETED | OUTPATIENT
Start: 2025-06-05 | End: 2025-06-05

## 2025-06-05 RX ADMIN — MIDAZOLAM 1 MG: 1 INJECTION INTRAMUSCULAR; INTRAVENOUS at 14:08

## 2025-06-05 RX ADMIN — FENTANYL CITRATE 50 MCG: 50 INJECTION, SOLUTION INTRAMUSCULAR; INTRAVENOUS at 14:09

## 2025-06-05 NOTE — H&P
[facility]  Cardiology        Reason for Referral: [facility]    CC: Epistaxis, falls, Hematuria       Subjective:     History of Present Illness:    Isela Conner is a 80 y.o. patient with a PMH significant for CHF, PPM, PAFIB presented with complaints of hematuria.            Past Medical History:   has a past medical history of CHF (congestive heart failure) (HCC), CKD stage 3a, GFR 45-59 ml/min (HCC), Diabetes mellitus (HCC), Fatty liver, Femur fracture, left (HCC), Fibromyalgia, Fracture, hip (HCC), GERD (gastroesophageal reflux disease), Hypertension, IBS (irritable bowel syndrome), Osteoarthritis, Peripheral neuropathy, Postmenopausal, Pulmonary hypertension (HCC), and Scleroderma ([facility]).    Surgical History:   has a past surgical history that includes Hysterectomy (1987); Cholecystectomy (1989); Lumbar disc surgery (2007); Throat surgery (2012); arthroplasty (08/10/2012); Fibula Fracture Surgery (2017); back surgery; Cystocopy (08/01/2018); Partial hip arthroplasty (Right, 09/2019); Femur fracture surgery; invasive vascular (Left, 8/16/2024); invasive vascular (Left, 8/16/2024); and Femoral Endarterectomy (Left, 8/29/2024).     Social History:   reports that she quit smoking about 13 years ago. Her smoking use included cigarettes. She started smoking about 65 years ago. She has a 154.9 pack-year smoking history. She has never used smokeless tobacco. She reports that she does not drink alcohol and does not use drugs.     Family History:  family history includes Cancer in her mother and another family member; Diabetes in her mother and sister; Heart Disease in her mother; Hypertension in her father and mother; Osteoporosis in her mother and sister; Stroke in her father.    Home Medications:  Were reviewed and are listed in nursing record and/or below  Prior to Admission medications    Medication Sig Start Date End Date Taking? Authorizing Provider   aspirin 81 MG chewable tablet Take 1 tablet by mouth

## 2025-06-05 NOTE — PROCEDURES
[facility]     Electrophysiology Procedure Note       Date of Procedure: 6/5/2025  Patient's Name: Isela Conner  YOB: 1945   Medical Record Number: [id number]  Procedure Performed by: Manan Elkins MD    Procedures performed:  IV sedation.   Trans-esophageal echocardiography     Mallampati3  ASA 3    Indication of the procedure:Watchman    Details of procedure:   The patient was brought to the cath lab area in a fasting and non-sedated state. The risks, benefits and alternatives of the procedure were discussed with the patient. The patient opted to proceed with the procedure. Written informed consent was signed and placed in the chart.  A timeout protocol was completed to identify the patient and the procedure being performed.    IV sedation was provided with IV Versed, Fentanyl initially and ANNETTE was performed which did not show any  LA clot/thrombus. Full ANNETTE reports will be dictated.     An independent trained observer pushed medications and/or monitored patient  at my direction.   We monitored the patient's level of consciousness and vital signs/physiologic status throughout the procedure duration (see start and stop times below).  Sedation:  1 mg Versed, 50 mcg Fentanyl   Sedation start: 1407  Sedation stop: 1423        The patient tolerated the procedure well and there were no complications.     Conclusion:   Watchman well seated with no Leak.      Plan:   The patient can be discharged if remains stable. Will continue with medical therapy.   Continue as per protocol.

## 2025-06-05 NOTE — DISCHARGE INSTRUCTIONS
ANNETTE DISCHARGE INSTRUCTIONS    No driving for 24 hours. We strongly recommend that a responsible adult stay with you for the next 24 hours.    Continue Medications.    Advance diet as tolerated    Contact physician office  for following symptoms:  Fever  Difficulty swallowing  Chest pain  Difficulty breathing  Bleeding

## 2025-06-05 NOTE — PROGRESS NOTES
Awake and alert. Up in chair. Tolerated procedure well. Taking in fluids well. Discharged home with cousin.

## 2025-06-09 LAB — ECHO BSA: 1.85 M2

## 2025-06-25 ENCOUNTER — TELEPHONE (OUTPATIENT)
Dept: SURGERY | Age: 80
End: 2025-06-25

## 2025-06-25 NOTE — TELEPHONE ENCOUNTER
Left a message to return my call. NP and cancer consult need to be scheduled. Referral from Dr. Mendoza, imaging and pathology obtained @ [facility]/ [place].     Thanks, Corinne

## 2025-06-25 NOTE — TELEPHONE ENCOUNTER
Called [place] delgadillo. Spoke to Barbara NP intake completed scheduled for consult 7/3/25 @ 1 PM. Chemo tx with Dr. Mendoza expected to start 7/7/25.  [place] schedules all appts for Barbara and accompanies her to all appts.     Thanks, Corinne

## 2025-06-26 ENCOUNTER — PRE-PROCEDURE TELEPHONE (OUTPATIENT)
Dept: INTERVENTIONAL RADIOLOGY/VASCULAR | Age: 80
End: 2025-06-26

## 2025-06-30 ASSESSMENT — ENCOUNTER SYMPTOMS
GASTROINTESTINAL NEGATIVE: 1
RESPIRATORY NEGATIVE: 1
EYES NEGATIVE: 1

## 2025-06-30 NOTE — PROGRESS NOTES
PCP:  Medical Oncology: Reji  Radiation:  Other:      [id number] STAGE:  IIB left breast cancer      HPI:  Ms. Conner is a 80 y.o. 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     Prior to this she has never had breast related problems and has never required a breast biopsy.  She has no family history of breast or ovarian cancer.  About 2 to 3 months ago she noticed a palpable mass in the lower aspect of her left breast.  She has not noticed any new abnormalities such as additional masses, skin changes, color changes, nipple discharge, or changes to the nipple-areolar complex.     She is here today in initial consultation to discuss her recent breast diagnosis.  She was referred by Dr. Mendoza.  She we will begin neoadjuvant chemotherapy next week.  If complete the full regimen she will be anticipating finishing in December 2025.    INTERVAL HISTORY:  On 5/5/2025 she underwent bilateral breast imaging.  In the location of her palpable concern in the inferior left breast there is a mammographic distortion.  Ultrasound correlate of the left breast 7 o'clock position identified an irregular hypoechoic mass measuring 2.9 x 1.7 x 2.4 cm.  The left axilla appears negative.  Biopsy was recommended.  BI-RADS 4.    On 6/13/2025 she underwent core needle biopsy of the left breast.  Pathology identified grade 3 poorly differentiated mammary carcinoma with metaplastic features.  ER negative PA negative HER2 negative.  Ki-67 90%.  [id number].       Review of Systems   Constitutional: Negative.    HENT: Negative.     Eyes: Negative.    Respiratory: Negative.     Cardiovascular: Negative.    Gastrointestinal: Negative.    Genitourinary: Negative.    Musculoskeletal: Negative.    Skin: Negative.    Neurological: Negative.    Psychiatric/Behavioral: Negative.     All other systems reviewed and are negative.  :    There isno new onset of

## 2025-07-01 ENCOUNTER — HOSPITAL ENCOUNTER (OUTPATIENT)
Dept: INTERVENTIONAL RADIOLOGY/VASCULAR | Age: 80
Discharge: HOME | End: 2025-07-01
Payer: MEDICARE

## 2025-07-01 VITALS
DIASTOLIC BLOOD PRESSURE: 68 MMHG | BODY MASS INDEX: 24.64 KG/M2 | TEMPERATURE: 98.2 F | HEART RATE: 60 BPM | OXYGEN SATURATION: 100 % | RESPIRATION RATE: 16 BRPM | SYSTOLIC BLOOD PRESSURE: 134 MMHG | HEIGHT: 67 IN | WEIGHT: 157 LBS

## 2025-07-01 DIAGNOSIS — C50.919: ICD-10-CM

## 2025-07-01 LAB
ANION GAP SERPL CALCULATED.3IONS-SCNC: 10 MMOL/L (ref 3–16)
BUN SERPL-MCNC: 23 MG/DL (ref 7–20)
CALCIUM SERPL-MCNC: 9.1 MG/DL (ref 8.3–10.6)
CHLORIDE SERPL-SCNC: 101 MMOL/L (ref 99–110)
CO2 SERPL-SCNC: 28 MMOL/L (ref 21–32)
CREAT SERPL-MCNC: 1.2 MG/DL (ref 0.6–1.2)
DEPRECATED RDW RBC AUTO: 13.4 % (ref 12.4–15.4)
GFR SERPLBLD CREATININE-BSD FMLA CKD-EPI: 45 ML/MIN/{1.73_M2}
GLUCOSE SERPL-MCNC: 174 MG/DL (ref 70–99)
HCT VFR BLD AUTO: 36.7 % (ref 36–48)
HGB BLD-MCNC: 12.1 G/DL (ref 12–16)
MCH RBC QN AUTO: 29.8 PG (ref 26–34)
MCHC RBC AUTO-ENTMCNC: 32.9 G/DL (ref 31–36)
MCV RBC AUTO: 90.5 FL (ref 80–100)
PERFORMED ON: ABNORMAL
PLATELET # BLD AUTO: 158 K/UL (ref 135–450)
PMV BLD AUTO: 9.8 FL (ref 5–10.5)
POTASSIUM SERPL-SCNC: 4.4 MMOL/L (ref 3.5–5.1)
RBC # BLD AUTO: 4.06 M/UL (ref 4–5.2)
SODIUM SERPL-SCNC: 139 MMOL/L (ref 136–145)
WBC # BLD AUTO: 8.3 K/UL (ref 4–11)

## 2025-07-01 PROCEDURE — 6370000000 HC RX 637 (ALT 250 FOR IP): Performed by: INTERNAL MEDICINE

## 2025-07-01 PROCEDURE — 99152 MOD SED SAME PHYS/QHP 5/>YRS: CPT

## 2025-07-01 PROCEDURE — 99153 MOD SED SAME PHYS/QHP EA: CPT

## 2025-07-01 PROCEDURE — 36561 INSERT TUNNELED CV CATH: CPT

## 2025-07-01 PROCEDURE — 7100000011 HC PHASE II RECOVERY - ADDTL 15 MIN: Performed by: INTERNAL MEDICINE

## 2025-07-01 PROCEDURE — 77001 FLUOROGUIDE FOR VEIN DEVICE: CPT

## 2025-07-01 PROCEDURE — 6360000002 HC RX W HCPCS: Performed by: INTERNAL MEDICINE

## 2025-07-01 PROCEDURE — 2580000003 HC RX 258: Performed by: INTERNAL MEDICINE

## 2025-07-01 PROCEDURE — 76937 US GUIDE VASCULAR ACCESS: CPT

## 2025-07-01 PROCEDURE — 36415 COLL VENOUS BLD VENIPUNCTURE: CPT

## 2025-07-01 PROCEDURE — 85027 COMPLETE CBC AUTOMATED: CPT

## 2025-07-01 PROCEDURE — C1769 GUIDE WIRE: HCPCS

## 2025-07-01 PROCEDURE — 80048 BASIC METABOLIC PNL TOTAL CA: CPT

## 2025-07-01 PROCEDURE — 7100000010 HC PHASE II RECOVERY - FIRST 15 MIN: Performed by: INTERNAL MEDICINE

## 2025-07-01 RX ORDER — FENTANYL CITRATE 50 UG/ML
INJECTION, SOLUTION INTRAMUSCULAR; INTRAVENOUS PRN
Status: COMPLETED | OUTPATIENT
Start: 2025-07-01 | End: 2025-07-01

## 2025-07-01 RX ORDER — LIDOCAINE HYDROCHLORIDE 10 MG/ML
10 INJECTION, SOLUTION EPIDURAL; INFILTRATION; INTRACAUDAL; PERINEURAL ONCE
Status: COMPLETED | OUTPATIENT
Start: 2025-07-01 | End: 2025-07-01

## 2025-07-01 RX ORDER — MIDAZOLAM HYDROCHLORIDE 1 MG/ML
INJECTION, SOLUTION INTRAMUSCULAR; INTRAVENOUS PRN
Status: COMPLETED | OUTPATIENT
Start: 2025-07-01 | End: 2025-07-01

## 2025-07-01 RX ORDER — LIDOCAINE HYDROCHLORIDE AND EPINEPHRINE BITARTRATE 20; .01 MG/ML; MG/ML
20 INJECTION, SOLUTION SUBCUTANEOUS ONCE
Status: COMPLETED | OUTPATIENT
Start: 2025-07-01 | End: 2025-07-01

## 2025-07-01 RX ADMIN — LIDOCAINE HYDROCHLORIDE,EPINEPHRINE BITARTRATE 10 ML: 20; .01 INJECTION, SOLUTION INFILTRATION; PERINEURAL at 09:07

## 2025-07-01 RX ADMIN — MIDAZOLAM 0.5 MG: 1 INJECTION INTRAMUSCULAR; INTRAVENOUS at 08:46

## 2025-07-01 RX ADMIN — FENTANYL CITRATE 25 MCG: 50 INJECTION, SOLUTION INTRAMUSCULAR; INTRAVENOUS at 08:46

## 2025-07-01 RX ADMIN — MIDAZOLAM 0.5 MG: 1 INJECTION INTRAMUSCULAR; INTRAVENOUS at 08:54

## 2025-07-01 RX ADMIN — FENTANYL CITRATE 25 MCG: 50 INJECTION, SOLUTION INTRAMUSCULAR; INTRAVENOUS at 08:54

## 2025-07-01 RX ADMIN — LIDOCAINE HYDROCHLORIDE 10 ML: 10 INJECTION, SOLUTION EPIDURAL; INFILTRATION; INTRACAUDAL; PERINEURAL at 09:07

## 2025-07-01 RX ADMIN — MIDAZOLAM 1 MG: 1 INJECTION INTRAMUSCULAR; INTRAVENOUS at 08:37

## 2025-07-01 RX ADMIN — Medication: at 09:07

## 2025-07-01 RX ADMIN — CEFAZOLIN 2000 MG: 1 INJECTION, POWDER, FOR SOLUTION INTRAVENOUS at 08:35

## 2025-07-01 ASSESSMENT — PAIN SCALES - GENERAL: PAINLEVEL_OUTOF10: 0

## 2025-07-01 NOTE — PROGRESS NOTES
Patient to pre-op 14 from IR. X2 incisions w/ gauze and tagfrantzr. CDI. VSS. On 2L nasal cannula which is patient's baseline @ home. Family @bedside.

## 2025-07-01 NOTE — BRIEF OP NOTE
Brief Postoperative Note    Isela Conner  YOB: 1945  [id number]    Pre-operative Diagnosis: Left breast cancer    Post-operative Diagnosis: Same    Procedure: Right chest wall port placement    Anesthesia: Local and Moderate Sedation    Surgeons/Assistants: Gm Ray M.D.    Estimated Blood Loss: less than 50     Complications: None    Specimens: Was Not Obtained    Findings: Successful right IJ approach port placement. Ready for immediate use.    Electronically signed by Gm Ray MD on 7/1/2025 at 9:15 AM

## 2025-07-01 NOTE — PROGRESS NOTES
Discharge instructions reviewed and understanding verbalized per pt/family with copy given. All home medications/new prescriptions have been reviewed, questions answered and patient/family state understanding. Medication information sheet provided for new prescriptions received when applicable. Patient's peripheral IV has been removed without complications. Patient has left unit with all belongings in stable condition.

## 2025-07-01 NOTE — PRE SEDATION
Sedation Pre-Procedure Note    Patient Name: Isela Conner  YOB: 1945  Medical Record Number: [id number]  Date:  7/1/25  Time: 8:07 AM    Indication:  Breast cancer - port placement    Consent: I have discussed with the patient and/or the patient representative the indication, alternatives, and the possible risks and/or complications of the planned procedure and the anesthesia methods. The patient and/or patient representative appear to understand and agree to proceed.    Vital Signs:   Vitals:    07/01/25 0757   BP: (!) 126/100   Pulse: 76   Resp: 16   Temp: 98.2 °F (36.8 °C)   SpO2: 100%       Past Medical History:  Past Medical History:   Diagnosis Date    CHF (congestive heart failure) (HCC)     CKD stage 3a, GFR 45-59 ml/min (HCC)     Diabetes mellitus (HCC)     Fatty liver     Severe    Femur fracture, left (HCC) 2021    spontaneous Left femur fracture    Fibromyalgia     Fracture, hip (HCC)     GERD (gastroesophageal reflux disease)     Hypertension     IBS (irritable bowel syndrome)     Osteoarthritis     Peripheral neuropathy     Postmenopausal     Presence of Watchman left atrial appendage closure device 04/14/2025    Pulmonary hypertension (HCC)     Scleroderma (HCC)     crest       Past Surgical History:  Past Surgical History:   Procedure Laterality Date    ARTHROPLASTY  08/10/2012    FIFTH TOE LEFT FOOT    BACK SURGERY      CHOLECYSTECTOMY  1989    CYSTOSCOPY  08/01/2018    with botox injection     EP DEVICE PROCEDURE N/A 4/14/2025    Watchman saturnino closure device performed by Torres Hector MD at [facility] CARDIAC CATH LAB    FEMORAL ENDARTERECTOMY Left 8/29/2024    LEFT FEMORAL ENDARTERECTOMY performed by Chang Manning II, MD at [facility] OR    FEMUR FRACTURE SURGERY      FIBULA FRACTURE SURGERY  2017    3 separate surgeries for a fractured left fibula tibial a closed done by Dr. Desai    HYSTERECTOMY (CERVIX STATUS UNKNOWN)  1987    INVASIVE VASCULAR Left 8/16/2024    Aortagram abdominal w

## 2025-07-01 NOTE — DISCHARGE INSTRUCTIONS
PORT-A-CATH INSERTION DISCHARGE INSTRUCTIONS    Rest today.    Advance to your regular diet as tolerated today.  You may apply ice (alternating ice on for 20min, and off for 30 min) to your incision to help with the discomfort of today's procedure.      You DO HAVE what is called a \"POWER PORT\", or power injectable port a cath.  This means you can have CT scan contrast infused in your port.    You can have labs drawn from your new port by an RN (RN only. Not a tech.)  Your Oncologist/Hemotologist will likely be the doctor to \"follow\" the care of your port and help you determine when/if it can be removed.  Your Oncologist/Hemotologist will help with questions/concerns.  Ordering provider: ***  Your port is ready for use after you leave the hospital today.  However, you may be Xrayed at times when it is used to confirm placement before use.   Interventions such as cathflo, alteplase, or activase may be used at times to return your port to fully function if it ceases to return blood or cannot be flushed.      Your doctor will order these interventions.  A partially functioning port should not be ignored, as it puts you at risk for developing a blood clot.    If you should need emergent care, your provider may opt to use a peripheral line for quick access to help you.    Otherwise, tell your providers right away that you have an active Power Port upon arrival so it can be utilized for blood draws and infusions.    Today:  Observe the operative area for signs of excessive bleeding. If needed, apply pressure, elevate if able, and contact your surgeon.  Observe the operative site for any signs of infection- such as increased pain, redness, fever greater than 101 degrees, swelling, foul odor or drainage.   Keep operative site clean and dry.  If glued, you may shower in 24 hours.  Do not remove dressing, if present, unless instructed to by surgeon.  Avoid pulling, pushing, or tugging at incision site.  Apply ice as directed

## 2025-07-02 ENCOUNTER — TELEPHONE (OUTPATIENT)
Dept: INTERVENTIONAL RADIOLOGY/VASCULAR | Age: 80
End: 2025-07-02

## 2025-07-03 ENCOUNTER — INITIAL CONSULT (OUTPATIENT)
Dept: SURGERY | Age: 80
End: 2025-07-03
Payer: MEDICARE

## 2025-07-03 VITALS
HEART RATE: 64 BPM | RESPIRATION RATE: 16 BRPM | HEIGHT: 67 IN | OXYGEN SATURATION: 97 % | BODY MASS INDEX: 24.96 KG/M2 | SYSTOLIC BLOOD PRESSURE: 125 MMHG | WEIGHT: 159 LBS | DIASTOLIC BLOOD PRESSURE: 64 MMHG

## 2025-07-03 DIAGNOSIS — C50.912 PRIMARY BREAST MALIGNANCY, LEFT (HCC): Primary | ICD-10-CM

## 2025-07-03 PROCEDURE — 3074F SYST BP LT 130 MM HG: CPT | Performed by: SURGERY

## 2025-07-03 PROCEDURE — G8420 CALC BMI NORM PARAMETERS: HCPCS | Performed by: SURGERY

## 2025-07-03 PROCEDURE — G8427 DOCREV CUR MEDS BY ELIG CLIN: HCPCS | Performed by: SURGERY

## 2025-07-03 PROCEDURE — 99205 OFFICE O/P NEW HI 60 MIN: CPT | Performed by: SURGERY

## 2025-07-03 PROCEDURE — 3078F DIAST BP <80 MM HG: CPT | Performed by: SURGERY

## 2025-07-03 PROCEDURE — 1090F PRES/ABSN URINE INCON ASSESS: CPT | Performed by: SURGERY

## 2025-08-01 RX ORDER — ATORVASTATIN CALCIUM 10 MG/1
10 TABLET, FILM COATED ORAL DAILY
Qty: 90 TABLET | Refills: 3 | Status: SHIPPED | OUTPATIENT
Start: 2025-08-01

## 2025-08-01 NOTE — TELEPHONE ENCOUNTER
Prescription refill    Last OV:05/28/2025    Last Refill:08/06/2024    Labs:05/20/2025 lipid    Future Appt:11/03/2025

## 2025-08-11 ENCOUNTER — HOSPITAL ENCOUNTER (INPATIENT)
Age: 80
LOS: 3 days | Discharge: HOME | End: 2025-08-14
Attending: EMERGENCY MEDICINE | Admitting: INTERNAL MEDICINE
Payer: MEDICARE

## 2025-08-11 ENCOUNTER — APPOINTMENT (OUTPATIENT)
Dept: CT IMAGING | Age: 80
End: 2025-08-11
Payer: MEDICARE

## 2025-08-11 DIAGNOSIS — A09 DIARRHEA OF INFECTIOUS ORIGIN: ICD-10-CM

## 2025-08-11 DIAGNOSIS — D63.0 ANEMIA IN NEOPLASTIC DISEASE: ICD-10-CM

## 2025-08-11 DIAGNOSIS — R53.1 GENERAL WEAKNESS: Primary | ICD-10-CM

## 2025-08-11 PROBLEM — D61.818 PANCYTOPENIA (HCC): Status: ACTIVE | Noted: 2025-08-11

## 2025-08-11 LAB
ABO/RH: NORMAL
ANION GAP SERPL CALCULATED.3IONS-SCNC: 9 MMOL/L (ref 3–16)
ANTIBODY SCREEN: NORMAL
BASE EXCESS BLDV CALC-SCNC: 1 MMOL/L (ref -3–3)
BASOPHILS # BLD: 0.1 K/UL (ref 0–0.2)
BASOPHILS NFR BLD: 3.6 %
BUN SERPL-MCNC: 20 MG/DL (ref 7–20)
CALCIUM SERPL-MCNC: 8.6 MG/DL (ref 8.3–10.6)
CHLORIDE SERPL-SCNC: 104 MMOL/L (ref 99–110)
CO2 BLDV-SCNC: 62 MMOL/L
CO2 SERPL-SCNC: 24 MMOL/L (ref 21–32)
COHGB MFR BLDV: 2.5 % (ref 0–1.5)
CREAT SERPL-MCNC: 1.2 MG/DL (ref 0.6–1.2)
DEPRECATED RDW RBC AUTO: 13.9 % (ref 12.4–15.4)
EOSINOPHIL # BLD: 0.1 K/UL (ref 0–0.6)
EOSINOPHIL NFR BLD: 4.5 %
GFR SERPLBLD CREATININE-BSD FMLA CKD-EPI: 45 ML/MIN/{1.73_M2}
GLUCOSE SERPL-MCNC: 121 MG/DL (ref 70–99)
HCO3 BLDV-SCNC: 26.3 MMOL/L (ref 23–29)
HCT VFR BLD AUTO: 13.3 % (ref 36–48)
HGB BLD-MCNC: 4.4 G/DL (ref 12–16)
LACTATE BLDV-SCNC: 1 MMOL/L (ref 0.4–1.9)
LYMPHOCYTES # BLD: 1.1 K/UL (ref 1–5.1)
LYMPHOCYTES NFR BLD: 62.5 %
MCH RBC QN AUTO: 29.4 PG (ref 26–34)
MCHC RBC AUTO-ENTMCNC: 33.3 G/DL (ref 31–36)
MCV RBC AUTO: 88.4 FL (ref 80–100)
METHGB MFR BLDV: 0.8 %
MONOCYTES # BLD: 0.2 K/UL (ref 0–1.3)
MONOCYTES NFR BLD: 12.1 %
NEUTROPHILS # BLD: 0.3 K/UL (ref 1.7–7.7)
NEUTROPHILS NFR BLD: 17.3 %
O2 CT VFR BLDV CALC: 11 VOL %
O2 THERAPY: ABNORMAL
PCO2 BLDV: 44.6 MMHG (ref 40–50)
PH BLDV: 7.38 [PH] (ref 7.35–7.45)
PLATELET # BLD AUTO: 258 K/UL (ref 135–450)
PMV BLD AUTO: 9.1 FL (ref 5–10.5)
PO2 BLDV: 133 MMHG (ref 25–40)
POTASSIUM SERPL-SCNC: 4.7 MMOL/L (ref 3.5–5.1)
PROCALCITONIN SERPL IA-MCNC: 0.07 NG/ML (ref 0–0.15)
RBC # BLD AUTO: 1.5 M/UL (ref 4–5.2)
SAO2 % BLDV: 99 %
SODIUM SERPL-SCNC: 137 MMOL/L (ref 136–145)
WBC # BLD AUTO: 1.7 K/UL (ref 4–11)

## 2025-08-11 PROCEDURE — 2700000000 HC OXYGEN THERAPY PER DAY

## 2025-08-11 PROCEDURE — 84145 PROCALCITONIN (PCT): CPT

## 2025-08-11 PROCEDURE — 96374 THER/PROPH/DIAG INJ IV PUSH: CPT

## 2025-08-11 PROCEDURE — 6360000002 HC RX W HCPCS: Performed by: EMERGENCY MEDICINE

## 2025-08-11 PROCEDURE — 82803 BLOOD GASES ANY COMBINATION: CPT

## 2025-08-11 PROCEDURE — 99285 EMERGENCY DEPT VISIT HI MDM: CPT

## 2025-08-11 PROCEDURE — 74176 CT ABD & PELVIS W/O CONTRAST: CPT

## 2025-08-11 PROCEDURE — 6360000002 HC RX W HCPCS

## 2025-08-11 PROCEDURE — 6370000000 HC RX 637 (ALT 250 FOR IP): Performed by: INTERNAL MEDICINE

## 2025-08-11 PROCEDURE — 80048 BASIC METABOLIC PNL TOTAL CA: CPT

## 2025-08-11 PROCEDURE — 1200000000 HC SEMI PRIVATE

## 2025-08-11 PROCEDURE — 30233N1 TRANSFUSION OF NONAUTOLOGOUS RED BLOOD CELLS INTO PERIPHERAL VEIN, PERCUTANEOUS APPROACH: ICD-10-PCS | Performed by: INTERNAL MEDICINE

## 2025-08-11 PROCEDURE — 36430 TRANSFUSION BLD/BLD COMPNT: CPT

## 2025-08-11 PROCEDURE — 86901 BLOOD TYPING SEROLOGIC RH(D): CPT

## 2025-08-11 PROCEDURE — 2500000003 HC RX 250 WO HCPCS: Performed by: INTERNAL MEDICINE

## 2025-08-11 PROCEDURE — 85025 COMPLETE CBC W/AUTO DIFF WBC: CPT

## 2025-08-11 PROCEDURE — 86900 BLOOD TYPING SEROLOGIC ABO: CPT

## 2025-08-11 PROCEDURE — 87040 BLOOD CULTURE FOR BACTERIA: CPT

## 2025-08-11 PROCEDURE — 96375 TX/PRO/DX INJ NEW DRUG ADDON: CPT

## 2025-08-11 PROCEDURE — P9022 WASHED RED BLOOD CELLS UNIT: HCPCS

## 2025-08-11 PROCEDURE — 83605 ASSAY OF LACTIC ACID: CPT

## 2025-08-11 PROCEDURE — 2580000003 HC RX 258: Performed by: EMERGENCY MEDICINE

## 2025-08-11 PROCEDURE — 86850 RBC ANTIBODY SCREEN: CPT

## 2025-08-11 PROCEDURE — 94760 N-INVAS EAR/PLS OXIMETRY 1: CPT

## 2025-08-11 PROCEDURE — 86923 COMPATIBILITY TEST ELECTRIC: CPT

## 2025-08-11 RX ORDER — CLOBETASOL PROPIONATE 0.5 MG/ML
SOLUTION TOPICAL 2 TIMES DAILY
COMMUNITY

## 2025-08-11 RX ORDER — SODIUM CHLORIDE 0.9 % (FLUSH) 0.9 %
5-40 SYRINGE (ML) INJECTION EVERY 12 HOURS SCHEDULED
Status: DISCONTINUED | OUTPATIENT
Start: 2025-08-11 | End: 2025-08-14 | Stop reason: HOSPADM

## 2025-08-11 RX ORDER — DILTIAZEM HYDROCHLORIDE 180 MG/1
180 CAPSULE, COATED, EXTENDED RELEASE ORAL DAILY
Status: DISCONTINUED | OUTPATIENT
Start: 2025-08-11 | End: 2025-08-14 | Stop reason: HOSPADM

## 2025-08-11 RX ORDER — METOPROLOL SUCCINATE 50 MG/1
100 TABLET, EXTENDED RELEASE ORAL DAILY
Status: DISCONTINUED | OUTPATIENT
Start: 2025-08-11 | End: 2025-08-14 | Stop reason: HOSPADM

## 2025-08-11 RX ORDER — HYDROMORPHONE HYDROCHLORIDE 1 MG/ML
0.5 INJECTION, SOLUTION INTRAMUSCULAR; INTRAVENOUS; SUBCUTANEOUS EVERY 4 HOURS PRN
Status: DISCONTINUED | OUTPATIENT
Start: 2025-08-11 | End: 2025-08-14 | Stop reason: HOSPADM

## 2025-08-11 RX ORDER — ACETAMINOPHEN 325 MG/1
650 TABLET ORAL EVERY 6 HOURS PRN
Status: DISCONTINUED | OUTPATIENT
Start: 2025-08-11 | End: 2025-08-14 | Stop reason: HOSPADM

## 2025-08-11 RX ORDER — HYDROXYCHLOROQUINE SULFATE 200 MG/1
200 TABLET, FILM COATED ORAL DAILY
Status: DISCONTINUED | OUTPATIENT
Start: 2025-08-11 | End: 2025-08-14 | Stop reason: HOSPADM

## 2025-08-11 RX ORDER — HYDROXYZINE HYDROCHLORIDE 25 MG/1
25 TABLET, FILM COATED ORAL NIGHTLY PRN
Status: DISCONTINUED | OUTPATIENT
Start: 2025-08-11 | End: 2025-08-14 | Stop reason: HOSPADM

## 2025-08-11 RX ORDER — SEMAGLUTIDE 1.34 MG/ML
1 INJECTION, SOLUTION SUBCUTANEOUS
COMMUNITY

## 2025-08-11 RX ORDER — NITROFURANTOIN 25; 75 MG/1; MG/1
100 CAPSULE ORAL NIGHTLY
Status: ON HOLD | COMMUNITY
Start: 2025-08-04 | End: 2025-08-14 | Stop reason: HOSPADM

## 2025-08-11 RX ORDER — SODIUM CHLORIDE 0.9 % (FLUSH) 0.9 %
5-40 SYRINGE (ML) INJECTION PRN
Status: DISCONTINUED | OUTPATIENT
Start: 2025-08-11 | End: 2025-08-14 | Stop reason: HOSPADM

## 2025-08-11 RX ORDER — ONDANSETRON 2 MG/ML
4 INJECTION INTRAMUSCULAR; INTRAVENOUS EVERY 6 HOURS PRN
Status: DISCONTINUED | OUTPATIENT
Start: 2025-08-11 | End: 2025-08-14 | Stop reason: HOSPADM

## 2025-08-11 RX ORDER — FENTANYL CITRATE 50 UG/ML
100 INJECTION, SOLUTION INTRAMUSCULAR; INTRAVENOUS ONCE
Status: COMPLETED | OUTPATIENT
Start: 2025-08-11 | End: 2025-08-11

## 2025-08-11 RX ORDER — AMIODARONE HYDROCHLORIDE 200 MG/1
100 TABLET ORAL DAILY
Status: DISCONTINUED | OUTPATIENT
Start: 2025-08-11 | End: 2025-08-14 | Stop reason: HOSPADM

## 2025-08-11 RX ORDER — 0.9 % SODIUM CHLORIDE 0.9 %
1000 INTRAVENOUS SOLUTION INTRAVENOUS ONCE
Status: COMPLETED | OUTPATIENT
Start: 2025-08-11 | End: 2025-08-11

## 2025-08-11 RX ORDER — PROCHLORPERAZINE MALEATE 5 MG/1
10 TABLET ORAL EVERY 6 HOURS PRN
Status: DISCONTINUED | OUTPATIENT
Start: 2025-08-11 | End: 2025-08-14 | Stop reason: HOSPADM

## 2025-08-11 RX ORDER — ATORVASTATIN CALCIUM 10 MG/1
10 TABLET, FILM COATED ORAL DAILY
Status: DISCONTINUED | OUTPATIENT
Start: 2025-08-11 | End: 2025-08-14 | Stop reason: HOSPADM

## 2025-08-11 RX ORDER — HYDROMORPHONE HYDROCHLORIDE 1 MG/ML
1 INJECTION, SOLUTION INTRAMUSCULAR; INTRAVENOUS; SUBCUTANEOUS ONCE
Status: COMPLETED | OUTPATIENT
Start: 2025-08-11 | End: 2025-08-11

## 2025-08-11 RX ORDER — HYDROCODONE BITARTRATE AND ACETAMINOPHEN 7.5; 325 MG/1; MG/1
1 TABLET ORAL EVERY 6 HOURS PRN
Status: DISCONTINUED | OUTPATIENT
Start: 2025-08-11 | End: 2025-08-14 | Stop reason: HOSPADM

## 2025-08-11 RX ORDER — MAGNESIUM SULFATE IN WATER 40 MG/ML
2000 INJECTION, SOLUTION INTRAVENOUS PRN
Status: DISCONTINUED | OUTPATIENT
Start: 2025-08-11 | End: 2025-08-14 | Stop reason: HOSPADM

## 2025-08-11 RX ORDER — HYDROXYZINE HYDROCHLORIDE 25 MG/1
25 TABLET, FILM COATED ORAL NIGHTLY PRN
COMMUNITY

## 2025-08-11 RX ORDER — SODIUM CHLORIDE 9 MG/ML
INJECTION, SOLUTION INTRAVENOUS PRN
Status: DISCONTINUED | OUTPATIENT
Start: 2025-08-11 | End: 2025-08-14 | Stop reason: HOSPADM

## 2025-08-11 RX ORDER — GABAPENTIN 300 MG/1
300 CAPSULE ORAL 3 TIMES DAILY
Status: DISCONTINUED | OUTPATIENT
Start: 2025-08-11 | End: 2025-08-14 | Stop reason: HOSPADM

## 2025-08-11 RX ORDER — POLYETHYLENE GLYCOL 3350 17 G/17G
17 POWDER, FOR SOLUTION ORAL DAILY PRN
Status: DISCONTINUED | OUTPATIENT
Start: 2025-08-11 | End: 2025-08-14 | Stop reason: HOSPADM

## 2025-08-11 RX ORDER — ACETAMINOPHEN 650 MG/1
650 SUPPOSITORY RECTAL EVERY 6 HOURS PRN
Status: DISCONTINUED | OUTPATIENT
Start: 2025-08-11 | End: 2025-08-14 | Stop reason: HOSPADM

## 2025-08-11 RX ORDER — ONDANSETRON 8 MG/1
8 TABLET, FILM COATED ORAL EVERY 8 HOURS PRN
COMMUNITY

## 2025-08-11 RX ORDER — PROCHLORPERAZINE MALEATE 10 MG
10 TABLET ORAL EVERY 6 HOURS PRN
COMMUNITY

## 2025-08-11 RX ORDER — FUROSEMIDE 20 MG/1
20 TABLET ORAL DAILY
Status: DISCONTINUED | OUTPATIENT
Start: 2025-08-12 | End: 2025-08-14 | Stop reason: HOSPADM

## 2025-08-11 RX ADMIN — DILTIAZEM HYDROCHLORIDE 180 MG: 180 CAPSULE, EXTENDED RELEASE ORAL at 22:12

## 2025-08-11 RX ADMIN — HYDROXYCHLOROQUINE SULFATE 200 MG: 200 TABLET ORAL at 22:13

## 2025-08-11 RX ADMIN — Medication 10 ML: at 21:24

## 2025-08-11 RX ADMIN — HYDROMORPHONE HYDROCHLORIDE 1 MG: 1 INJECTION, SOLUTION INTRAMUSCULAR; INTRAVENOUS; SUBCUTANEOUS at 17:31

## 2025-08-11 RX ADMIN — METOPROLOL SUCCINATE 100 MG: 50 TABLET, EXTENDED RELEASE ORAL at 22:13

## 2025-08-11 RX ADMIN — ATORVASTATIN CALCIUM 10 MG: 10 TABLET, FILM COATED ORAL at 22:12

## 2025-08-11 RX ADMIN — GABAPENTIN 300 MG: 300 CAPSULE ORAL at 22:12

## 2025-08-11 RX ADMIN — SODIUM CHLORIDE 1000 ML: 0.9 INJECTION, SOLUTION INTRAVENOUS at 17:11

## 2025-08-11 RX ADMIN — ONDANSETRON 4 MG: 2 INJECTION, SOLUTION INTRAMUSCULAR; INTRAVENOUS at 16:42

## 2025-08-11 RX ADMIN — FENTANYL CITRATE 100 MCG: 50 INJECTION INTRAMUSCULAR; INTRAVENOUS at 16:42

## 2025-08-11 RX ADMIN — HYDROMORPHONE HYDROCHLORIDE 0.5 MG: 1 INJECTION, SOLUTION INTRAMUSCULAR; INTRAVENOUS; SUBCUTANEOUS at 22:52

## 2025-08-11 RX ADMIN — AMIODARONE HYDROCHLORIDE 100 MG: 200 TABLET ORAL at 22:12

## 2025-08-11 ASSESSMENT — PAIN SCALES - GENERAL
PAINLEVEL_OUTOF10: 0
PAINLEVEL_OUTOF10: 10
PAINLEVEL_OUTOF10: 4
PAINLEVEL_OUTOF10: 0

## 2025-08-11 ASSESSMENT — PAIN - FUNCTIONAL ASSESSMENT: PAIN_FUNCTIONAL_ASSESSMENT: 0-10

## 2025-08-11 ASSESSMENT — PAIN DESCRIPTION - LOCATION: LOCATION: FOOT

## 2025-08-11 ASSESSMENT — LIFESTYLE VARIABLES
HOW OFTEN DO YOU HAVE A DRINK CONTAINING ALCOHOL: NEVER
HOW MANY STANDARD DRINKS CONTAINING ALCOHOL DO YOU HAVE ON A TYPICAL DAY: PATIENT DOES NOT DRINK

## 2025-08-11 ASSESSMENT — PAIN DESCRIPTION - ORIENTATION: ORIENTATION: LEFT

## 2025-08-12 LAB
ANION GAP SERPL CALCULATED.3IONS-SCNC: 8 MMOL/L (ref 3–16)
BACTERIA URNS QL MICRO: NORMAL /HPF
BASOPHILS # BLD: 0.1 K/UL (ref 0–0.2)
BASOPHILS NFR BLD: 3.8 %
BILIRUB UR QL STRIP.AUTO: NEGATIVE
BLOOD BANK DISPENSE STATUS: NORMAL
BLOOD BANK DISPENSE STATUS: NORMAL
BLOOD BANK PRODUCT CODE: NORMAL
BLOOD BANK PRODUCT CODE: NORMAL
BPU ID: NORMAL
BPU ID: NORMAL
BUN SERPL-MCNC: 16 MG/DL (ref 7–20)
CALCIUM SERPL-MCNC: 8.3 MG/DL (ref 8.3–10.6)
CHLORIDE SERPL-SCNC: 106 MMOL/L (ref 99–110)
CLARITY UR: CLEAR
CO2 SERPL-SCNC: 24 MMOL/L (ref 21–32)
COLOR UR: YELLOW
CREAT SERPL-MCNC: 1.1 MG/DL (ref 0.6–1.2)
DEPRECATED RDW RBC AUTO: 13.8 % (ref 12.4–15.4)
DESCRIPTION BLOOD BANK: NORMAL
DESCRIPTION BLOOD BANK: NORMAL
EOSINOPHIL # BLD: 0.1 K/UL (ref 0–0.6)
EOSINOPHIL NFR BLD: 3.7 %
EPI CELLS #/AREA URNS AUTO: 2 /HPF (ref 0–5)
GFR SERPLBLD CREATININE-BSD FMLA CKD-EPI: 50 ML/MIN/{1.73_M2}
GLUCOSE SERPL-MCNC: 80 MG/DL (ref 70–99)
GLUCOSE UR STRIP.AUTO-MCNC: NEGATIVE MG/DL
HCT VFR BLD AUTO: 32.5 % (ref 36–48)
HGB BLD-MCNC: 10.8 G/DL (ref 12–16)
HGB UR QL STRIP.AUTO: NEGATIVE
HYALINE CASTS #/AREA URNS AUTO: 1 /LPF (ref 0–8)
KETONES UR STRIP.AUTO-MCNC: NEGATIVE MG/DL
LEUKOCYTE ESTERASE UR QL STRIP.AUTO: ABNORMAL
LYMPHOCYTES # BLD: 0.9 K/UL (ref 1–5.1)
LYMPHOCYTES NFR BLD: 56.4 %
MCH RBC QN AUTO: 29.9 PG (ref 26–34)
MCHC RBC AUTO-ENTMCNC: 33.2 G/DL (ref 31–36)
MCV RBC AUTO: 90 FL (ref 80–100)
MONOCYTES # BLD: 0.2 K/UL (ref 0–1.3)
MONOCYTES NFR BLD: 13.1 %
NEUTROPHILS # BLD: 0.4 K/UL (ref 1.7–7.7)
NEUTROPHILS NFR BLD: 23 %
NITRITE UR QL STRIP.AUTO: NEGATIVE
PATH INTERP BLD-IMP: NO
PATH INTERP BLD-IMP: NORMAL
PH UR STRIP.AUTO: 5 [PH] (ref 5–8)
PLATELET # BLD AUTO: 174 K/UL (ref 135–450)
PMV BLD AUTO: 8.6 FL (ref 5–10.5)
POTASSIUM SERPL-SCNC: 4.8 MMOL/L (ref 3.5–5.1)
PROT UR STRIP.AUTO-MCNC: ABNORMAL MG/DL
RBC # BLD AUTO: 3.61 M/UL (ref 4–5.2)
RBC CLUMPS #/AREA URNS AUTO: 0 /HPF (ref 0–4)
SODIUM SERPL-SCNC: 138 MMOL/L (ref 136–145)
SP GR UR STRIP.AUTO: 1.01 (ref 1–1.03)
UA COMPLETE W REFLEX CULTURE PNL UR: ABNORMAL
UA DIPSTICK W REFLEX MICRO PNL UR: YES
URN SPEC COLLECT METH UR: ABNORMAL
UROBILINOGEN UR STRIP-ACNC: 0.2 E.U./DL
WBC # BLD AUTO: 1.6 K/UL (ref 4–11)
WBC #/AREA URNS AUTO: 1 /HPF (ref 0–5)

## 2025-08-12 PROCEDURE — 36430 TRANSFUSION BLD/BLD COMPNT: CPT

## 2025-08-12 PROCEDURE — 6370000000 HC RX 637 (ALT 250 FOR IP): Performed by: INTERNAL MEDICINE

## 2025-08-12 PROCEDURE — 2700000000 HC OXYGEN THERAPY PER DAY

## 2025-08-12 PROCEDURE — 6360000002 HC RX W HCPCS

## 2025-08-12 PROCEDURE — 2500000003 HC RX 250 WO HCPCS: Performed by: INTERNAL MEDICINE

## 2025-08-12 PROCEDURE — 6370000000 HC RX 637 (ALT 250 FOR IP)

## 2025-08-12 PROCEDURE — 36415 COLL VENOUS BLD VENIPUNCTURE: CPT

## 2025-08-12 PROCEDURE — 1200000000 HC SEMI PRIVATE

## 2025-08-12 PROCEDURE — 81001 URINALYSIS AUTO W/SCOPE: CPT

## 2025-08-12 PROCEDURE — 80048 BASIC METABOLIC PNL TOTAL CA: CPT

## 2025-08-12 PROCEDURE — 94760 N-INVAS EAR/PLS OXIMETRY 1: CPT

## 2025-08-12 PROCEDURE — 85025 COMPLETE CBC W/AUTO DIFF WBC: CPT

## 2025-08-12 RX ORDER — PHENAZOPYRIDINE HYDROCHLORIDE 100 MG/1
100 TABLET, FILM COATED ORAL
Status: COMPLETED | OUTPATIENT
Start: 2025-08-12 | End: 2025-08-12

## 2025-08-12 RX ORDER — CLOPIDOGREL BISULFATE 75 MG/1
75 TABLET ORAL DAILY
Status: DISCONTINUED | OUTPATIENT
Start: 2025-08-12 | End: 2025-08-14 | Stop reason: HOSPADM

## 2025-08-12 RX ORDER — BOSENTAN 125 MG/1
125 TABLET, FILM COATED ORAL 2 TIMES DAILY
Status: DISCONTINUED | OUTPATIENT
Start: 2025-08-12 | End: 2025-08-14 | Stop reason: HOSPADM

## 2025-08-12 RX ADMIN — Medication 10 ML: at 21:56

## 2025-08-12 RX ADMIN — HYDROXYCHLOROQUINE SULFATE 200 MG: 200 TABLET ORAL at 10:50

## 2025-08-12 RX ADMIN — HYDROMORPHONE HYDROCHLORIDE 0.5 MG: 1 INJECTION, SOLUTION INTRAMUSCULAR; INTRAVENOUS; SUBCUTANEOUS at 10:55

## 2025-08-12 RX ADMIN — HYDROXYZINE HYDROCHLORIDE 25 MG: 25 TABLET ORAL at 21:54

## 2025-08-12 RX ADMIN — HYDROCODONE BITARTRATE AND ACETAMINOPHEN 1 TABLET: 7.5; 325 TABLET ORAL at 15:03

## 2025-08-12 RX ADMIN — GABAPENTIN 300 MG: 300 CAPSULE ORAL at 10:49

## 2025-08-12 RX ADMIN — Medication 2 SPRAY: at 21:54

## 2025-08-12 RX ADMIN — HYDROMORPHONE HYDROCHLORIDE 0.5 MG: 1 INJECTION, SOLUTION INTRAMUSCULAR; INTRAVENOUS; SUBCUTANEOUS at 22:05

## 2025-08-12 RX ADMIN — PHENAZOPYRIDINE 100 MG: 100 TABLET ORAL at 21:55

## 2025-08-12 RX ADMIN — CLOPIDOGREL BISULFATE 75 MG: 75 TABLET, FILM COATED ORAL at 10:54

## 2025-08-12 RX ADMIN — DILTIAZEM HYDROCHLORIDE 180 MG: 180 CAPSULE, EXTENDED RELEASE ORAL at 10:50

## 2025-08-12 RX ADMIN — GABAPENTIN 300 MG: 300 CAPSULE ORAL at 21:54

## 2025-08-12 RX ADMIN — Medication 10 ML: at 10:51

## 2025-08-12 RX ADMIN — GABAPENTIN 300 MG: 300 CAPSULE ORAL at 15:01

## 2025-08-12 RX ADMIN — FUROSEMIDE 20 MG: 20 TABLET ORAL at 10:50

## 2025-08-12 RX ADMIN — ATORVASTATIN CALCIUM 10 MG: 10 TABLET, FILM COATED ORAL at 10:50

## 2025-08-12 RX ADMIN — METOPROLOL SUCCINATE 100 MG: 50 TABLET, EXTENDED RELEASE ORAL at 10:49

## 2025-08-12 RX ADMIN — AMIODARONE HYDROCHLORIDE 100 MG: 200 TABLET ORAL at 10:50

## 2025-08-12 ASSESSMENT — PAIN DESCRIPTION - DESCRIPTORS
DESCRIPTORS: ACHING;DISCOMFORT
DESCRIPTORS: BURNING
DESCRIPTORS: DISCOMFORT

## 2025-08-12 ASSESSMENT — PAIN - FUNCTIONAL ASSESSMENT
PAIN_FUNCTIONAL_ASSESSMENT: 0-10
PAIN_FUNCTIONAL_ASSESSMENT: PREVENTS OR INTERFERES SOME ACTIVE ACTIVITIES AND ADLS

## 2025-08-12 ASSESSMENT — PAIN SCALES - GENERAL
PAINLEVEL_OUTOF10: 5
PAINLEVEL_OUTOF10: 6
PAINLEVEL_OUTOF10: 7
PAINLEVEL_OUTOF10: 6
PAINLEVEL_OUTOF10: 7

## 2025-08-12 ASSESSMENT — PAIN DESCRIPTION - LOCATION
LOCATION: FOOT
LOCATION: OTHER (COMMENT)
LOCATION: FOOT

## 2025-08-12 ASSESSMENT — PAIN DESCRIPTION - ORIENTATION
ORIENTATION: LEFT
ORIENTATION: RIGHT;LEFT

## 2025-08-13 LAB
ANION GAP SERPL CALCULATED.3IONS-SCNC: 9 MMOL/L (ref 3–16)
BACTERIA URNS QL MICRO: ABNORMAL /HPF
BILIRUB UR QL STRIP.AUTO: NEGATIVE
BUN SERPL-MCNC: 15 MG/DL (ref 7–20)
CALCIUM SERPL-MCNC: 8.9 MG/DL (ref 8.3–10.6)
CHLORIDE SERPL-SCNC: 104 MMOL/L (ref 99–110)
CLARITY UR: CLEAR
CO2 SERPL-SCNC: 24 MMOL/L (ref 21–32)
COLOR UR: ABNORMAL
CREAT SERPL-MCNC: 1.1 MG/DL (ref 0.6–1.2)
EPI CELLS #/AREA URNS AUTO: 2 /HPF (ref 0–5)
GFR SERPLBLD CREATININE-BSD FMLA CKD-EPI: 50 ML/MIN/{1.73_M2}
GLUCOSE BLD-MCNC: 167 MG/DL (ref 70–99)
GLUCOSE SERPL-MCNC: 216 MG/DL (ref 70–99)
GLUCOSE UR STRIP.AUTO-MCNC: NEGATIVE MG/DL
HGB UR QL STRIP.AUTO: ABNORMAL
HYALINE CASTS #/AREA URNS AUTO: 3 /LPF (ref 0–8)
KETONES UR STRIP.AUTO-MCNC: NEGATIVE MG/DL
LEUKOCYTE ESTERASE UR QL STRIP.AUTO: ABNORMAL
NITRITE UR QL STRIP.AUTO: POSITIVE
PERFORMED ON: ABNORMAL
PH UR STRIP.AUTO: 5.5 [PH] (ref 5–8)
POTASSIUM SERPL-SCNC: 4.9 MMOL/L (ref 3.5–5.1)
PROT UR STRIP.AUTO-MCNC: 30 MG/DL
RBC CLUMPS #/AREA URNS AUTO: 3 /HPF (ref 0–4)
SODIUM SERPL-SCNC: 137 MMOL/L (ref 136–145)
SP GR UR STRIP.AUTO: 1.01 (ref 1–1.03)
UA COMPLETE W REFLEX CULTURE PNL UR: YES
UA DIPSTICK W REFLEX MICRO PNL UR: YES
URN SPEC COLLECT METH UR: ABNORMAL
UROBILINOGEN UR STRIP-ACNC: 1 E.U./DL
WBC #/AREA URNS AUTO: 28 /HPF (ref 0–5)

## 2025-08-13 PROCEDURE — 6360000002 HC RX W HCPCS

## 2025-08-13 PROCEDURE — 94761 N-INVAS EAR/PLS OXIMETRY MLT: CPT

## 2025-08-13 PROCEDURE — 85025 COMPLETE CBC W/AUTO DIFF WBC: CPT

## 2025-08-13 PROCEDURE — 2500000003 HC RX 250 WO HCPCS

## 2025-08-13 PROCEDURE — 81001 URINALYSIS AUTO W/SCOPE: CPT

## 2025-08-13 PROCEDURE — 2500000003 HC RX 250 WO HCPCS: Performed by: INTERNAL MEDICINE

## 2025-08-13 PROCEDURE — 1200000000 HC SEMI PRIVATE

## 2025-08-13 PROCEDURE — 36415 COLL VENOUS BLD VENIPUNCTURE: CPT

## 2025-08-13 PROCEDURE — 80048 BASIC METABOLIC PNL TOTAL CA: CPT

## 2025-08-13 PROCEDURE — 87086 URINE CULTURE/COLONY COUNT: CPT

## 2025-08-13 PROCEDURE — 6370000000 HC RX 637 (ALT 250 FOR IP): Performed by: INTERNAL MEDICINE

## 2025-08-13 PROCEDURE — 87077 CULTURE AEROBIC IDENTIFY: CPT

## 2025-08-13 PROCEDURE — 2700000000 HC OXYGEN THERAPY PER DAY

## 2025-08-13 PROCEDURE — 87186 SC STD MICRODIL/AGAR DIL: CPT

## 2025-08-13 RX ORDER — DEXTROSE MONOHYDRATE 100 MG/ML
INJECTION, SOLUTION INTRAVENOUS CONTINUOUS PRN
Status: DISCONTINUED | OUTPATIENT
Start: 2025-08-13 | End: 2025-08-14 | Stop reason: HOSPADM

## 2025-08-13 RX ORDER — GLUCAGON 1 MG/ML
1 KIT INJECTION PRN
Status: DISCONTINUED | OUTPATIENT
Start: 2025-08-13 | End: 2025-08-14 | Stop reason: HOSPADM

## 2025-08-13 RX ORDER — INSULIN GLARGINE 100 [IU]/ML
0.25 INJECTION, SOLUTION SUBCUTANEOUS NIGHTLY
Status: DISCONTINUED | OUTPATIENT
Start: 2025-08-14 | End: 2025-08-14 | Stop reason: HOSPADM

## 2025-08-13 RX ORDER — INSULIN LISPRO 100 [IU]/ML
0-4 INJECTION, SOLUTION INTRAVENOUS; SUBCUTANEOUS
Status: DISCONTINUED | OUTPATIENT
Start: 2025-08-13 | End: 2025-08-14 | Stop reason: HOSPADM

## 2025-08-13 RX ORDER — DEXTROSE MONOHYDRATE 100 MG/ML
INJECTION, SOLUTION INTRAVENOUS CONTINUOUS PRN
Status: DISCONTINUED | OUTPATIENT
Start: 2025-08-13 | End: 2025-08-13

## 2025-08-13 RX ORDER — PHENAZOPYRIDINE HYDROCHLORIDE 100 MG/1
100 TABLET, FILM COATED ORAL ONCE
Status: COMPLETED | OUTPATIENT
Start: 2025-08-13 | End: 2025-08-13

## 2025-08-13 RX ADMIN — HYDROMORPHONE HYDROCHLORIDE 0.5 MG: 1 INJECTION, SOLUTION INTRAMUSCULAR; INTRAVENOUS; SUBCUTANEOUS at 20:54

## 2025-08-13 RX ADMIN — HYDROCODONE BITARTRATE AND ACETAMINOPHEN 1 TABLET: 7.5; 325 TABLET ORAL at 15:21

## 2025-08-13 RX ADMIN — HYDROXYCHLOROQUINE SULFATE 200 MG: 200 TABLET ORAL at 08:27

## 2025-08-13 RX ADMIN — Medication 10 ML: at 08:27

## 2025-08-13 RX ADMIN — GABAPENTIN 300 MG: 300 CAPSULE ORAL at 15:15

## 2025-08-13 RX ADMIN — DILTIAZEM HYDROCHLORIDE 180 MG: 180 CAPSULE, EXTENDED RELEASE ORAL at 08:27

## 2025-08-13 RX ADMIN — GABAPENTIN 300 MG: 300 CAPSULE ORAL at 20:42

## 2025-08-13 RX ADMIN — PHENAZOPYRIDINE HYDROCHLORIDE 100 MG: 100 TABLET ORAL at 18:00

## 2025-08-13 RX ADMIN — METOPROLOL SUCCINATE 100 MG: 50 TABLET, EXTENDED RELEASE ORAL at 08:27

## 2025-08-13 RX ADMIN — FUROSEMIDE 20 MG: 20 TABLET ORAL at 08:27

## 2025-08-13 RX ADMIN — AMIODARONE HYDROCHLORIDE 100 MG: 200 TABLET ORAL at 08:27

## 2025-08-13 RX ADMIN — ATORVASTATIN CALCIUM 10 MG: 10 TABLET, FILM COATED ORAL at 08:27

## 2025-08-13 RX ADMIN — CLOPIDOGREL BISULFATE 75 MG: 75 TABLET, FILM COATED ORAL at 08:27

## 2025-08-13 RX ADMIN — Medication 10 ML: at 20:42

## 2025-08-13 RX ADMIN — GABAPENTIN 300 MG: 300 CAPSULE ORAL at 08:27

## 2025-08-13 RX ADMIN — WATER 1000 MG: 1 INJECTION INTRAMUSCULAR; INTRAVENOUS; SUBCUTANEOUS at 20:42

## 2025-08-13 ASSESSMENT — PAIN DESCRIPTION - LOCATION
LOCATION: FOOT

## 2025-08-13 ASSESSMENT — PAIN SCALES - GENERAL
PAINLEVEL_OUTOF10: 1
PAINLEVEL_OUTOF10: 8
PAINLEVEL_OUTOF10: 3
PAINLEVEL_OUTOF10: 0
PAINLEVEL_OUTOF10: 0
PAINLEVEL_OUTOF10: 7
PAINLEVEL_OUTOF10: 5

## 2025-08-13 ASSESSMENT — PAIN DESCRIPTION - DESCRIPTORS
DESCRIPTORS: DISCOMFORT
DESCRIPTORS: DISCOMFORT

## 2025-08-13 ASSESSMENT — PAIN DESCRIPTION - ORIENTATION
ORIENTATION: LEFT

## 2025-08-13 ASSESSMENT — PAIN - FUNCTIONAL ASSESSMENT
PAIN_FUNCTIONAL_ASSESSMENT: 0-10
PAIN_FUNCTIONAL_ASSESSMENT: 0-10

## 2025-08-14 ENCOUNTER — TELEPHONE (OUTPATIENT)
Dept: CARDIOLOGY CLINIC | Age: 80
End: 2025-08-14

## 2025-08-14 VITALS
HEIGHT: 67 IN | TEMPERATURE: 98 F | BODY MASS INDEX: 23.4 KG/M2 | SYSTOLIC BLOOD PRESSURE: 129 MMHG | DIASTOLIC BLOOD PRESSURE: 74 MMHG | OXYGEN SATURATION: 99 % | WEIGHT: 149.1 LBS | RESPIRATION RATE: 16 BRPM | HEART RATE: 64 BPM

## 2025-08-14 LAB
ANION GAP SERPL CALCULATED.3IONS-SCNC: 11 MMOL/L (ref 3–16)
BASOPHILS # BLD: 0 K/UL (ref 0–0.2)
BASOPHILS # BLD: 0.1 K/UL (ref 0–0.2)
BASOPHILS NFR BLD: 1 %
BASOPHILS NFR BLD: 3.4 %
BUN SERPL-MCNC: 15 MG/DL (ref 7–20)
CALCIUM SERPL-MCNC: 8.9 MG/DL (ref 8.3–10.6)
CHLORIDE SERPL-SCNC: 104 MMOL/L (ref 99–110)
CO2 SERPL-SCNC: 24 MMOL/L (ref 21–32)
CREAT SERPL-MCNC: 1.1 MG/DL (ref 0.6–1.2)
DEPRECATED RDW RBC AUTO: 14.1 % (ref 12.4–15.4)
DEPRECATED RDW RBC AUTO: 14.5 % (ref 12.4–15.4)
EOSINOPHIL # BLD: 0 K/UL (ref 0–0.6)
EOSINOPHIL # BLD: 0.1 K/UL (ref 0–0.6)
EOSINOPHIL NFR BLD: 1.5 %
EOSINOPHIL NFR BLD: 3 %
EST. AVERAGE GLUCOSE BLD GHB EST-MCNC: 137 MG/DL
GFR SERPLBLD CREATININE-BSD FMLA CKD-EPI: 50 ML/MIN/{1.73_M2}
GLUCOSE BLD-MCNC: 132 MG/DL (ref 70–99)
GLUCOSE BLD-MCNC: 136 MG/DL (ref 70–99)
GLUCOSE SERPL-MCNC: 136 MG/DL (ref 70–99)
HBA1C MFR BLD: 6.4 %
HCT VFR BLD AUTO: 34.8 % (ref 36–48)
HCT VFR BLD AUTO: 36 % (ref 36–48)
HGB BLD-MCNC: 11.7 G/DL (ref 12–16)
HGB BLD-MCNC: 12.1 G/DL (ref 12–16)
LYMPHOCYTES # BLD: 0.6 K/UL (ref 1–5.1)
LYMPHOCYTES # BLD: 1.1 K/UL (ref 1–5.1)
LYMPHOCYTES NFR BLD: 35.7 %
LYMPHOCYTES NFR BLD: 43 %
MCH RBC QN AUTO: 29.9 PG (ref 26–34)
MCH RBC QN AUTO: 30.2 PG (ref 26–34)
MCHC RBC AUTO-ENTMCNC: 33.5 G/DL (ref 31–36)
MCHC RBC AUTO-ENTMCNC: 33.6 G/DL (ref 31–36)
MCV RBC AUTO: 89.5 FL (ref 80–100)
MCV RBC AUTO: 89.8 FL (ref 80–100)
METAMYELOCYTES NFR BLD MANUAL: 2 %
MONOCYTES # BLD: 0.3 K/UL (ref 0–1.3)
MONOCYTES # BLD: 0.4 K/UL (ref 0–1.3)
MONOCYTES NFR BLD: 15 %
MONOCYTES NFR BLD: 19 %
NEUTROPHILS # BLD: 0.7 K/UL (ref 1.7–7.7)
NEUTROPHILS # BLD: 1 K/UL (ref 1.7–7.7)
NEUTROPHILS NFR BLD: 32 %
NEUTROPHILS NFR BLD: 40.4 %
NEUTS BAND NFR BLD MANUAL: 4 % (ref 0–7)
PATH INTERP BLD-IMP: NO
PERFORMED ON: ABNORMAL
PERFORMED ON: ABNORMAL
PLATELET # BLD AUTO: 196 K/UL (ref 135–450)
PLATELET # BLD AUTO: 209 K/UL (ref 135–450)
PLATELET BLD QL SMEAR: ADEQUATE
PMV BLD AUTO: 8.4 FL (ref 5–10.5)
PMV BLD AUTO: 8.5 FL (ref 5–10.5)
POLYCHROMASIA BLD QL SMEAR: ABNORMAL
POTASSIUM SERPL-SCNC: 4.4 MMOL/L (ref 3.5–5.1)
RBC # BLD AUTO: 3.88 M/UL (ref 4–5.2)
RBC # BLD AUTO: 4.03 M/UL (ref 4–5.2)
RBC MORPH BLD: NORMAL
SLIDE REVIEW: ABNORMAL
SODIUM SERPL-SCNC: 139 MMOL/L (ref 136–145)
WBC # BLD AUTO: 1.7 K/UL (ref 4–11)
WBC # BLD AUTO: 2.5 K/UL (ref 4–11)

## 2025-08-14 PROCEDURE — 85025 COMPLETE CBC W/AUTO DIFF WBC: CPT

## 2025-08-14 PROCEDURE — 80048 BASIC METABOLIC PNL TOTAL CA: CPT

## 2025-08-14 PROCEDURE — 2500000003 HC RX 250 WO HCPCS: Performed by: INTERNAL MEDICINE

## 2025-08-14 PROCEDURE — 83036 HEMOGLOBIN GLYCOSYLATED A1C: CPT

## 2025-08-14 PROCEDURE — 6370000000 HC RX 637 (ALT 250 FOR IP): Performed by: INTERNAL MEDICINE

## 2025-08-14 PROCEDURE — 36415 COLL VENOUS BLD VENIPUNCTURE: CPT

## 2025-08-14 RX ORDER — CEFUROXIME AXETIL 500 MG/1
500 TABLET ORAL 2 TIMES DAILY
Qty: 14 TABLET | Refills: 0 | Status: SHIPPED | OUTPATIENT
Start: 2025-08-14 | End: 2025-08-21

## 2025-08-14 RX ADMIN — ATORVASTATIN CALCIUM 10 MG: 10 TABLET, FILM COATED ORAL at 08:30

## 2025-08-14 RX ADMIN — GABAPENTIN 300 MG: 300 CAPSULE ORAL at 13:41

## 2025-08-14 RX ADMIN — HYDROXYCHLOROQUINE SULFATE 200 MG: 200 TABLET ORAL at 08:30

## 2025-08-14 RX ADMIN — DILTIAZEM HYDROCHLORIDE 180 MG: 180 CAPSULE, EXTENDED RELEASE ORAL at 08:30

## 2025-08-14 RX ADMIN — AMIODARONE HYDROCHLORIDE 100 MG: 200 TABLET ORAL at 08:30

## 2025-08-14 RX ADMIN — HYDROCODONE BITARTRATE AND ACETAMINOPHEN 1 TABLET: 7.5; 325 TABLET ORAL at 08:29

## 2025-08-14 RX ADMIN — METOPROLOL SUCCINATE 100 MG: 50 TABLET, EXTENDED RELEASE ORAL at 08:29

## 2025-08-14 RX ADMIN — Medication 10 ML: at 08:31

## 2025-08-14 RX ADMIN — CLOPIDOGREL BISULFATE 75 MG: 75 TABLET, FILM COATED ORAL at 08:28

## 2025-08-14 RX ADMIN — HYDROCODONE BITARTRATE AND ACETAMINOPHEN 1 TABLET: 7.5; 325 TABLET ORAL at 02:29

## 2025-08-14 RX ADMIN — FUROSEMIDE 20 MG: 20 TABLET ORAL at 08:30

## 2025-08-14 RX ADMIN — HYDROXYZINE HYDROCHLORIDE 25 MG: 25 TABLET ORAL at 04:32

## 2025-08-14 RX ADMIN — GABAPENTIN 300 MG: 300 CAPSULE ORAL at 08:28

## 2025-08-14 ASSESSMENT — PAIN SCALES - GENERAL
PAINLEVEL_OUTOF10: 3
PAINLEVEL_OUTOF10: 9
PAINLEVEL_OUTOF10: 0
PAINLEVEL_OUTOF10: 3

## 2025-08-14 ASSESSMENT — PAIN - FUNCTIONAL ASSESSMENT
PAIN_FUNCTIONAL_ASSESSMENT: 0-10
PAIN_FUNCTIONAL_ASSESSMENT: WONG-BAKER FACES
PAIN_FUNCTIONAL_ASSESSMENT: 0-10
PAIN_FUNCTIONAL_ASSESSMENT: 0-10

## 2025-08-14 ASSESSMENT — PAIN DESCRIPTION - LOCATION
LOCATION: OTHER (COMMENT)
LOCATION: FOOT
LOCATION: FOOT

## 2025-08-14 ASSESSMENT — PAIN SCALES - WONG BAKER: WONGBAKER_NUMERICALRESPONSE: NO HURT

## 2025-08-14 ASSESSMENT — PAIN DESCRIPTION - ORIENTATION
ORIENTATION: LEFT
ORIENTATION: LEFT

## 2025-08-14 ASSESSMENT — PAIN DESCRIPTION - DESCRIPTORS: DESCRIPTORS: DISCOMFORT;PINS AND NEEDLES

## 2025-08-15 LAB
BACTERIA BLD CULT ORG #2: NORMAL
BACTERIA BLD CULT: NORMAL

## 2025-08-15 RX ORDER — DIGOXIN 125 MCG
125 TABLET ORAL EVERY OTHER DAY
Qty: 45 TABLET | Refills: 1 | Status: SHIPPED | OUTPATIENT
Start: 2025-08-15

## 2025-08-16 LAB
BACTERIA UR CULT: ABNORMAL
BACTERIA UR CULT: ABNORMAL
ORGANISM: ABNORMAL
ORGANISM: ABNORMAL

## 2025-08-28 RX ORDER — METOPROLOL SUCCINATE 100 MG/1
100 TABLET, EXTENDED RELEASE ORAL DAILY
Qty: 90 TABLET | Refills: 3 | Status: SHIPPED | OUTPATIENT
Start: 2025-08-28

## (undated) DEVICE — ELECTRODE PT RET AD L9FT HI MOIST COND ADH HYDRGEL CORDED

## (undated) DEVICE — PAD, DEFIB, ADULT, RADIOTRANS, PHYSIO: Brand: MEDLINE

## (undated) DEVICE — SUTURE PERMAHAND SZ 4-0 L18IN NONABSORBABLE BLK SILK BRAID A183H

## (undated) DEVICE — PERCUTANEOUS ENTRY THINWALL NEEDLE  ONE-PART: Brand: COOK

## (undated) DEVICE — MEDIUM INTERVENTIONAL SPECIALTY SHIELD-YELLOW: Brand: RADPAD

## (undated) DEVICE — GUIDEWIRE VASC ANGLED 3 CM 0.035 INX180 CM SHORT STIFF GLIDE

## (undated) DEVICE — PACEMAKER PACK: Brand: MEDLINE INDUSTRIES, INC.

## (undated) DEVICE — CATHETER ANGIO 5FR L65CM 0.035IN VIS OMNI FLUSH-0 SFT TIP

## (undated) DEVICE — 4FR MICROPUNCTURE KIT STIFFEN

## (undated) DEVICE — SUTURE PERMAHAND SZ 2-0 L12X18IN NONABSORBABLE BLK SILK A185H

## (undated) DEVICE — GLOVE SURG SZ 7.5 L11.73IN FNGR THK9.8MIL STRW LTX POLYMER

## (undated) DEVICE — 1 X VERSACROSS CONNECT TRANSSEPTAL DILATOR (INCLUDING 1 X J-TIP MECHANICAL GUIDEWIRE); 1 X VERSACROSS RF WIRE (INCLUDING 1 X CONNECTOR CABLE (SINGLE USE)); 1 X DISPERSIVE ELECTRODE: Brand: VERSACROSS CONNECT LAAC ACCESS SOLUTION

## (undated) DEVICE — SHEET, T, LAPAROTOMY, STERILE: Brand: MEDLINE

## (undated) DEVICE — Device: Brand: NOMOLINE™ LH ADULT NASAL CO2 CANNULA WITH O2 4M

## (undated) DEVICE — PERCLOSE™ PROSTYLE™ SUTURE-MEDIATED CLOSURE AND REPAIR SYSTEM: Brand: PERCLOSE™ PROSTYLE™

## (undated) DEVICE — 1 X VERSACROSS STEERABLE SHEATH (INCLUDING  1 X DILATOR AND 1 X J-TIP GUIDEWIRE); 1 X VERSACROSS RF WIRE (INCLUDING 1 X CONNECTOR CABLE (SINGLE USE)); 1 X DISPERSIVE ELECTRODE: Brand: VERSACROSS STEERABLE ACCESS SOLUTION

## (undated) DEVICE — ACCESS SHEATH WITH DILATOR: Brand: WATCHMAN FXD CURVE™ ACCESS SYSTEM

## (undated) DEVICE — RADIFOCUS GLIDECATH: Brand: GLIDECATH

## (undated) DEVICE — PAD THRM M SPL TORSO

## (undated) DEVICE — BLADE ES ELASTOMERIC COAT INSUL DURABLE BEND UPTO 90DEG

## (undated) DEVICE — MASIMOSET LNCS ADTX SPO2 ADULT PULSE OXIMETER ADHESIVE SENSOR: Brand: MASIMOSET® LNCS® ADTX SPO2 ADULT PULSE OXIMETER ADHESIVE SENSOR

## (undated) DEVICE — SUTURE PROL SZ 6-0 L24IN NONABSORBABLE BLU L13MM C-1 3/8 8726H

## (undated) DEVICE — MAJOR VASCULAR: Brand: MEDLINE INDUSTRIES, INC.

## (undated) DEVICE — CHECK-FLO PERFORMER INTRODUCER: Brand: PERFORMER

## (undated) DEVICE — SUTURE PROL SZ 6-0 L24IN NONABSORBABLE BLU L9.3MM BV-1 3/8 8805H

## (undated) DEVICE — SOLUTION IRRIG 1000ML 0.9% SOD CHL USP POUR PLAS BTL

## (undated) DEVICE — PROBE COVER KIT: Brand: MEDLINE INDUSTRIES, INC.

## (undated) DEVICE — KIT MFLD ISOLATN NACL CNTRST PRT TBNG SPIK W/ PRSS TRNSDUC

## (undated) DEVICE — STRIP,CLOSURE,WOUND,MEDI-STRIP,1/2X4: Brand: MEDLINE

## (undated) DEVICE — SUTURE MONOCRYL + SZ 4-0 L27IN ABSRB UD L19MM PS-2 3/8 CIR MCP426H

## (undated) DEVICE — COVER LT HNDL UNIV FOR LNG HNDL KOVER 1 PER PK

## (undated) DEVICE — GUIDEWIRE ANGIO L150CM OD0.035IN STR FIX PTFE SLD SUPP

## (undated) DEVICE — DRAPE,REIN 53X77,STERILE: Brand: MEDLINE

## (undated) DEVICE — RESTRAINT EXT ANK WRST LT BLU FOAM 2 STRP SIDE BCKL HK AND

## (undated) DEVICE — LIQUIBAND RAPID ADHESIVE 36/CS 0.8ML: Brand: MEDLINE

## (undated) DEVICE — KIT AT-X65 ANGIOTOUCH HAND CONTROLLER

## (undated) DEVICE — BLANKET WRM W29.9XL79.1IN UP BODY FORC AIR MISTRAL-AIR

## (undated) DEVICE — DRESSING HEMOSTATIC INTVENT W/O SLT QUIKCLOT

## (undated) DEVICE — FOGARTY - HYDRAGRIP SURGICAL - CLAMP INSERTS: Brand: FOGARTY SOFTJAW

## (undated) DEVICE — CATHETER ETER ANGIO AD 6FR L110CM DIA0054IN TIP L46CM GWIRE

## (undated) DEVICE — PINNACLE INTRODUCER SHEATH: Brand: PINNACLE

## (undated) DEVICE — CATH LAB PACK: Brand: MEDLINE INDUSTRIES, INC.

## (undated) DEVICE — SUTURE VICRYL + SZ 2-0 L36IN ABSRB UD L36MM CT-1 1/2 CIR VCP945H